# Patient Record
Sex: MALE | Race: WHITE | NOT HISPANIC OR LATINO | Employment: OTHER | URBAN - METROPOLITAN AREA
[De-identification: names, ages, dates, MRNs, and addresses within clinical notes are randomized per-mention and may not be internally consistent; named-entity substitution may affect disease eponyms.]

---

## 2017-10-19 ENCOUNTER — PATIENT MESSAGE (OUTPATIENT)
Dept: HEALTH INFORMATION MANAGEMENT | Facility: OTHER | Age: 72
End: 2017-10-19

## 2018-02-20 PROBLEM — Z91.148 CONTROLLED SUBSTANCE AGREEMENT BROKEN: Status: ACTIVE | Noted: 2018-02-20

## 2018-04-03 PROBLEM — Z91.148 CONTROLLED SUBSTANCE AGREEMENT BROKEN: Status: RESOLVED | Noted: 2018-02-20 | Resolved: 2018-04-03

## 2020-02-19 PROBLEM — G93.40 ENCEPHALOPATHY ACUTE: Status: ACTIVE | Noted: 2020-02-19

## 2020-02-19 PROBLEM — R29.6 RECURRENT FALLS: Status: ACTIVE | Noted: 2020-02-19

## 2020-02-19 PROBLEM — R79.1 SUBTHERAPEUTIC INTERNATIONAL NORMALIZED RATIO (INR): Status: ACTIVE | Noted: 2020-02-19

## 2020-02-19 PROBLEM — B35.6 TINEA CRURIS: Status: ACTIVE | Noted: 2020-02-19

## 2020-05-05 PROBLEM — R35.0 BENIGN PROSTATIC HYPERPLASIA WITH URINARY FREQUENCY: Status: ACTIVE | Noted: 2020-05-05

## 2020-05-05 PROBLEM — R31.29 MICROSCOPIC HEMATURIA: Status: ACTIVE | Noted: 2020-05-05

## 2020-05-05 PROBLEM — N28.1 ACQUIRED RENAL CYST: Status: ACTIVE | Noted: 2020-05-05

## 2020-05-05 PROBLEM — N40.1 BENIGN PROSTATIC HYPERPLASIA WITH URINARY FREQUENCY: Status: ACTIVE | Noted: 2020-05-05

## 2020-05-05 PROBLEM — R35.0 FREQUENCY OF MICTURITION: Status: ACTIVE | Noted: 2020-05-05

## 2020-05-05 PROBLEM — R35.1 NOCTURIA: Status: ACTIVE | Noted: 2020-05-05

## 2022-01-01 ENCOUNTER — APPOINTMENT (OUTPATIENT)
Dept: RADIOLOGY | Facility: MEDICAL CENTER | Age: 77
DRG: 870 | End: 2022-01-01
Attending: STUDENT IN AN ORGANIZED HEALTH CARE EDUCATION/TRAINING PROGRAM
Payer: COMMERCIAL

## 2022-01-01 ENCOUNTER — APPOINTMENT (OUTPATIENT)
Dept: RADIOLOGY | Facility: MEDICAL CENTER | Age: 77
DRG: 870 | End: 2022-01-01
Payer: COMMERCIAL

## 2022-01-01 ENCOUNTER — APPOINTMENT (OUTPATIENT)
Dept: RADIOLOGY | Facility: MEDICAL CENTER | Age: 77
DRG: 870 | End: 2022-01-01
Attending: INTERNAL MEDICINE
Payer: COMMERCIAL

## 2022-01-01 ENCOUNTER — APPOINTMENT (OUTPATIENT)
Dept: RADIOLOGY | Facility: MEDICAL CENTER | Age: 77
DRG: 870 | End: 2022-01-01
Attending: EMERGENCY MEDICINE
Payer: COMMERCIAL

## 2022-01-01 ENCOUNTER — HOSPITAL ENCOUNTER (OUTPATIENT)
Dept: RADIOLOGY | Facility: MEDICAL CENTER | Age: 77
End: 2022-11-04

## 2022-01-01 ENCOUNTER — HOSPITAL ENCOUNTER (INPATIENT)
Facility: MEDICAL CENTER | Age: 77
LOS: 36 days | DRG: 870 | End: 2022-12-10
Attending: EMERGENCY MEDICINE | Admitting: EMERGENCY MEDICINE
Payer: COMMERCIAL

## 2022-01-01 ENCOUNTER — APPOINTMENT (OUTPATIENT)
Dept: CARDIOLOGY | Facility: MEDICAL CENTER | Age: 77
DRG: 870 | End: 2022-01-01
Attending: EMERGENCY MEDICINE
Payer: COMMERCIAL

## 2022-01-01 ENCOUNTER — HOSPITAL ENCOUNTER (INPATIENT)
Facility: REHABILITATION | Age: 77
End: 2022-01-01
Attending: PHYSICAL MEDICINE & REHABILITATION | Admitting: PHYSICAL MEDICINE & REHABILITATION
Payer: COMMERCIAL

## 2022-01-01 ENCOUNTER — HOSPITAL ENCOUNTER (OUTPATIENT)
Dept: RADIOLOGY | Facility: MEDICAL CENTER | Age: 77
End: 2022-11-06
Attending: EMERGENCY MEDICINE
Payer: COMMERCIAL

## 2022-01-01 ENCOUNTER — PATIENT OUTREACH (OUTPATIENT)
Dept: SCHEDULING | Facility: IMAGING CENTER | Age: 77
End: 2022-01-01
Payer: COMMERCIAL

## 2022-01-01 VITALS
OXYGEN SATURATION: 87 % | DIASTOLIC BLOOD PRESSURE: 76 MMHG | RESPIRATION RATE: 25 BRPM | BODY MASS INDEX: 32.25 KG/M2 | SYSTOLIC BLOOD PRESSURE: 136 MMHG | WEIGHT: 251.32 LBS | TEMPERATURE: 99.1 F | HEART RATE: 90 BPM | HEIGHT: 74 IN

## 2022-01-01 DIAGNOSIS — R29.6 RECURRENT FALLS: ICD-10-CM

## 2022-01-01 DIAGNOSIS — A41.01: ICD-10-CM

## 2022-01-01 DIAGNOSIS — R65.21 SEPTIC SHOCK (HCC): ICD-10-CM

## 2022-01-01 DIAGNOSIS — A41.9 SEPTIC SHOCK (HCC): ICD-10-CM

## 2022-01-01 DIAGNOSIS — Z71.89 GOALS OF CARE, COUNSELING/DISCUSSION: ICD-10-CM

## 2022-01-01 DIAGNOSIS — N17.9 AKI (ACUTE KIDNEY INJURY) (HCC): ICD-10-CM

## 2022-01-01 DIAGNOSIS — K68.3 NONTRAUMATIC RETROPERITONEAL HEMATOMA: ICD-10-CM

## 2022-01-01 DIAGNOSIS — R65.21: ICD-10-CM

## 2022-01-01 LAB
ABO GROUP BLD: NORMAL
ABO GROUP BLD: NORMAL
ALBUMIN SERPL BCP-MCNC: 2.1 G/DL (ref 3.2–4.9)
ALBUMIN SERPL BCP-MCNC: 2.2 G/DL (ref 3.2–4.9)
ALBUMIN SERPL BCP-MCNC: 2.2 G/DL (ref 3.2–4.9)
ALBUMIN SERPL BCP-MCNC: 2.4 G/DL (ref 3.2–4.9)
ALBUMIN SERPL BCP-MCNC: 2.5 G/DL (ref 3.2–4.9)
ALBUMIN SERPL BCP-MCNC: 2.6 G/DL (ref 3.2–4.9)
ALBUMIN SERPL BCP-MCNC: 2.6 G/DL (ref 3.2–4.9)
ALBUMIN SERPL BCP-MCNC: 2.7 G/DL (ref 3.2–4.9)
ALBUMIN SERPL BCP-MCNC: 2.7 G/DL (ref 3.2–4.9)
ALBUMIN SERPL BCP-MCNC: 2.9 G/DL (ref 3.2–4.9)
ALBUMIN SERPL BCP-MCNC: 3 G/DL (ref 3.2–4.9)
ALBUMIN SERPL BCP-MCNC: 3.3 G/DL (ref 3.2–4.9)
ALBUMIN SERPL BCP-MCNC: 3.3 G/DL (ref 3.2–4.9)
ALBUMIN SERPL BCP-MCNC: 3.4 G/DL (ref 3.2–4.9)
ALBUMIN/GLOB SERPL: 0.5 G/DL
ALBUMIN/GLOB SERPL: 0.6 G/DL
ALBUMIN/GLOB SERPL: 1 G/DL
ALBUMIN/GLOB SERPL: 1.1 G/DL
ALBUMIN/GLOB SERPL: 1.1 G/DL
ALP SERPL-CCNC: 117 U/L (ref 30–99)
ALP SERPL-CCNC: 125 U/L (ref 30–99)
ALP SERPL-CCNC: 136 U/L (ref 30–99)
ALP SERPL-CCNC: 149 U/L (ref 30–99)
ALP SERPL-CCNC: 153 U/L (ref 30–99)
ALP SERPL-CCNC: 158 U/L (ref 30–99)
ALP SERPL-CCNC: 166 U/L (ref 30–99)
ALP SERPL-CCNC: 221 U/L (ref 30–99)
ALP SERPL-CCNC: 240 U/L (ref 30–99)
ALP SERPL-CCNC: 303 U/L (ref 30–99)
ALP SERPL-CCNC: 307 U/L (ref 30–99)
ALP SERPL-CCNC: 63 U/L (ref 30–99)
ALP SERPL-CCNC: 64 U/L (ref 30–99)
ALP SERPL-CCNC: 70 U/L (ref 30–99)
ALT SERPL-CCNC: 11 U/L (ref 2–50)
ALT SERPL-CCNC: 11 U/L (ref 2–50)
ALT SERPL-CCNC: 12 U/L (ref 2–50)
ALT SERPL-CCNC: 13 U/L (ref 2–50)
ALT SERPL-CCNC: 13 U/L (ref 2–50)
ALT SERPL-CCNC: 17 U/L (ref 2–50)
ALT SERPL-CCNC: 19 U/L (ref 2–50)
ALT SERPL-CCNC: 21 U/L (ref 2–50)
ALT SERPL-CCNC: 22 U/L (ref 2–50)
ALT SERPL-CCNC: 23 U/L (ref 2–50)
ALT SERPL-CCNC: 26 U/L (ref 2–50)
ALT SERPL-CCNC: 7 U/L (ref 2–50)
ALT SERPL-CCNC: 9 U/L (ref 2–50)
ALT SERPL-CCNC: 9 U/L (ref 2–50)
AMMONIA PLAS-SCNC: 23 UMOL/L (ref 11–45)
AMORPH CRY #/AREA URNS HPF: PRESENT /HPF
ANION GAP SERPL CALC-SCNC: 10 MMOL/L (ref 7–16)
ANION GAP SERPL CALC-SCNC: 11 MMOL/L (ref 7–16)
ANION GAP SERPL CALC-SCNC: 12 MMOL/L (ref 7–16)
ANION GAP SERPL CALC-SCNC: 12 MMOL/L (ref 7–16)
ANION GAP SERPL CALC-SCNC: 13 MMOL/L (ref 7–16)
ANION GAP SERPL CALC-SCNC: 14 MMOL/L (ref 7–16)
ANION GAP SERPL CALC-SCNC: 15 MMOL/L (ref 7–16)
ANION GAP SERPL CALC-SCNC: 16 MMOL/L (ref 7–16)
ANION GAP SERPL CALC-SCNC: 20 MMOL/L (ref 7–16)
ANION GAP SERPL CALC-SCNC: 22 MMOL/L (ref 7–16)
ANISOCYTOSIS BLD QL SMEAR: ABNORMAL
APPEARANCE UR: ABNORMAL
APTT PPP: 25.6 SEC (ref 24.7–36)
APTT PPP: 39.2 SEC (ref 24.7–36)
AST SERPL-CCNC: 18 U/L (ref 12–45)
AST SERPL-CCNC: 20 U/L (ref 12–45)
AST SERPL-CCNC: 22 U/L (ref 12–45)
AST SERPL-CCNC: 22 U/L (ref 12–45)
AST SERPL-CCNC: 23 U/L (ref 12–45)
AST SERPL-CCNC: 24 U/L (ref 12–45)
AST SERPL-CCNC: 25 U/L (ref 12–45)
AST SERPL-CCNC: 26 U/L (ref 12–45)
AST SERPL-CCNC: 30 U/L (ref 12–45)
AST SERPL-CCNC: 34 U/L (ref 12–45)
AST SERPL-CCNC: 35 U/L (ref 12–45)
AST SERPL-CCNC: 43 U/L (ref 12–45)
AST SERPL-CCNC: 65 U/L (ref 12–45)
AST SERPL-CCNC: 68 U/L (ref 12–45)
BACTERIA #/AREA URNS HPF: ABNORMAL /HPF
BACTERIA #/AREA URNS HPF: ABNORMAL /HPF
BACTERIA #/AREA URNS HPF: NEGATIVE /HPF
BACTERIA #/AREA URNS HPF: NEGATIVE /HPF
BACTERIA BLD CULT: NORMAL
BACTERIA SPEC RESP CULT: ABNORMAL
BACTERIA SPEC RESP CULT: ABNORMAL
BACTERIA UR CULT: NORMAL
BARCODED ABORH UBTYP: 2800
BARCODED ABORH UBTYP: 5100
BARCODED ABORH UBTYP: 6200
BARCODED PRD CODE UBPRD: NORMAL
BARCODED UNIT NUM UBUNT: NORMAL
BASE EXCESS BLDA CALC-SCNC: -1 MMOL/L (ref -4–3)
BASE EXCESS BLDA CALC-SCNC: -16 MMOL/L (ref -4–3)
BASE EXCESS BLDA CALC-SCNC: -17 MMOL/L (ref -4–3)
BASE EXCESS BLDA CALC-SCNC: -18 MMOL/L (ref -4–3)
BASE EXCESS BLDA CALC-SCNC: -2 MMOL/L (ref -4–3)
BASOPHILS # BLD AUTO: 0 % (ref 0–1.8)
BASOPHILS # BLD AUTO: 0.1 % (ref 0–1.8)
BASOPHILS # BLD AUTO: 0.2 % (ref 0–1.8)
BASOPHILS # BLD AUTO: 0.3 % (ref 0–1.8)
BASOPHILS # BLD AUTO: 0.3 % (ref 0–1.8)
BASOPHILS # BLD AUTO: 0.4 % (ref 0–1.8)
BASOPHILS # BLD AUTO: 0.5 % (ref 0–1.8)
BASOPHILS # BLD AUTO: 0.5 % (ref 0–1.8)
BASOPHILS # BLD AUTO: 0.6 % (ref 0–1.8)
BASOPHILS # BLD: 0 K/UL (ref 0–0.12)
BASOPHILS # BLD: 0.01 K/UL (ref 0–0.12)
BASOPHILS # BLD: 0.02 K/UL (ref 0–0.12)
BASOPHILS # BLD: 0.03 K/UL (ref 0–0.12)
BASOPHILS # BLD: 0.03 K/UL (ref 0–0.12)
BASOPHILS # BLD: 0.04 K/UL (ref 0–0.12)
BASOPHILS # BLD: 0.05 K/UL (ref 0–0.12)
BILIRUB SERPL-MCNC: 0.4 MG/DL (ref 0.1–1.5)
BILIRUB SERPL-MCNC: 0.7 MG/DL (ref 0.1–1.5)
BILIRUB SERPL-MCNC: 0.9 MG/DL (ref 0.1–1.5)
BILIRUB SERPL-MCNC: 1 MG/DL (ref 0.1–1.5)
BILIRUB SERPL-MCNC: 1 MG/DL (ref 0.1–1.5)
BILIRUB SERPL-MCNC: 1.1 MG/DL (ref 0.1–1.5)
BILIRUB SERPL-MCNC: 1.1 MG/DL (ref 0.1–1.5)
BILIRUB SERPL-MCNC: 1.2 MG/DL (ref 0.1–1.5)
BILIRUB SERPL-MCNC: 1.2 MG/DL (ref 0.1–1.5)
BILIRUB SERPL-MCNC: 1.3 MG/DL (ref 0.1–1.5)
BILIRUB SERPL-MCNC: 1.5 MG/DL (ref 0.1–1.5)
BILIRUB SERPL-MCNC: 1.6 MG/DL (ref 0.1–1.5)
BILIRUB UR QL STRIP.AUTO: ABNORMAL
BILIRUB UR QL STRIP.AUTO: ABNORMAL
BILIRUB UR QL STRIP.AUTO: NEGATIVE
BILIRUB UR QL STRIP.AUTO: NEGATIVE
BLD GP AB SCN SERPL QL: NORMAL
BLD GP AB SCN SERPL QL: NORMAL
BODY TEMPERATURE: ABNORMAL DEGREES
BREATHS SETTING VENT: 16
BREATHS SETTING VENT: 22
BREATHS SETTING VENT: 26
BREATHS SETTING VENT: 26
BUN SERPL-MCNC: 107 MG/DL (ref 8–22)
BUN SERPL-MCNC: 37 MG/DL (ref 8–22)
BUN SERPL-MCNC: 41 MG/DL (ref 8–22)
BUN SERPL-MCNC: 42 MG/DL (ref 8–22)
BUN SERPL-MCNC: 44 MG/DL (ref 8–22)
BUN SERPL-MCNC: 47 MG/DL (ref 8–22)
BUN SERPL-MCNC: 49 MG/DL (ref 8–22)
BUN SERPL-MCNC: 53 MG/DL (ref 8–22)
BUN SERPL-MCNC: 54 MG/DL (ref 8–22)
BUN SERPL-MCNC: 55 MG/DL (ref 8–22)
BUN SERPL-MCNC: 56 MG/DL (ref 8–22)
BUN SERPL-MCNC: 58 MG/DL (ref 8–22)
BUN SERPL-MCNC: 59 MG/DL (ref 8–22)
BUN SERPL-MCNC: 60 MG/DL (ref 8–22)
BUN SERPL-MCNC: 63 MG/DL (ref 8–22)
BUN SERPL-MCNC: 67 MG/DL (ref 8–22)
BUN SERPL-MCNC: 68 MG/DL (ref 8–22)
BUN SERPL-MCNC: 71 MG/DL (ref 8–22)
BUN SERPL-MCNC: 74 MG/DL (ref 8–22)
BUN SERPL-MCNC: 75 MG/DL (ref 8–22)
BUN SERPL-MCNC: 77 MG/DL (ref 8–22)
BUN SERPL-MCNC: 77 MG/DL (ref 8–22)
BUN SERPL-MCNC: 78 MG/DL (ref 8–22)
BUN SERPL-MCNC: 80 MG/DL (ref 8–22)
BUN SERPL-MCNC: 84 MG/DL (ref 8–22)
BUN SERPL-MCNC: 84 MG/DL (ref 8–22)
BUN SERPL-MCNC: 86 MG/DL (ref 8–22)
BUN SERPL-MCNC: 86 MG/DL (ref 8–22)
BUN SERPL-MCNC: 89 MG/DL (ref 8–22)
BUN SERPL-MCNC: 90 MG/DL (ref 8–22)
BUN SERPL-MCNC: 93 MG/DL (ref 8–22)
CA-I BLD ISE-SCNC: 0.97 MMOL/L (ref 1.1–1.3)
CA-I SERPL-SCNC: 1 MMOL/L (ref 1.1–1.3)
CALCIUM SERPL-MCNC: 6.7 MG/DL (ref 8.5–10.5)
CALCIUM SERPL-MCNC: 7.4 MG/DL (ref 8.5–10.5)
CALCIUM SERPL-MCNC: 7.5 MG/DL (ref 8.5–10.5)
CALCIUM SERPL-MCNC: 7.5 MG/DL (ref 8.5–10.5)
CALCIUM SERPL-MCNC: 7.7 MG/DL (ref 8.5–10.5)
CALCIUM SERPL-MCNC: 7.9 MG/DL (ref 8.5–10.5)
CALCIUM SERPL-MCNC: 8 MG/DL (ref 8.5–10.5)
CALCIUM SERPL-MCNC: 8 MG/DL (ref 8.5–10.5)
CALCIUM SERPL-MCNC: 8.1 MG/DL (ref 8.5–10.5)
CALCIUM SERPL-MCNC: 8.1 MG/DL (ref 8.5–10.5)
CALCIUM SERPL-MCNC: 8.2 MG/DL (ref 8.5–10.5)
CALCIUM SERPL-MCNC: 8.2 MG/DL (ref 8.5–10.5)
CALCIUM SERPL-MCNC: 8.3 MG/DL (ref 8.5–10.5)
CALCIUM SERPL-MCNC: 8.3 MG/DL (ref 8.5–10.5)
CALCIUM SERPL-MCNC: 8.4 MG/DL (ref 8.5–10.5)
CALCIUM SERPL-MCNC: 8.5 MG/DL (ref 8.5–10.5)
CALCIUM SERPL-MCNC: 8.6 MG/DL (ref 8.5–10.5)
CALCIUM SERPL-MCNC: 8.6 MG/DL (ref 8.5–10.5)
CALCIUM SERPL-MCNC: 8.7 MG/DL (ref 8.5–10.5)
CALCIUM SERPL-MCNC: 8.7 MG/DL (ref 8.5–10.5)
CALCIUM SERPL-MCNC: 8.8 MG/DL (ref 8.5–10.5)
CALCIUM SERPL-MCNC: 8.8 MG/DL (ref 8.5–10.5)
CALCIUM SERPL-MCNC: 8.9 MG/DL (ref 8.5–10.5)
CFT BLD TEG: 8.7 MIN (ref 4.6–9.1)
CFT BLD TEG: >17 MIN (ref 4.6–9.1)
CFT P HPASE BLD TEG: 8.2 MIN (ref 4.3–8.3)
CFT P HPASE BLD TEG: >17 MIN (ref 4.3–8.3)
CHLORIDE SERPL-SCNC: 100 MMOL/L (ref 96–112)
CHLORIDE SERPL-SCNC: 101 MMOL/L (ref 96–112)
CHLORIDE SERPL-SCNC: 101 MMOL/L (ref 96–112)
CHLORIDE SERPL-SCNC: 102 MMOL/L (ref 96–112)
CHLORIDE SERPL-SCNC: 102 MMOL/L (ref 96–112)
CHLORIDE SERPL-SCNC: 103 MMOL/L (ref 96–112)
CHLORIDE SERPL-SCNC: 103 MMOL/L (ref 96–112)
CHLORIDE SERPL-SCNC: 104 MMOL/L (ref 96–112)
CHLORIDE SERPL-SCNC: 105 MMOL/L (ref 96–112)
CHLORIDE SERPL-SCNC: 105 MMOL/L (ref 96–112)
CHLORIDE SERPL-SCNC: 106 MMOL/L (ref 96–112)
CHLORIDE SERPL-SCNC: 107 MMOL/L (ref 96–112)
CHLORIDE SERPL-SCNC: 107 MMOL/L (ref 96–112)
CHLORIDE SERPL-SCNC: 108 MMOL/L (ref 96–112)
CHLORIDE SERPL-SCNC: 108 MMOL/L (ref 96–112)
CHLORIDE SERPL-SCNC: 110 MMOL/L (ref 96–112)
CHLORIDE SERPL-SCNC: 110 MMOL/L (ref 96–112)
CHLORIDE SERPL-SCNC: 111 MMOL/L (ref 96–112)
CHLORIDE SERPL-SCNC: 112 MMOL/L (ref 96–112)
CHLORIDE SERPL-SCNC: 113 MMOL/L (ref 96–112)
CHLORIDE SERPL-SCNC: 115 MMOL/L (ref 96–112)
CHLORIDE SERPL-SCNC: 98 MMOL/L (ref 96–112)
CHLORIDE SERPL-SCNC: 98 MMOL/L (ref 96–112)
CHLORIDE SERPL-SCNC: 99 MMOL/L (ref 96–112)
CHLORIDE SERPL-SCNC: 99 MMOL/L (ref 96–112)
CHLORIDE UR-SCNC: <20 MMOL/L
CLOT ANGLE BLD TEG: 69.3 DEGREES (ref 63–78)
CLOT ANGLE BLD TEG: <39 DEGREES (ref 63–78)
CLOT LYSIS 30M P MA LENFR BLD TEG: 0 % (ref 0–2.6)
CLOT LYSIS 30M P MA LENFR BLD TEG: ABNORMAL % (ref 0–2.6)
CO2 BLDA-SCNC: 14 MMOL/L (ref 20–33)
CO2 BLDA-SCNC: 17 MMOL/L (ref 20–33)
CO2 BLDA-SCNC: 17 MMOL/L (ref 20–33)
CO2 BLDA-SCNC: 24 MMOL/L (ref 20–33)
CO2 BLDA-SCNC: 25 MMOL/L (ref 20–33)
CO2 SERPL-SCNC: 13 MMOL/L (ref 20–33)
CO2 SERPL-SCNC: 17 MMOL/L (ref 20–33)
CO2 SERPL-SCNC: 18 MMOL/L (ref 20–33)
CO2 SERPL-SCNC: 19 MMOL/L (ref 20–33)
CO2 SERPL-SCNC: 20 MMOL/L (ref 20–33)
CO2 SERPL-SCNC: 21 MMOL/L (ref 20–33)
CO2 SERPL-SCNC: 22 MMOL/L (ref 20–33)
CO2 SERPL-SCNC: 23 MMOL/L (ref 20–33)
CO2 SERPL-SCNC: 24 MMOL/L (ref 20–33)
CO2 SERPL-SCNC: 24 MMOL/L (ref 20–33)
CO2 SERPL-SCNC: 25 MMOL/L (ref 20–33)
COLOR UR: ABNORMAL
COMMENT 1642: NORMAL
COMPONENT CT 8504CT: NORMAL
COMPONENT CT 8504CT: NORMAL
COMPONENT F 8504F: NORMAL
COMPONENT R 8504R: NORMAL
CREAT SERPL-MCNC: 1.21 MG/DL (ref 0.5–1.4)
CREAT SERPL-MCNC: 1.29 MG/DL (ref 0.5–1.4)
CREAT SERPL-MCNC: 1.39 MG/DL (ref 0.5–1.4)
CREAT SERPL-MCNC: 1.43 MG/DL (ref 0.5–1.4)
CREAT SERPL-MCNC: 1.55 MG/DL (ref 0.5–1.4)
CREAT SERPL-MCNC: 1.97 MG/DL (ref 0.5–1.4)
CREAT SERPL-MCNC: 2.02 MG/DL (ref 0.5–1.4)
CREAT SERPL-MCNC: 2.27 MG/DL (ref 0.5–1.4)
CREAT SERPL-MCNC: 2.38 MG/DL (ref 0.5–1.4)
CREAT SERPL-MCNC: 2.47 MG/DL (ref 0.5–1.4)
CREAT SERPL-MCNC: 2.62 MG/DL (ref 0.5–1.4)
CREAT SERPL-MCNC: 2.62 MG/DL (ref 0.5–1.4)
CREAT SERPL-MCNC: 2.88 MG/DL (ref 0.5–1.4)
CREAT SERPL-MCNC: 2.96 MG/DL (ref 0.5–1.4)
CREAT SERPL-MCNC: 2.97 MG/DL (ref 0.5–1.4)
CREAT SERPL-MCNC: 2.98 MG/DL (ref 0.5–1.4)
CREAT SERPL-MCNC: 2.99 MG/DL (ref 0.5–1.4)
CREAT SERPL-MCNC: 3.02 MG/DL (ref 0.5–1.4)
CREAT SERPL-MCNC: 3.05 MG/DL (ref 0.5–1.4)
CREAT SERPL-MCNC: 3.12 MG/DL (ref 0.5–1.4)
CREAT SERPL-MCNC: 3.18 MG/DL (ref 0.5–1.4)
CREAT SERPL-MCNC: 3.74 MG/DL (ref 0.5–1.4)
CREAT SERPL-MCNC: 3.75 MG/DL (ref 0.5–1.4)
CREAT SERPL-MCNC: 3.87 MG/DL (ref 0.5–1.4)
CREAT SERPL-MCNC: 4.15 MG/DL (ref 0.5–1.4)
CREAT SERPL-MCNC: 4.28 MG/DL (ref 0.5–1.4)
CREAT SERPL-MCNC: 4.3 MG/DL (ref 0.5–1.4)
CREAT SERPL-MCNC: 4.41 MG/DL (ref 0.5–1.4)
CREAT SERPL-MCNC: 4.44 MG/DL (ref 0.5–1.4)
CREAT SERPL-MCNC: 4.69 MG/DL (ref 0.5–1.4)
CREAT SERPL-MCNC: 4.89 MG/DL (ref 0.5–1.4)
CREAT SERPL-MCNC: 4.98 MG/DL (ref 0.5–1.4)
CREAT SERPL-MCNC: 5.08 MG/DL (ref 0.5–1.4)
CREAT SERPL-MCNC: 7.66 MG/DL (ref 0.5–1.4)
CREAT UR-MCNC: 79.38 MG/DL
CRP SERPL HS-MCNC: 1.62 MG/DL (ref 0–0.75)
CRP SERPL HS-MCNC: 16.27 MG/DL (ref 0–0.75)
CRP SERPL HS-MCNC: 17.61 MG/DL (ref 0–0.75)
CRP SERPL HS-MCNC: 2.59 MG/DL (ref 0–0.75)
CRP SERPL HS-MCNC: 5.65 MG/DL (ref 0–0.75)
CRP SERPL HS-MCNC: 9.47 MG/DL (ref 0–0.75)
CT.EXTRINSIC BLD ROTEM: 2 MIN (ref 0.8–2.1)
CT.EXTRINSIC BLD ROTEM: ABNORMAL MIN (ref 0.8–2.1)
DELSYS IDSYS: ABNORMAL
EKG IMPRESSION: NORMAL
END TIDAL CARBON DIOXIDE IECO2: 27 MMHG
END TIDAL CARBON DIOXIDE IECO2: 27 MMHG
END TIDAL CARBON DIOXIDE IECO2: 31 MMHG
END TIDAL CARBON DIOXIDE IECO2: 39 MMHG
EOSINOPHIL # BLD AUTO: 0 K/UL (ref 0–0.51)
EOSINOPHIL # BLD AUTO: 0.01 K/UL (ref 0–0.51)
EOSINOPHIL # BLD AUTO: 0.12 K/UL (ref 0–0.51)
EOSINOPHIL # BLD AUTO: 0.2 K/UL (ref 0–0.51)
EOSINOPHIL # BLD AUTO: 0.23 K/UL (ref 0–0.51)
EOSINOPHIL # BLD AUTO: 0.24 K/UL (ref 0–0.51)
EOSINOPHIL # BLD AUTO: 0.24 K/UL (ref 0–0.51)
EOSINOPHIL # BLD AUTO: 0.28 K/UL (ref 0–0.51)
EOSINOPHIL # BLD AUTO: 0.3 K/UL (ref 0–0.51)
EOSINOPHIL # BLD AUTO: 0.33 K/UL (ref 0–0.51)
EOSINOPHIL # BLD AUTO: 0.37 K/UL (ref 0–0.51)
EOSINOPHIL # BLD AUTO: 0.41 K/UL (ref 0–0.51)
EOSINOPHIL # BLD AUTO: 0.43 K/UL (ref 0–0.51)
EOSINOPHIL NFR BLD: 0 % (ref 0–6.9)
EOSINOPHIL NFR BLD: 0.1 % (ref 0–6.9)
EOSINOPHIL NFR BLD: 1.2 % (ref 0–6.9)
EOSINOPHIL NFR BLD: 2.1 % (ref 0–6.9)
EOSINOPHIL NFR BLD: 2.5 % (ref 0–6.9)
EOSINOPHIL NFR BLD: 2.6 % (ref 0–6.9)
EOSINOPHIL NFR BLD: 2.9 % (ref 0–6.9)
EOSINOPHIL NFR BLD: 3.1 % (ref 0–6.9)
EOSINOPHIL NFR BLD: 3.2 % (ref 0–6.9)
EOSINOPHIL NFR BLD: 3.6 % (ref 0–6.9)
EOSINOPHIL NFR BLD: 4.4 % (ref 0–6.9)
EOSINOPHIL NFR BLD: 4.5 % (ref 0–6.9)
EOSINOPHIL NFR BLD: 4.9 % (ref 0–6.9)
EPI CELLS #/AREA URNS HPF: ABNORMAL /HPF
EPI CELLS #/AREA URNS HPF: NEGATIVE /HPF
EPI CELLS #/AREA URNS HPF: NEGATIVE /HPF
ERYTHROCYTE [DISTWIDTH] IN BLOOD BY AUTOMATED COUNT: 45 FL (ref 35.9–50)
ERYTHROCYTE [DISTWIDTH] IN BLOOD BY AUTOMATED COUNT: 46 FL (ref 35.9–50)
ERYTHROCYTE [DISTWIDTH] IN BLOOD BY AUTOMATED COUNT: 47.6 FL (ref 35.9–50)
ERYTHROCYTE [DISTWIDTH] IN BLOOD BY AUTOMATED COUNT: 48.1 FL (ref 35.9–50)
ERYTHROCYTE [DISTWIDTH] IN BLOOD BY AUTOMATED COUNT: 48.3 FL (ref 35.9–50)
ERYTHROCYTE [DISTWIDTH] IN BLOOD BY AUTOMATED COUNT: 48.4 FL (ref 35.9–50)
ERYTHROCYTE [DISTWIDTH] IN BLOOD BY AUTOMATED COUNT: 48.5 FL (ref 35.9–50)
ERYTHROCYTE [DISTWIDTH] IN BLOOD BY AUTOMATED COUNT: 48.7 FL (ref 35.9–50)
ERYTHROCYTE [DISTWIDTH] IN BLOOD BY AUTOMATED COUNT: 48.8 FL (ref 35.9–50)
ERYTHROCYTE [DISTWIDTH] IN BLOOD BY AUTOMATED COUNT: 49 FL (ref 35.9–50)
ERYTHROCYTE [DISTWIDTH] IN BLOOD BY AUTOMATED COUNT: 49.4 FL (ref 35.9–50)
ERYTHROCYTE [DISTWIDTH] IN BLOOD BY AUTOMATED COUNT: 49.5 FL (ref 35.9–50)
ERYTHROCYTE [DISTWIDTH] IN BLOOD BY AUTOMATED COUNT: 49.6 FL (ref 35.9–50)
ERYTHROCYTE [DISTWIDTH] IN BLOOD BY AUTOMATED COUNT: 49.7 FL (ref 35.9–50)
ERYTHROCYTE [DISTWIDTH] IN BLOOD BY AUTOMATED COUNT: 50.1 FL (ref 35.9–50)
ERYTHROCYTE [DISTWIDTH] IN BLOOD BY AUTOMATED COUNT: 50.1 FL (ref 35.9–50)
ERYTHROCYTE [DISTWIDTH] IN BLOOD BY AUTOMATED COUNT: 50.2 FL (ref 35.9–50)
ERYTHROCYTE [DISTWIDTH] IN BLOOD BY AUTOMATED COUNT: 50.3 FL (ref 35.9–50)
ERYTHROCYTE [DISTWIDTH] IN BLOOD BY AUTOMATED COUNT: 50.6 FL (ref 35.9–50)
ERYTHROCYTE [DISTWIDTH] IN BLOOD BY AUTOMATED COUNT: 50.6 FL (ref 35.9–50)
ERYTHROCYTE [DISTWIDTH] IN BLOOD BY AUTOMATED COUNT: 51.1 FL (ref 35.9–50)
ERYTHROCYTE [DISTWIDTH] IN BLOOD BY AUTOMATED COUNT: 51.4 FL (ref 35.9–50)
ERYTHROCYTE [DISTWIDTH] IN BLOOD BY AUTOMATED COUNT: 51.5 FL (ref 35.9–50)
ERYTHROCYTE [DISTWIDTH] IN BLOOD BY AUTOMATED COUNT: 51.5 FL (ref 35.9–50)
ERYTHROCYTE [DISTWIDTH] IN BLOOD BY AUTOMATED COUNT: 52 FL (ref 35.9–50)
ERYTHROCYTE [DISTWIDTH] IN BLOOD BY AUTOMATED COUNT: 52.5 FL (ref 35.9–50)
FERRITIN SERPL-MCNC: 1216 NG/ML (ref 22–322)
FERRITIN SERPL-MCNC: 807 NG/ML (ref 22–322)
FIBRINOGEN PPP-MCNC: 438 MG/DL (ref 215–460)
GAMMA INTERFERON BACKGROUND BLD IA-ACNC: 0.02 IU/ML
GFR SERPLBLD CREATININE-BSD FMLA CKD-EPI: 11 ML/MIN/1.73 M 2
GFR SERPLBLD CREATININE-BSD FMLA CKD-EPI: 11 ML/MIN/1.73 M 2
GFR SERPLBLD CREATININE-BSD FMLA CKD-EPI: 12 ML/MIN/1.73 M 2
GFR SERPLBLD CREATININE-BSD FMLA CKD-EPI: 12 ML/MIN/1.73 M 2
GFR SERPLBLD CREATININE-BSD FMLA CKD-EPI: 13 ML/MIN/1.73 M 2
GFR SERPLBLD CREATININE-BSD FMLA CKD-EPI: 14 ML/MIN/1.73 M 2
GFR SERPLBLD CREATININE-BSD FMLA CKD-EPI: 14 ML/MIN/1.73 M 2
GFR SERPLBLD CREATININE-BSD FMLA CKD-EPI: 15 ML/MIN/1.73 M 2
GFR SERPLBLD CREATININE-BSD FMLA CKD-EPI: 16 ML/MIN/1.73 M 2
GFR SERPLBLD CREATININE-BSD FMLA CKD-EPI: 16 ML/MIN/1.73 M 2
GFR SERPLBLD CREATININE-BSD FMLA CKD-EPI: 19 ML/MIN/1.73 M 2
GFR SERPLBLD CREATININE-BSD FMLA CKD-EPI: 20 ML/MIN/1.73 M 2
GFR SERPLBLD CREATININE-BSD FMLA CKD-EPI: 20 ML/MIN/1.73 M 2
GFR SERPLBLD CREATININE-BSD FMLA CKD-EPI: 21 ML/MIN/1.73 M 2
GFR SERPLBLD CREATININE-BSD FMLA CKD-EPI: 22 ML/MIN/1.73 M 2
GFR SERPLBLD CREATININE-BSD FMLA CKD-EPI: 24 ML/MIN/1.73 M 2
GFR SERPLBLD CREATININE-BSD FMLA CKD-EPI: 24 ML/MIN/1.73 M 2
GFR SERPLBLD CREATININE-BSD FMLA CKD-EPI: 26 ML/MIN/1.73 M 2
GFR SERPLBLD CREATININE-BSD FMLA CKD-EPI: 27 ML/MIN/1.73 M 2
GFR SERPLBLD CREATININE-BSD FMLA CKD-EPI: 29 ML/MIN/1.73 M 2
GFR SERPLBLD CREATININE-BSD FMLA CKD-EPI: 33 ML/MIN/1.73 M 2
GFR SERPLBLD CREATININE-BSD FMLA CKD-EPI: 34 ML/MIN/1.73 M 2
GFR SERPLBLD CREATININE-BSD FMLA CKD-EPI: 46 ML/MIN/1.73 M 2
GFR SERPLBLD CREATININE-BSD FMLA CKD-EPI: 51 ML/MIN/1.73 M 2
GFR SERPLBLD CREATININE-BSD FMLA CKD-EPI: 52 ML/MIN/1.73 M 2
GFR SERPLBLD CREATININE-BSD FMLA CKD-EPI: 57 ML/MIN/1.73 M 2
GFR SERPLBLD CREATININE-BSD FMLA CKD-EPI: 62 ML/MIN/1.73 M 2
GFR SERPLBLD CREATININE-BSD FMLA CKD-EPI: 7 ML/MIN/1.73 M 2
GIANT PLATELETS BLD QL SMEAR: NORMAL
GLOBULIN SER CALC-MCNC: 3 G/DL (ref 1.9–3.5)
GLOBULIN SER CALC-MCNC: 3.1 G/DL (ref 1.9–3.5)
GLOBULIN SER CALC-MCNC: 3.4 G/DL (ref 1.9–3.5)
GLOBULIN SER CALC-MCNC: 4 G/DL (ref 1.9–3.5)
GLOBULIN SER CALC-MCNC: 4.2 G/DL (ref 1.9–3.5)
GLOBULIN SER CALC-MCNC: 4.5 G/DL (ref 1.9–3.5)
GLOBULIN SER CALC-MCNC: 4.6 G/DL (ref 1.9–3.5)
GLOBULIN SER CALC-MCNC: 4.8 G/DL (ref 1.9–3.5)
GLOBULIN SER CALC-MCNC: 5 G/DL (ref 1.9–3.5)
GLUCOSE BLD STRIP.AUTO-MCNC: 100 MG/DL (ref 65–99)
GLUCOSE BLD STRIP.AUTO-MCNC: 104 MG/DL (ref 65–99)
GLUCOSE BLD STRIP.AUTO-MCNC: 105 MG/DL (ref 65–99)
GLUCOSE BLD STRIP.AUTO-MCNC: 106 MG/DL (ref 65–99)
GLUCOSE BLD STRIP.AUTO-MCNC: 108 MG/DL (ref 65–99)
GLUCOSE BLD STRIP.AUTO-MCNC: 110 MG/DL (ref 65–99)
GLUCOSE BLD STRIP.AUTO-MCNC: 110 MG/DL (ref 65–99)
GLUCOSE BLD STRIP.AUTO-MCNC: 113 MG/DL (ref 65–99)
GLUCOSE BLD STRIP.AUTO-MCNC: 115 MG/DL (ref 65–99)
GLUCOSE BLD STRIP.AUTO-MCNC: 116 MG/DL (ref 65–99)
GLUCOSE BLD STRIP.AUTO-MCNC: 116 MG/DL (ref 65–99)
GLUCOSE BLD STRIP.AUTO-MCNC: 119 MG/DL (ref 65–99)
GLUCOSE BLD STRIP.AUTO-MCNC: 119 MG/DL (ref 65–99)
GLUCOSE BLD STRIP.AUTO-MCNC: 120 MG/DL (ref 65–99)
GLUCOSE BLD STRIP.AUTO-MCNC: 121 MG/DL (ref 65–99)
GLUCOSE BLD STRIP.AUTO-MCNC: 122 MG/DL (ref 65–99)
GLUCOSE BLD STRIP.AUTO-MCNC: 122 MG/DL (ref 65–99)
GLUCOSE BLD STRIP.AUTO-MCNC: 123 MG/DL (ref 65–99)
GLUCOSE BLD STRIP.AUTO-MCNC: 123 MG/DL (ref 65–99)
GLUCOSE BLD STRIP.AUTO-MCNC: 124 MG/DL (ref 65–99)
GLUCOSE BLD STRIP.AUTO-MCNC: 125 MG/DL (ref 65–99)
GLUCOSE BLD STRIP.AUTO-MCNC: 126 MG/DL (ref 65–99)
GLUCOSE BLD STRIP.AUTO-MCNC: 126 MG/DL (ref 65–99)
GLUCOSE BLD STRIP.AUTO-MCNC: 128 MG/DL (ref 65–99)
GLUCOSE BLD STRIP.AUTO-MCNC: 129 MG/DL (ref 65–99)
GLUCOSE BLD STRIP.AUTO-MCNC: 130 MG/DL (ref 65–99)
GLUCOSE BLD STRIP.AUTO-MCNC: 131 MG/DL (ref 65–99)
GLUCOSE BLD STRIP.AUTO-MCNC: 132 MG/DL (ref 65–99)
GLUCOSE BLD STRIP.AUTO-MCNC: 133 MG/DL (ref 65–99)
GLUCOSE BLD STRIP.AUTO-MCNC: 134 MG/DL (ref 65–99)
GLUCOSE BLD STRIP.AUTO-MCNC: 134 MG/DL (ref 65–99)
GLUCOSE BLD STRIP.AUTO-MCNC: 135 MG/DL (ref 65–99)
GLUCOSE BLD STRIP.AUTO-MCNC: 136 MG/DL (ref 65–99)
GLUCOSE BLD STRIP.AUTO-MCNC: 136 MG/DL (ref 65–99)
GLUCOSE BLD STRIP.AUTO-MCNC: 137 MG/DL (ref 65–99)
GLUCOSE BLD STRIP.AUTO-MCNC: 138 MG/DL (ref 65–99)
GLUCOSE BLD STRIP.AUTO-MCNC: 139 MG/DL (ref 65–99)
GLUCOSE BLD STRIP.AUTO-MCNC: 139 MG/DL (ref 65–99)
GLUCOSE BLD STRIP.AUTO-MCNC: 140 MG/DL (ref 65–99)
GLUCOSE BLD STRIP.AUTO-MCNC: 140 MG/DL (ref 65–99)
GLUCOSE BLD STRIP.AUTO-MCNC: 142 MG/DL (ref 65–99)
GLUCOSE BLD STRIP.AUTO-MCNC: 145 MG/DL (ref 65–99)
GLUCOSE BLD STRIP.AUTO-MCNC: 145 MG/DL (ref 65–99)
GLUCOSE BLD STRIP.AUTO-MCNC: 146 MG/DL (ref 65–99)
GLUCOSE BLD STRIP.AUTO-MCNC: 150 MG/DL (ref 65–99)
GLUCOSE BLD STRIP.AUTO-MCNC: 152 MG/DL (ref 65–99)
GLUCOSE BLD STRIP.AUTO-MCNC: 153 MG/DL (ref 65–99)
GLUCOSE BLD STRIP.AUTO-MCNC: 154 MG/DL (ref 65–99)
GLUCOSE BLD STRIP.AUTO-MCNC: 155 MG/DL (ref 65–99)
GLUCOSE BLD STRIP.AUTO-MCNC: 155 MG/DL (ref 65–99)
GLUCOSE BLD STRIP.AUTO-MCNC: 158 MG/DL (ref 65–99)
GLUCOSE BLD STRIP.AUTO-MCNC: 159 MG/DL (ref 65–99)
GLUCOSE BLD STRIP.AUTO-MCNC: 159 MG/DL (ref 65–99)
GLUCOSE BLD STRIP.AUTO-MCNC: 160 MG/DL (ref 65–99)
GLUCOSE BLD STRIP.AUTO-MCNC: 160 MG/DL (ref 65–99)
GLUCOSE BLD STRIP.AUTO-MCNC: 162 MG/DL (ref 65–99)
GLUCOSE BLD STRIP.AUTO-MCNC: 162 MG/DL (ref 65–99)
GLUCOSE BLD STRIP.AUTO-MCNC: 163 MG/DL (ref 65–99)
GLUCOSE BLD STRIP.AUTO-MCNC: 165 MG/DL (ref 65–99)
GLUCOSE BLD STRIP.AUTO-MCNC: 166 MG/DL (ref 65–99)
GLUCOSE BLD STRIP.AUTO-MCNC: 167 MG/DL (ref 65–99)
GLUCOSE BLD STRIP.AUTO-MCNC: 168 MG/DL (ref 65–99)
GLUCOSE BLD STRIP.AUTO-MCNC: 169 MG/DL (ref 65–99)
GLUCOSE BLD STRIP.AUTO-MCNC: 169 MG/DL (ref 65–99)
GLUCOSE BLD STRIP.AUTO-MCNC: 170 MG/DL (ref 65–99)
GLUCOSE BLD STRIP.AUTO-MCNC: 170 MG/DL (ref 65–99)
GLUCOSE BLD STRIP.AUTO-MCNC: 171 MG/DL (ref 65–99)
GLUCOSE BLD STRIP.AUTO-MCNC: 172 MG/DL (ref 65–99)
GLUCOSE BLD STRIP.AUTO-MCNC: 174 MG/DL (ref 65–99)
GLUCOSE BLD STRIP.AUTO-MCNC: 174 MG/DL (ref 65–99)
GLUCOSE BLD STRIP.AUTO-MCNC: 175 MG/DL (ref 65–99)
GLUCOSE BLD STRIP.AUTO-MCNC: 180 MG/DL (ref 65–99)
GLUCOSE BLD STRIP.AUTO-MCNC: 180 MG/DL (ref 65–99)
GLUCOSE BLD STRIP.AUTO-MCNC: 182 MG/DL (ref 65–99)
GLUCOSE BLD STRIP.AUTO-MCNC: 182 MG/DL (ref 65–99)
GLUCOSE BLD STRIP.AUTO-MCNC: 183 MG/DL (ref 65–99)
GLUCOSE BLD STRIP.AUTO-MCNC: 184 MG/DL (ref 65–99)
GLUCOSE BLD STRIP.AUTO-MCNC: 185 MG/DL (ref 65–99)
GLUCOSE BLD STRIP.AUTO-MCNC: 185 MG/DL (ref 65–99)
GLUCOSE BLD STRIP.AUTO-MCNC: 186 MG/DL (ref 65–99)
GLUCOSE BLD STRIP.AUTO-MCNC: 186 MG/DL (ref 65–99)
GLUCOSE BLD STRIP.AUTO-MCNC: 187 MG/DL (ref 65–99)
GLUCOSE BLD STRIP.AUTO-MCNC: 189 MG/DL (ref 65–99)
GLUCOSE BLD STRIP.AUTO-MCNC: 194 MG/DL (ref 65–99)
GLUCOSE BLD STRIP.AUTO-MCNC: 197 MG/DL (ref 65–99)
GLUCOSE BLD STRIP.AUTO-MCNC: 198 MG/DL (ref 65–99)
GLUCOSE BLD STRIP.AUTO-MCNC: 199 MG/DL (ref 65–99)
GLUCOSE BLD STRIP.AUTO-MCNC: 202 MG/DL (ref 65–99)
GLUCOSE BLD STRIP.AUTO-MCNC: 203 MG/DL (ref 65–99)
GLUCOSE BLD STRIP.AUTO-MCNC: 204 MG/DL (ref 65–99)
GLUCOSE BLD STRIP.AUTO-MCNC: 207 MG/DL (ref 65–99)
GLUCOSE BLD STRIP.AUTO-MCNC: 208 MG/DL (ref 65–99)
GLUCOSE BLD STRIP.AUTO-MCNC: 214 MG/DL (ref 65–99)
GLUCOSE BLD STRIP.AUTO-MCNC: 217 MG/DL (ref 65–99)
GLUCOSE BLD STRIP.AUTO-MCNC: 220 MG/DL (ref 65–99)
GLUCOSE BLD STRIP.AUTO-MCNC: 222 MG/DL (ref 65–99)
GLUCOSE BLD STRIP.AUTO-MCNC: 224 MG/DL (ref 65–99)
GLUCOSE BLD STRIP.AUTO-MCNC: 225 MG/DL (ref 65–99)
GLUCOSE BLD STRIP.AUTO-MCNC: 227 MG/DL (ref 65–99)
GLUCOSE BLD STRIP.AUTO-MCNC: 227 MG/DL (ref 65–99)
GLUCOSE BLD STRIP.AUTO-MCNC: 228 MG/DL (ref 65–99)
GLUCOSE BLD STRIP.AUTO-MCNC: 242 MG/DL (ref 65–99)
GLUCOSE BLD STRIP.AUTO-MCNC: 244 MG/DL (ref 65–99)
GLUCOSE BLD STRIP.AUTO-MCNC: 252 MG/DL (ref 65–99)
GLUCOSE BLD STRIP.AUTO-MCNC: 266 MG/DL (ref 65–99)
GLUCOSE BLD STRIP.AUTO-MCNC: 266 MG/DL (ref 65–99)
GLUCOSE BLD STRIP.AUTO-MCNC: 318 MG/DL (ref 65–99)
GLUCOSE BLD STRIP.AUTO-MCNC: 325 MG/DL (ref 65–99)
GLUCOSE BLD STRIP.AUTO-MCNC: 339 MG/DL (ref 65–99)
GLUCOSE BLD STRIP.AUTO-MCNC: 340 MG/DL (ref 65–99)
GLUCOSE BLD STRIP.AUTO-MCNC: 346 MG/DL (ref 65–99)
GLUCOSE BLD STRIP.AUTO-MCNC: 353 MG/DL (ref 65–99)
GLUCOSE BLD STRIP.AUTO-MCNC: 70 MG/DL (ref 65–99)
GLUCOSE BLD STRIP.AUTO-MCNC: 85 MG/DL (ref 65–99)
GLUCOSE BLD STRIP.AUTO-MCNC: 91 MG/DL (ref 65–99)
GLUCOSE BLD STRIP.AUTO-MCNC: 95 MG/DL (ref 65–99)
GLUCOSE BLD STRIP.AUTO-MCNC: 95 MG/DL (ref 65–99)
GLUCOSE BLD STRIP.AUTO-MCNC: 97 MG/DL (ref 65–99)
GLUCOSE BLD STRIP.AUTO-MCNC: 98 MG/DL (ref 65–99)
GLUCOSE SERPL-MCNC: 110 MG/DL (ref 65–99)
GLUCOSE SERPL-MCNC: 111 MG/DL (ref 65–99)
GLUCOSE SERPL-MCNC: 117 MG/DL (ref 65–99)
GLUCOSE SERPL-MCNC: 121 MG/DL (ref 65–99)
GLUCOSE SERPL-MCNC: 123 MG/DL (ref 65–99)
GLUCOSE SERPL-MCNC: 126 MG/DL (ref 65–99)
GLUCOSE SERPL-MCNC: 130 MG/DL (ref 65–99)
GLUCOSE SERPL-MCNC: 135 MG/DL (ref 65–99)
GLUCOSE SERPL-MCNC: 136 MG/DL (ref 65–99)
GLUCOSE SERPL-MCNC: 137 MG/DL (ref 65–99)
GLUCOSE SERPL-MCNC: 139 MG/DL (ref 65–99)
GLUCOSE SERPL-MCNC: 146 MG/DL (ref 65–99)
GLUCOSE SERPL-MCNC: 151 MG/DL (ref 65–99)
GLUCOSE SERPL-MCNC: 153 MG/DL (ref 65–99)
GLUCOSE SERPL-MCNC: 157 MG/DL (ref 65–99)
GLUCOSE SERPL-MCNC: 159 MG/DL (ref 65–99)
GLUCOSE SERPL-MCNC: 162 MG/DL (ref 65–99)
GLUCOSE SERPL-MCNC: 163 MG/DL (ref 65–99)
GLUCOSE SERPL-MCNC: 168 MG/DL (ref 65–99)
GLUCOSE SERPL-MCNC: 175 MG/DL (ref 65–99)
GLUCOSE SERPL-MCNC: 182 MG/DL (ref 65–99)
GLUCOSE SERPL-MCNC: 183 MG/DL (ref 65–99)
GLUCOSE SERPL-MCNC: 184 MG/DL (ref 65–99)
GLUCOSE SERPL-MCNC: 188 MG/DL (ref 65–99)
GLUCOSE SERPL-MCNC: 195 MG/DL (ref 65–99)
GLUCOSE SERPL-MCNC: 197 MG/DL (ref 65–99)
GLUCOSE SERPL-MCNC: 211 MG/DL (ref 65–99)
GLUCOSE SERPL-MCNC: 241 MG/DL (ref 65–99)
GLUCOSE SERPL-MCNC: 260 MG/DL (ref 65–99)
GLUCOSE SERPL-MCNC: 271 MG/DL (ref 65–99)
GLUCOSE SERPL-MCNC: 292 MG/DL (ref 65–99)
GLUCOSE SERPL-MCNC: 345 MG/DL (ref 65–99)
GLUCOSE SERPL-MCNC: 347 MG/DL (ref 65–99)
GLUCOSE SERPL-MCNC: 80 MG/DL (ref 65–99)
GLUCOSE UR STRIP.AUTO-MCNC: NEGATIVE MG/DL
GRAM STN SPEC: ABNORMAL
GRAM STN SPEC: NORMAL
GRAN CASTS #/AREA URNS LPF: ABNORMAL /LPF
GRAN CASTS #/AREA URNS LPF: ABNORMAL /LPF
HAV IGM SERPL QL IA: NORMAL
HBV CORE IGM SER QL: NORMAL
HBV SURFACE AB SERPL IA-ACNC: <3.5 MIU/ML (ref 0–10)
HBV SURFACE AB SERPL IA-ACNC: <3.5 MIU/ML (ref 0–10)
HBV SURFACE AG SER QL: NORMAL
HCO3 BLDA-SCNC: 12.6 MMOL/L (ref 17–25)
HCO3 BLDA-SCNC: 15.1 MMOL/L (ref 17–25)
HCO3 BLDA-SCNC: 15.5 MMOL/L (ref 17–25)
HCO3 BLDA-SCNC: 22.6 MMOL/L (ref 17–25)
HCO3 BLDA-SCNC: 23.5 MMOL/L (ref 17–25)
HCT VFR BLD AUTO: 21.7 % (ref 42–52)
HCT VFR BLD AUTO: 23 % (ref 42–52)
HCT VFR BLD AUTO: 23.2 % (ref 42–52)
HCT VFR BLD AUTO: 23.2 % (ref 42–52)
HCT VFR BLD AUTO: 23.5 % (ref 42–52)
HCT VFR BLD AUTO: 23.6 % (ref 42–52)
HCT VFR BLD AUTO: 23.7 % (ref 42–52)
HCT VFR BLD AUTO: 24.1 % (ref 42–52)
HCT VFR BLD AUTO: 24.1 % (ref 42–52)
HCT VFR BLD AUTO: 24.3 % (ref 42–52)
HCT VFR BLD AUTO: 24.4 % (ref 42–52)
HCT VFR BLD AUTO: 24.5 % (ref 42–52)
HCT VFR BLD AUTO: 24.9 % (ref 42–52)
HCT VFR BLD AUTO: 25 % (ref 42–52)
HCT VFR BLD AUTO: 25.1 % (ref 42–52)
HCT VFR BLD AUTO: 25.1 % (ref 42–52)
HCT VFR BLD AUTO: 25.3 % (ref 42–52)
HCT VFR BLD AUTO: 25.5 % (ref 42–52)
HCT VFR BLD AUTO: 25.5 % (ref 42–52)
HCT VFR BLD AUTO: 25.7 % (ref 42–52)
HCT VFR BLD AUTO: 25.8 % (ref 42–52)
HCT VFR BLD AUTO: 25.9 % (ref 42–52)
HCT VFR BLD AUTO: 26 % (ref 42–52)
HCT VFR BLD AUTO: 26 % (ref 42–52)
HCT VFR BLD AUTO: 26.1 % (ref 42–52)
HCT VFR BLD AUTO: 26.1 % (ref 42–52)
HCT VFR BLD AUTO: 26.2 % (ref 42–52)
HCT VFR BLD AUTO: 26.3 % (ref 42–52)
HCT VFR BLD AUTO: 26.5 % (ref 42–52)
HCT VFR BLD AUTO: 26.6 % (ref 42–52)
HCT VFR BLD AUTO: 26.8 % (ref 42–52)
HCT VFR BLD AUTO: 26.9 % (ref 42–52)
HCT VFR BLD AUTO: 27 % (ref 42–52)
HCT VFR BLD AUTO: 27.1 % (ref 42–52)
HCT VFR BLD AUTO: 27.7 % (ref 42–52)
HCT VFR BLD AUTO: 29.1 % (ref 42–52)
HCT VFR BLD AUTO: 29.5 % (ref 42–52)
HCT VFR BLD AUTO: 32.5 % (ref 42–52)
HCT VFR BLD AUTO: 38.6 % (ref 42–52)
HCV AB SER QL: NORMAL
HGB BLD-MCNC: 10.8 G/DL (ref 14–18)
HGB BLD-MCNC: 12.1 G/DL (ref 14–18)
HGB BLD-MCNC: 6.7 G/DL (ref 14–18)
HGB BLD-MCNC: 7 G/DL (ref 14–18)
HGB BLD-MCNC: 7.1 G/DL (ref 14–18)
HGB BLD-MCNC: 7.1 G/DL (ref 14–18)
HGB BLD-MCNC: 7.2 G/DL (ref 14–18)
HGB BLD-MCNC: 7.3 G/DL (ref 14–18)
HGB BLD-MCNC: 7.3 G/DL (ref 14–18)
HGB BLD-MCNC: 7.4 G/DL (ref 14–18)
HGB BLD-MCNC: 7.5 G/DL (ref 14–18)
HGB BLD-MCNC: 7.6 G/DL (ref 14–18)
HGB BLD-MCNC: 7.6 G/DL (ref 14–18)
HGB BLD-MCNC: 7.7 G/DL (ref 14–18)
HGB BLD-MCNC: 7.7 G/DL (ref 14–18)
HGB BLD-MCNC: 7.8 G/DL (ref 14–18)
HGB BLD-MCNC: 7.9 G/DL (ref 14–18)
HGB BLD-MCNC: 8 G/DL (ref 14–18)
HGB BLD-MCNC: 8.1 G/DL (ref 14–18)
HGB BLD-MCNC: 8.2 G/DL (ref 14–18)
HGB BLD-MCNC: 8.2 G/DL (ref 14–18)
HGB BLD-MCNC: 8.3 G/DL (ref 14–18)
HGB BLD-MCNC: 8.4 G/DL (ref 14–18)
HGB BLD-MCNC: 8.5 G/DL (ref 14–18)
HGB BLD-MCNC: 8.6 G/DL (ref 14–18)
HGB BLD-MCNC: 8.8 G/DL (ref 14–18)
HGB BLD-MCNC: 8.9 G/DL (ref 14–18)
HGB BLD-MCNC: 8.9 G/DL (ref 14–18)
HOROWITZ INDEX BLDA+IHG-RTO: 118 MM[HG]
HOROWITZ INDEX BLDA+IHG-RTO: 120 MM[HG]
HOROWITZ INDEX BLDA+IHG-RTO: 131 MM[HG]
HOROWITZ INDEX BLDA+IHG-RTO: 166 MM[HG]
HOROWITZ INDEX BLDA+IHG-RTO: 192 MM[HG]
HYALINE CASTS #/AREA URNS LPF: ABNORMAL /LPF
HYALINE CASTS #/AREA URNS LPF: ABNORMAL /LPF
HYPOCHROMIA BLD QL SMEAR: ABNORMAL
IMM GRANULOCYTES # BLD AUTO: 0.03 K/UL (ref 0–0.11)
IMM GRANULOCYTES # BLD AUTO: 0.04 K/UL (ref 0–0.11)
IMM GRANULOCYTES # BLD AUTO: 0.05 K/UL (ref 0–0.11)
IMM GRANULOCYTES # BLD AUTO: 0.06 K/UL (ref 0–0.11)
IMM GRANULOCYTES # BLD AUTO: 0.16 K/UL (ref 0–0.11)
IMM GRANULOCYTES # BLD AUTO: 0.17 K/UL (ref 0–0.11)
IMM GRANULOCYTES # BLD AUTO: 0.2 K/UL (ref 0–0.11)
IMM GRANULOCYTES # BLD AUTO: 0.24 K/UL (ref 0–0.11)
IMM GRANULOCYTES # BLD AUTO: 0.25 K/UL (ref 0–0.11)
IMM GRANULOCYTES NFR BLD AUTO: 0.3 % (ref 0–0.9)
IMM GRANULOCYTES NFR BLD AUTO: 0.4 % (ref 0–0.9)
IMM GRANULOCYTES NFR BLD AUTO: 0.5 % (ref 0–0.9)
IMM GRANULOCYTES NFR BLD AUTO: 0.7 % (ref 0–0.9)
IMM GRANULOCYTES NFR BLD AUTO: 0.8 % (ref 0–0.9)
IMM GRANULOCYTES NFR BLD AUTO: 1.5 % (ref 0–0.9)
IMM GRANULOCYTES NFR BLD AUTO: 1.6 % (ref 0–0.9)
IMM GRANULOCYTES NFR BLD AUTO: 1.9 % (ref 0–0.9)
IMM GRANULOCYTES NFR BLD AUTO: 1.9 % (ref 0–0.9)
IMM GRANULOCYTES NFR BLD AUTO: 2.4 % (ref 0–0.9)
INR PPP: 1.43 (ref 0.87–1.13)
INR PPP: 1.43 (ref 0.87–1.13)
INR PPP: 1.45 (ref 0.87–1.13)
INR PPP: 1.49 (ref 0.87–1.13)
INR PPP: 1.51 (ref 0.87–1.13)
INR PPP: 1.51 (ref 0.87–1.13)
INR PPP: 1.56 (ref 0.87–1.13)
INR PPP: 1.56 (ref 0.87–1.13)
INR PPP: 1.64 (ref 0.87–1.13)
INR PPP: 1.67 (ref 0.87–1.13)
INR PPP: 1.75 (ref 0.87–1.13)
INR PPP: 1.98 (ref 0.87–1.13)
INR PPP: 2.26 (ref 0.87–1.13)
INR PPP: 2.28 (ref 0.87–1.13)
INR PPP: 2.45 (ref 0.87–1.13)
INR PPP: 2.78 (ref 0.87–1.13)
INR PPP: 3.03 (ref 0.87–1.13)
INR PPP: 3.4 (ref 0.87–1.13)
INR PPP: 4.97 (ref 0.87–1.13)
IRON SATN MFR SERPL: 16 % (ref 15–55)
IRON SATN MFR SERPL: 22 % (ref 15–55)
IRON SERPL-MCNC: 28 UG/DL (ref 50–180)
IRON SERPL-MCNC: 33 UG/DL (ref 50–180)
KETONES UR STRIP.AUTO-MCNC: NEGATIVE MG/DL
LACTATE SERPL-SCNC: 1 MMOL/L (ref 0.5–2)
LACTATE SERPL-SCNC: 1.6 MMOL/L (ref 0.5–2)
LACTATE SERPL-SCNC: 1.6 MMOL/L (ref 0.5–2)
LEUKOCYTE ESTERASE UR QL STRIP.AUTO: ABNORMAL
LEUKOCYTE ESTERASE UR QL STRIP.AUTO: NEGATIVE
LV EJECT FRACT  99904: 55
LYMPHOCYTES # BLD AUTO: 0.18 K/UL (ref 1–4.8)
LYMPHOCYTES # BLD AUTO: 0.63 K/UL (ref 1–4.8)
LYMPHOCYTES # BLD AUTO: 0.88 K/UL (ref 1–4.8)
LYMPHOCYTES # BLD AUTO: 1 K/UL (ref 1–4.8)
LYMPHOCYTES # BLD AUTO: 1.06 K/UL (ref 1–4.8)
LYMPHOCYTES # BLD AUTO: 1.19 K/UL (ref 1–4.8)
LYMPHOCYTES # BLD AUTO: 1.25 K/UL (ref 1–4.8)
LYMPHOCYTES # BLD AUTO: 1.26 K/UL (ref 1–4.8)
LYMPHOCYTES # BLD AUTO: 1.35 K/UL (ref 1–4.8)
LYMPHOCYTES # BLD AUTO: 1.36 K/UL (ref 1–4.8)
LYMPHOCYTES # BLD AUTO: 1.47 K/UL (ref 1–4.8)
LYMPHOCYTES # BLD AUTO: 1.48 K/UL (ref 1–4.8)
LYMPHOCYTES # BLD AUTO: 1.53 K/UL (ref 1–4.8)
LYMPHOCYTES # BLD AUTO: 1.56 K/UL (ref 1–4.8)
LYMPHOCYTES # BLD AUTO: 1.56 K/UL (ref 1–4.8)
LYMPHOCYTES # BLD AUTO: 1.65 K/UL (ref 1–4.8)
LYMPHOCYTES # BLD AUTO: 1.79 K/UL (ref 1–4.8)
LYMPHOCYTES NFR BLD: 1.7 % (ref 22–41)
LYMPHOCYTES NFR BLD: 10.4 % (ref 22–41)
LYMPHOCYTES NFR BLD: 11.7 % (ref 22–41)
LYMPHOCYTES NFR BLD: 12.7 % (ref 22–41)
LYMPHOCYTES NFR BLD: 13.9 % (ref 22–41)
LYMPHOCYTES NFR BLD: 14 % (ref 22–41)
LYMPHOCYTES NFR BLD: 14.5 % (ref 22–41)
LYMPHOCYTES NFR BLD: 14.7 % (ref 22–41)
LYMPHOCYTES NFR BLD: 15.1 % (ref 22–41)
LYMPHOCYTES NFR BLD: 15.7 % (ref 22–41)
LYMPHOCYTES NFR BLD: 16.2 % (ref 22–41)
LYMPHOCYTES NFR BLD: 16.8 % (ref 22–41)
LYMPHOCYTES NFR BLD: 16.8 % (ref 22–41)
LYMPHOCYTES NFR BLD: 16.9 % (ref 22–41)
LYMPHOCYTES NFR BLD: 17.7 % (ref 22–41)
LYMPHOCYTES NFR BLD: 9.5 % (ref 22–41)
LYMPHOCYTES NFR BLD: 9.8 % (ref 22–41)
M TB IFN-G BLD-IMP: ABNORMAL
M TB IFN-G CD4+ BCKGRND COR BLD-ACNC: 0 IU/ML
MACROCYTES BLD QL SMEAR: ABNORMAL
MAGNESIUM SERPL-MCNC: 1.8 MG/DL (ref 1.5–2.5)
MAGNESIUM SERPL-MCNC: 1.9 MG/DL (ref 1.5–2.5)
MAGNESIUM SERPL-MCNC: 2 MG/DL (ref 1.5–2.5)
MAGNESIUM SERPL-MCNC: 2.1 MG/DL (ref 1.5–2.5)
MAGNESIUM SERPL-MCNC: 2.1 MG/DL (ref 1.5–2.5)
MAGNESIUM SERPL-MCNC: 2.2 MG/DL (ref 1.5–2.5)
MAGNESIUM SERPL-MCNC: 2.3 MG/DL (ref 1.5–2.5)
MAGNESIUM SERPL-MCNC: 2.3 MG/DL (ref 1.5–2.5)
MANUAL DIFF BLD: NORMAL
MCF BLD TEG: 53.9 MM (ref 52–69)
MCF BLD TEG: <40 MM (ref 52–69)
MCF.PLATELET INHIB BLD ROTEM: 21.4 MM (ref 15–32)
MCF.PLATELET INHIB BLD ROTEM: 28.6 MM (ref 15–32)
MCH RBC QN AUTO: 28.7 PG (ref 27–33)
MCH RBC QN AUTO: 28.7 PG (ref 27–33)
MCH RBC QN AUTO: 29 PG (ref 27–33)
MCH RBC QN AUTO: 29.1 PG (ref 27–33)
MCH RBC QN AUTO: 29.2 PG (ref 27–33)
MCH RBC QN AUTO: 29.2 PG (ref 27–33)
MCH RBC QN AUTO: 29.5 PG (ref 27–33)
MCH RBC QN AUTO: 29.7 PG (ref 27–33)
MCH RBC QN AUTO: 29.8 PG (ref 27–33)
MCH RBC QN AUTO: 29.9 PG (ref 27–33)
MCH RBC QN AUTO: 30 PG (ref 27–33)
MCH RBC QN AUTO: 30.1 PG (ref 27–33)
MCH RBC QN AUTO: 30.2 PG (ref 27–33)
MCH RBC QN AUTO: 30.3 PG (ref 27–33)
MCH RBC QN AUTO: 30.3 PG (ref 27–33)
MCH RBC QN AUTO: 30.5 PG (ref 27–33)
MCH RBC QN AUTO: 30.6 PG (ref 27–33)
MCH RBC QN AUTO: 30.7 PG (ref 27–33)
MCHC RBC AUTO-ENTMCNC: 29.7 G/DL (ref 33.7–35.3)
MCHC RBC AUTO-ENTMCNC: 29.8 G/DL (ref 33.7–35.3)
MCHC RBC AUTO-ENTMCNC: 30.2 G/DL (ref 33.7–35.3)
MCHC RBC AUTO-ENTMCNC: 30.4 G/DL (ref 33.7–35.3)
MCHC RBC AUTO-ENTMCNC: 30.5 G/DL (ref 33.7–35.3)
MCHC RBC AUTO-ENTMCNC: 30.5 G/DL (ref 33.7–35.3)
MCHC RBC AUTO-ENTMCNC: 30.6 G/DL (ref 33.7–35.3)
MCHC RBC AUTO-ENTMCNC: 30.7 G/DL (ref 33.7–35.3)
MCHC RBC AUTO-ENTMCNC: 30.7 G/DL (ref 33.7–35.3)
MCHC RBC AUTO-ENTMCNC: 30.8 G/DL (ref 33.7–35.3)
MCHC RBC AUTO-ENTMCNC: 30.9 G/DL (ref 33.7–35.3)
MCHC RBC AUTO-ENTMCNC: 30.9 G/DL (ref 33.7–35.3)
MCHC RBC AUTO-ENTMCNC: 31 G/DL (ref 33.7–35.3)
MCHC RBC AUTO-ENTMCNC: 31.1 G/DL (ref 33.7–35.3)
MCHC RBC AUTO-ENTMCNC: 31.3 G/DL (ref 33.7–35.3)
MCHC RBC AUTO-ENTMCNC: 31.4 G/DL (ref 33.7–35.3)
MCHC RBC AUTO-ENTMCNC: 31.4 G/DL (ref 33.7–35.3)
MCHC RBC AUTO-ENTMCNC: 31.5 G/DL (ref 33.7–35.3)
MCHC RBC AUTO-ENTMCNC: 31.5 G/DL (ref 33.7–35.3)
MCHC RBC AUTO-ENTMCNC: 31.6 G/DL (ref 33.7–35.3)
MCHC RBC AUTO-ENTMCNC: 31.7 G/DL (ref 33.7–35.3)
MCHC RBC AUTO-ENTMCNC: 31.9 G/DL (ref 33.7–35.3)
MCHC RBC AUTO-ENTMCNC: 33.2 G/DL (ref 33.7–35.3)
MCHC RBC AUTO-ENTMCNC: 33.3 G/DL (ref 33.7–35.3)
MCHC RBC AUTO-ENTMCNC: 33.5 G/DL (ref 33.7–35.3)
MCV RBC AUTO: 90.7 FL (ref 81.4–97.8)
MCV RBC AUTO: 91.3 FL (ref 81.4–97.8)
MCV RBC AUTO: 92.3 FL (ref 81.4–97.8)
MCV RBC AUTO: 93.4 FL (ref 81.4–97.8)
MCV RBC AUTO: 94.2 FL (ref 81.4–97.8)
MCV RBC AUTO: 94.2 FL (ref 81.4–97.8)
MCV RBC AUTO: 94.3 FL (ref 81.4–97.8)
MCV RBC AUTO: 94.6 FL (ref 81.4–97.8)
MCV RBC AUTO: 94.7 FL (ref 81.4–97.8)
MCV RBC AUTO: 94.8 FL (ref 81.4–97.8)
MCV RBC AUTO: 95.1 FL (ref 81.4–97.8)
MCV RBC AUTO: 95.1 FL (ref 81.4–97.8)
MCV RBC AUTO: 95.4 FL (ref 81.4–97.8)
MCV RBC AUTO: 95.6 FL (ref 81.4–97.8)
MCV RBC AUTO: 95.7 FL (ref 81.4–97.8)
MCV RBC AUTO: 95.9 FL (ref 81.4–97.8)
MCV RBC AUTO: 96.3 FL (ref 81.4–97.8)
MCV RBC AUTO: 96.4 FL (ref 81.4–97.8)
MCV RBC AUTO: 96.4 FL (ref 81.4–97.8)
MCV RBC AUTO: 96.5 FL (ref 81.4–97.8)
MCV RBC AUTO: 96.6 FL (ref 81.4–97.8)
MCV RBC AUTO: 96.8 FL (ref 81.4–97.8)
MCV RBC AUTO: 96.9 FL (ref 81.4–97.8)
MCV RBC AUTO: 97 FL (ref 81.4–97.8)
MCV RBC AUTO: 97.1 FL (ref 81.4–97.8)
MCV RBC AUTO: 97.4 FL (ref 81.4–97.8)
MCV RBC AUTO: 97.7 FL (ref 81.4–97.8)
MCV RBC AUTO: 97.9 FL (ref 81.4–97.8)
MCV RBC AUTO: 98.3 FL (ref 81.4–97.8)
MCV RBC AUTO: 98.4 FL (ref 81.4–97.8)
MCV RBC AUTO: 99.6 FL (ref 81.4–97.8)
METAMYELOCYTES NFR BLD MANUAL: 0.8 %
METAMYELOCYTES NFR BLD MANUAL: 0.8 %
MICRO URNS: ABNORMAL
MITOGEN IGNF BCKGRD COR BLD-ACNC: 0.22 IU/ML
MODE IMODE: ABNORMAL
MONOCYTES # BLD AUTO: 0.46 K/UL (ref 0–0.85)
MONOCYTES # BLD AUTO: 0.53 K/UL (ref 0–0.85)
MONOCYTES # BLD AUTO: 0.64 K/UL (ref 0–0.85)
MONOCYTES # BLD AUTO: 0.95 K/UL (ref 0–0.85)
MONOCYTES # BLD AUTO: 1.07 K/UL (ref 0–0.85)
MONOCYTES # BLD AUTO: 1.12 K/UL (ref 0–0.85)
MONOCYTES # BLD AUTO: 1.14 K/UL (ref 0–0.85)
MONOCYTES # BLD AUTO: 1.14 K/UL (ref 0–0.85)
MONOCYTES # BLD AUTO: 1.22 K/UL (ref 0–0.85)
MONOCYTES # BLD AUTO: 1.23 K/UL (ref 0–0.85)
MONOCYTES # BLD AUTO: 1.23 K/UL (ref 0–0.85)
MONOCYTES # BLD AUTO: 1.33 K/UL (ref 0–0.85)
MONOCYTES # BLD AUTO: 1.58 K/UL (ref 0–0.85)
MONOCYTES # BLD AUTO: 1.68 K/UL (ref 0–0.85)
MONOCYTES # BLD AUTO: 1.71 K/UL (ref 0–0.85)
MONOCYTES # BLD AUTO: 1.93 K/UL (ref 0–0.85)
MONOCYTES # BLD AUTO: 1.95 K/UL (ref 0–0.85)
MONOCYTES NFR BLD AUTO: 10.1 % (ref 0–13.4)
MONOCYTES NFR BLD AUTO: 11 % (ref 0–13.4)
MONOCYTES NFR BLD AUTO: 11.4 % (ref 0–13.4)
MONOCYTES NFR BLD AUTO: 11.7 % (ref 0–13.4)
MONOCYTES NFR BLD AUTO: 12.2 % (ref 0–13.4)
MONOCYTES NFR BLD AUTO: 12.8 % (ref 0–13.4)
MONOCYTES NFR BLD AUTO: 13 % (ref 0–13.4)
MONOCYTES NFR BLD AUTO: 13.1 % (ref 0–13.4)
MONOCYTES NFR BLD AUTO: 13.5 % (ref 0–13.4)
MONOCYTES NFR BLD AUTO: 13.8 % (ref 0–13.4)
MONOCYTES NFR BLD AUTO: 14.9 % (ref 0–13.4)
MONOCYTES NFR BLD AUTO: 16.1 % (ref 0–13.4)
MONOCYTES NFR BLD AUTO: 17.2 % (ref 0–13.4)
MONOCYTES NFR BLD AUTO: 18.1 % (ref 0–13.4)
MONOCYTES NFR BLD AUTO: 19.2 % (ref 0–13.4)
MONOCYTES NFR BLD AUTO: 5.1 % (ref 0–13.4)
MONOCYTES NFR BLD AUTO: 6.9 % (ref 0–13.4)
MORPHOLOGY BLD-IMP: NORMAL
NEUTROPHILS # BLD AUTO: 4.52 K/UL (ref 1.82–7.42)
NEUTROPHILS # BLD AUTO: 4.94 K/UL (ref 1.82–7.42)
NEUTROPHILS # BLD AUTO: 5.23 K/UL (ref 1.82–7.42)
NEUTROPHILS # BLD AUTO: 5.52 K/UL (ref 1.82–7.42)
NEUTROPHILS # BLD AUTO: 5.58 K/UL (ref 1.82–7.42)
NEUTROPHILS # BLD AUTO: 5.75 K/UL (ref 1.82–7.42)
NEUTROPHILS # BLD AUTO: 5.82 K/UL (ref 1.82–7.42)
NEUTROPHILS # BLD AUTO: 6.47 K/UL (ref 1.82–7.42)
NEUTROPHILS # BLD AUTO: 6.74 K/UL (ref 1.82–7.42)
NEUTROPHILS # BLD AUTO: 6.75 K/UL (ref 1.82–7.42)
NEUTROPHILS # BLD AUTO: 6.89 K/UL (ref 1.82–7.42)
NEUTROPHILS # BLD AUTO: 6.93 K/UL (ref 1.82–7.42)
NEUTROPHILS # BLD AUTO: 7.03 K/UL (ref 1.82–7.42)
NEUTROPHILS # BLD AUTO: 7.05 K/UL (ref 1.82–7.42)
NEUTROPHILS # BLD AUTO: 7.95 K/UL (ref 1.82–7.42)
NEUTROPHILS # BLD AUTO: 9.46 K/UL (ref 1.82–7.42)
NEUTROPHILS # BLD AUTO: 9.52 K/UL (ref 1.82–7.42)
NEUTROPHILS NFR BLD: 60 % (ref 44–72)
NEUTROPHILS NFR BLD: 63 % (ref 44–72)
NEUTROPHILS NFR BLD: 63.6 % (ref 44–72)
NEUTROPHILS NFR BLD: 64.8 % (ref 44–72)
NEUTROPHILS NFR BLD: 65.3 % (ref 44–72)
NEUTROPHILS NFR BLD: 66.4 % (ref 44–72)
NEUTROPHILS NFR BLD: 67.8 % (ref 44–72)
NEUTROPHILS NFR BLD: 69 % (ref 44–72)
NEUTROPHILS NFR BLD: 69.2 % (ref 44–72)
NEUTROPHILS NFR BLD: 70.6 % (ref 44–72)
NEUTROPHILS NFR BLD: 71.3 % (ref 44–72)
NEUTROPHILS NFR BLD: 71.4 % (ref 44–72)
NEUTROPHILS NFR BLD: 71.8 % (ref 44–72)
NEUTROPHILS NFR BLD: 73 % (ref 44–72)
NEUTROPHILS NFR BLD: 73.2 % (ref 44–72)
NEUTROPHILS NFR BLD: 79.3 % (ref 44–72)
NEUTROPHILS NFR BLD: 88.2 % (ref 44–72)
NEUTS BAND NFR BLD MANUAL: 4.2 % (ref 0–10)
NEUTS BAND NFR BLD MANUAL: 4.3 % (ref 0–10)
NITRITE UR QL STRIP.AUTO: NEGATIVE
NITRITE UR QL STRIP.AUTO: POSITIVE
NRBC # BLD AUTO: 0 K/UL
NRBC # BLD AUTO: 0.06 K/UL
NRBC # BLD AUTO: 0.09 K/UL
NRBC # BLD AUTO: 0.13 K/UL
NRBC # BLD AUTO: 0.15 K/UL
NRBC # BLD AUTO: 0.26 K/UL
NRBC BLD-RTO: 0 /100 WBC
NRBC BLD-RTO: 0.5 /100 WBC
NRBC BLD-RTO: 0.9 /100 WBC
NRBC BLD-RTO: 1.2 /100 WBC
NRBC BLD-RTO: 1.2 /100 WBC
NRBC BLD-RTO: 2.6 /100 WBC
NT-PROBNP SERPL IA-MCNC: 7008 PG/ML (ref 0–125)
NT-PROBNP SERPL IA-MCNC: ABNORMAL PG/ML (ref 0–125)
O2/TOTAL GAS SETTING VFR VENT: 50 %
O2/TOTAL GAS SETTING VFR VENT: 50 %
O2/TOTAL GAS SETTING VFR VENT: 80 %
PA AA BLD-ACNC: 0 % (ref 0–11)
PA AA BLD-ACNC: 9 % (ref 0–11)
PA ADP BLD-ACNC: 17.4 % (ref 0–17)
PA ADP BLD-ACNC: 7.6 % (ref 0–17)
PCO2 BLDA: 34.9 MMHG (ref 26–37)
PCO2 BLDA: 36.6 MMHG (ref 26–37)
PCO2 BLDA: 46.4 MMHG (ref 26–37)
PCO2 BLDA: 61.2 MMHG (ref 26–37)
PCO2 BLDA: 61.8 MMHG (ref 26–37)
PCO2 TEMP ADJ BLDA: 35.8 MMHG (ref 26–37)
PCO2 TEMP ADJ BLDA: 37 MMHG (ref 26–37)
PCO2 TEMP ADJ BLDA: 48.1 MMHG (ref 26–37)
PCO2 TEMP ADJ BLDA: 62.9 MMHG (ref 26–37)
PEEP END EXPIRATORY PRESSURE IPEEP: 8 CMH20
PH BLDA: 7 [PH] (ref 7.4–7.5)
PH BLDA: 7.01 [PH] (ref 7.4–7.5)
PH BLDA: 7.04 [PH] (ref 7.4–7.5)
PH BLDA: 7.4 [PH] (ref 7.4–7.5)
PH BLDA: 7.44 [PH] (ref 7.4–7.5)
PH TEMP ADJ BLDA: 6.99 [PH] (ref 7.4–7.5)
PH TEMP ADJ BLDA: 7.03 [PH] (ref 7.4–7.5)
PH TEMP ADJ BLDA: 7.4 [PH] (ref 7.4–7.5)
PH TEMP ADJ BLDA: 7.43 [PH] (ref 7.4–7.5)
PH UR STRIP.AUTO: 5 [PH] (ref 5–8)
PH UR STRIP.AUTO: 5.5 [PH] (ref 5–8)
PH UR STRIP.AUTO: 7 [PH] (ref 5–8)
PH UR STRIP.AUTO: 7 [PH] (ref 5–8)
PHOSPHATE SERPL-MCNC: 1.8 MG/DL (ref 2.5–4.5)
PHOSPHATE SERPL-MCNC: 10.3 MG/DL (ref 2.5–4.5)
PHOSPHATE SERPL-MCNC: 2.4 MG/DL (ref 2.5–4.5)
PHOSPHATE SERPL-MCNC: 2.5 MG/DL (ref 2.5–4.5)
PHOSPHATE SERPL-MCNC: 2.8 MG/DL (ref 2.5–4.5)
PHOSPHATE SERPL-MCNC: 2.9 MG/DL (ref 2.5–4.5)
PHOSPHATE SERPL-MCNC: 3 MG/DL (ref 2.5–4.5)
PHOSPHATE SERPL-MCNC: 3 MG/DL (ref 2.5–4.5)
PHOSPHATE SERPL-MCNC: 3.6 MG/DL (ref 2.5–4.5)
PHOSPHATE SERPL-MCNC: 4.6 MG/DL (ref 2.5–4.5)
PHOSPHATE SERPL-MCNC: 5.3 MG/DL (ref 2.5–4.5)
PHOSPHATE SERPL-MCNC: 5.6 MG/DL (ref 2.5–4.5)
PHOSPHATE SERPL-MCNC: 6.1 MG/DL (ref 2.5–4.5)
PLATELET # BLD AUTO: 103 K/UL (ref 164–446)
PLATELET # BLD AUTO: 122 K/UL (ref 164–446)
PLATELET # BLD AUTO: 125 K/UL (ref 164–446)
PLATELET # BLD AUTO: 128 K/UL (ref 164–446)
PLATELET # BLD AUTO: 158 K/UL (ref 164–446)
PLATELET # BLD AUTO: 181 K/UL (ref 164–446)
PLATELET # BLD AUTO: 196 K/UL (ref 164–446)
PLATELET # BLD AUTO: 203 K/UL (ref 164–446)
PLATELET # BLD AUTO: 207 K/UL (ref 164–446)
PLATELET # BLD AUTO: 213 K/UL (ref 164–446)
PLATELET # BLD AUTO: 216 K/UL (ref 164–446)
PLATELET # BLD AUTO: 219 K/UL (ref 164–446)
PLATELET # BLD AUTO: 221 K/UL (ref 164–446)
PLATELET # BLD AUTO: 232 K/UL (ref 164–446)
PLATELET # BLD AUTO: 232 K/UL (ref 164–446)
PLATELET # BLD AUTO: 233 K/UL (ref 164–446)
PLATELET # BLD AUTO: 235 K/UL (ref 164–446)
PLATELET # BLD AUTO: 237 K/UL (ref 164–446)
PLATELET # BLD AUTO: 244 K/UL (ref 164–446)
PLATELET # BLD AUTO: 244 K/UL (ref 164–446)
PLATELET # BLD AUTO: 272 K/UL (ref 164–446)
PLATELET # BLD AUTO: 280 K/UL (ref 164–446)
PLATELET # BLD AUTO: 325 K/UL (ref 164–446)
PLATELET # BLD AUTO: 343 K/UL (ref 164–446)
PLATELET # BLD AUTO: 349 K/UL (ref 164–446)
PLATELET # BLD AUTO: 353 K/UL (ref 164–446)
PLATELET # BLD AUTO: 360 K/UL (ref 164–446)
PLATELET # BLD AUTO: 376 K/UL (ref 164–446)
PLATELET # BLD AUTO: 379 K/UL (ref 164–446)
PLATELET # BLD AUTO: 380 K/UL (ref 164–446)
PLATELET # BLD AUTO: 436 K/UL (ref 164–446)
PLATELET # BLD AUTO: 445 K/UL (ref 164–446)
PLATELET # BLD AUTO: 449 K/UL (ref 164–446)
PLATELET BLD QL SMEAR: NORMAL
PMV BLD AUTO: 10.1 FL (ref 9–12.9)
PMV BLD AUTO: 10.2 FL (ref 9–12.9)
PMV BLD AUTO: 10.2 FL (ref 9–12.9)
PMV BLD AUTO: 10.3 FL (ref 9–12.9)
PMV BLD AUTO: 10.5 FL (ref 9–12.9)
PMV BLD AUTO: 10.5 FL (ref 9–12.9)
PMV BLD AUTO: 10.7 FL (ref 9–12.9)
PMV BLD AUTO: 10.7 FL (ref 9–12.9)
PMV BLD AUTO: 10.8 FL (ref 9–12.9)
PMV BLD AUTO: 10.8 FL (ref 9–12.9)
PMV BLD AUTO: 10.9 FL (ref 9–12.9)
PMV BLD AUTO: 11 FL (ref 9–12.9)
PMV BLD AUTO: 11.1 FL (ref 9–12.9)
PMV BLD AUTO: 11.2 FL (ref 9–12.9)
PMV BLD AUTO: 11.2 FL (ref 9–12.9)
PMV BLD AUTO: 11.3 FL (ref 9–12.9)
PMV BLD AUTO: 11.4 FL (ref 9–12.9)
PMV BLD AUTO: 11.4 FL (ref 9–12.9)
PMV BLD AUTO: 11.6 FL (ref 9–12.9)
PMV BLD AUTO: 11.6 FL (ref 9–12.9)
PMV BLD AUTO: 12.3 FL (ref 9–12.9)
PMV BLD AUTO: 9.7 FL (ref 9–12.9)
PMV BLD AUTO: 9.9 FL (ref 9–12.9)
PO2 BLDA: 105 MMHG (ref 64–87)
PO2 BLDA: 83 MMHG (ref 64–87)
PO2 BLDA: 94 MMHG (ref 64–87)
PO2 BLDA: 96 MMHG (ref 64–87)
PO2 BLDA: 96 MMHG (ref 64–87)
PO2 TEMP ADJ BLDA: 100 MMHG (ref 64–87)
PO2 TEMP ADJ BLDA: 110 MMHG (ref 64–87)
PO2 TEMP ADJ BLDA: 84 MMHG (ref 64–87)
PO2 TEMP ADJ BLDA: 98 MMHG (ref 64–87)
POIKILOCYTOSIS BLD QL SMEAR: NORMAL
POLYCHROMASIA BLD QL SMEAR: NORMAL
POTASSIUM BLD-SCNC: 7.3 MMOL/L (ref 3.6–5.5)
POTASSIUM SERPL-SCNC: 4.2 MMOL/L (ref 3.6–5.5)
POTASSIUM SERPL-SCNC: 4.2 MMOL/L (ref 3.6–5.5)
POTASSIUM SERPL-SCNC: 4.3 MMOL/L (ref 3.6–5.5)
POTASSIUM SERPL-SCNC: 4.3 MMOL/L (ref 3.6–5.5)
POTASSIUM SERPL-SCNC: 4.4 MMOL/L (ref 3.6–5.5)
POTASSIUM SERPL-SCNC: 4.5 MMOL/L (ref 3.6–5.5)
POTASSIUM SERPL-SCNC: 4.5 MMOL/L (ref 3.6–5.5)
POTASSIUM SERPL-SCNC: 4.6 MMOL/L (ref 3.6–5.5)
POTASSIUM SERPL-SCNC: 4.6 MMOL/L (ref 3.6–5.5)
POTASSIUM SERPL-SCNC: 4.7 MMOL/L (ref 3.6–5.5)
POTASSIUM SERPL-SCNC: 4.7 MMOL/L (ref 3.6–5.5)
POTASSIUM SERPL-SCNC: 4.8 MMOL/L (ref 3.6–5.5)
POTASSIUM SERPL-SCNC: 4.9 MMOL/L (ref 3.6–5.5)
POTASSIUM SERPL-SCNC: 5 MMOL/L (ref 3.6–5.5)
POTASSIUM SERPL-SCNC: 5.1 MMOL/L (ref 3.6–5.5)
POTASSIUM SERPL-SCNC: 5.2 MMOL/L (ref 3.6–5.5)
POTASSIUM SERPL-SCNC: 5.3 MMOL/L (ref 3.6–5.5)
POTASSIUM SERPL-SCNC: 5.3 MMOL/L (ref 3.6–5.5)
POTASSIUM SERPL-SCNC: 5.4 MMOL/L (ref 3.6–5.5)
POTASSIUM SERPL-SCNC: 5.5 MMOL/L (ref 3.6–5.5)
POTASSIUM SERPL-SCNC: 5.9 MMOL/L (ref 3.6–5.5)
POTASSIUM SERPL-SCNC: 5.9 MMOL/L (ref 3.6–5.5)
POTASSIUM SERPL-SCNC: 6 MMOL/L (ref 3.6–5.5)
POTASSIUM SERPL-SCNC: 7.6 MMOL/L (ref 3.6–5.5)
POTASSIUM UR-SCNC: 41 MMOL/L
PREALB SERPL-MCNC: 12.1 MG/DL (ref 18–38)
PREALB SERPL-MCNC: 12.1 MG/DL (ref 18–38)
PREALB SERPL-MCNC: 14.2 MG/DL (ref 18–38)
PREALB SERPL-MCNC: 6.7 MG/DL (ref 18–38)
PROCALCITONIN SERPL-MCNC: 0.29 NG/ML
PROCALCITONIN SERPL-MCNC: 0.5 NG/ML
PROCALCITONIN SERPL-MCNC: 0.54 NG/ML
PROCALCITONIN SERPL-MCNC: 1.05 NG/ML
PRODUCT TYPE UPROD: NORMAL
PROT SERPL-MCNC: 6.4 G/DL (ref 6–8.2)
PROT SERPL-MCNC: 6.4 G/DL (ref 6–8.2)
PROT SERPL-MCNC: 6.5 G/DL (ref 6–8.2)
PROT SERPL-MCNC: 6.6 G/DL (ref 6–8.2)
PROT SERPL-MCNC: 6.7 G/DL (ref 6–8.2)
PROT SERPL-MCNC: 6.8 G/DL (ref 6–8.2)
PROT SERPL-MCNC: 6.8 G/DL (ref 6–8.2)
PROT SERPL-MCNC: 6.9 G/DL (ref 6–8.2)
PROT SERPL-MCNC: 7.1 G/DL (ref 6–8.2)
PROT SERPL-MCNC: 7.2 G/DL (ref 6–8.2)
PROT SERPL-MCNC: 7.2 G/DL (ref 6–8.2)
PROT SERPL-MCNC: 7.4 G/DL (ref 6–8.2)
PROT SERPL-MCNC: 7.4 G/DL (ref 6–8.2)
PROT SERPL-MCNC: 7.5 G/DL (ref 6–8.2)
PROT UR QL STRIP: 100 MG/DL
PROT UR-MCNC: 115 MG/DL (ref 0–15)
PROTHROMBIN TIME: 17.1 SEC (ref 12–14.6)
PROTHROMBIN TIME: 17.2 SEC (ref 12–14.6)
PROTHROMBIN TIME: 17.3 SEC (ref 12–14.6)
PROTHROMBIN TIME: 17.8 SEC (ref 12–14.6)
PROTHROMBIN TIME: 17.9 SEC (ref 12–14.6)
PROTHROMBIN TIME: 17.9 SEC (ref 12–14.6)
PROTHROMBIN TIME: 18.3 SEC (ref 12–14.6)
PROTHROMBIN TIME: 18.4 SEC (ref 12–14.6)
PROTHROMBIN TIME: 19 SEC (ref 12–14.6)
PROTHROMBIN TIME: 19.3 SEC (ref 12–14.6)
PROTHROMBIN TIME: 20 SEC (ref 12–14.6)
PROTHROMBIN TIME: 21.9 SEC (ref 12–14.6)
PROTHROMBIN TIME: 24.3 SEC (ref 12–14.6)
PROTHROMBIN TIME: 24.5 SEC (ref 12–14.6)
PROTHROMBIN TIME: 25.8 SEC (ref 12–14.6)
PROTHROMBIN TIME: 28.4 SEC (ref 12–14.6)
PROTHROMBIN TIME: 30.3 SEC (ref 12–14.6)
PROTHROMBIN TIME: 33.1 SEC (ref 12–14.6)
PROTHROMBIN TIME: 44.2 SEC (ref 12–14.6)
PTH-INTACT SERPL-MCNC: 99.3 PG/ML (ref 14–72)
QFT TB2 - NIL TBQ2: 0 IU/ML
RBC # BLD AUTO: 2.22 M/UL (ref 4.7–6.1)
RBC # BLD AUTO: 2.36 M/UL (ref 4.7–6.1)
RBC # BLD AUTO: 2.41 M/UL (ref 4.7–6.1)
RBC # BLD AUTO: 2.46 M/UL (ref 4.7–6.1)
RBC # BLD AUTO: 2.49 M/UL (ref 4.7–6.1)
RBC # BLD AUTO: 2.52 M/UL (ref 4.7–6.1)
RBC # BLD AUTO: 2.54 M/UL (ref 4.7–6.1)
RBC # BLD AUTO: 2.58 M/UL (ref 4.7–6.1)
RBC # BLD AUTO: 2.58 M/UL (ref 4.7–6.1)
RBC # BLD AUTO: 2.62 M/UL (ref 4.7–6.1)
RBC # BLD AUTO: 2.63 M/UL (ref 4.7–6.1)
RBC # BLD AUTO: 2.64 M/UL (ref 4.7–6.1)
RBC # BLD AUTO: 2.66 M/UL (ref 4.7–6.1)
RBC # BLD AUTO: 2.69 M/UL (ref 4.7–6.1)
RBC # BLD AUTO: 2.76 M/UL (ref 4.7–6.1)
RBC # BLD AUTO: 2.76 M/UL (ref 4.7–6.1)
RBC # BLD AUTO: 2.77 M/UL (ref 4.7–6.1)
RBC # BLD AUTO: 2.79 M/UL (ref 4.7–6.1)
RBC # BLD AUTO: 2.81 M/UL (ref 4.7–6.1)
RBC # BLD AUTO: 2.81 M/UL (ref 4.7–6.1)
RBC # BLD AUTO: 2.82 M/UL (ref 4.7–6.1)
RBC # BLD AUTO: 2.84 M/UL (ref 4.7–6.1)
RBC # BLD AUTO: 2.86 M/UL (ref 4.7–6.1)
RBC # BLD AUTO: 2.86 M/UL (ref 4.7–6.1)
RBC # BLD AUTO: 2.88 M/UL (ref 4.7–6.1)
RBC # BLD AUTO: 2.9 M/UL (ref 4.7–6.1)
RBC # BLD AUTO: 3.02 M/UL (ref 4.7–6.1)
RBC # BLD AUTO: 3.05 M/UL (ref 4.7–6.1)
RBC # BLD AUTO: 3.52 M/UL (ref 4.7–6.1)
RBC # BLD AUTO: 4 M/UL (ref 4.7–6.1)
RBC # URNS HPF: >150 /HPF
RBC # URNS HPF: >150 /HPF
RBC # URNS HPF: ABNORMAL /HPF
RBC # URNS HPF: ABNORMAL /HPF
RBC BLD AUTO: NORMAL
RBC BLD AUTO: PRESENT
RBC BLD AUTO: PRESENT
RBC UR QL AUTO: ABNORMAL
RH BLD: NORMAL
RH BLD: NORMAL
SAO2 % BLDA: 91 % (ref 93–99)
SAO2 % BLDA: 92 % (ref 93–99)
SAO2 % BLDA: 95 % (ref 93–99)
SAO2 % BLDA: 96 % (ref 93–99)
SAO2 % BLDA: 98 % (ref 93–99)
SCCMEC + MECA PNL NOSE NAA+PROBE: NEGATIVE
SIGNIFICANT IND 70042: ABNORMAL
SIGNIFICANT IND 70042: NORMAL
SITE SITE: ABNORMAL
SITE SITE: NORMAL
SODIUM BLD-SCNC: 135 MMOL/L (ref 135–145)
SODIUM SERPL-SCNC: 132 MMOL/L (ref 135–145)
SODIUM SERPL-SCNC: 135 MMOL/L (ref 135–145)
SODIUM SERPL-SCNC: 136 MMOL/L (ref 135–145)
SODIUM SERPL-SCNC: 137 MMOL/L (ref 135–145)
SODIUM SERPL-SCNC: 138 MMOL/L (ref 135–145)
SODIUM SERPL-SCNC: 139 MMOL/L (ref 135–145)
SODIUM SERPL-SCNC: 140 MMOL/L (ref 135–145)
SODIUM SERPL-SCNC: 140 MMOL/L (ref 135–145)
SODIUM SERPL-SCNC: 141 MMOL/L (ref 135–145)
SODIUM SERPL-SCNC: 142 MMOL/L (ref 135–145)
SODIUM SERPL-SCNC: 144 MMOL/L (ref 135–145)
SODIUM SERPL-SCNC: 144 MMOL/L (ref 135–145)
SODIUM SERPL-SCNC: 145 MMOL/L (ref 135–145)
SODIUM SERPL-SCNC: 146 MMOL/L (ref 135–145)
SODIUM SERPL-SCNC: 147 MMOL/L (ref 135–145)
SODIUM SERPL-SCNC: 148 MMOL/L (ref 135–145)
SODIUM SERPL-SCNC: 148 MMOL/L (ref 135–145)
SODIUM SERPL-SCNC: 149 MMOL/L (ref 135–145)
SODIUM SERPL-SCNC: 151 MMOL/L (ref 135–145)
SODIUM SERPL-SCNC: 159 MMOL/L (ref 135–145)
SODIUM UR-SCNC: 32 MMOL/L
SOURCE SOURCE: ABNORMAL
SOURCE SOURCE: NORMAL
SP GR UR STRIP.AUTO: 1.01
SP GR UR STRIP.AUTO: 1.01
SP GR UR STRIP.AUTO: 1.02
SP GR UR STRIP.AUTO: 1.02
SPECIMEN DRAWN FROM PATIENT: ABNORMAL
SPHEROCYTES BLD QL SMEAR: NORMAL
TEG ALGORITHM TGALG: ABNORMAL
TEG ALGORITHM TGALG: ABNORMAL
TIBC SERPL-MCNC: 149 UG/DL (ref 250–450)
TIBC SERPL-MCNC: 174 UG/DL (ref 250–450)
TIDAL VOLUME IVT: 470 ML
UFH PPP CHRO-ACNC: 0.21 IU/ML
UFH PPP CHRO-ACNC: 0.33 IU/ML
UFH PPP CHRO-ACNC: 0.46 IU/ML
UFH PPP CHRO-ACNC: 0.49 IU/ML
UFH PPP CHRO-ACNC: 0.51 IU/ML
UFH PPP CHRO-ACNC: 0.59 IU/ML
UFH PPP CHRO-ACNC: 0.64 IU/ML
UFH PPP CHRO-ACNC: 0.69 IU/ML
UFH PPP CHRO-ACNC: 0.75 IU/ML
UFH PPP CHRO-ACNC: 0.82 IU/ML
UFH PPP CHRO-ACNC: 1.04 IU/ML
UFH PPP CHRO-ACNC: <0.1 IU/ML
UFH PPP CHRO-ACNC: >1.1 IU/ML
UIBC SERPL-MCNC: 116 UG/DL (ref 110–370)
UIBC SERPL-MCNC: 146 UG/DL (ref 110–370)
UNIT STATUS USTAT: NORMAL
URATE SERPL-MCNC: 8.8 MG/DL (ref 2.5–8.3)
UROBILINOGEN UR STRIP.AUTO-MCNC: 1 MG/DL
UROBILINOGEN UR STRIP.AUTO-MCNC: 1 MG/DL
UROBILINOGEN UR STRIP.AUTO-MCNC: 4 MG/DL
UROBILINOGEN UR STRIP.AUTO-MCNC: 4 MG/DL
WBC # BLD AUTO: 10.2 K/UL (ref 4.8–10.8)
WBC # BLD AUTO: 10.2 K/UL (ref 4.8–10.8)
WBC # BLD AUTO: 10.3 K/UL (ref 4.8–10.8)
WBC # BLD AUTO: 10.6 K/UL (ref 4.8–10.8)
WBC # BLD AUTO: 11.1 K/UL (ref 4.8–10.8)
WBC # BLD AUTO: 12.8 K/UL (ref 4.8–10.8)
WBC # BLD AUTO: 12.9 K/UL (ref 4.8–10.8)
WBC # BLD AUTO: 12.9 K/UL (ref 4.8–10.8)
WBC # BLD AUTO: 13.2 K/UL (ref 4.8–10.8)
WBC # BLD AUTO: 13.9 K/UL (ref 4.8–10.8)
WBC # BLD AUTO: 16.1 K/UL (ref 4.8–10.8)
WBC # BLD AUTO: 5.6 K/UL (ref 4.8–10.8)
WBC # BLD AUTO: 5.7 K/UL (ref 4.8–10.8)
WBC # BLD AUTO: 6.3 K/UL (ref 4.8–10.8)
WBC # BLD AUTO: 6.6 K/UL (ref 4.8–10.8)
WBC # BLD AUTO: 6.6 K/UL (ref 4.8–10.8)
WBC # BLD AUTO: 6.8 K/UL (ref 4.8–10.8)
WBC # BLD AUTO: 7 K/UL (ref 4.8–10.8)
WBC # BLD AUTO: 7 K/UL (ref 4.8–10.8)
WBC # BLD AUTO: 7.2 K/UL (ref 4.8–10.8)
WBC # BLD AUTO: 8.3 K/UL (ref 4.8–10.8)
WBC # BLD AUTO: 8.8 K/UL (ref 4.8–10.8)
WBC # BLD AUTO: 8.8 K/UL (ref 4.8–10.8)
WBC # BLD AUTO: 9.1 K/UL (ref 4.8–10.8)
WBC # BLD AUTO: 9.2 K/UL (ref 4.8–10.8)
WBC # BLD AUTO: 9.3 K/UL (ref 4.8–10.8)
WBC # BLD AUTO: 9.4 K/UL (ref 4.8–10.8)
WBC # BLD AUTO: 9.6 K/UL (ref 4.8–10.8)
WBC # BLD AUTO: 9.7 K/UL (ref 4.8–10.8)
WBC # BLD AUTO: 9.8 K/UL (ref 4.8–10.8)
WBC # BLD AUTO: 9.8 K/UL (ref 4.8–10.8)
WBC #/AREA URNS HPF: ABNORMAL /HPF

## 2022-01-01 PROCEDURE — 36415 COLL VENOUS BLD VENIPUNCTURE: CPT

## 2022-01-01 PROCEDURE — 700105 HCHG RX REV CODE 258: Performed by: INTERNAL MEDICINE

## 2022-01-01 PROCEDURE — A9270 NON-COVERED ITEM OR SERVICE: HCPCS

## 2022-01-01 PROCEDURE — 99232 SBSQ HOSP IP/OBS MODERATE 35: CPT | Mod: GC | Performed by: HOSPITALIST

## 2022-01-01 PROCEDURE — 82962 GLUCOSE BLOOD TEST: CPT | Mod: 91

## 2022-01-01 PROCEDURE — A9270 NON-COVERED ITEM OR SERVICE: HCPCS | Performed by: HOSPITALIST

## 2022-01-01 PROCEDURE — 93005 ELECTROCARDIOGRAM TRACING: CPT

## 2022-01-01 PROCEDURE — 86850 RBC ANTIBODY SCREEN: CPT

## 2022-01-01 PROCEDURE — 700105 HCHG RX REV CODE 258: Performed by: EMERGENCY MEDICINE

## 2022-01-01 PROCEDURE — 700102 HCHG RX REV CODE 250 W/ 637 OVERRIDE(OP)

## 2022-01-01 PROCEDURE — 83735 ASSAY OF MAGNESIUM: CPT

## 2022-01-01 PROCEDURE — A9270 NON-COVERED ITEM OR SERVICE: HCPCS | Performed by: INTERNAL MEDICINE

## 2022-01-01 PROCEDURE — 82803 BLOOD GASES ANY COMBINATION: CPT

## 2022-01-01 PROCEDURE — 93010 ELECTROCARDIOGRAM REPORT: CPT | Performed by: INTERNAL MEDICINE

## 2022-01-01 PROCEDURE — 76775 US EXAM ABDO BACK WALL LIM: CPT

## 2022-01-01 PROCEDURE — 37799 UNLISTED PX VASCULAR SURGERY: CPT

## 2022-01-01 PROCEDURE — A9270 NON-COVERED ITEM OR SERVICE: HCPCS | Performed by: STUDENT IN AN ORGANIZED HEALTH CARE EDUCATION/TRAINING PROGRAM

## 2022-01-01 PROCEDURE — 94003 VENT MGMT INPAT SUBQ DAY: CPT

## 2022-01-01 PROCEDURE — 770020 HCHG ROOM/CARE - TELE (206)

## 2022-01-01 PROCEDURE — 97530 THERAPEUTIC ACTIVITIES: CPT

## 2022-01-01 PROCEDURE — 80048 BASIC METABOLIC PNL TOTAL CA: CPT

## 2022-01-01 PROCEDURE — 700102 HCHG RX REV CODE 250 W/ 637 OVERRIDE(OP): Performed by: INTERNAL MEDICINE

## 2022-01-01 PROCEDURE — 700102 HCHG RX REV CODE 250 W/ 637 OVERRIDE(OP): Performed by: HOSPITALIST

## 2022-01-01 PROCEDURE — 82962 GLUCOSE BLOOD TEST: CPT

## 2022-01-01 PROCEDURE — A9270 NON-COVERED ITEM OR SERVICE: HCPCS | Performed by: NURSE PRACTITIONER

## 2022-01-01 PROCEDURE — 93306 TTE W/DOPPLER COMPLETE: CPT

## 2022-01-01 PROCEDURE — 85014 HEMATOCRIT: CPT

## 2022-01-01 PROCEDURE — 86706 HEP B SURFACE ANTIBODY: CPT | Mod: 91

## 2022-01-01 PROCEDURE — 770001 HCHG ROOM/CARE - MED/SURG/GYN PRIV*

## 2022-01-01 PROCEDURE — 84145 PROCALCITONIN (PCT): CPT

## 2022-01-01 PROCEDURE — 92526 ORAL FUNCTION THERAPY: CPT

## 2022-01-01 PROCEDURE — A9270 NON-COVERED ITEM OR SERVICE: HCPCS | Performed by: PHYSICAL MEDICINE & REHABILITATION

## 2022-01-01 PROCEDURE — 99233 SBSQ HOSP IP/OBS HIGH 50: CPT | Performed by: INTERNAL MEDICINE

## 2022-01-01 PROCEDURE — 84100 ASSAY OF PHOSPHORUS: CPT

## 2022-01-01 PROCEDURE — 700111 HCHG RX REV CODE 636 W/ 250 OVERRIDE (IP): Performed by: EMERGENCY MEDICINE

## 2022-01-01 PROCEDURE — 87641 MR-STAPH DNA AMP PROBE: CPT

## 2022-01-01 PROCEDURE — 74176 CT ABD & PELVIS W/O CONTRAST: CPT

## 2022-01-01 PROCEDURE — 85027 COMPLETE CBC AUTOMATED: CPT

## 2022-01-01 PROCEDURE — 80053 COMPREHEN METABOLIC PANEL: CPT

## 2022-01-01 PROCEDURE — 84300 ASSAY OF URINE SODIUM: CPT

## 2022-01-01 PROCEDURE — 700102 HCHG RX REV CODE 250 W/ 637 OVERRIDE(OP): Performed by: STUDENT IN AN ORGANIZED HEALTH CARE EDUCATION/TRAINING PROGRAM

## 2022-01-01 PROCEDURE — 700105 HCHG RX REV CODE 258

## 2022-01-01 PROCEDURE — 700111 HCHG RX REV CODE 636 W/ 250 OVERRIDE (IP): Performed by: INTERNAL MEDICINE

## 2022-01-01 PROCEDURE — 30233N1 TRANSFUSION OF NONAUTOLOGOUS RED BLOOD CELLS INTO PERIPHERAL VEIN, PERCUTANEOUS APPROACH: ICD-10-PCS | Performed by: STUDENT IN AN ORGANIZED HEALTH CARE EDUCATION/TRAINING PROGRAM

## 2022-01-01 PROCEDURE — 700101 HCHG RX REV CODE 250

## 2022-01-01 PROCEDURE — 700111 HCHG RX REV CODE 636 W/ 250 OVERRIDE (IP)

## 2022-01-01 PROCEDURE — 85610 PROTHROMBIN TIME: CPT

## 2022-01-01 PROCEDURE — 97602 WOUND(S) CARE NON-SELECTIVE: CPT

## 2022-01-01 PROCEDURE — 85576 BLOOD PLATELET AGGREGATION: CPT | Mod: 91

## 2022-01-01 PROCEDURE — 94640 AIRWAY INHALATION TREATMENT: CPT

## 2022-01-01 PROCEDURE — 85025 COMPLETE CBC W/AUTO DIFF WBC: CPT

## 2022-01-01 PROCEDURE — 84550 ASSAY OF BLOOD/URIC ACID: CPT

## 2022-01-01 PROCEDURE — 700101 HCHG RX REV CODE 250: Performed by: EMERGENCY MEDICINE

## 2022-01-01 PROCEDURE — 84295 ASSAY OF SERUM SODIUM: CPT

## 2022-01-01 PROCEDURE — 700102 HCHG RX REV CODE 250 W/ 637 OVERRIDE(OP): Performed by: EMERGENCY MEDICINE

## 2022-01-01 PROCEDURE — 94150 VITAL CAPACITY TEST: CPT

## 2022-01-01 PROCEDURE — 06HY33Z INSERTION OF INFUSION DEVICE INTO LOWER VEIN, PERCUTANEOUS APPROACH: ICD-10-PCS | Performed by: STUDENT IN AN ORGANIZED HEALTH CARE EDUCATION/TRAINING PROGRAM

## 2022-01-01 PROCEDURE — 85018 HEMOGLOBIN: CPT | Mod: 91

## 2022-01-01 PROCEDURE — 71045 X-RAY EXAM CHEST 1 VIEW: CPT

## 2022-01-01 PROCEDURE — 36620 INSERTION CATHETER ARTERY: CPT

## 2022-01-01 PROCEDURE — 80069 RENAL FUNCTION PANEL: CPT

## 2022-01-01 PROCEDURE — 93005 ELECTROCARDIOGRAM TRACING: CPT | Performed by: STUDENT IN AN ORGANIZED HEALTH CARE EDUCATION/TRAINING PROGRAM

## 2022-01-01 PROCEDURE — 99232 SBSQ HOSP IP/OBS MODERATE 35: CPT | Mod: GC | Performed by: INTERNAL MEDICINE

## 2022-01-01 PROCEDURE — 99231 SBSQ HOSP IP/OBS SF/LOW 25: CPT | Performed by: INTERNAL MEDICINE

## 2022-01-01 PROCEDURE — C1752 CATH,HEMODIALYSIS,SHORT-TERM: HCPCS

## 2022-01-01 PROCEDURE — 97167 OT EVAL HIGH COMPLEX 60 MIN: CPT

## 2022-01-01 PROCEDURE — 770022 HCHG ROOM/CARE - ICU (200)

## 2022-01-01 PROCEDURE — 700102 HCHG RX REV CODE 250 W/ 637 OVERRIDE(OP): Performed by: PHYSICAL MEDICINE & REHABILITATION

## 2022-01-01 PROCEDURE — 85730 THROMBOPLASTIN TIME PARTIAL: CPT

## 2022-01-01 PROCEDURE — 93005 ELECTROCARDIOGRAM TRACING: CPT | Performed by: EMERGENCY MEDICINE

## 2022-01-01 PROCEDURE — 99291 CRITICAL CARE FIRST HOUR: CPT | Performed by: STUDENT IN AN ORGANIZED HEALTH CARE EDUCATION/TRAINING PROGRAM

## 2022-01-01 PROCEDURE — 86900 BLOOD TYPING SEROLOGIC ABO: CPT

## 2022-01-01 PROCEDURE — 700105 HCHG RX REV CODE 258: Performed by: STUDENT IN AN ORGANIZED HEALTH CARE EDUCATION/TRAINING PROGRAM

## 2022-01-01 PROCEDURE — 85018 HEMOGLOBIN: CPT

## 2022-01-01 PROCEDURE — 82330 ASSAY OF CALCIUM: CPT

## 2022-01-01 PROCEDURE — 99232 SBSQ HOSP IP/OBS MODERATE 35: CPT | Performed by: INTERNAL MEDICINE

## 2022-01-01 PROCEDURE — 99292 CRITICAL CARE ADDL 30 MIN: CPT | Performed by: INTERNAL MEDICINE

## 2022-01-01 PROCEDURE — 85520 HEPARIN ASSAY: CPT

## 2022-01-01 PROCEDURE — 30233M1 TRANSFUSION OF NONAUTOLOGOUS PLASMA CRYOPRECIPITATE INTO PERIPHERAL VEIN, PERCUTANEOUS APPROACH: ICD-10-PCS | Performed by: STUDENT IN AN ORGANIZED HEALTH CARE EDUCATION/TRAINING PROGRAM

## 2022-01-01 PROCEDURE — 700102 HCHG RX REV CODE 250 W/ 637 OVERRIDE(OP): Performed by: NURSE PRACTITIONER

## 2022-01-01 PROCEDURE — 99232 SBSQ HOSP IP/OBS MODERATE 35: CPT | Performed by: STUDENT IN AN ORGANIZED HEALTH CARE EDUCATION/TRAINING PROGRAM

## 2022-01-01 PROCEDURE — 92612 ENDOSCOPY SWALLOW (FEES) VID: CPT

## 2022-01-01 PROCEDURE — 84134 ASSAY OF PREALBUMIN: CPT

## 2022-01-01 PROCEDURE — 36556 INSERT NON-TUNNEL CV CATH: CPT | Mod: RT | Performed by: EMERGENCY MEDICINE

## 2022-01-01 PROCEDURE — 700101 HCHG RX REV CODE 250: Performed by: STUDENT IN AN ORGANIZED HEALTH CARE EDUCATION/TRAINING PROGRAM

## 2022-01-01 PROCEDURE — A9270 NON-COVERED ITEM OR SERVICE: HCPCS | Performed by: EMERGENCY MEDICINE

## 2022-01-01 PROCEDURE — 87040 BLOOD CULTURE FOR BACTERIA: CPT | Mod: 91

## 2022-01-01 PROCEDURE — 94760 N-INVAS EAR/PLS OXIMETRY 1: CPT

## 2022-01-01 PROCEDURE — P9016 RBC LEUKOCYTES REDUCED: HCPCS | Mod: 91

## 2022-01-01 PROCEDURE — 87086 URINE CULTURE/COLONY COUNT: CPT

## 2022-01-01 PROCEDURE — 73100 X-RAY EXAM OF WRIST: CPT | Mod: RT

## 2022-01-01 PROCEDURE — 97163 PT EVAL HIGH COMPLEX 45 MIN: CPT

## 2022-01-01 PROCEDURE — 85007 BL SMEAR W/DIFF WBC COUNT: CPT

## 2022-01-01 PROCEDURE — 99291 CRITICAL CARE FIRST HOUR: CPT | Performed by: INTERNAL MEDICINE

## 2022-01-01 PROCEDURE — 99292 CRITICAL CARE ADDL 30 MIN: CPT | Mod: 25 | Performed by: STUDENT IN AN ORGANIZED HEALTH CARE EDUCATION/TRAINING PROGRAM

## 2022-01-01 PROCEDURE — 83540 ASSAY OF IRON: CPT

## 2022-01-01 PROCEDURE — 99152 MOD SED SAME PHYS/QHP 5/>YRS: CPT

## 2022-01-01 PROCEDURE — 36600 WITHDRAWAL OF ARTERIAL BLOOD: CPT

## 2022-01-01 PROCEDURE — P9016 RBC LEUKOCYTES REDUCED: HCPCS

## 2022-01-01 PROCEDURE — 86140 C-REACTIVE PROTEIN: CPT

## 2022-01-01 PROCEDURE — 85027 COMPLETE CBC AUTOMATED: CPT | Mod: 91

## 2022-01-01 PROCEDURE — 700111 HCHG RX REV CODE 636 W/ 250 OVERRIDE (IP): Performed by: STUDENT IN AN ORGANIZED HEALTH CARE EDUCATION/TRAINING PROGRAM

## 2022-01-01 PROCEDURE — 94799 UNLISTED PULMONARY SVC/PX: CPT

## 2022-01-01 PROCEDURE — 51798 US URINE CAPACITY MEASURE: CPT

## 2022-01-01 PROCEDURE — 82803 BLOOD GASES ANY COMBINATION: CPT | Mod: 91

## 2022-01-01 PROCEDURE — 86901 BLOOD TYPING SEROLOGIC RH(D): CPT

## 2022-01-01 PROCEDURE — 36620 INSERTION CATHETER ARTERY: CPT | Performed by: EMERGENCY MEDICINE

## 2022-01-01 PROCEDURE — 83970 ASSAY OF PARATHORMONE: CPT

## 2022-01-01 PROCEDURE — 36430 TRANSFUSION BLD/BLD COMPNT: CPT

## 2022-01-01 PROCEDURE — 82728 ASSAY OF FERRITIN: CPT

## 2022-01-01 PROCEDURE — 99231 SBSQ HOSP IP/OBS SF/LOW 25: CPT | Mod: GC | Performed by: INTERNAL MEDICINE

## 2022-01-01 PROCEDURE — 8E0ZXY6 ISOLATION: ICD-10-PCS | Performed by: EMERGENCY MEDICINE

## 2022-01-01 PROCEDURE — 302240 PAPR UNIT: Performed by: EMERGENCY MEDICINE

## 2022-01-01 PROCEDURE — 84295 ASSAY OF SERUM SODIUM: CPT | Mod: 91

## 2022-01-01 PROCEDURE — 93970 EXTREMITY STUDY: CPT

## 2022-01-01 PROCEDURE — 84133 ASSAY OF URINE POTASSIUM: CPT

## 2022-01-01 PROCEDURE — 85347 COAGULATION TIME ACTIVATED: CPT | Mod: 91

## 2022-01-01 PROCEDURE — 83550 IRON BINDING TEST: CPT

## 2022-01-01 PROCEDURE — 93970 EXTREMITY STUDY: CPT | Mod: 26 | Performed by: INTERNAL MEDICINE

## 2022-01-01 PROCEDURE — 83880 ASSAY OF NATRIURETIC PEPTIDE: CPT

## 2022-01-01 PROCEDURE — 87040 BLOOD CULTURE FOR BACTERIA: CPT

## 2022-01-01 PROCEDURE — 99233 SBSQ HOSP IP/OBS HIGH 50: CPT | Mod: GC | Performed by: INTERNAL MEDICINE

## 2022-01-01 PROCEDURE — 99291 CRITICAL CARE FIRST HOUR: CPT | Mod: 25 | Performed by: STUDENT IN AN ORGANIZED HEALTH CARE EDUCATION/TRAINING PROGRAM

## 2022-01-01 PROCEDURE — 99291 CRITICAL CARE FIRST HOUR: CPT | Mod: 25 | Performed by: EMERGENCY MEDICINE

## 2022-01-01 PROCEDURE — 84156 ASSAY OF PROTEIN URINE: CPT

## 2022-01-01 PROCEDURE — 81001 URINALYSIS AUTO W/SCOPE: CPT

## 2022-01-01 PROCEDURE — 87077 CULTURE AEROBIC IDENTIFY: CPT

## 2022-01-01 PROCEDURE — 36556 INSERT NON-TUNNEL CV CATH: CPT

## 2022-01-01 PROCEDURE — 5A1D70Z PERFORMANCE OF URINARY FILTRATION, INTERMITTENT, LESS THAN 6 HOURS PER DAY: ICD-10-PCS | Performed by: INTERNAL MEDICINE

## 2022-01-01 PROCEDURE — 99223 1ST HOSP IP/OBS HIGH 75: CPT | Performed by: PHYSICAL MEDICINE & REHABILITATION

## 2022-01-01 PROCEDURE — 87186 SC STD MICRODIL/AGAR DIL: CPT

## 2022-01-01 PROCEDURE — 93306 TTE W/DOPPLER COMPLETE: CPT | Mod: 26 | Performed by: INTERNAL MEDICINE

## 2022-01-01 PROCEDURE — 5A1955Z RESPIRATORY VENTILATION, GREATER THAN 96 CONSECUTIVE HOURS: ICD-10-PCS | Performed by: EMERGENCY MEDICINE

## 2022-01-01 PROCEDURE — 82140 ASSAY OF AMMONIA: CPT

## 2022-01-01 PROCEDURE — P9017 PLASMA 1 DONOR FRZ W/IN 8 HR: HCPCS | Mod: 91

## 2022-01-01 PROCEDURE — 86923 COMPATIBILITY TEST ELECTRIC: CPT

## 2022-01-01 PROCEDURE — 87205 SMEAR GRAM STAIN: CPT

## 2022-01-01 PROCEDURE — C1751 CATH, INF, PER/CENT/MIDLINE: HCPCS

## 2022-01-01 PROCEDURE — 02HV33Z INSERTION OF INFUSION DEVICE INTO SUPERIOR VENA CAVA, PERCUTANEOUS APPROACH: ICD-10-PCS | Performed by: STUDENT IN AN ORGANIZED HEALTH CARE EDUCATION/TRAINING PROGRAM

## 2022-01-01 PROCEDURE — 86480 TB TEST CELL IMMUN MEASURE: CPT

## 2022-01-01 PROCEDURE — 87147 CULTURE TYPE IMMUNOLOGIC: CPT

## 2022-01-01 PROCEDURE — 97535 SELF CARE MNGMENT TRAINING: CPT

## 2022-01-01 PROCEDURE — 87070 CULTURE OTHR SPECIMN AEROBIC: CPT

## 2022-01-01 PROCEDURE — 85384 FIBRINOGEN ACTIVITY: CPT

## 2022-01-01 PROCEDURE — 85014 HEMATOCRIT: CPT | Mod: 91

## 2022-01-01 PROCEDURE — 99292 CRITICAL CARE ADDL 30 MIN: CPT | Mod: 25 | Performed by: EMERGENCY MEDICINE

## 2022-01-01 PROCEDURE — 86923 COMPATIBILITY TEST ELECTRIC: CPT | Mod: 91

## 2022-01-01 PROCEDURE — 30233K1 TRANSFUSION OF NONAUTOLOGOUS FROZEN PLASMA INTO PERIPHERAL VEIN, PERCUTANEOUS APPROACH: ICD-10-PCS | Performed by: STUDENT IN AN ORGANIZED HEALTH CARE EDUCATION/TRAINING PROGRAM

## 2022-01-01 PROCEDURE — 82570 ASSAY OF URINE CREATININE: CPT

## 2022-01-01 PROCEDURE — 93971 EXTREMITY STUDY: CPT | Mod: 26,RT | Performed by: INTERNAL MEDICINE

## 2022-01-01 PROCEDURE — 36556 INSERT NON-TUNNEL CV CATH: CPT | Mod: LT | Performed by: STUDENT IN AN ORGANIZED HEALTH CARE EDUCATION/TRAINING PROGRAM

## 2022-01-01 PROCEDURE — 80048 BASIC METABOLIC PNL TOTAL CA: CPT | Mod: 91

## 2022-01-01 PROCEDURE — 82436 ASSAY OF URINE CHLORIDE: CPT

## 2022-01-01 PROCEDURE — 302136 NUTRITION PUMP: Performed by: EMERGENCY MEDICINE

## 2022-01-01 PROCEDURE — 83605 ASSAY OF LACTIC ACID: CPT

## 2022-01-01 PROCEDURE — 74018 RADEX ABDOMEN 1 VIEW: CPT

## 2022-01-01 PROCEDURE — P9017 PLASMA 1 DONOR FRZ W/IN 8 HR: HCPCS

## 2022-01-01 PROCEDURE — 04HY32Z INSERTION OF MONITORING DEVICE INTO LOWER ARTERY, PERCUTANEOUS APPROACH: ICD-10-PCS | Performed by: EMERGENCY MEDICINE

## 2022-01-01 PROCEDURE — 99153 MOD SED SAME PHYS/QHP EA: CPT

## 2022-01-01 PROCEDURE — 93971 EXTREMITY STUDY: CPT | Mod: RT

## 2022-01-01 PROCEDURE — 94002 VENT MGMT INPAT INIT DAY: CPT

## 2022-01-01 PROCEDURE — 84132 ASSAY OF SERUM POTASSIUM: CPT

## 2022-01-01 PROCEDURE — 70450 CT HEAD/BRAIN W/O DYE: CPT

## 2022-01-01 PROCEDURE — 99223 1ST HOSP IP/OBS HIGH 75: CPT | Performed by: HOSPITALIST

## 2022-01-01 PROCEDURE — 92610 EVALUATE SWALLOWING FUNCTION: CPT

## 2022-01-01 PROCEDURE — 90935 HEMODIALYSIS ONE EVALUATION: CPT

## 2022-01-01 PROCEDURE — 302240 PAPR UNIT: Performed by: STUDENT IN AN ORGANIZED HEALTH CARE EDUCATION/TRAINING PROGRAM

## 2022-01-01 PROCEDURE — P9012 CRYOPRECIPITATE EACH UNIT: HCPCS | Mod: 91

## 2022-01-01 PROCEDURE — 80074 ACUTE HEPATITIS PANEL: CPT

## 2022-01-01 PROCEDURE — 80053 COMPREHEN METABOLIC PANEL: CPT | Mod: 91

## 2022-01-01 PROCEDURE — 93005 ELECTROCARDIOGRAM TRACING: CPT | Performed by: INTERNAL MEDICINE

## 2022-01-01 RX ORDER — CARBOXYMETHYLCELLULOSE SODIUM 5 MG/ML
1 SOLUTION/ DROPS OPHTHALMIC PRN
Status: DISCONTINUED | OUTPATIENT
Start: 2022-01-01 | End: 2022-01-01 | Stop reason: HOSPADM

## 2022-01-01 RX ORDER — FAMOTIDINE 20 MG/1
20 TABLET, FILM COATED ORAL EVERY 24 HOURS
Status: DISCONTINUED | OUTPATIENT
Start: 2022-01-01 | End: 2022-01-01

## 2022-01-01 RX ORDER — FUROSEMIDE 10 MG/ML
40 INJECTION INTRAMUSCULAR; INTRAVENOUS
Status: DISCONTINUED | OUTPATIENT
Start: 2022-01-01 | End: 2022-01-01

## 2022-01-01 RX ORDER — LORAZEPAM 2 MG/ML
1 INJECTION INTRAMUSCULAR
Status: DISCONTINUED | OUTPATIENT
Start: 2022-01-01 | End: 2022-01-01

## 2022-01-01 RX ORDER — HEPARIN SODIUM 5000 [USP'U]/ML
5000 INJECTION, SOLUTION INTRAVENOUS; SUBCUTANEOUS EVERY 8 HOURS
Status: DISCONTINUED | OUTPATIENT
Start: 2022-01-01 | End: 2022-01-01

## 2022-01-01 RX ORDER — HYDROMORPHONE HYDROCHLORIDE 1 MG/ML
1 INJECTION, SOLUTION INTRAMUSCULAR; INTRAVENOUS; SUBCUTANEOUS ONCE
Status: COMPLETED | OUTPATIENT
Start: 2022-01-01 | End: 2022-01-01

## 2022-01-01 RX ORDER — SODIUM CHLORIDE, SODIUM LACTATE, POTASSIUM CHLORIDE, CALCIUM CHLORIDE 600; 310; 30; 20 MG/100ML; MG/100ML; MG/100ML; MG/100ML
INJECTION, SOLUTION INTRAVENOUS CONTINUOUS
Status: DISCONTINUED | OUTPATIENT
Start: 2022-01-01 | End: 2022-01-01

## 2022-01-01 RX ORDER — MORPHINE SULFATE 10 MG/5ML
10 SOLUTION ORAL
Status: DISCONTINUED | OUTPATIENT
Start: 2022-01-01 | End: 2022-01-01

## 2022-01-01 RX ORDER — MIDAZOLAM HYDROCHLORIDE 1 MG/ML
1 INJECTION INTRAMUSCULAR; INTRAVENOUS
Status: COMPLETED | OUTPATIENT
Start: 2022-01-01 | End: 2022-01-01

## 2022-01-01 RX ORDER — HYDROCODONE BITARTRATE AND ACETAMINOPHEN 10; 325 MG/1; MG/1
1 TABLET ORAL EVERY 6 HOURS PRN
Status: DISCONTINUED | OUTPATIENT
Start: 2022-01-01 | End: 2022-01-01

## 2022-01-01 RX ORDER — QUETIAPINE FUMARATE 25 MG/1
25 TABLET, FILM COATED ORAL ONCE
Status: DISCONTINUED | OUTPATIENT
Start: 2022-01-01 | End: 2022-01-01

## 2022-01-01 RX ORDER — HEPARIN SODIUM 1000 [USP'U]/ML
40 INJECTION, SOLUTION INTRAVENOUS; SUBCUTANEOUS PRN
Status: DISCONTINUED | OUTPATIENT
Start: 2022-01-01 | End: 2022-01-01

## 2022-01-01 RX ORDER — SODIUM CHLORIDE, SODIUM LACTATE, POTASSIUM CHLORIDE, CALCIUM CHLORIDE 600; 310; 30; 20 MG/100ML; MG/100ML; MG/100ML; MG/100ML
INJECTION, SOLUTION INTRAVENOUS CONTINUOUS
Status: ACTIVE | OUTPATIENT
Start: 2022-01-01 | End: 2022-01-01

## 2022-01-01 RX ORDER — TAMSULOSIN HYDROCHLORIDE 0.4 MG/1
0.4 CAPSULE ORAL
Status: DISCONTINUED | OUTPATIENT
Start: 2022-01-01 | End: 2022-01-01

## 2022-01-01 RX ORDER — MIDAZOLAM HYDROCHLORIDE 1 MG/ML
4 INJECTION INTRAMUSCULAR; INTRAVENOUS ONCE
Status: DISCONTINUED | OUTPATIENT
Start: 2022-01-01 | End: 2022-01-01

## 2022-01-01 RX ORDER — SODIUM CHLORIDE 9 MG/ML
INJECTION, SOLUTION INTRAVENOUS CONTINUOUS
Status: ACTIVE | OUTPATIENT
Start: 2022-01-01 | End: 2022-01-01

## 2022-01-01 RX ORDER — SODIUM POLYSTYRENE SULFONATE 15 G/60ML
15 SUSPENSION ORAL; RECTAL ONCE
Status: COMPLETED | OUTPATIENT
Start: 2022-01-01 | End: 2022-01-01

## 2022-01-01 RX ORDER — BISACODYL 10 MG
10 SUPPOSITORY, RECTAL RECTAL
Status: DISCONTINUED | OUTPATIENT
Start: 2022-01-01 | End: 2022-01-01

## 2022-01-01 RX ORDER — SEVELAMER CARBONATE 800 MG/1
800 TABLET, FILM COATED ORAL
Status: DISCONTINUED | OUTPATIENT
Start: 2022-01-01 | End: 2022-01-01

## 2022-01-01 RX ORDER — ENEMA 19; 7 G/133ML; G/133ML
1 ENEMA RECTAL ONCE
Status: COMPLETED | OUTPATIENT
Start: 2022-01-01 | End: 2022-01-01

## 2022-01-01 RX ORDER — QUETIAPINE FUMARATE 25 MG/1
25 TABLET, FILM COATED ORAL
Status: DISCONTINUED | OUTPATIENT
Start: 2022-01-01 | End: 2022-01-01

## 2022-01-01 RX ORDER — AMOXICILLIN 250 MG
2 CAPSULE ORAL 2 TIMES DAILY
Status: DISCONTINUED | OUTPATIENT
Start: 2022-01-01 | End: 2022-01-01

## 2022-01-01 RX ORDER — LINEZOLID 2 MG/ML
600 INJECTION, SOLUTION INTRAVENOUS ONCE
Status: COMPLETED | OUTPATIENT
Start: 2022-01-01 | End: 2022-01-01

## 2022-01-01 RX ORDER — NOREPINEPHRINE BITARTRATE 0.03 MG/ML
0-30 INJECTION, SOLUTION INTRAVENOUS CONTINUOUS
Status: DISCONTINUED | OUTPATIENT
Start: 2022-01-01 | End: 2022-01-01

## 2022-01-01 RX ORDER — DEXAMETHASONE SODIUM PHOSPHATE 4 MG/ML
6 INJECTION, SOLUTION INTRA-ARTICULAR; INTRALESIONAL; INTRAMUSCULAR; INTRAVENOUS; SOFT TISSUE DAILY
Status: COMPLETED | OUTPATIENT
Start: 2022-01-01 | End: 2022-01-01

## 2022-01-01 RX ORDER — CARVEDILOL 12.5 MG/1
12.5 TABLET ORAL 2 TIMES DAILY WITH MEALS
Status: DISCONTINUED | OUTPATIENT
Start: 2022-01-01 | End: 2022-01-01

## 2022-01-01 RX ORDER — OXYCODONE HYDROCHLORIDE 5 MG/1
5-10 TABLET ORAL EVERY 4 HOURS PRN
Status: DISCONTINUED | OUTPATIENT
Start: 2022-01-01 | End: 2022-01-01

## 2022-01-01 RX ORDER — WARFARIN SODIUM 5 MG/1
5 TABLET ORAL DAILY
Status: DISCONTINUED | OUTPATIENT
Start: 2022-01-01 | End: 2022-01-01

## 2022-01-01 RX ORDER — CARBOXYMETHYLCELLULOSE SODIUM 5 MG/ML
1 SOLUTION/ DROPS OPHTHALMIC PRN
Status: DISCONTINUED | OUTPATIENT
Start: 2022-01-01 | End: 2022-01-01

## 2022-01-01 RX ORDER — OXYCODONE HYDROCHLORIDE 5 MG/1
5 TABLET ORAL EVERY 8 HOURS PRN
Status: DISCONTINUED | OUTPATIENT
Start: 2022-01-01 | End: 2022-01-01

## 2022-01-01 RX ORDER — ROPINIROLE 0.5 MG/1
1 TABLET, FILM COATED ORAL
Status: DISCONTINUED | OUTPATIENT
Start: 2022-01-01 | End: 2022-01-01

## 2022-01-01 RX ORDER — CEFAZOLIN SODIUM 1 G/50ML
1 INJECTION, SOLUTION INTRAVENOUS EVERY 12 HOURS
Status: DISCONTINUED | OUTPATIENT
Start: 2022-01-01 | End: 2022-01-01

## 2022-01-01 RX ORDER — ACETAMINOPHEN 325 MG/1
650 TABLET ORAL EVERY 6 HOURS PRN
Status: DISCONTINUED | OUTPATIENT
Start: 2022-01-01 | End: 2022-01-01

## 2022-01-01 RX ORDER — HALOPERIDOL 5 MG/ML
1 INJECTION INTRAMUSCULAR ONCE
Status: COMPLETED | OUTPATIENT
Start: 2022-01-01 | End: 2022-01-01

## 2022-01-01 RX ORDER — ALBUTEROL SULFATE 90 UG/1
2 AEROSOL, METERED RESPIRATORY (INHALATION)
Status: DISCONTINUED | OUTPATIENT
Start: 2022-01-01 | End: 2022-01-01

## 2022-01-01 RX ORDER — NYSTATIN 100000 [USP'U]/G
POWDER TOPICAL 3 TIMES DAILY
Status: DISCONTINUED | OUTPATIENT
Start: 2022-01-01 | End: 2022-01-01

## 2022-01-01 RX ORDER — OMEPRAZOLE 20 MG/1
20 CAPSULE, DELAYED RELEASE ORAL
Status: DISCONTINUED | OUTPATIENT
Start: 2022-01-01 | End: 2022-01-01

## 2022-01-01 RX ORDER — CALCIUM GLUCONATE 20 MG/ML
2 INJECTION, SOLUTION INTRAVENOUS ONCE
Status: COMPLETED | OUTPATIENT
Start: 2022-01-01 | End: 2022-01-01

## 2022-01-01 RX ORDER — DOCUSATE SODIUM 100 MG/1
100 CAPSULE, LIQUID FILLED ORAL EVERY 12 HOURS
Status: DISCONTINUED | OUTPATIENT
Start: 2022-01-01 | End: 2022-01-01 | Stop reason: HOSPADM

## 2022-01-01 RX ORDER — HEPARIN SODIUM 1000 [USP'U]/ML
2800 INJECTION, SOLUTION INTRAVENOUS; SUBCUTANEOUS
Status: DISCONTINUED | OUTPATIENT
Start: 2022-01-01 | End: 2022-01-01

## 2022-01-01 RX ORDER — POLYETHYLENE GLYCOL 3350 17 G/17G
1 POWDER, FOR SOLUTION ORAL
Status: DISCONTINUED | OUTPATIENT
Start: 2022-01-01 | End: 2022-01-01

## 2022-01-01 RX ORDER — IPRATROPIUM BROMIDE AND ALBUTEROL SULFATE 2.5; .5 MG/3ML; MG/3ML
3 SOLUTION RESPIRATORY (INHALATION)
Status: DISCONTINUED | OUTPATIENT
Start: 2022-01-01 | End: 2022-01-01

## 2022-01-01 RX ORDER — ACETAMINOPHEN 325 MG/1
650 TABLET ORAL EVERY 4 HOURS PRN
Status: DISCONTINUED | OUTPATIENT
Start: 2022-01-01 | End: 2022-01-01

## 2022-01-01 RX ORDER — CHOLECALCIFEROL (VITAMIN D3) 125 MCG
5 CAPSULE ORAL NIGHTLY
Status: DISCONTINUED | OUTPATIENT
Start: 2022-01-01 | End: 2022-01-01

## 2022-01-01 RX ORDER — DIPHENHYDRAMINE HCL 25 MG
25 TABLET ORAL ONCE
Status: COMPLETED | OUTPATIENT
Start: 2022-01-01 | End: 2022-01-01

## 2022-01-01 RX ORDER — ENEMA 19; 7 G/133ML; G/133ML
1 ENEMA RECTAL ONCE
Status: ACTIVE | OUTPATIENT
Start: 2022-01-01 | End: 2022-01-01

## 2022-01-01 RX ORDER — ONDANSETRON 4 MG/1
8 TABLET, ORALLY DISINTEGRATING ORAL EVERY 8 HOURS PRN
Status: DISCONTINUED | OUTPATIENT
Start: 2022-01-01 | End: 2022-01-01

## 2022-01-01 RX ORDER — POLYETHYLENE GLYCOL 3350 17 G/17G
1 POWDER, FOR SOLUTION ORAL 2 TIMES DAILY
Status: DISCONTINUED | OUTPATIENT
Start: 2022-01-01 | End: 2022-01-01

## 2022-01-01 RX ORDER — DEXMEDETOMIDINE HYDROCHLORIDE 4 UG/ML
0-1.5 INJECTION, SOLUTION INTRAVENOUS CONTINUOUS
Status: DISCONTINUED | OUTPATIENT
Start: 2022-01-01 | End: 2022-01-01

## 2022-01-01 RX ORDER — WARFARIN SODIUM 6 MG/1
6 TABLET ORAL DAILY
Status: DISCONTINUED | OUTPATIENT
Start: 2022-01-01 | End: 2022-01-01

## 2022-01-01 RX ORDER — ENOXAPARIN SODIUM 100 MG/ML
40 INJECTION SUBCUTANEOUS DAILY
Status: DISCONTINUED | OUTPATIENT
Start: 2022-01-01 | End: 2022-01-01

## 2022-01-01 RX ORDER — MORPHINE SULFATE 10 MG/5ML
10 SOLUTION ORAL
Status: DISCONTINUED | OUTPATIENT
Start: 2022-01-01 | End: 2022-01-01 | Stop reason: HOSPADM

## 2022-01-01 RX ORDER — HEPARIN SODIUM 5000 [USP'U]/100ML
0-30 INJECTION, SOLUTION INTRAVENOUS CONTINUOUS
Status: DISCONTINUED | OUTPATIENT
Start: 2022-01-01 | End: 2022-01-01

## 2022-01-01 RX ORDER — MAGNESIUM SULFATE 1 G/100ML
1 INJECTION INTRAVENOUS ONCE
Status: COMPLETED | OUTPATIENT
Start: 2022-01-01 | End: 2022-01-01

## 2022-01-01 RX ORDER — FUROSEMIDE 10 MG/ML
20 INJECTION INTRAMUSCULAR; INTRAVENOUS
Status: DISCONTINUED | OUTPATIENT
Start: 2022-01-01 | End: 2022-01-01

## 2022-01-01 RX ORDER — WARFARIN SODIUM 2 MG/1
2 TABLET ORAL
Status: DISCONTINUED | OUTPATIENT
Start: 2022-01-01 | End: 2022-01-01

## 2022-01-01 RX ORDER — MIDAZOLAM HYDROCHLORIDE 1 MG/ML
4 INJECTION INTRAMUSCULAR; INTRAVENOUS ONCE
Status: COMPLETED | OUTPATIENT
Start: 2022-01-01 | End: 2022-01-01

## 2022-01-01 RX ORDER — ROPINIROLE 0.5 MG/1
0.5 TABLET, FILM COATED ORAL DAILY
Status: DISCONTINUED | OUTPATIENT
Start: 2022-01-01 | End: 2022-01-01

## 2022-01-01 RX ORDER — SODIUM CHLORIDE 9 MG/ML
INJECTION, SOLUTION INTRAVENOUS CONTINUOUS
Status: DISCONTINUED | OUTPATIENT
Start: 2022-01-01 | End: 2022-01-01

## 2022-01-01 RX ORDER — ATORVASTATIN CALCIUM 40 MG/1
40 TABLET, FILM COATED ORAL
Status: DISCONTINUED | OUTPATIENT
Start: 2022-01-01 | End: 2022-01-01

## 2022-01-01 RX ORDER — SODIUM POLYSTYRENE SULFONATE 15 G/60ML
15 SUSPENSION ORAL; RECTAL 3 TIMES DAILY
Status: COMPLETED | OUTPATIENT
Start: 2022-01-01 | End: 2022-01-01

## 2022-01-01 RX ORDER — NYSTATIN 100000 [USP'U]/G
POWDER TOPICAL 2 TIMES DAILY
Status: COMPLETED | OUTPATIENT
Start: 2022-01-01 | End: 2022-01-01

## 2022-01-01 RX ORDER — ONDANSETRON 2 MG/ML
8 INJECTION INTRAMUSCULAR; INTRAVENOUS EVERY 8 HOURS PRN
Status: DISCONTINUED | OUTPATIENT
Start: 2022-01-01 | End: 2022-01-01

## 2022-01-01 RX ORDER — CEFAZOLIN SODIUM 1 G/50ML
1 INJECTION, SOLUTION INTRAVENOUS EVERY 24 HOURS
Status: DISCONTINUED | OUTPATIENT
Start: 2022-01-01 | End: 2022-01-01

## 2022-01-01 RX ORDER — DOCUSATE SODIUM 100 MG/1
100 CAPSULE, LIQUID FILLED ORAL EVERY 12 HOURS
Status: DISCONTINUED | OUTPATIENT
Start: 2022-01-01 | End: 2022-01-01

## 2022-01-01 RX ORDER — LINEZOLID 600 MG/1
600 TABLET, FILM COATED ORAL EVERY 12 HOURS
Status: DISCONTINUED | OUTPATIENT
Start: 2022-01-01 | End: 2022-01-01

## 2022-01-01 RX ORDER — HYDROMORPHONE HYDROCHLORIDE 1 MG/ML
0.5 INJECTION, SOLUTION INTRAMUSCULAR; INTRAVENOUS; SUBCUTANEOUS
Status: DISCONTINUED | OUTPATIENT
Start: 2022-01-01 | End: 2022-01-01

## 2022-01-01 RX ORDER — POLYETHYLENE GLYCOL 3350 17 G/17G
1 POWDER, FOR SOLUTION ORAL DAILY
Status: DISCONTINUED | OUTPATIENT
Start: 2022-01-01 | End: 2022-01-01

## 2022-01-01 RX ORDER — HYDROMORPHONE HYDROCHLORIDE 1 MG/ML
.5-1 INJECTION, SOLUTION INTRAMUSCULAR; INTRAVENOUS; SUBCUTANEOUS
Status: DISCONTINUED | OUTPATIENT
Start: 2022-01-01 | End: 2022-01-01

## 2022-01-01 RX ORDER — IBUPROFEN 600 MG/1
600 TABLET ORAL EVERY 6 HOURS PRN
Status: DISCONTINUED | OUTPATIENT
Start: 2022-01-01 | End: 2022-01-01 | Stop reason: HOSPADM

## 2022-01-01 RX ORDER — HYDROMORPHONE HYDROCHLORIDE 1 MG/ML
1 INJECTION, SOLUTION INTRAMUSCULAR; INTRAVENOUS; SUBCUTANEOUS
Status: DISCONTINUED | OUTPATIENT
Start: 2022-01-01 | End: 2022-01-01

## 2022-01-01 RX ORDER — WARFARIN SODIUM 7.5 MG/1
7.5 TABLET ORAL DAILY
Status: DISCONTINUED | OUTPATIENT
Start: 2022-01-01 | End: 2022-01-01

## 2022-01-01 RX ORDER — ATROPINE SULFATE 10 MG/ML
2 SOLUTION/ DROPS OPHTHALMIC EVERY 4 HOURS PRN
Status: DISCONTINUED | OUTPATIENT
Start: 2022-01-01 | End: 2022-01-01 | Stop reason: HOSPADM

## 2022-01-01 RX ORDER — LORAZEPAM 2 MG/ML
1 CONCENTRATE ORAL
Status: DISCONTINUED | OUTPATIENT
Start: 2022-01-01 | End: 2022-01-01

## 2022-01-01 RX ORDER — LORAZEPAM 2 MG/ML
2 CONCENTRATE ORAL
Status: DISCONTINUED | OUTPATIENT
Start: 2022-01-01 | End: 2022-01-01 | Stop reason: HOSPADM

## 2022-01-01 RX ORDER — SCOLOPAMINE TRANSDERMAL SYSTEM 1 MG/1
1 PATCH, EXTENDED RELEASE TRANSDERMAL
Status: DISCONTINUED | OUTPATIENT
Start: 2022-01-01 | End: 2022-01-01 | Stop reason: HOSPADM

## 2022-01-01 RX ORDER — ALBUTEROL SULFATE 90 UG/1
2 AEROSOL, METERED RESPIRATORY (INHALATION) EVERY 4 HOURS PRN
Status: DISCONTINUED | OUTPATIENT
Start: 2022-01-01 | End: 2022-01-01

## 2022-01-01 RX ORDER — MAGNESIUM SULFATE HEPTAHYDRATE 40 MG/ML
2 INJECTION, SOLUTION INTRAVENOUS ONCE
Status: COMPLETED | OUTPATIENT
Start: 2022-01-01 | End: 2022-01-01

## 2022-01-01 RX ORDER — OXYCODONE HYDROCHLORIDE 5 MG/1
5 TABLET ORAL EVERY 4 HOURS PRN
Status: DISCONTINUED | OUTPATIENT
Start: 2022-01-01 | End: 2022-01-01

## 2022-01-01 RX ORDER — OXYCODONE HYDROCHLORIDE 5 MG/1
2.5 TABLET ORAL ONCE
Status: COMPLETED | OUTPATIENT
Start: 2022-01-01 | End: 2022-01-01

## 2022-01-01 RX ORDER — DEXTROSE MONOHYDRATE 50 MG/ML
INJECTION, SOLUTION INTRAVENOUS CONTINUOUS
Status: DISCONTINUED | OUTPATIENT
Start: 2022-01-01 | End: 2022-01-01

## 2022-01-01 RX ORDER — SODIUM CHLORIDE, SODIUM LACTATE, POTASSIUM CHLORIDE, CALCIUM CHLORIDE 600; 310; 30; 20 MG/100ML; MG/100ML; MG/100ML; MG/100ML
INJECTION, SOLUTION INTRAVENOUS CONTINUOUS
Status: CANCELLED | OUTPATIENT
Start: 2022-01-01

## 2022-01-01 RX ORDER — ACETAMINOPHEN 650 MG/1
650 SUPPOSITORY RECTAL EVERY 4 HOURS PRN
Status: DISCONTINUED | OUTPATIENT
Start: 2022-01-01 | End: 2022-01-01

## 2022-01-01 RX ORDER — DOXYCYCLINE 100 MG/1
100 TABLET ORAL EVERY 12 HOURS
Status: DISCONTINUED | OUTPATIENT
Start: 2022-01-01 | End: 2022-01-01

## 2022-01-01 RX ORDER — OXYCODONE HYDROCHLORIDE 5 MG/1
5-10 TABLET ORAL EVERY 6 HOURS PRN
Status: DISCONTINUED | OUTPATIENT
Start: 2022-01-01 | End: 2022-01-01

## 2022-01-01 RX ORDER — OMEPRAZOLE 20 MG/1
20 CAPSULE, DELAYED RELEASE ORAL DAILY
Status: DISCONTINUED | OUTPATIENT
Start: 2022-01-01 | End: 2022-01-01

## 2022-01-01 RX ORDER — SODIUM CHLORIDE 9 MG/ML
1500 INJECTION, SOLUTION INTRAVENOUS CONTINUOUS
Status: DISCONTINUED | OUTPATIENT
Start: 2022-01-01 | End: 2022-01-01

## 2022-01-01 RX ORDER — LIDOCAINE 50 MG/G
2 PATCH TOPICAL EVERY 24 HOURS
Status: DISCONTINUED | OUTPATIENT
Start: 2022-01-01 | End: 2022-01-01

## 2022-01-01 RX ORDER — LORAZEPAM 2 MG/ML
1 INJECTION INTRAMUSCULAR
Status: DISCONTINUED | OUTPATIENT
Start: 2022-01-01 | End: 2022-01-01 | Stop reason: HOSPADM

## 2022-01-01 RX ORDER — LABETALOL HYDROCHLORIDE 5 MG/ML
10 INJECTION, SOLUTION INTRAVENOUS EVERY 4 HOURS PRN
Status: ACTIVE | OUTPATIENT
Start: 2022-01-01 | End: 2022-01-01

## 2022-01-01 RX ORDER — FAMOTIDINE 20 MG/1
10 TABLET, FILM COATED ORAL EVERY 24 HOURS
Status: DISCONTINUED | OUTPATIENT
Start: 2022-01-01 | End: 2022-01-01

## 2022-01-01 RX ORDER — ROPINIROLE 0.5 MG/1
0.5 TABLET, FILM COATED ORAL ONCE
Status: COMPLETED | OUTPATIENT
Start: 2022-01-01 | End: 2022-01-01

## 2022-01-01 RX ORDER — ROPINIROLE 1 MG/1
1 TABLET, FILM COATED ORAL
Status: DISCONTINUED | OUTPATIENT
Start: 2022-01-01 | End: 2022-01-01

## 2022-01-01 RX ORDER — NOREPINEPHRINE BITARTRATE 1 MG/ML
INJECTION, SOLUTION INTRAVENOUS
Status: ACTIVE
Start: 2022-01-01 | End: 2022-01-01

## 2022-01-01 RX ORDER — IPRATROPIUM BROMIDE AND ALBUTEROL SULFATE 2.5; .5 MG/3ML; MG/3ML
3 SOLUTION RESPIRATORY (INHALATION) 4 TIMES DAILY
Status: DISCONTINUED | OUTPATIENT
Start: 2022-01-01 | End: 2022-01-01

## 2022-01-01 RX ORDER — ACETAMINOPHEN 325 MG/1
650 TABLET ORAL 4 TIMES DAILY
Status: DISCONTINUED | OUTPATIENT
Start: 2022-01-01 | End: 2022-01-01

## 2022-01-01 RX ORDER — ONDANSETRON 2 MG/ML
4 INJECTION INTRAMUSCULAR; INTRAVENOUS EVERY 4 HOURS PRN
Status: DISCONTINUED | OUTPATIENT
Start: 2022-01-01 | End: 2022-01-01

## 2022-01-01 RX ADMIN — FAMOTIDINE 10 MG: 10 INJECTION, SOLUTION INTRAVENOUS at 05:14

## 2022-01-01 RX ADMIN — ATORVASTATIN CALCIUM 40 MG: 40 TABLET, FILM COATED ORAL at 16:22

## 2022-01-01 RX ADMIN — APIXABAN 5 MG: 5 TABLET, FILM COATED ORAL at 18:03

## 2022-01-01 RX ADMIN — LORAZEPAM 2 MG: 2 SOLUTION, CONCENTRATE ORAL at 06:02

## 2022-01-01 RX ADMIN — NYSTATIN: 100000 POWDER TOPICAL at 04:41

## 2022-01-01 RX ADMIN — Medication 5 MG: at 20:21

## 2022-01-01 RX ADMIN — OMEPRAZOLE 20 MG: 20 CAPSULE, DELAYED RELEASE ORAL at 05:01

## 2022-01-01 RX ADMIN — IPRATROPIUM BROMIDE AND ALBUTEROL SULFATE 3 ML: .5; 3 SOLUTION RESPIRATORY (INHALATION) at 21:54

## 2022-01-01 RX ADMIN — DOCUSATE SODIUM 50 MG AND SENNOSIDES 8.6 MG 2 TABLET: 8.6; 5 TABLET, FILM COATED ORAL at 17:21

## 2022-01-01 RX ADMIN — POLYETHYLENE GLYCOL 3350 1 PACKET: 17 POWDER, FOR SOLUTION ORAL at 05:22

## 2022-01-01 RX ADMIN — DEXAMETHASONE SODIUM PHOSPHATE 6 MG: 4 INJECTION, SOLUTION INTRA-ARTICULAR; INTRALESIONAL; INTRAMUSCULAR; INTRAVENOUS; SOFT TISSUE at 05:25

## 2022-01-01 RX ADMIN — ATORVASTATIN CALCIUM 40 MG: 40 TABLET, FILM COATED ORAL at 18:18

## 2022-01-01 RX ADMIN — IPRATROPIUM BROMIDE AND ALBUTEROL SULFATE 3 ML: .5; 3 SOLUTION RESPIRATORY (INHALATION) at 03:11

## 2022-01-01 RX ADMIN — ACETAMINOPHEN 650 MG: 325 TABLET, FILM COATED ORAL at 11:57

## 2022-01-01 RX ADMIN — DOCUSATE SODIUM 50 MG AND SENNOSIDES 8.6 MG 2 TABLET: 8.6; 5 TABLET, FILM COATED ORAL at 17:50

## 2022-01-01 RX ADMIN — DEXAMETHASONE SODIUM PHOSPHATE 6 MG: 4 INJECTION, SOLUTION INTRA-ARTICULAR; INTRALESIONAL; INTRAMUSCULAR; INTRAVENOUS; SOFT TISSUE at 05:20

## 2022-01-01 RX ADMIN — POLYETHYLENE GLYCOL 3350 1 PACKET: 17 POWDER, FOR SOLUTION ORAL at 17:26

## 2022-01-01 RX ADMIN — ROPINIROLE HYDROCHLORIDE 1 MG: 0.5 TABLET, FILM COATED ORAL at 17:25

## 2022-01-01 RX ADMIN — MORPHINE SULFATE 10 MG: 10 SOLUTION ORAL at 06:46

## 2022-01-01 RX ADMIN — DOCUSATE SODIUM 50 MG AND SENNOSIDES 8.6 MG 2 TABLET: 8.6; 5 TABLET, FILM COATED ORAL at 18:02

## 2022-01-01 RX ADMIN — INSULIN HUMAN 3 UNITS: 100 INJECTION, SOLUTION PARENTERAL at 12:47

## 2022-01-01 RX ADMIN — TAMSULOSIN HYDROCHLORIDE 0.4 MG: 0.4 CAPSULE ORAL at 08:54

## 2022-01-01 RX ADMIN — IPRATROPIUM BROMIDE AND ALBUTEROL SULFATE 3 ML: .5; 3 SOLUTION RESPIRATORY (INHALATION) at 20:09

## 2022-01-01 RX ADMIN — INSULIN HUMAN 3 UNITS: 100 INJECTION, SOLUTION PARENTERAL at 12:46

## 2022-01-01 RX ADMIN — DEXAMETHASONE SODIUM PHOSPHATE 6 MG: 4 INJECTION, SOLUTION INTRA-ARTICULAR; INTRALESIONAL; INTRAMUSCULAR; INTRAVENOUS; SOFT TISSUE at 06:26

## 2022-01-01 RX ADMIN — LORAZEPAM 1 MG: 2 SOLUTION, CONCENTRATE ORAL at 04:19

## 2022-01-01 RX ADMIN — ATORVASTATIN CALCIUM 40 MG: 40 TABLET, FILM COATED ORAL at 18:03

## 2022-01-01 RX ADMIN — Medication 1 APPLICATOR: at 05:42

## 2022-01-01 RX ADMIN — OMEPRAZOLE 20 MG: KIT at 08:29

## 2022-01-01 RX ADMIN — INSULIN HUMAN 4 UNITS: 100 INJECTION, SOLUTION PARENTERAL at 13:17

## 2022-01-01 RX ADMIN — WARFARIN SODIUM 5 MG: 5 TABLET ORAL at 18:07

## 2022-01-01 RX ADMIN — DICLOFENAC SODIUM 2 G: 10 GEL TOPICAL at 02:37

## 2022-01-01 RX ADMIN — ENOXAPARIN SODIUM 40 MG: 40 INJECTION SUBCUTANEOUS at 17:40

## 2022-01-01 RX ADMIN — PIPERACILLIN AND TAZOBACTAM 3.38 G: 3; .375 INJECTION, POWDER, LYOPHILIZED, FOR SOLUTION INTRAVENOUS; PARENTERAL at 01:42

## 2022-01-01 RX ADMIN — INSULIN GLARGINE-YFGN 12 UNITS: 100 INJECTION, SOLUTION SUBCUTANEOUS at 04:33

## 2022-01-01 RX ADMIN — IPRATROPIUM BROMIDE AND ALBUTEROL SULFATE 3 ML: .5; 3 SOLUTION RESPIRATORY (INHALATION) at 06:58

## 2022-01-01 RX ADMIN — SENNOSIDES AND DOCUSATE SODIUM 2 TABLET: 50; 8.6 TABLET ORAL at 04:32

## 2022-01-01 RX ADMIN — HYDROMORPHONE HYDROCHLORIDE 1 MG: 1 INJECTION, SOLUTION INTRAMUSCULAR; INTRAVENOUS; SUBCUTANEOUS at 19:26

## 2022-01-01 RX ADMIN — OMEPRAZOLE 20 MG: 20 CAPSULE, DELAYED RELEASE ORAL at 06:00

## 2022-01-01 RX ADMIN — Medication 2500 MCG: at 02:14

## 2022-01-01 RX ADMIN — APIXABAN 5 MG: 5 TABLET, FILM COATED ORAL at 11:25

## 2022-01-01 RX ADMIN — INSULIN GLARGINE-YFGN 12 UNITS: 100 INJECTION, SOLUTION SUBCUTANEOUS at 05:04

## 2022-01-01 RX ADMIN — INSULIN HUMAN 3 UNITS: 100 INJECTION, SOLUTION PARENTERAL at 13:11

## 2022-01-01 RX ADMIN — SODIUM POLYSTYRENE SULFONATE 15 G: 15 SUSPENSION ORAL; RECTAL at 12:01

## 2022-01-01 RX ADMIN — CARVEDILOL 12.5 MG: 12.5 TABLET, FILM COATED ORAL at 16:29

## 2022-01-01 RX ADMIN — INSULIN HUMAN 3 UNITS: 100 INJECTION, SOLUTION PARENTERAL at 21:43

## 2022-01-01 RX ADMIN — ROPINIROLE HYDROCHLORIDE 1 MG: 0.5 TABLET, FILM COATED ORAL at 16:29

## 2022-01-01 RX ADMIN — INSULIN GLARGINE-YFGN 12 UNITS: 100 INJECTION, SOLUTION SUBCUTANEOUS at 04:59

## 2022-01-01 RX ADMIN — INSULIN GLARGINE-YFGN 12 UNITS: 100 INJECTION, SOLUTION SUBCUTANEOUS at 05:00

## 2022-01-01 RX ADMIN — INSULIN HUMAN 3 UNITS: 100 INJECTION, SOLUTION PARENTERAL at 12:02

## 2022-01-01 RX ADMIN — OXYCODONE 5 MG: 5 TABLET ORAL at 06:14

## 2022-01-01 RX ADMIN — ROPINIROLE HYDROCHLORIDE 1 MG: 0.5 TABLET, FILM COATED ORAL at 18:07

## 2022-01-01 RX ADMIN — IPRATROPIUM BROMIDE AND ALBUTEROL SULFATE 3 ML: .5; 2.5 SOLUTION RESPIRATORY (INHALATION) at 05:15

## 2022-01-01 RX ADMIN — DEXMEDETOMIDINE 0.4 MCG/KG/HR: 200 INJECTION, SOLUTION INTRAVENOUS at 15:06

## 2022-01-01 RX ADMIN — OXYCODONE 5 MG: 5 TABLET ORAL at 22:27

## 2022-01-01 RX ADMIN — OXYCODONE 5 MG: 5 TABLET ORAL at 18:02

## 2022-01-01 RX ADMIN — DOCUSATE SODIUM 50 MG AND SENNOSIDES 8.6 MG 2 TABLET: 8.6; 5 TABLET, FILM COATED ORAL at 05:32

## 2022-01-01 RX ADMIN — INSULIN HUMAN 3 UNITS: 100 INJECTION, SOLUTION PARENTERAL at 06:04

## 2022-01-01 RX ADMIN — PHYTONADIONE 10 MG: 10 INJECTION, EMULSION INTRAMUSCULAR; INTRAVENOUS; SUBCUTANEOUS at 09:59

## 2022-01-01 RX ADMIN — ATORVASTATIN CALCIUM 40 MG: 40 TABLET, FILM COATED ORAL at 18:37

## 2022-01-01 RX ADMIN — SENNOSIDES AND DOCUSATE SODIUM 2 TABLET: 50; 8.6 TABLET ORAL at 06:15

## 2022-01-01 RX ADMIN — ACETAMINOPHEN 650 MG: 325 TABLET, FILM COATED ORAL at 20:16

## 2022-01-01 RX ADMIN — IPRATROPIUM BROMIDE AND ALBUTEROL SULFATE 3 ML: .5; 3 SOLUTION RESPIRATORY (INHALATION) at 03:48

## 2022-01-01 RX ADMIN — OMEPRAZOLE 20 MG: 20 CAPSULE, DELAYED RELEASE ORAL at 04:16

## 2022-01-01 RX ADMIN — OXYCODONE 10 MG: 5 TABLET ORAL at 01:54

## 2022-01-01 RX ADMIN — CEFAZOLIN SODIUM 1 G: 1 INJECTION, SOLUTION INTRAVENOUS at 17:43

## 2022-01-01 RX ADMIN — ALBUTEROL SULFATE 2 PUFF: 90 AEROSOL, METERED RESPIRATORY (INHALATION) at 07:11

## 2022-01-01 RX ADMIN — ATORVASTATIN CALCIUM 40 MG: 40 TABLET, FILM COATED ORAL at 17:20

## 2022-01-01 RX ADMIN — Medication 5 MG: at 20:46

## 2022-01-01 RX ADMIN — ACETAMINOPHEN 650 MG: 325 TABLET, FILM COATED ORAL at 15:28

## 2022-01-01 RX ADMIN — OXYCODONE 10 MG: 5 TABLET ORAL at 06:16

## 2022-01-01 RX ADMIN — METOPROLOL TARTRATE 12.5 MG: 25 TABLET, FILM COATED ORAL at 17:53

## 2022-01-01 RX ADMIN — OXYCODONE 5 MG: 5 TABLET ORAL at 23:59

## 2022-01-01 RX ADMIN — ALBUTEROL SULFATE 2 PUFF: 90 AEROSOL, METERED RESPIRATORY (INHALATION) at 00:03

## 2022-01-01 RX ADMIN — OXYCODONE 5 MG: 5 TABLET ORAL at 21:18

## 2022-01-01 RX ADMIN — IPRATROPIUM BROMIDE AND ALBUTEROL SULFATE 3 ML: .5; 3 SOLUTION RESPIRATORY (INHALATION) at 14:08

## 2022-01-01 RX ADMIN — NOREPINEPHRINE BITARTRATE 6 MCG/MIN: 1 INJECTION, SOLUTION, CONCENTRATE INTRAVENOUS at 15:41

## 2022-01-01 RX ADMIN — Medication 5 MG: at 20:16

## 2022-01-01 RX ADMIN — DICLOFENAC SODIUM 2 G: 10 GEL TOPICAL at 04:59

## 2022-01-01 RX ADMIN — INSULIN GLARGINE-YFGN 12 UNITS: 100 INJECTION, SOLUTION SUBCUTANEOUS at 04:40

## 2022-01-01 RX ADMIN — CARVEDILOL 12.5 MG: 12.5 TABLET, FILM COATED ORAL at 08:39

## 2022-01-01 RX ADMIN — CARVEDILOL 12.5 MG: 12.5 TABLET, FILM COATED ORAL at 07:57

## 2022-01-01 RX ADMIN — METOPROLOL TARTRATE 12.5 MG: 25 TABLET, FILM COATED ORAL at 05:00

## 2022-01-01 RX ADMIN — LORAZEPAM 2 MG: 2 SOLUTION, CONCENTRATE ORAL at 17:29

## 2022-01-01 RX ADMIN — CEFTRIAXONE SODIUM 2000 MG: 10 INJECTION, POWDER, FOR SOLUTION INTRAVENOUS at 09:09

## 2022-01-01 RX ADMIN — Medication 5 MG: at 21:43

## 2022-01-01 RX ADMIN — INSULIN GLARGINE-YFGN 12 UNITS: 100 INJECTION, SOLUTION SUBCUTANEOUS at 05:07

## 2022-01-01 RX ADMIN — PIPERACILLIN SODIUM AND TAZOBACTAM SODIUM 3.38 G: 3; .375 INJECTION, POWDER, FOR SOLUTION INTRAVENOUS at 13:20

## 2022-01-01 RX ADMIN — INSULIN HUMAN 3 UNITS: 100 INJECTION, SOLUTION PARENTERAL at 18:28

## 2022-01-01 RX ADMIN — WARFARIN SODIUM 5 MG: 6 TABLET ORAL at 18:22

## 2022-01-01 RX ADMIN — DEXAMETHASONE SODIUM PHOSPHATE 6 MG: 4 INJECTION, SOLUTION INTRA-ARTICULAR; INTRALESIONAL; INTRAMUSCULAR; INTRAVENOUS; SOFT TISSUE at 05:14

## 2022-01-01 RX ADMIN — SODIUM CHLORIDE 1500 ML: 9 INJECTION, SOLUTION INTRAVENOUS at 18:18

## 2022-01-01 RX ADMIN — Medication 5 MG: at 20:35

## 2022-01-01 RX ADMIN — LORAZEPAM 1 MG: 2 SOLUTION, CONCENTRATE ORAL at 21:50

## 2022-01-01 RX ADMIN — CARVEDILOL 12.5 MG: 12.5 TABLET, FILM COATED ORAL at 17:32

## 2022-01-01 RX ADMIN — OXYCODONE 5 MG: 5 TABLET ORAL at 20:30

## 2022-01-01 RX ADMIN — POLYETHYLENE GLYCOL 3350 1 PACKET: 17 POWDER, FOR SOLUTION ORAL at 17:55

## 2022-01-01 RX ADMIN — INSULIN HUMAN 3 UNITS: 100 INJECTION, SOLUTION PARENTERAL at 21:33

## 2022-01-01 RX ADMIN — SENNOSIDES AND DOCUSATE SODIUM 2 TABLET: 50; 8.6 TABLET ORAL at 05:47

## 2022-01-01 RX ADMIN — ENOXAPARIN SODIUM 40 MG: 40 INJECTION SUBCUTANEOUS at 18:14

## 2022-01-01 RX ADMIN — FAMOTIDINE 10 MG: 20 TABLET, FILM COATED ORAL at 05:47

## 2022-01-01 RX ADMIN — APIXABAN 5 MG: 5 TABLET, FILM COATED ORAL at 17:16

## 2022-01-01 RX ADMIN — ACETAMINOPHEN 650 MG: 325 TABLET, FILM COATED ORAL at 00:08

## 2022-01-01 RX ADMIN — CEFAZOLIN 2 G: 2 INJECTION, POWDER, FOR SOLUTION INTRAMUSCULAR; INTRAVENOUS at 05:48

## 2022-01-01 RX ADMIN — INSULIN HUMAN 3 UNITS: 100 INJECTION, SOLUTION PARENTERAL at 11:57

## 2022-01-01 RX ADMIN — Medication 5 MG: at 21:27

## 2022-01-01 RX ADMIN — DOCUSATE SODIUM 50 MG AND SENNOSIDES 8.6 MG 2 TABLET: 8.6; 5 TABLET, FILM COATED ORAL at 17:09

## 2022-01-01 RX ADMIN — HEPARIN SODIUM 18 UNITS/KG/HR: 5000 INJECTION, SOLUTION INTRAVENOUS at 18:04

## 2022-01-01 RX ADMIN — DEXMEDETOMIDINE 0.5 MCG/KG/HR: 200 INJECTION, SOLUTION INTRAVENOUS at 03:55

## 2022-01-01 RX ADMIN — OXYCODONE 10 MG: 5 TABLET ORAL at 09:39

## 2022-01-01 RX ADMIN — MORPHINE SULFATE 10 MG: 10 SOLUTION ORAL at 15:17

## 2022-01-01 RX ADMIN — OXYCODONE 10 MG: 5 TABLET ORAL at 17:50

## 2022-01-01 RX ADMIN — CEFAZOLIN SODIUM 1 G: 1 INJECTION, SOLUTION INTRAVENOUS at 15:10

## 2022-01-01 RX ADMIN — DOCUSATE SODIUM 50 MG AND SENNOSIDES 8.6 MG 2 TABLET: 8.6; 5 TABLET, FILM COATED ORAL at 05:12

## 2022-01-01 RX ADMIN — TAMSULOSIN HYDROCHLORIDE 0.4 MG: 0.4 CAPSULE ORAL at 08:27

## 2022-01-01 RX ADMIN — PIPERACILLIN SODIUM AND TAZOBACTAM SODIUM 3.38 G: 3; .375 INJECTION, POWDER, FOR SOLUTION INTRAVENOUS at 13:56

## 2022-01-01 RX ADMIN — PIPERACILLIN SODIUM AND TAZOBACTAM SODIUM 3.38 G: 3; .375 INJECTION, POWDER, FOR SOLUTION INTRAVENOUS at 20:34

## 2022-01-01 RX ADMIN — OXYCODONE 10 MG: 5 TABLET ORAL at 22:04

## 2022-01-01 RX ADMIN — METOPROLOL TARTRATE 12.5 MG: 25 TABLET, FILM COATED ORAL at 06:06

## 2022-01-01 RX ADMIN — METOPROLOL TARTRATE 12.5 MG: 25 TABLET, FILM COATED ORAL at 17:35

## 2022-01-01 RX ADMIN — INSULIN GLARGINE-YFGN 20 UNITS: 100 INJECTION, SOLUTION SUBCUTANEOUS at 06:32

## 2022-01-01 RX ADMIN — OXYCODONE 5 MG: 5 TABLET ORAL at 01:57

## 2022-01-01 RX ADMIN — ATORVASTATIN CALCIUM 40 MG: 40 TABLET, FILM COATED ORAL at 16:29

## 2022-01-01 RX ADMIN — METOPROLOL TARTRATE 12.5 MG: 25 TABLET, FILM COATED ORAL at 05:07

## 2022-01-01 RX ADMIN — MAGNESIUM SULFATE HEPTAHYDRATE 2 G: 40 INJECTION, SOLUTION INTRAVENOUS at 09:53

## 2022-01-01 RX ADMIN — NYSTATIN: 100000 POWDER TOPICAL at 12:12

## 2022-01-01 RX ADMIN — Medication 5 MG: at 21:52

## 2022-01-01 RX ADMIN — SENNOSIDES AND DOCUSATE SODIUM 2 TABLET: 50; 8.6 TABLET ORAL at 06:00

## 2022-01-01 RX ADMIN — SODIUM POLYSTYRENE SULFONATE 15 G: 15 SUSPENSION ORAL; RECTAL at 17:14

## 2022-01-01 RX ADMIN — PIPERACILLIN SODIUM AND TAZOBACTAM SODIUM 3.38 G: 3; .375 INJECTION, POWDER, FOR SOLUTION INTRAVENOUS at 04:52

## 2022-01-01 RX ADMIN — NOREPINEPHRINE BITARTRATE 7 MCG/MIN: 1 INJECTION, SOLUTION, CONCENTRATE INTRAVENOUS at 21:18

## 2022-01-01 RX ADMIN — INSULIN HUMAN 3 UNITS: 100 INJECTION, SOLUTION PARENTERAL at 16:30

## 2022-01-01 RX ADMIN — APIXABAN 5 MG: 5 TABLET, FILM COATED ORAL at 05:01

## 2022-01-01 RX ADMIN — CEFAZOLIN SODIUM 1 G: 1 INJECTION, SOLUTION INTRAVENOUS at 17:57

## 2022-01-01 RX ADMIN — ACETAMINOPHEN 650 MG: 325 TABLET, FILM COATED ORAL at 23:44

## 2022-01-01 RX ADMIN — IPRATROPIUM BROMIDE AND ALBUTEROL SULFATE 3 ML: .5; 3 SOLUTION RESPIRATORY (INHALATION) at 10:27

## 2022-01-01 RX ADMIN — DEXAMETHASONE SODIUM PHOSPHATE 6 MG: 4 INJECTION, SOLUTION INTRA-ARTICULAR; INTRALESIONAL; INTRAMUSCULAR; INTRAVENOUS; SOFT TISSUE at 05:46

## 2022-01-01 RX ADMIN — VASOPRESSIN 0.03 UNITS/MIN: 20 INJECTION, SOLUTION INTRAMUSCULAR; SUBCUTANEOUS at 12:11

## 2022-01-01 RX ADMIN — NYSTATIN: 100000 POWDER TOPICAL at 06:00

## 2022-01-01 RX ADMIN — HYDROMORPHONE HYDROCHLORIDE 1 MG: 1 INJECTION, SOLUTION INTRAMUSCULAR; INTRAVENOUS; SUBCUTANEOUS at 04:33

## 2022-01-01 RX ADMIN — SENNOSIDES AND DOCUSATE SODIUM 2 TABLET: 50; 8.6 TABLET ORAL at 06:27

## 2022-01-01 RX ADMIN — WARFARIN SODIUM 5 MG: 5 TABLET ORAL at 17:26

## 2022-01-01 RX ADMIN — ATORVASTATIN CALCIUM 40 MG: 40 TABLET, FILM COATED ORAL at 17:26

## 2022-01-01 RX ADMIN — SENNOSIDES AND DOCUSATE SODIUM 2 TABLET: 50; 8.6 TABLET ORAL at 06:26

## 2022-01-01 RX ADMIN — SODIUM CHLORIDE, POTASSIUM CHLORIDE, SODIUM LACTATE AND CALCIUM CHLORIDE: 600; 310; 30; 20 INJECTION, SOLUTION INTRAVENOUS at 13:09

## 2022-01-01 RX ADMIN — CEFAZOLIN 2 G: 2 INJECTION, POWDER, FOR SOLUTION INTRAMUSCULAR; INTRAVENOUS at 21:35

## 2022-01-01 RX ADMIN — ROPINIROLE HYDROCHLORIDE 1 MG: 0.5 TABLET, FILM COATED ORAL at 17:09

## 2022-01-01 RX ADMIN — METOPROLOL TARTRATE 12.5 MG: 25 TABLET, FILM COATED ORAL at 17:21

## 2022-01-01 RX ADMIN — Medication 100 MCG/HR: at 19:08

## 2022-01-01 RX ADMIN — TAMSULOSIN HYDROCHLORIDE 0.4 MG: 0.4 CAPSULE ORAL at 07:23

## 2022-01-01 RX ADMIN — OXYCODONE 10 MG: 5 TABLET ORAL at 00:25

## 2022-01-01 RX ADMIN — CARVEDILOL 12.5 MG: 12.5 TABLET, FILM COATED ORAL at 10:21

## 2022-01-01 RX ADMIN — FUROSEMIDE 40 MG: 10 INJECTION, SOLUTION INTRAMUSCULAR; INTRAVENOUS at 04:41

## 2022-01-01 RX ADMIN — OXYCODONE 5 MG: 5 TABLET ORAL at 05:32

## 2022-01-01 RX ADMIN — SENNOSIDES AND DOCUSATE SODIUM 2 TABLET: 50; 8.6 TABLET ORAL at 16:12

## 2022-01-01 RX ADMIN — LORAZEPAM 2 MG: 2 SOLUTION, CONCENTRATE ORAL at 09:55

## 2022-01-01 RX ADMIN — MIDAZOLAM HYDROCHLORIDE 4 MG: 1 INJECTION, SOLUTION INTRAMUSCULAR; INTRAVENOUS at 11:01

## 2022-01-01 RX ADMIN — INSULIN HUMAN 3 UNITS: 100 INJECTION, SOLUTION PARENTERAL at 12:51

## 2022-01-01 RX ADMIN — OXYCODONE 10 MG: 5 TABLET ORAL at 00:39

## 2022-01-01 RX ADMIN — PIPERACILLIN SODIUM AND TAZOBACTAM SODIUM 3.38 G: 3; .375 INJECTION, POWDER, FOR SOLUTION INTRAVENOUS at 21:50

## 2022-01-01 RX ADMIN — OXYCODONE 10 MG: 5 TABLET ORAL at 15:18

## 2022-01-01 RX ADMIN — INSULIN HUMAN 3 UNITS: 100 INJECTION, SOLUTION PARENTERAL at 17:46

## 2022-01-01 RX ADMIN — SENNOSIDES AND DOCUSATE SODIUM 2 TABLET: 50; 8.6 TABLET ORAL at 06:24

## 2022-01-01 RX ADMIN — SEVELAMER CARBONATE 800 MG: 800 TABLET, FILM COATED ORAL at 12:12

## 2022-01-01 RX ADMIN — LORAZEPAM 2 MG: 2 SOLUTION, CONCENTRATE ORAL at 06:28

## 2022-01-01 RX ADMIN — DIBASIC SODIUM PHOSPHATE, MONOBASIC POTASSIUM PHOSPHATE AND MONOBASIC SODIUM PHOSPHATE 500 MG: 852; 155; 130 TABLET ORAL at 17:40

## 2022-01-01 RX ADMIN — POLYETHYLENE GLYCOL 3350 1 PACKET: 17 POWDER, FOR SOLUTION ORAL at 04:59

## 2022-01-01 RX ADMIN — DOCUSATE SODIUM 50 MG AND SENNOSIDES 8.6 MG 2 TABLET: 8.6; 5 TABLET, FILM COATED ORAL at 04:16

## 2022-01-01 RX ADMIN — SENNOSIDES AND DOCUSATE SODIUM 2 TABLET: 50; 8.6 TABLET ORAL at 06:02

## 2022-01-01 RX ADMIN — HYDROMORPHONE HYDROCHLORIDE 1 MG: 1 INJECTION, SOLUTION INTRAMUSCULAR; INTRAVENOUS; SUBCUTANEOUS at 21:14

## 2022-01-01 RX ADMIN — PIPERACILLIN SODIUM AND TAZOBACTAM SODIUM 3.38 G: 3; .375 INJECTION, POWDER, FOR SOLUTION INTRAVENOUS at 11:49

## 2022-01-01 RX ADMIN — DICLOFENAC SODIUM 2 G: 10 GEL TOPICAL at 02:15

## 2022-01-01 RX ADMIN — INSULIN HUMAN 3 UNITS: 100 INJECTION, SOLUTION PARENTERAL at 20:30

## 2022-01-01 RX ADMIN — INSULIN HUMAN 3 UNITS: 100 INJECTION, SOLUTION PARENTERAL at 21:16

## 2022-01-01 RX ADMIN — INSULIN GLARGINE-YFGN 20 UNITS: 100 INJECTION, SOLUTION SUBCUTANEOUS at 05:23

## 2022-01-01 RX ADMIN — METOPROLOL TARTRATE 12.5 MG: 25 TABLET, FILM COATED ORAL at 17:51

## 2022-01-01 RX ADMIN — INSULIN HUMAN 3 UNITS: 100 INJECTION, SOLUTION PARENTERAL at 21:14

## 2022-01-01 RX ADMIN — OMEPRAZOLE 20 MG: 20 CAPSULE, DELAYED RELEASE ORAL at 05:23

## 2022-01-01 RX ADMIN — CARVEDILOL 12.5 MG: 12.5 TABLET, FILM COATED ORAL at 18:36

## 2022-01-01 RX ADMIN — TAMSULOSIN HYDROCHLORIDE 0.4 MG: 0.4 CAPSULE ORAL at 07:47

## 2022-01-01 RX ADMIN — NYSTATIN: 100000 POWDER TOPICAL at 11:57

## 2022-01-01 RX ADMIN — OXYCODONE 10 MG: 5 TABLET ORAL at 17:17

## 2022-01-01 RX ADMIN — DEXTROSE MONOHYDRATE: 50 INJECTION, SOLUTION INTRAVENOUS at 07:33

## 2022-01-01 RX ADMIN — ACETAMINOPHEN 650 MG: 325 TABLET, FILM COATED ORAL at 05:02

## 2022-01-01 RX ADMIN — ATORVASTATIN CALCIUM 40 MG: 40 TABLET, FILM COATED ORAL at 18:07

## 2022-01-01 RX ADMIN — DEXTROSE MONOHYDRATE: 50 INJECTION, SOLUTION INTRAVENOUS at 16:21

## 2022-01-01 RX ADMIN — SENNOSIDES AND DOCUSATE SODIUM 2 TABLET: 50; 8.6 TABLET ORAL at 17:09

## 2022-01-01 RX ADMIN — OMEPRAZOLE 20 MG: 20 CAPSULE, DELAYED RELEASE ORAL at 04:52

## 2022-01-01 RX ADMIN — INSULIN HUMAN 3 UNITS: 100 INJECTION, SOLUTION PARENTERAL at 11:31

## 2022-01-01 RX ADMIN — ROPINIROLE HYDROCHLORIDE 1 MG: 0.5 TABLET, FILM COATED ORAL at 17:31

## 2022-01-01 RX ADMIN — DOCUSATE SODIUM 50 MG AND SENNOSIDES 8.6 MG 2 TABLET: 8.6; 5 TABLET, FILM COATED ORAL at 17:17

## 2022-01-01 RX ADMIN — NYSTATIN: 100000 POWDER TOPICAL at 05:45

## 2022-01-01 RX ADMIN — INSULIN GLARGINE-YFGN 20 UNITS: 100 INJECTION, SOLUTION SUBCUTANEOUS at 10:15

## 2022-01-01 RX ADMIN — INSULIN HUMAN 4 UNITS: 100 INJECTION, SOLUTION PARENTERAL at 08:37

## 2022-01-01 RX ADMIN — FAMOTIDINE 10 MG: 10 INJECTION, SOLUTION INTRAVENOUS at 05:46

## 2022-01-01 RX ADMIN — INSULIN HUMAN 4 UNITS: 100 INJECTION, SOLUTION PARENTERAL at 18:06

## 2022-01-01 RX ADMIN — INSULIN HUMAN 4 UNITS: 100 INJECTION, SOLUTION PARENTERAL at 17:36

## 2022-01-01 RX ADMIN — DEXTROSE MONOHYDRATE: 50 INJECTION, SOLUTION INTRAVENOUS at 22:12

## 2022-01-01 RX ADMIN — OXYCODONE 5 MG: 5 TABLET ORAL at 09:12

## 2022-01-01 RX ADMIN — DOCUSATE SODIUM 50 MG AND SENNOSIDES 8.6 MG 2 TABLET: 8.6; 5 TABLET, FILM COATED ORAL at 17:05

## 2022-01-01 RX ADMIN — CEFAZOLIN SODIUM 1 G: 1 INJECTION, SOLUTION INTRAVENOUS at 03:23

## 2022-01-01 RX ADMIN — HEPARIN SODIUM 18 UNITS/KG/HR: 5000 INJECTION, SOLUTION INTRAVENOUS at 08:58

## 2022-01-01 RX ADMIN — NOREPINEPHRINE BITARTRATE 30 MCG/MIN: 1 INJECTION, SOLUTION, CONCENTRATE INTRAVENOUS at 14:57

## 2022-01-01 RX ADMIN — ONDANSETRON 4 MG: 2 INJECTION INTRAMUSCULAR; INTRAVENOUS at 11:48

## 2022-01-01 RX ADMIN — FAMOTIDINE 10 MG: 20 TABLET, FILM COATED ORAL at 04:32

## 2022-01-01 RX ADMIN — OMEPRAZOLE 20 MG: 20 CAPSULE, DELAYED RELEASE ORAL at 06:32

## 2022-01-01 RX ADMIN — DEXMEDETOMIDINE 0.2 MCG/KG/HR: 200 INJECTION, SOLUTION INTRAVENOUS at 02:15

## 2022-01-01 RX ADMIN — ROPINIROLE HYDROCHLORIDE 1 MG: 0.5 TABLET, FILM COATED ORAL at 16:22

## 2022-01-01 RX ADMIN — INSULIN HUMAN 3 UNITS: 100 INJECTION, SOLUTION PARENTERAL at 13:31

## 2022-01-01 RX ADMIN — NYSTATIN: 100000 POWDER TOPICAL at 18:05

## 2022-01-01 RX ADMIN — MORPHINE SULFATE 10 MG: 10 SOLUTION ORAL at 13:10

## 2022-01-01 RX ADMIN — INSULIN HUMAN 3 UNITS: 100 INJECTION, SOLUTION PARENTERAL at 04:48

## 2022-01-01 RX ADMIN — OXYCODONE 10 MG: 5 TABLET ORAL at 19:32

## 2022-01-01 RX ADMIN — ACETAMINOPHEN 650 MG: 325 TABLET, FILM COATED ORAL at 00:17

## 2022-01-01 RX ADMIN — ACETAMINOPHEN 650 MG: 325 TABLET, FILM COATED ORAL at 04:52

## 2022-01-01 RX ADMIN — POLYETHYLENE GLYCOL 3350 1 PACKET: 17 POWDER, FOR SOLUTION ORAL at 05:06

## 2022-01-01 RX ADMIN — DOCUSATE SODIUM 50 MG AND SENNOSIDES 8.6 MG 2 TABLET: 8.6; 5 TABLET, FILM COATED ORAL at 07:22

## 2022-01-01 RX ADMIN — ROPINIROLE HYDROCHLORIDE 1 MG: 0.5 TABLET, FILM COATED ORAL at 16:12

## 2022-01-01 RX ADMIN — INSULIN HUMAN 3 UNITS: 100 INJECTION, SOLUTION PARENTERAL at 20:17

## 2022-01-01 RX ADMIN — Medication 1 APPLICATOR: at 18:00

## 2022-01-01 RX ADMIN — DOCUSATE SODIUM 50 MG AND SENNOSIDES 8.6 MG 2 TABLET: 8.6; 5 TABLET, FILM COATED ORAL at 17:26

## 2022-01-01 RX ADMIN — Medication 5 MG: at 21:20

## 2022-01-01 RX ADMIN — IPRATROPIUM BROMIDE AND ALBUTEROL SULFATE 3 ML: .5; 3 SOLUTION RESPIRATORY (INHALATION) at 09:48

## 2022-01-01 RX ADMIN — HEPARIN SODIUM 3500 UNITS: 1000 INJECTION, SOLUTION INTRAVENOUS; SUBCUTANEOUS at 09:31

## 2022-01-01 RX ADMIN — SODIUM CHLORIDE, POTASSIUM CHLORIDE, SODIUM LACTATE AND CALCIUM CHLORIDE: 600; 310; 30; 20 INJECTION, SOLUTION INTRAVENOUS at 13:41

## 2022-01-01 RX ADMIN — DOCUSATE SODIUM 50 MG AND SENNOSIDES 8.6 MG 2 TABLET: 8.6; 5 TABLET, FILM COATED ORAL at 17:34

## 2022-01-01 RX ADMIN — NOREPINEPHRINE BITARTRATE 10 MCG/MIN: 1 INJECTION, SOLUTION, CONCENTRATE INTRAVENOUS at 22:49

## 2022-01-01 RX ADMIN — OXYCODONE 5 MG: 5 TABLET ORAL at 14:36

## 2022-01-01 RX ADMIN — OMEPRAZOLE 20 MG: 20 CAPSULE, DELAYED RELEASE ORAL at 04:41

## 2022-01-01 RX ADMIN — OXYCODONE 5 MG: 5 TABLET ORAL at 05:25

## 2022-01-01 RX ADMIN — CARVEDILOL 12.5 MG: 12.5 TABLET, FILM COATED ORAL at 18:07

## 2022-01-01 RX ADMIN — WARFARIN SODIUM 6 MG: 6 TABLET ORAL at 18:38

## 2022-01-01 RX ADMIN — INSULIN GLARGINE-YFGN 20 UNITS: 100 INJECTION, SOLUTION SUBCUTANEOUS at 06:19

## 2022-01-01 RX ADMIN — ALBUTEROL SULFATE 2 PUFF: 90 AEROSOL, METERED RESPIRATORY (INHALATION) at 21:50

## 2022-01-01 RX ADMIN — INSULIN HUMAN 3 UNITS: 100 INJECTION, SOLUTION PARENTERAL at 18:17

## 2022-01-01 RX ADMIN — CARVEDILOL 12.5 MG: 12.5 TABLET, FILM COATED ORAL at 09:05

## 2022-01-01 RX ADMIN — TAMSULOSIN HYDROCHLORIDE 0.4 MG: 0.4 CAPSULE ORAL at 15:28

## 2022-01-01 RX ADMIN — Medication 1 APPLICATOR: at 18:14

## 2022-01-01 RX ADMIN — IPRATROPIUM BROMIDE AND ALBUTEROL SULFATE 3 ML: .5; 3 SOLUTION RESPIRATORY (INHALATION) at 10:53

## 2022-01-01 RX ADMIN — INSULIN HUMAN 3 UNITS: 100 INJECTION, SOLUTION PARENTERAL at 09:39

## 2022-01-01 RX ADMIN — DEXTROSE MONOHYDRATE 25 G: 100 INJECTION, SOLUTION INTRAVENOUS at 05:27

## 2022-01-01 RX ADMIN — WARFARIN SODIUM 7.5 MG: 7.5 TABLET ORAL at 17:08

## 2022-01-01 RX ADMIN — WARFARIN SODIUM 7.5 MG: 7.5 TABLET ORAL at 18:00

## 2022-01-01 RX ADMIN — OXYCODONE 5 MG: 5 TABLET ORAL at 08:12

## 2022-01-01 RX ADMIN — DEXMEDETOMIDINE 0.5 MCG/KG/HR: 200 INJECTION, SOLUTION INTRAVENOUS at 21:45

## 2022-01-01 RX ADMIN — DEXAMETHASONE SODIUM PHOSPHATE 6 MG: 4 INJECTION, SOLUTION INTRA-ARTICULAR; INTRALESIONAL; INTRAMUSCULAR; INTRAVENOUS; SOFT TISSUE at 06:15

## 2022-01-01 RX ADMIN — DEXAMETHASONE SODIUM PHOSPHATE 6 MG: 4 INJECTION, SOLUTION INTRA-ARTICULAR; INTRALESIONAL; INTRAMUSCULAR; INTRAVENOUS; SOFT TISSUE at 05:40

## 2022-01-01 RX ADMIN — OXYCODONE 10 MG: 5 TABLET ORAL at 02:14

## 2022-01-01 RX ADMIN — DEXMEDETOMIDINE 0.5 MCG/KG/HR: 200 INJECTION, SOLUTION INTRAVENOUS at 22:50

## 2022-01-01 RX ADMIN — METOPROLOL TARTRATE 12.5 MG: 25 TABLET, FILM COATED ORAL at 18:03

## 2022-01-01 RX ADMIN — OMEPRAZOLE 20 MG: 20 CAPSULE, DELAYED RELEASE ORAL at 05:33

## 2022-01-01 RX ADMIN — METOPROLOL TARTRATE 12.5 MG: 25 TABLET, FILM COATED ORAL at 04:58

## 2022-01-01 RX ADMIN — Medication 5 MG: at 21:09

## 2022-01-01 RX ADMIN — LORAZEPAM 2 MG: 2 SOLUTION, CONCENTRATE ORAL at 11:48

## 2022-01-01 RX ADMIN — SODIUM CHLORIDE, POTASSIUM CHLORIDE, SODIUM LACTATE AND CALCIUM CHLORIDE: 600; 310; 30; 20 INJECTION, SOLUTION INTRAVENOUS at 00:09

## 2022-01-01 RX ADMIN — OMEPRAZOLE 20 MG: 20 CAPSULE, DELAYED RELEASE ORAL at 06:06

## 2022-01-01 RX ADMIN — METOPROLOL TARTRATE 12.5 MG: 25 TABLET, FILM COATED ORAL at 18:05

## 2022-01-01 RX ADMIN — NOREPINEPHRINE BITARTRATE 15 MCG/MIN: 1 INJECTION, SOLUTION, CONCENTRATE INTRAVENOUS at 02:17

## 2022-01-01 RX ADMIN — CEFAZOLIN 2 G: 2 INJECTION, POWDER, FOR SOLUTION INTRAMUSCULAR; INTRAVENOUS at 21:32

## 2022-01-01 RX ADMIN — CEFTRIAXONE SODIUM 2000 MG: 10 INJECTION, POWDER, FOR SOLUTION INTRAVENOUS at 05:47

## 2022-01-01 RX ADMIN — OXYCODONE 5 MG: 5 TABLET ORAL at 13:14

## 2022-01-01 RX ADMIN — OMEPRAZOLE 20 MG: 20 CAPSULE, DELAYED RELEASE ORAL at 05:03

## 2022-01-01 RX ADMIN — ATORVASTATIN CALCIUM 40 MG: 40 TABLET, FILM COATED ORAL at 17:34

## 2022-01-01 RX ADMIN — OXYCODONE 5 MG: 5 TABLET ORAL at 22:58

## 2022-01-01 RX ADMIN — OXYCODONE 5 MG: 5 TABLET ORAL at 18:11

## 2022-01-01 RX ADMIN — IPRATROPIUM BROMIDE AND ALBUTEROL SULFATE 3 ML: .5; 3 SOLUTION RESPIRATORY (INHALATION) at 14:50

## 2022-01-01 RX ADMIN — ACETAMINOPHEN 650 MG: 325 TABLET, FILM COATED ORAL at 06:06

## 2022-01-01 RX ADMIN — OMEPRAZOLE 20 MG: 20 CAPSULE, DELAYED RELEASE ORAL at 05:12

## 2022-01-01 RX ADMIN — OXYCODONE 5 MG: 5 TABLET ORAL at 16:29

## 2022-01-01 RX ADMIN — SENNOSIDES AND DOCUSATE SODIUM 2 TABLET: 50; 8.6 TABLET ORAL at 05:13

## 2022-01-01 RX ADMIN — INSULIN HUMAN 3 UNITS: 100 INJECTION, SOLUTION PARENTERAL at 11:44

## 2022-01-01 RX ADMIN — POLYETHYLENE GLYCOL 3350 1 PACKET: 17 POWDER, FOR SOLUTION ORAL at 05:26

## 2022-01-01 RX ADMIN — INSULIN GLARGINE-YFGN 20 UNITS: 100 INJECTION, SOLUTION SUBCUTANEOUS at 09:54

## 2022-01-01 RX ADMIN — Medication 1 APPLICATOR: at 06:00

## 2022-01-01 RX ADMIN — OXYCODONE 10 MG: 5 TABLET ORAL at 16:46

## 2022-01-01 RX ADMIN — OMEPRAZOLE 20 MG: KIT at 05:24

## 2022-01-01 RX ADMIN — SCOPOLAMINE 1 PATCH: 1.5 PATCH, EXTENDED RELEASE TRANSDERMAL at 11:36

## 2022-01-01 RX ADMIN — OXYCODONE 10 MG: 5 TABLET ORAL at 16:12

## 2022-01-01 RX ADMIN — FENTANYL CITRATE 100 MCG: 50 INJECTION, SOLUTION INTRAMUSCULAR; INTRAVENOUS at 13:56

## 2022-01-01 RX ADMIN — INSULIN HUMAN 3 UNITS: 100 INJECTION, SOLUTION PARENTERAL at 12:09

## 2022-01-01 RX ADMIN — ATORVASTATIN CALCIUM 40 MG: 40 TABLET, FILM COATED ORAL at 17:50

## 2022-01-01 RX ADMIN — INSULIN HUMAN 3 UNITS: 100 INJECTION, SOLUTION PARENTERAL at 18:48

## 2022-01-01 RX ADMIN — INSULIN HUMAN 3 UNITS: 100 INJECTION, SOLUTION PARENTERAL at 18:03

## 2022-01-01 RX ADMIN — METOPROLOL TARTRATE 12.5 MG: 25 TABLET, FILM COATED ORAL at 04:17

## 2022-01-01 RX ADMIN — OXYCODONE 5 MG: 5 TABLET ORAL at 18:00

## 2022-01-01 RX ADMIN — ATORVASTATIN CALCIUM 40 MG: 40 TABLET, FILM COATED ORAL at 16:12

## 2022-01-01 RX ADMIN — PIPERACILLIN SODIUM AND TAZOBACTAM SODIUM 3.38 G: 3; .375 INJECTION, POWDER, FOR SOLUTION INTRAVENOUS at 20:25

## 2022-01-01 RX ADMIN — ALBUTEROL SULFATE 2 PUFF: 90 AEROSOL, METERED RESPIRATORY (INHALATION) at 01:44

## 2022-01-01 RX ADMIN — OXYCODONE 10 MG: 5 TABLET ORAL at 04:57

## 2022-01-01 RX ADMIN — ATORVASTATIN CALCIUM 40 MG: 40 TABLET, FILM COATED ORAL at 17:33

## 2022-01-01 RX ADMIN — SODIUM CHLORIDE: 9 INJECTION, SOLUTION INTRAVENOUS at 04:35

## 2022-01-01 RX ADMIN — OMEPRAZOLE 20 MG: 20 CAPSULE, DELAYED RELEASE ORAL at 05:04

## 2022-01-01 RX ADMIN — OXYCODONE 5 MG: 5 TABLET ORAL at 17:16

## 2022-01-01 RX ADMIN — SODIUM POLYSTYRENE SULFONATE 15 G: 15 SUSPENSION ORAL; RECTAL at 04:59

## 2022-01-01 RX ADMIN — LORAZEPAM 1 MG: 2 SOLUTION, CONCENTRATE ORAL at 16:06

## 2022-01-01 RX ADMIN — IPRATROPIUM BROMIDE AND ALBUTEROL SULFATE 3 ML: .5; 3 SOLUTION RESPIRATORY (INHALATION) at 06:42

## 2022-01-01 RX ADMIN — INSULIN HUMAN 3 UNITS: 100 INJECTION, SOLUTION PARENTERAL at 18:20

## 2022-01-01 RX ADMIN — OXYCODONE 5 MG: 5 TABLET ORAL at 11:24

## 2022-01-01 RX ADMIN — Medication 5 MG: at 21:14

## 2022-01-01 RX ADMIN — CEFAZOLIN 2 G: 2 INJECTION, POWDER, FOR SOLUTION INTRAMUSCULAR; INTRAVENOUS at 14:20

## 2022-01-01 RX ADMIN — DOCUSATE SODIUM 50 MG AND SENNOSIDES 8.6 MG 2 TABLET: 8.6; 5 TABLET, FILM COATED ORAL at 17:46

## 2022-01-01 RX ADMIN — NYSTATIN: 100000 POWDER TOPICAL at 05:20

## 2022-01-01 RX ADMIN — CEFAZOLIN 2 G: 2 INJECTION, POWDER, FOR SOLUTION INTRAMUSCULAR; INTRAVENOUS at 05:23

## 2022-01-01 RX ADMIN — OMEPRAZOLE 20 MG: 20 CAPSULE, DELAYED RELEASE ORAL at 06:27

## 2022-01-01 RX ADMIN — Medication 1 APPLICATOR: at 17:40

## 2022-01-01 RX ADMIN — SENNOSIDES AND DOCUSATE SODIUM 2 TABLET: 50; 8.6 TABLET ORAL at 04:41

## 2022-01-01 RX ADMIN — METOPROLOL TARTRATE 12.5 MG: 25 TABLET, FILM COATED ORAL at 17:26

## 2022-01-01 RX ADMIN — OXYCODONE 10 MG: 5 TABLET ORAL at 09:32

## 2022-01-01 RX ADMIN — DOCUSATE SODIUM 50 MG AND SENNOSIDES 8.6 MG 2 TABLET: 8.6; 5 TABLET, FILM COATED ORAL at 04:58

## 2022-01-01 RX ADMIN — DEXAMETHASONE SODIUM PHOSPHATE 6 MG: 4 INJECTION, SOLUTION INTRA-ARTICULAR; INTRALESIONAL; INTRAMUSCULAR; INTRAVENOUS; SOFT TISSUE at 05:24

## 2022-01-01 RX ADMIN — NYSTATIN: 100000 POWDER TOPICAL at 18:16

## 2022-01-01 RX ADMIN — Medication 5 MG: at 20:40

## 2022-01-01 RX ADMIN — APIXABAN 5 MG: 5 TABLET, FILM COATED ORAL at 04:58

## 2022-01-01 RX ADMIN — OXYCODONE 10 MG: 5 TABLET ORAL at 20:35

## 2022-01-01 RX ADMIN — CARVEDILOL 12.5 MG: 12.5 TABLET, FILM COATED ORAL at 07:30

## 2022-01-01 RX ADMIN — INSULIN GLARGINE-YFGN 12 UNITS: 100 INJECTION, SOLUTION SUBCUTANEOUS at 06:01

## 2022-01-01 RX ADMIN — WARFARIN SODIUM 5 MG: 5 TABLET ORAL at 18:24

## 2022-01-01 RX ADMIN — ROPINIROLE HYDROCHLORIDE 1 MG: 0.5 TABLET, FILM COATED ORAL at 18:37

## 2022-01-01 RX ADMIN — IPRATROPIUM BROMIDE AND ALBUTEROL SULFATE 3 ML: .5; 3 SOLUTION RESPIRATORY (INHALATION) at 06:32

## 2022-01-01 RX ADMIN — INSULIN HUMAN 5.5 UNITS: 100 INJECTION, SOLUTION PARENTERAL at 07:35

## 2022-01-01 RX ADMIN — LORAZEPAM 2 MG: 2 SOLUTION, CONCENTRATE ORAL at 04:10

## 2022-01-01 RX ADMIN — MORPHINE SULFATE 10 MG: 10 SOLUTION ORAL at 05:43

## 2022-01-01 RX ADMIN — FENTANYL CITRATE 100 MCG: 50 INJECTION, SOLUTION INTRAMUSCULAR; INTRAVENOUS at 22:26

## 2022-01-01 RX ADMIN — MORPHINE SULFATE 10 MG: 10 SOLUTION ORAL at 10:25

## 2022-01-01 RX ADMIN — MORPHINE SULFATE 10 MG: 10 SOLUTION ORAL at 16:53

## 2022-01-01 RX ADMIN — DIBASIC SODIUM PHOSPHATE, MONOBASIC POTASSIUM PHOSPHATE AND MONOBASIC SODIUM PHOSPHATE 500 MG: 852; 155; 130 TABLET ORAL at 10:13

## 2022-01-01 RX ADMIN — OXYCODONE 10 MG: 5 TABLET ORAL at 10:42

## 2022-01-01 RX ADMIN — INSULIN HUMAN 3 UNITS: 100 INJECTION, SOLUTION PARENTERAL at 20:43

## 2022-01-01 RX ADMIN — INSULIN HUMAN 10.5 UNITS: 100 INJECTION, SOLUTION PARENTERAL at 05:29

## 2022-01-01 RX ADMIN — OMEPRAZOLE 20 MG: 20 CAPSULE, DELAYED RELEASE ORAL at 09:05

## 2022-01-01 RX ADMIN — ROPINIROLE HYDROCHLORIDE 1 MG: 0.5 TABLET, FILM COATED ORAL at 18:18

## 2022-01-01 RX ADMIN — LORAZEPAM 2 MG: 2 SOLUTION, CONCENTRATE ORAL at 00:07

## 2022-01-01 RX ADMIN — FAMOTIDINE 10 MG: 10 INJECTION, SOLUTION INTRAVENOUS at 05:37

## 2022-01-01 RX ADMIN — DOCUSATE SODIUM 50 MG AND SENNOSIDES 8.6 MG 2 TABLET: 8.6; 5 TABLET, FILM COATED ORAL at 05:00

## 2022-01-01 RX ADMIN — PIPERACILLIN SODIUM AND TAZOBACTAM SODIUM 3.38 G: 3; .375 INJECTION, POWDER, FOR SOLUTION INTRAVENOUS at 14:04

## 2022-01-01 RX ADMIN — NYSTATIN: 100000 POWDER TOPICAL at 05:23

## 2022-01-01 RX ADMIN — OXYCODONE 5 MG: 5 TABLET ORAL at 23:14

## 2022-01-01 RX ADMIN — INSULIN GLARGINE-YFGN 20 UNITS: 100 INJECTION, SOLUTION SUBCUTANEOUS at 06:06

## 2022-01-01 RX ADMIN — OMEPRAZOLE 20 MG: 20 CAPSULE, DELAYED RELEASE ORAL at 06:24

## 2022-01-01 RX ADMIN — OXYCODONE 5 MG: 5 TABLET ORAL at 05:13

## 2022-01-01 RX ADMIN — CEFAZOLIN 2 G: 2 INJECTION, POWDER, FOR SOLUTION INTRAMUSCULAR; INTRAVENOUS at 13:35

## 2022-01-01 RX ADMIN — POLYETHYLENE GLYCOL 3350 1 PACKET: 17 POWDER, FOR SOLUTION ORAL at 04:17

## 2022-01-01 RX ADMIN — DOCUSATE SODIUM 50 MG AND SENNOSIDES 8.6 MG 2 TABLET: 8.6; 5 TABLET, FILM COATED ORAL at 05:04

## 2022-01-01 RX ADMIN — INSULIN HUMAN 3 UNITS: 100 INJECTION, SOLUTION PARENTERAL at 21:20

## 2022-01-01 RX ADMIN — ATORVASTATIN CALCIUM 40 MG: 40 TABLET, FILM COATED ORAL at 18:05

## 2022-01-01 RX ADMIN — NOREPINEPHRINE BITARTRATE 10 MCG/MIN: 1 INJECTION, SOLUTION, CONCENTRATE INTRAVENOUS at 09:07

## 2022-01-01 RX ADMIN — SENNOSIDES AND DOCUSATE SODIUM 2 TABLET: 50; 8.6 TABLET ORAL at 17:44

## 2022-01-01 RX ADMIN — CARVEDILOL 12.5 MG: 12.5 TABLET, FILM COATED ORAL at 18:18

## 2022-01-01 RX ADMIN — NYSTATIN: 100000 POWDER TOPICAL at 06:14

## 2022-01-01 RX ADMIN — OXYCODONE 10 MG: 5 TABLET ORAL at 08:43

## 2022-01-01 RX ADMIN — Medication 1 APPLICATOR: at 05:47

## 2022-01-01 RX ADMIN — HEPARIN SODIUM 18 UNITS/KG/HR: 5000 INJECTION, SOLUTION INTRAVENOUS at 01:07

## 2022-01-01 RX ADMIN — ACETAMINOPHEN 650 MG: 325 TABLET, FILM COATED ORAL at 23:59

## 2022-01-01 RX ADMIN — NYSTATIN: 100000 POWDER TOPICAL at 18:00

## 2022-01-01 RX ADMIN — OMEPRAZOLE 20 MG: 20 CAPSULE, DELAYED RELEASE ORAL at 05:54

## 2022-01-01 RX ADMIN — VASOPRESSIN 0.03 UNITS/MIN: 20 INJECTION, SOLUTION INTRAMUSCULAR; SUBCUTANEOUS at 07:59

## 2022-01-01 RX ADMIN — DEXMEDETOMIDINE 0.7 MCG/KG/HR: 200 INJECTION, SOLUTION INTRAVENOUS at 04:15

## 2022-01-01 RX ADMIN — OXYCODONE 10 MG: 5 TABLET ORAL at 23:45

## 2022-01-01 RX ADMIN — INSULIN HUMAN 4 UNITS: 100 INJECTION, SOLUTION PARENTERAL at 21:05

## 2022-01-01 RX ADMIN — HEPARIN SODIUM 16 UNITS/KG/HR: 5000 INJECTION, SOLUTION INTRAVENOUS at 18:13

## 2022-01-01 RX ADMIN — OXYCODONE 5 MG: 5 TABLET ORAL at 18:41

## 2022-01-01 RX ADMIN — IPRATROPIUM BROMIDE AND ALBUTEROL SULFATE 3 ML: .5; 3 SOLUTION RESPIRATORY (INHALATION) at 21:31

## 2022-01-01 RX ADMIN — OXYCODONE 10 MG: 5 TABLET ORAL at 04:16

## 2022-01-01 RX ADMIN — INSULIN HUMAN 3 UNITS: 100 INJECTION, SOLUTION PARENTERAL at 20:29

## 2022-01-01 RX ADMIN — DEXTROSE MONOHYDRATE 25 G: 100 INJECTION, SOLUTION INTRAVENOUS at 07:12

## 2022-01-01 RX ADMIN — INSULIN HUMAN 4 UNITS: 100 INJECTION, SOLUTION PARENTERAL at 21:17

## 2022-01-01 RX ADMIN — ACETAMINOPHEN 650 MG: 325 TABLET, FILM COATED ORAL at 05:03

## 2022-01-01 RX ADMIN — ATORVASTATIN CALCIUM 40 MG: 40 TABLET, FILM COATED ORAL at 17:09

## 2022-01-01 RX ADMIN — MORPHINE SULFATE 10 MG: 10 SOLUTION ORAL at 07:54

## 2022-01-01 RX ADMIN — OXYCODONE 10 MG: 5 TABLET ORAL at 14:38

## 2022-01-01 RX ADMIN — ALBUTEROL SULFATE 2 PUFF: 90 AEROSOL, METERED RESPIRATORY (INHALATION) at 14:11

## 2022-01-01 RX ADMIN — INSULIN HUMAN 4 UNITS: 100 INJECTION, SOLUTION PARENTERAL at 12:18

## 2022-01-01 RX ADMIN — LORAZEPAM 2 MG: 2 SOLUTION, CONCENTRATE ORAL at 13:55

## 2022-01-01 RX ADMIN — MIDAZOLAM HYDROCHLORIDE 1 MG: 1 INJECTION, SOLUTION INTRAMUSCULAR; INTRAVENOUS at 16:28

## 2022-01-01 RX ADMIN — IPRATROPIUM BROMIDE AND ALBUTEROL SULFATE 3 ML: .5; 3 SOLUTION RESPIRATORY (INHALATION) at 18:34

## 2022-01-01 RX ADMIN — LORAZEPAM 1 MG: 2 SOLUTION, CONCENTRATE ORAL at 09:46

## 2022-01-01 RX ADMIN — INSULIN GLARGINE-YFGN 20 UNITS: 100 INJECTION, SOLUTION SUBCUTANEOUS at 06:30

## 2022-01-01 RX ADMIN — Medication 5 MG: at 20:56

## 2022-01-01 RX ADMIN — Medication 5 MG: at 21:18

## 2022-01-01 RX ADMIN — HEPARIN SODIUM 18 UNITS/KG/HR: 5000 INJECTION, SOLUTION INTRAVENOUS at 23:42

## 2022-01-01 RX ADMIN — SENNOSIDES AND DOCUSATE SODIUM 2 TABLET: 50; 8.6 TABLET ORAL at 18:00

## 2022-01-01 RX ADMIN — IPRATROPIUM BROMIDE AND ALBUTEROL SULFATE 3 ML: .5; 3 SOLUTION RESPIRATORY (INHALATION) at 07:04

## 2022-01-01 RX ADMIN — HYDROMORPHONE HYDROCHLORIDE 0.5 MG: 1 INJECTION, SOLUTION INTRAMUSCULAR; INTRAVENOUS; SUBCUTANEOUS at 13:13

## 2022-01-01 RX ADMIN — FUROSEMIDE 20 MG: 10 INJECTION, SOLUTION INTRAMUSCULAR; INTRAVENOUS at 14:02

## 2022-01-01 RX ADMIN — INSULIN GLARGINE-YFGN 15 UNITS: 100 INJECTION, SOLUTION SUBCUTANEOUS at 06:31

## 2022-01-01 RX ADMIN — ROPINIROLE HYDROCHLORIDE 1 MG: 0.5 TABLET, FILM COATED ORAL at 16:54

## 2022-01-01 RX ADMIN — CEFAZOLIN 2 G: 2 INJECTION, POWDER, FOR SOLUTION INTRAMUSCULAR; INTRAVENOUS at 05:16

## 2022-01-01 RX ADMIN — NYSTATIN: 100000 POWDER TOPICAL at 12:16

## 2022-01-01 RX ADMIN — NOREPINEPHRINE BITARTRATE 18 MCG/MIN: 1 INJECTION, SOLUTION, CONCENTRATE INTRAVENOUS at 09:48

## 2022-01-01 RX ADMIN — VASOPRESSIN 0.03 UNITS/MIN: 20 INJECTION, SOLUTION INTRAMUSCULAR; SUBCUTANEOUS at 21:32

## 2022-01-01 RX ADMIN — SODIUM BICARBONATE 50 MEQ: 84 INJECTION, SOLUTION INTRAVENOUS at 05:25

## 2022-01-01 RX ADMIN — OXYCODONE 5 MG: 5 TABLET ORAL at 07:24

## 2022-01-01 RX ADMIN — ALBUTEROL SULFATE 2 PUFF: 90 AEROSOL, METERED RESPIRATORY (INHALATION) at 18:45

## 2022-01-01 RX ADMIN — INSULIN HUMAN 3 UNITS: 100 INJECTION, SOLUTION PARENTERAL at 13:05

## 2022-01-01 RX ADMIN — ACETAMINOPHEN 650 MG: 325 TABLET, FILM COATED ORAL at 23:07

## 2022-01-01 RX ADMIN — CARVEDILOL 12.5 MG: 12.5 TABLET, FILM COATED ORAL at 10:07

## 2022-01-01 RX ADMIN — CARVEDILOL 12.5 MG: 12.5 TABLET, FILM COATED ORAL at 17:45

## 2022-01-01 RX ADMIN — PIPERACILLIN SODIUM AND TAZOBACTAM SODIUM 3.38 G: 3; .375 INJECTION, POWDER, FOR SOLUTION INTRAVENOUS at 21:10

## 2022-01-01 RX ADMIN — INSULIN HUMAN 3 UNITS: 100 INJECTION, SOLUTION PARENTERAL at 00:04

## 2022-01-01 RX ADMIN — INSULIN HUMAN 4 UNITS: 100 INJECTION, SOLUTION PARENTERAL at 22:07

## 2022-01-01 RX ADMIN — INSULIN HUMAN 4 UNITS: 100 INJECTION, SOLUTION PARENTERAL at 17:59

## 2022-01-01 RX ADMIN — DEXAMETHASONE SODIUM PHOSPHATE 6 MG: 4 INJECTION, SOLUTION INTRA-ARTICULAR; INTRALESIONAL; INTRAMUSCULAR; INTRAVENOUS; SOFT TISSUE at 04:32

## 2022-01-01 RX ADMIN — DOCUSATE SODIUM 50 MG AND SENNOSIDES 8.6 MG 2 TABLET: 8.6; 5 TABLET, FILM COATED ORAL at 06:06

## 2022-01-01 RX ADMIN — APIXABAN 5 MG: 5 TABLET, FILM COATED ORAL at 17:09

## 2022-01-01 RX ADMIN — LINEZOLID 600 MG: 600 INJECTION, SOLUTION INTRAVENOUS at 02:22

## 2022-01-01 RX ADMIN — INSULIN HUMAN 3 UNITS: 100 INJECTION, SOLUTION PARENTERAL at 13:22

## 2022-01-01 RX ADMIN — OXYCODONE 5 MG: 5 TABLET ORAL at 17:50

## 2022-01-01 RX ADMIN — ALBUTEROL SULFATE 2 PUFF: 90 AEROSOL, METERED RESPIRATORY (INHALATION) at 10:57

## 2022-01-01 RX ADMIN — ACETAMINOPHEN 650 MG: 325 TABLET, FILM COATED ORAL at 12:12

## 2022-01-01 RX ADMIN — INSULIN GLARGINE-YFGN 20 UNITS: 100 INJECTION, SOLUTION SUBCUTANEOUS at 08:40

## 2022-01-01 RX ADMIN — OMEPRAZOLE 20 MG: KIT at 05:19

## 2022-01-01 RX ADMIN — OXYCODONE 10 MG: 5 TABLET ORAL at 03:07

## 2022-01-01 RX ADMIN — ACETAMINOPHEN 650 MG: 325 TABLET, FILM COATED ORAL at 17:55

## 2022-01-01 RX ADMIN — NYSTATIN: 100000 POWDER TOPICAL at 04:51

## 2022-01-01 RX ADMIN — INSULIN GLARGINE-YFGN 12 UNITS: 100 INJECTION, SOLUTION SUBCUTANEOUS at 05:56

## 2022-01-01 RX ADMIN — DICLOFENAC SODIUM 2 G: 10 GEL TOPICAL at 06:03

## 2022-01-01 RX ADMIN — METOPROLOL TARTRATE 12.5 MG: 25 TABLET, FILM COATED ORAL at 05:03

## 2022-01-01 RX ADMIN — DOCUSATE SODIUM 50 MG AND SENNOSIDES 8.6 MG 2 TABLET: 8.6; 5 TABLET, FILM COATED ORAL at 16:46

## 2022-01-01 RX ADMIN — METOPROLOL TARTRATE 12.5 MG: 25 TABLET, FILM COATED ORAL at 04:53

## 2022-01-01 RX ADMIN — INSULIN HUMAN 3 UNITS: 100 INJECTION, SOLUTION PARENTERAL at 17:52

## 2022-01-01 RX ADMIN — DOCUSATE SODIUM 50 MG AND SENNOSIDES 8.6 MG 2 TABLET: 8.6; 5 TABLET, FILM COATED ORAL at 04:52

## 2022-01-01 RX ADMIN — OMEPRAZOLE 20 MG: 20 CAPSULE, DELAYED RELEASE ORAL at 04:59

## 2022-01-01 RX ADMIN — DOCUSATE SODIUM 50 MG AND SENNOSIDES 8.6 MG 2 TABLET: 8.6; 5 TABLET, FILM COATED ORAL at 17:22

## 2022-01-01 RX ADMIN — ALBUTEROL SULFATE 2 PUFF: 90 AEROSOL, METERED RESPIRATORY (INHALATION) at 15:08

## 2022-01-01 RX ADMIN — NYSTATIN: 100000 POWDER TOPICAL at 05:40

## 2022-01-01 RX ADMIN — METOPROLOL TARTRATE 12.5 MG: 25 TABLET, FILM COATED ORAL at 17:05

## 2022-01-01 RX ADMIN — CARVEDILOL 12.5 MG: 12.5 TABLET, FILM COATED ORAL at 16:39

## 2022-01-01 RX ADMIN — Medication 5 MG: at 20:25

## 2022-01-01 RX ADMIN — METOPROLOL TARTRATE 12.5 MG: 25 TABLET, FILM COATED ORAL at 05:22

## 2022-01-01 RX ADMIN — MIDAZOLAM HYDROCHLORIDE 1 MG: 1 INJECTION, SOLUTION INTRAMUSCULAR; INTRAVENOUS at 17:02

## 2022-01-01 RX ADMIN — ACETAMINOPHEN 650 MG: 325 TABLET, FILM COATED ORAL at 17:34

## 2022-01-01 RX ADMIN — SODIUM CHLORIDE, POTASSIUM CHLORIDE, SODIUM LACTATE AND CALCIUM CHLORIDE: 600; 310; 30; 20 INJECTION, SOLUTION INTRAVENOUS at 08:51

## 2022-01-01 RX ADMIN — OXYCODONE 10 MG: 5 TABLET ORAL at 10:54

## 2022-01-01 RX ADMIN — HYDROMORPHONE HYDROCHLORIDE 1 MG: 1 INJECTION, SOLUTION INTRAMUSCULAR; INTRAVENOUS; SUBCUTANEOUS at 11:01

## 2022-01-01 RX ADMIN — Medication 1 APPLICATOR: at 04:32

## 2022-01-01 RX ADMIN — HALOPERIDOL LACTATE 1 MG: 5 INJECTION, SOLUTION INTRAMUSCULAR at 02:31

## 2022-01-01 RX ADMIN — IPRATROPIUM BROMIDE AND ALBUTEROL SULFATE 3 ML: .5; 3 SOLUTION RESPIRATORY (INHALATION) at 15:02

## 2022-01-01 RX ADMIN — METOPROLOL TARTRATE 12.5 MG: 25 TABLET, FILM COATED ORAL at 17:09

## 2022-01-01 RX ADMIN — DEXTROSE MONOHYDRATE: 50 INJECTION, SOLUTION INTRAVENOUS at 04:11

## 2022-01-01 RX ADMIN — OXYCODONE 5 MG: 5 TABLET ORAL at 23:54

## 2022-01-01 RX ADMIN — INSULIN HUMAN 3 UNITS: 100 INJECTION, SOLUTION PARENTERAL at 22:26

## 2022-01-01 RX ADMIN — PIPERACILLIN SODIUM AND TAZOBACTAM SODIUM 3.38 G: 3; .375 INJECTION, POWDER, FOR SOLUTION INTRAVENOUS at 03:30

## 2022-01-01 RX ADMIN — HYDROMORPHONE HYDROCHLORIDE 1 MG: 1 INJECTION, SOLUTION INTRAMUSCULAR; INTRAVENOUS; SUBCUTANEOUS at 03:14

## 2022-01-01 RX ADMIN — ROPINIROLE HYDROCHLORIDE 0.5 MG: 0.5 TABLET, FILM COATED ORAL at 05:30

## 2022-01-01 RX ADMIN — OXYCODONE 10 MG: 5 TABLET ORAL at 12:01

## 2022-01-01 RX ADMIN — POLYETHYLENE GLYCOL 3350 1 PACKET: 17 POWDER, FOR SOLUTION ORAL at 05:04

## 2022-01-01 RX ADMIN — METOPROLOL TARTRATE 12.5 MG: 25 TABLET, FILM COATED ORAL at 09:03

## 2022-01-01 RX ADMIN — FENTANYL CITRATE 100 MCG: 50 INJECTION, SOLUTION INTRAMUSCULAR; INTRAVENOUS at 13:38

## 2022-01-01 RX ADMIN — FAMOTIDINE 20 MG: 10 INJECTION, SOLUTION INTRAVENOUS at 05:25

## 2022-01-01 RX ADMIN — APIXABAN 5 MG: 5 TABLET, FILM COATED ORAL at 05:04

## 2022-01-01 RX ADMIN — ACETAMINOPHEN 650 MG: 325 TABLET, FILM COATED ORAL at 21:51

## 2022-01-01 RX ADMIN — ALBUTEROL SULFATE 2 PUFF: 90 AEROSOL, METERED RESPIRATORY (INHALATION) at 06:10

## 2022-01-01 RX ADMIN — FUROSEMIDE 20 MG: 10 INJECTION, SOLUTION INTRAMUSCULAR; INTRAVENOUS at 06:24

## 2022-01-01 RX ADMIN — ATORVASTATIN CALCIUM 40 MG: 40 TABLET, FILM COATED ORAL at 17:53

## 2022-01-01 RX ADMIN — LORAZEPAM 1 MG: 2 SOLUTION, CONCENTRATE ORAL at 14:31

## 2022-01-01 RX ADMIN — HYDROMORPHONE HYDROCHLORIDE 0.5 MG: 1 INJECTION, SOLUTION INTRAMUSCULAR; INTRAVENOUS; SUBCUTANEOUS at 16:29

## 2022-01-01 RX ADMIN — OXYCODONE 5 MG: 5 TABLET ORAL at 17:08

## 2022-01-01 RX ADMIN — PIPERACILLIN SODIUM AND TAZOBACTAM SODIUM 3.38 G: 3; .375 INJECTION, POWDER, FOR SOLUTION INTRAVENOUS at 06:31

## 2022-01-01 RX ADMIN — NYSTATIN: 100000 POWDER TOPICAL at 17:54

## 2022-01-01 RX ADMIN — INSULIN HUMAN 3 UNITS: 100 INJECTION, SOLUTION PARENTERAL at 14:30

## 2022-01-01 RX ADMIN — SEVELAMER CARBONATE 800 MG: 800 TABLET, FILM COATED ORAL at 08:27

## 2022-01-01 RX ADMIN — DIPHENHYDRAMINE HYDROCHLORIDE 25 MG: 25 TABLET ORAL at 22:53

## 2022-01-01 RX ADMIN — OMEPRAZOLE 20 MG: 20 CAPSULE, DELAYED RELEASE ORAL at 05:26

## 2022-01-01 RX ADMIN — TAMSULOSIN HYDROCHLORIDE 0.4 MG: 0.4 CAPSULE ORAL at 09:24

## 2022-01-01 RX ADMIN — POLYETHYLENE GLYCOL 3350 1 PACKET: 17 POWDER, FOR SOLUTION ORAL at 17:49

## 2022-01-01 RX ADMIN — ACETAMINOPHEN 650 MG: 325 TABLET, FILM COATED ORAL at 19:42

## 2022-01-01 RX ADMIN — INSULIN HUMAN 3 UNITS: 100 INJECTION, SOLUTION PARENTERAL at 12:10

## 2022-01-01 RX ADMIN — DOCUSATE SODIUM 50 MG AND SENNOSIDES 8.6 MG 2 TABLET: 8.6; 5 TABLET, FILM COATED ORAL at 04:35

## 2022-01-01 RX ADMIN — PIPERACILLIN SODIUM AND TAZOBACTAM SODIUM 3.38 G: 3; .375 INJECTION, POWDER, FOR SOLUTION INTRAVENOUS at 21:22

## 2022-01-01 RX ADMIN — SODIUM POLYSTYRENE SULFONATE 15 G: 15 SUSPENSION ORAL; RECTAL at 17:53

## 2022-01-01 RX ADMIN — SODIUM POLYSTYRENE SULFONATE 15 G: 15 SUSPENSION ORAL; RECTAL at 07:35

## 2022-01-01 RX ADMIN — POLYETHYLENE GLYCOL 3350 1 PACKET: 17 POWDER, FOR SOLUTION ORAL at 05:33

## 2022-01-01 RX ADMIN — TAMSULOSIN HYDROCHLORIDE 0.4 MG: 0.4 CAPSULE ORAL at 08:15

## 2022-01-01 RX ADMIN — ATORVASTATIN CALCIUM 40 MG: 40 TABLET, FILM COATED ORAL at 17:06

## 2022-01-01 RX ADMIN — METOPROLOL TARTRATE 12.5 MG: 25 TABLET, FILM COATED ORAL at 05:54

## 2022-01-01 RX ADMIN — CEFAZOLIN 2 G: 2 INJECTION, POWDER, FOR SOLUTION INTRAMUSCULAR; INTRAVENOUS at 21:07

## 2022-01-01 RX ADMIN — LORAZEPAM 1 MG: 2 SOLUTION, CONCENTRATE ORAL at 19:44

## 2022-01-01 RX ADMIN — FAMOTIDINE 10 MG: 10 INJECTION, SOLUTION INTRAVENOUS at 06:27

## 2022-01-01 RX ADMIN — OMEPRAZOLE 20 MG: KIT at 05:40

## 2022-01-01 RX ADMIN — ROPINIROLE HYDROCHLORIDE 0.5 MG: 0.5 TABLET, FILM COATED ORAL at 05:03

## 2022-01-01 RX ADMIN — OXYCODONE 10 MG: 5 TABLET ORAL at 16:41

## 2022-01-01 RX ADMIN — Medication 5 MG: at 20:24

## 2022-01-01 RX ADMIN — INSULIN GLARGINE-YFGN 12 UNITS: 100 INJECTION, SOLUTION SUBCUTANEOUS at 05:17

## 2022-01-01 RX ADMIN — PIPERACILLIN SODIUM AND TAZOBACTAM SODIUM 3.38 G: 3; .375 INJECTION, POWDER, FOR SOLUTION INTRAVENOUS at 13:45

## 2022-01-01 RX ADMIN — APIXABAN 5 MG: 5 TABLET, FILM COATED ORAL at 17:53

## 2022-01-01 RX ADMIN — LORAZEPAM 2 MG: 2 SOLUTION, CONCENTRATE ORAL at 21:14

## 2022-01-01 RX ADMIN — PIPERACILLIN SODIUM AND TAZOBACTAM SODIUM 3.38 G: 3; .375 INJECTION, POWDER, FOR SOLUTION INTRAVENOUS at 06:24

## 2022-01-01 RX ADMIN — OXYCODONE 10 MG: 5 TABLET ORAL at 17:09

## 2022-01-01 RX ADMIN — POLYETHYLENE GLYCOL 3350 1 PACKET: 17 POWDER, FOR SOLUTION ORAL at 08:52

## 2022-01-01 RX ADMIN — LIDOCAINE 2 PATCH: 50 PATCH TOPICAL at 12:53

## 2022-01-01 RX ADMIN — SEVELAMER CARBONATE 800 MG: 800 TABLET, FILM COATED ORAL at 17:51

## 2022-01-01 RX ADMIN — Medication 5 MG: at 20:09

## 2022-01-01 RX ADMIN — ALBUTEROL SULFATE 10 MG/HR: 2.5 SOLUTION RESPIRATORY (INHALATION) at 06:58

## 2022-01-01 RX ADMIN — POLYETHYLENE GLYCOL 3350 1 PACKET: 17 POWDER, FOR SOLUTION ORAL at 17:35

## 2022-01-01 RX ADMIN — OMEPRAZOLE 20 MG: 20 CAPSULE, DELAYED RELEASE ORAL at 04:42

## 2022-01-01 RX ADMIN — HYDROMORPHONE HYDROCHLORIDE 1 MG: 1 INJECTION, SOLUTION INTRAMUSCULAR; INTRAVENOUS; SUBCUTANEOUS at 21:31

## 2022-01-01 RX ADMIN — ROPINIROLE HYDROCHLORIDE 1 MG: 0.5 TABLET, FILM COATED ORAL at 17:08

## 2022-01-01 RX ADMIN — ACETAMINOPHEN 650 MG: 325 TABLET, FILM COATED ORAL at 11:51

## 2022-01-01 RX ADMIN — APIXABAN 5 MG: 5 TABLET, FILM COATED ORAL at 05:07

## 2022-01-01 RX ADMIN — SENNOSIDES AND DOCUSATE SODIUM 2 TABLET: 50; 8.6 TABLET ORAL at 17:40

## 2022-01-01 RX ADMIN — OXYCODONE 5 MG: 5 TABLET ORAL at 01:39

## 2022-01-01 RX ADMIN — DEXTROSE MONOHYDRATE: 50 INJECTION, SOLUTION INTRAVENOUS at 13:04

## 2022-01-01 RX ADMIN — NYSTATIN: 100000 POWDER TOPICAL at 17:51

## 2022-01-01 RX ADMIN — WARFARIN SODIUM 6 MG: 6 TABLET ORAL at 17:45

## 2022-01-01 RX ADMIN — DOCUSATE SODIUM 50 MG AND SENNOSIDES 8.6 MG 2 TABLET: 8.6; 5 TABLET, FILM COATED ORAL at 17:51

## 2022-01-01 RX ADMIN — OXYCODONE 2.5 MG: 5 TABLET ORAL at 05:22

## 2022-01-01 RX ADMIN — MAGNESIUM HYDROXIDE 30 ML: 400 SUSPENSION ORAL at 18:11

## 2022-01-01 RX ADMIN — OXYCODONE 10 MG: 5 TABLET ORAL at 07:47

## 2022-01-01 RX ADMIN — QUETIAPINE FUMARATE 25 MG: 25 TABLET ORAL at 20:09

## 2022-01-01 RX ADMIN — INSULIN HUMAN 3 UNITS: 100 INJECTION, SOLUTION PARENTERAL at 21:42

## 2022-01-01 RX ADMIN — LORAZEPAM 1 MG: 2 SOLUTION, CONCENTRATE ORAL at 07:14

## 2022-01-01 RX ADMIN — POLYETHYLENE GLYCOL 3350 1 PACKET: 17 POWDER, FOR SOLUTION ORAL at 05:00

## 2022-01-01 RX ADMIN — HYDROCODONE BITARTRATE AND ACETAMINOPHEN 1 TABLET: 10; 325 TABLET ORAL at 20:08

## 2022-01-01 RX ADMIN — APIXABAN 5 MG: 5 TABLET, FILM COATED ORAL at 05:12

## 2022-01-01 RX ADMIN — FENTANYL CITRATE 100 MCG: 50 INJECTION, SOLUTION INTRAMUSCULAR; INTRAVENOUS at 15:17

## 2022-01-01 RX ADMIN — HEPARIN SODIUM 2800 UNITS: 1000 INJECTION, SOLUTION INTRAVENOUS; SUBCUTANEOUS at 10:00

## 2022-01-01 RX ADMIN — ACETAMINOPHEN 650 MG: 325 TABLET, FILM COATED ORAL at 12:10

## 2022-01-01 RX ADMIN — IPRATROPIUM BROMIDE AND ALBUTEROL SULFATE 3 ML: .5; 3 SOLUTION RESPIRATORY (INHALATION) at 01:41

## 2022-01-01 RX ADMIN — NOREPINEPHRINE BITARTRATE 5 MCG/MIN: 1 INJECTION, SOLUTION, CONCENTRATE INTRAVENOUS at 01:50

## 2022-01-01 RX ADMIN — POLYETHYLENE GLYCOL 3350 1 PACKET: 17 POWDER, FOR SOLUTION ORAL at 07:22

## 2022-01-01 RX ADMIN — SENNOSIDES AND DOCUSATE SODIUM 2 TABLET: 50; 8.6 TABLET ORAL at 16:54

## 2022-01-01 RX ADMIN — HEPARIN SODIUM 13 UNITS/KG/HR: 5000 INJECTION, SOLUTION INTRAVENOUS at 01:54

## 2022-01-01 RX ADMIN — Medication 50 MCG/HR: at 16:27

## 2022-01-01 RX ADMIN — HYDROMORPHONE HYDROCHLORIDE 1 MG: 1 INJECTION, SOLUTION INTRAMUSCULAR; INTRAVENOUS; SUBCUTANEOUS at 22:30

## 2022-01-01 RX ADMIN — OMEPRAZOLE 20 MG: KIT at 10:21

## 2022-01-01 RX ADMIN — LORAZEPAM 2 MG: 2 SOLUTION, CONCENTRATE ORAL at 09:37

## 2022-01-01 RX ADMIN — Medication 5 MG: at 20:23

## 2022-01-01 RX ADMIN — ACETAMINOPHEN 650 MG: 325 TABLET, FILM COATED ORAL at 13:31

## 2022-01-01 RX ADMIN — CARVEDILOL 12.5 MG: 12.5 TABLET, FILM COATED ORAL at 09:48

## 2022-01-01 RX ADMIN — CEFAZOLIN 2 G: 2 INJECTION, POWDER, FOR SOLUTION INTRAMUSCULAR; INTRAVENOUS at 13:40

## 2022-01-01 RX ADMIN — INSULIN HUMAN 7 UNITS: 100 INJECTION, SOLUTION PARENTERAL at 13:13

## 2022-01-01 RX ADMIN — CALCIUM GLUCONATE 2 G: 20 INJECTION, SOLUTION INTRAVENOUS at 05:27

## 2022-01-01 RX ADMIN — CEFAZOLIN SODIUM 1 G: 1 INJECTION, SOLUTION INTRAVENOUS at 16:10

## 2022-01-01 RX ADMIN — Medication 5 MG: at 22:01

## 2022-01-01 RX ADMIN — ACETAMINOPHEN 650 MG: 325 TABLET, FILM COATED ORAL at 18:05

## 2022-01-01 RX ADMIN — CARVEDILOL 12.5 MG: 12.5 TABLET, FILM COATED ORAL at 17:08

## 2022-01-01 RX ADMIN — ACETAMINOPHEN 650 MG: 325 TABLET, FILM COATED ORAL at 17:50

## 2022-01-01 RX ADMIN — SODIUM CHLORIDE, POTASSIUM CHLORIDE, SODIUM LACTATE AND CALCIUM CHLORIDE: 600; 310; 30; 20 INJECTION, SOLUTION INTRAVENOUS at 09:48

## 2022-01-01 RX ADMIN — CEFAZOLIN 2 G: 2 INJECTION, POWDER, FOR SOLUTION INTRAMUSCULAR; INTRAVENOUS at 22:11

## 2022-01-01 RX ADMIN — SODIUM CHLORIDE 1500 ML: 9 INJECTION, SOLUTION INTRAVENOUS at 01:25

## 2022-01-01 RX ADMIN — IPRATROPIUM BROMIDE AND ALBUTEROL SULFATE 3 ML: .5; 3 SOLUTION RESPIRATORY (INHALATION) at 14:31

## 2022-01-01 RX ADMIN — INSULIN GLARGINE-YFGN 15 UNITS: 100 INJECTION, SOLUTION SUBCUTANEOUS at 04:48

## 2022-01-01 RX ADMIN — SENNOSIDES AND DOCUSATE SODIUM 2 TABLET: 50; 8.6 TABLET ORAL at 18:14

## 2022-01-01 RX ADMIN — INSULIN GLARGINE-YFGN 15 UNITS: 100 INJECTION, SOLUTION SUBCUTANEOUS at 13:18

## 2022-01-01 RX ADMIN — OXYCODONE 10 MG: 5 TABLET ORAL at 16:59

## 2022-01-01 RX ADMIN — HEPARIN SODIUM 18 UNITS/KG/HR: 5000 INJECTION, SOLUTION INTRAVENOUS at 07:39

## 2022-01-01 RX ADMIN — MAGNESIUM SULFATE IN DEXTROSE 1 G: 10 INJECTION, SOLUTION INTRAVENOUS at 09:56

## 2022-01-01 RX ADMIN — DEXTROSE MONOHYDRATE: 50 INJECTION, SOLUTION INTRAVENOUS at 00:22

## 2022-01-01 RX ADMIN — POLYETHYLENE GLYCOL 3350 1 PACKET: 17 POWDER, FOR SOLUTION ORAL at 04:35

## 2022-01-01 RX ADMIN — OXYCODONE 10 MG: 5 TABLET ORAL at 11:25

## 2022-01-01 RX ADMIN — INSULIN GLARGINE-YFGN 20 UNITS: 100 INJECTION, SOLUTION SUBCUTANEOUS at 04:57

## 2022-01-01 RX ADMIN — DEXMEDETOMIDINE 0.4 MCG/KG/HR: 200 INJECTION, SOLUTION INTRAVENOUS at 13:16

## 2022-01-01 RX ADMIN — HYDROMORPHONE HYDROCHLORIDE 1 MG: 1 INJECTION, SOLUTION INTRAMUSCULAR; INTRAVENOUS; SUBCUTANEOUS at 19:34

## 2022-01-01 RX ADMIN — NYSTATIN: 100000 POWDER TOPICAL at 17:22

## 2022-01-01 RX ADMIN — INSULIN GLARGINE-YFGN 20 UNITS: 100 INJECTION, SOLUTION SUBCUTANEOUS at 08:04

## 2022-01-01 RX ADMIN — OMEPRAZOLE 20 MG: 20 CAPSULE, DELAYED RELEASE ORAL at 04:35

## 2022-01-01 RX ADMIN — DEXAMETHASONE SODIUM PHOSPHATE 6 MG: 4 INJECTION, SOLUTION INTRA-ARTICULAR; INTRALESIONAL; INTRAMUSCULAR; INTRAVENOUS; SOFT TISSUE at 05:48

## 2022-01-01 RX ADMIN — INSULIN HUMAN 3 UNITS: 100 INJECTION, SOLUTION PARENTERAL at 17:19

## 2022-01-01 RX ADMIN — HYDROCODONE BITARTRATE AND ACETAMINOPHEN 1 TABLET: 10; 325 TABLET ORAL at 08:36

## 2022-01-01 RX ADMIN — DICLOFENAC SODIUM 2 G: 10 GEL TOPICAL at 05:32

## 2022-01-01 RX ADMIN — IPRATROPIUM BROMIDE AND ALBUTEROL SULFATE 3 ML: .5; 3 SOLUTION RESPIRATORY (INHALATION) at 09:00

## 2022-01-01 RX ADMIN — SENNOSIDES AND DOCUSATE SODIUM 2 TABLET: 50; 8.6 TABLET ORAL at 17:21

## 2022-01-01 RX ADMIN — DICLOFENAC SODIUM 2 G: 10 GEL TOPICAL at 20:48

## 2022-01-01 RX ADMIN — ATORVASTATIN CALCIUM 40 MG: 40 TABLET, FILM COATED ORAL at 17:51

## 2022-01-01 RX ADMIN — DOCUSATE SODIUM 100 MG: 100 CAPSULE, LIQUID FILLED ORAL at 09:46

## 2022-01-01 RX ADMIN — Medication 1 APPLICATOR: at 05:14

## 2022-01-01 RX ADMIN — DOCUSATE SODIUM 50 MG AND SENNOSIDES 8.6 MG 2 TABLET: 8.6; 5 TABLET, FILM COATED ORAL at 05:23

## 2022-01-01 RX ADMIN — SODIUM PHOSPHATE 133 ML: 7; 19 ENEMA RECTAL at 15:45

## 2022-01-01 RX ADMIN — HEPARIN SODIUM 16 UNITS/KG/HR: 5000 INJECTION, SOLUTION INTRAVENOUS at 12:20

## 2022-01-01 RX ADMIN — Medication 5 MG: at 20:31

## 2022-01-01 RX ADMIN — HYDROMORPHONE HYDROCHLORIDE 1 MG: 1 INJECTION, SOLUTION INTRAMUSCULAR; INTRAVENOUS; SUBCUTANEOUS at 12:40

## 2022-01-01 RX ADMIN — LORAZEPAM 2 MG: 2 SOLUTION, CONCENTRATE ORAL at 22:42

## 2022-01-01 RX ADMIN — OXYCODONE 10 MG: 5 TABLET ORAL at 12:39

## 2022-01-01 RX ADMIN — Medication 5 MG: at 20:20

## 2022-01-01 RX ADMIN — INSULIN HUMAN 4 UNITS: 100 INJECTION, SOLUTION PARENTERAL at 12:48

## 2022-01-01 RX ADMIN — DEXMEDETOMIDINE 0.5 MCG/KG/HR: 200 INJECTION, SOLUTION INTRAVENOUS at 00:14

## 2022-01-01 RX ADMIN — INSULIN GLARGINE-YFGN 20 UNITS: 100 INJECTION, SOLUTION SUBCUTANEOUS at 10:11

## 2022-01-01 RX ADMIN — OXYCODONE 10 MG: 5 TABLET ORAL at 19:01

## 2022-01-01 RX ADMIN — INSULIN GLARGINE-YFGN 20 UNITS: 100 INJECTION, SOLUTION SUBCUTANEOUS at 06:33

## 2022-01-01 RX ADMIN — INSULIN GLARGINE-YFGN 20 UNITS: 100 INJECTION, SOLUTION SUBCUTANEOUS at 06:03

## 2022-01-01 RX ADMIN — DOCUSATE SODIUM 50 MG AND SENNOSIDES 8.6 MG 2 TABLET: 8.6; 5 TABLET, FILM COATED ORAL at 05:26

## 2022-01-01 RX ADMIN — APIXABAN 5 MG: 5 TABLET, FILM COATED ORAL at 17:46

## 2022-01-01 RX ADMIN — POLYETHYLENE GLYCOL 3350 1 PACKET: 17 POWDER, FOR SOLUTION ORAL at 18:15

## 2022-01-01 RX ADMIN — SODIUM CHLORIDE: 9 INJECTION, SOLUTION INTRAVENOUS at 17:49

## 2022-01-01 RX ADMIN — Medication 1 APPLICATOR: at 21:56

## 2022-01-01 RX ADMIN — ROPINIROLE HYDROCHLORIDE 0.5 MG: 0.5 TABLET, FILM COATED ORAL at 04:38

## 2022-01-01 RX ADMIN — ALBUTEROL SULFATE 2 PUFF: 90 AEROSOL, METERED RESPIRATORY (INHALATION) at 11:21

## 2022-01-01 RX ADMIN — LORAZEPAM 2 MG: 2 SOLUTION, CONCENTRATE ORAL at 18:00

## 2022-01-01 RX ADMIN — Medication 1 APPLICATOR: at 17:44

## 2022-01-01 RX ADMIN — OMEPRAZOLE 20 MG: 20 CAPSULE, DELAYED RELEASE ORAL at 05:07

## 2022-01-01 RX ADMIN — OXYCODONE 10 MG: 5 TABLET ORAL at 13:48

## 2022-01-01 RX ADMIN — SENNOSIDES AND DOCUSATE SODIUM 2 TABLET: 50; 8.6 TABLET ORAL at 17:27

## 2022-01-01 RX ADMIN — ALBUTEROL SULFATE 2 PUFF: 90 AEROSOL, METERED RESPIRATORY (INHALATION) at 13:04

## 2022-01-01 RX ADMIN — METOPROLOL TARTRATE 12.5 MG: 25 TABLET, FILM COATED ORAL at 04:35

## 2022-01-01 RX ADMIN — ACETAMINOPHEN 650 MG: 325 TABLET, FILM COATED ORAL at 22:50

## 2022-01-01 RX ADMIN — METOPROLOL TARTRATE 12.5 MG: 25 TABLET, FILM COATED ORAL at 04:42

## 2022-01-01 ASSESSMENT — COGNITIVE AND FUNCTIONAL STATUS - GENERAL
CLIMB 3 TO 5 STEPS WITH RAILING: TOTAL
MOVING TO AND FROM BED TO CHAIR: UNABLE
HELP NEEDED FOR BATHING: TOTAL
SUGGESTED CMS G CODE MODIFIER DAILY ACTIVITY: CL
DAILY ACTIVITIY SCORE: 11
STANDING UP FROM CHAIR USING ARMS: A LOT
WALKING IN HOSPITAL ROOM: TOTAL
HELP NEEDED FOR BATHING: A LOT
PERSONAL GROOMING: TOTAL
DRESSING REGULAR UPPER BODY CLOTHING: TOTAL
MOVING TO AND FROM BED TO CHAIR: UNABLE
SUGGESTED CMS G CODE MODIFIER DAILY ACTIVITY: CL
DRESSING REGULAR LOWER BODY CLOTHING: A LOT
PERSONAL GROOMING: A LOT
HELP NEEDED FOR BATHING: TOTAL
WALKING IN HOSPITAL ROOM: A LOT
MOVING TO AND FROM BED TO CHAIR: A LOT
STANDING UP FROM CHAIR USING ARMS: TOTAL
SUGGESTED CMS G CODE MODIFIER DAILY ACTIVITY: CM
CLIMB 3 TO 5 STEPS WITH RAILING: TOTAL
SUGGESTED CMS G CODE MODIFIER MOBILITY: CN
DAILY ACTIVITIY SCORE: 7
MOVING TO AND FROM BED TO CHAIR: UNABLE
CLIMB 3 TO 5 STEPS WITH RAILING: TOTAL
STANDING UP FROM CHAIR USING ARMS: A LOT
SUGGESTED CMS G CODE MODIFIER DAILY ACTIVITY: CL
SUGGESTED CMS G CODE MODIFIER MOBILITY: CM
MOVING FROM LYING ON BACK TO SITTING ON SIDE OF FLAT BED: UNABLE
WALKING IN HOSPITAL ROOM: TOTAL
DRESSING REGULAR UPPER BODY CLOTHING: A LOT
DRESSING REGULAR LOWER BODY CLOTHING: TOTAL
EATING MEALS: A LOT
MOBILITY SCORE: 6
MOBILITY SCORE: 7
CLIMB 3 TO 5 STEPS WITH RAILING: TOTAL
DAILY ACTIVITIY SCORE: 7
WALKING IN HOSPITAL ROOM: TOTAL
HELP NEEDED FOR BATHING: TOTAL
DRESSING REGULAR LOWER BODY CLOTHING: TOTAL
TOILETING: TOTAL
TURNING FROM BACK TO SIDE WHILE IN FLAT BAD: A LOT
MOVING FROM LYING ON BACK TO SITTING ON SIDE OF FLAT BED: UNABLE
TURNING FROM BACK TO SIDE WHILE IN FLAT BAD: A LOT
MOVING FROM LYING ON BACK TO SITTING ON SIDE OF FLAT BED: UNABLE
EATING MEALS: A LOT
SUGGESTED CMS G CODE MODIFIER DAILY ACTIVITY: CN
DAILY ACTIVITIY SCORE: 9
STANDING UP FROM CHAIR USING ARMS: TOTAL
WALKING IN HOSPITAL ROOM: TOTAL
TOILETING: A LOT
DRESSING REGULAR UPPER BODY CLOTHING: A LOT
MOBILITY SCORE: 8
DRESSING REGULAR UPPER BODY CLOTHING: A LOT
MOVING TO AND FROM BED TO CHAIR: A LOT
TURNING FROM BACK TO SIDE WHILE IN FLAT BAD: A LOT
CLIMB 3 TO 5 STEPS WITH RAILING: TOTAL
TURNING FROM BACK TO SIDE WHILE IN FLAT BAD: A LOT
EATING MEALS: A LOT
MOVING TO AND FROM BED TO CHAIR: A LOT
TURNING FROM BACK TO SIDE WHILE IN FLAT BAD: UNABLE
PERSONAL GROOMING: A LOT
TOILETING: A LOT
HELP NEEDED FOR BATHING: A LOT
SUGGESTED CMS G CODE MODIFIER MOBILITY: CN
TURNING FROM BACK TO SIDE WHILE IN FLAT BAD: UNABLE
WALKING IN HOSPITAL ROOM: TOTAL
MOBILITY SCORE: 9
SUGGESTED CMS G CODE MODIFIER MOBILITY: CM
EATING MEALS: A LOT
SUGGESTED CMS G CODE MODIFIER MOBILITY: CL
DAILY ACTIVITIY SCORE: 12
PERSONAL GROOMING: A LOT
CLIMB 3 TO 5 STEPS WITH RAILING: TOTAL
DRESSING REGULAR UPPER BODY CLOTHING: A LOT
TURNING FROM BACK TO SIDE WHILE IN FLAT BAD: A LOT
TURNING FROM BACK TO SIDE WHILE IN FLAT BAD: UNABLE
TOILETING: A LOT
TOILETING: TOTAL
STANDING UP FROM CHAIR USING ARMS: TOTAL
SUGGESTED CMS G CODE MODIFIER DAILY ACTIVITY: CL
STANDING UP FROM CHAIR USING ARMS: TOTAL
PERSONAL GROOMING: A LOT
HELP NEEDED FOR BATHING: A LOT
CLIMB 3 TO 5 STEPS WITH RAILING: TOTAL
DRESSING REGULAR LOWER BODY CLOTHING: TOTAL
DAILY ACTIVITIY SCORE: 6
MOVING FROM LYING ON BACK TO SITTING ON SIDE OF FLAT BED: UNABLE
TURNING FROM BACK TO SIDE WHILE IN FLAT BAD: A LOT
STANDING UP FROM CHAIR USING ARMS: TOTAL
DAILY ACTIVITIY SCORE: 12
MOBILITY SCORE: 8
DRESSING REGULAR LOWER BODY CLOTHING: TOTAL
MOVING TO AND FROM BED TO CHAIR: UNABLE
MOVING FROM LYING ON BACK TO SITTING ON SIDE OF FLAT BED: UNABLE
WALKING IN HOSPITAL ROOM: TOTAL
WALKING IN HOSPITAL ROOM: TOTAL
MOVING FROM LYING ON BACK TO SITTING ON SIDE OF FLAT BED: UNABLE
MOBILITY SCORE: 8
SUGGESTED CMS G CODE MODIFIER DAILY ACTIVITY: CM
WALKING IN HOSPITAL ROOM: TOTAL
STANDING UP FROM CHAIR USING ARMS: TOTAL
SUGGESTED CMS G CODE MODIFIER DAILY ACTIVITY: CL
CLIMB 3 TO 5 STEPS WITH RAILING: TOTAL
MOVING FROM LYING ON BACK TO SITTING ON SIDE OF FLAT BED: A LOT
HELP NEEDED FOR BATHING: TOTAL
CLIMB 3 TO 5 STEPS WITH RAILING: TOTAL
MOVING TO AND FROM BED TO CHAIR: UNABLE
TOILETING: TOTAL
DRESSING REGULAR LOWER BODY CLOTHING: TOTAL
PERSONAL GROOMING: A LOT
MOVING FROM LYING ON BACK TO SITTING ON SIDE OF FLAT BED: UNABLE
PERSONAL GROOMING: TOTAL
MOBILITY SCORE: 6
TOILETING: TOTAL
EATING MEALS: TOTAL
EATING MEALS: A LOT
SUGGESTED CMS G CODE MODIFIER MOBILITY: CM
WALKING IN HOSPITAL ROOM: TOTAL
CLIMB 3 TO 5 STEPS WITH RAILING: TOTAL
SUGGESTED CMS G CODE MODIFIER MOBILITY: CM
DRESSING REGULAR LOWER BODY CLOTHING: TOTAL
EATING MEALS: TOTAL
MOVING FROM LYING ON BACK TO SITTING ON SIDE OF FLAT BED: UNABLE
MOVING TO AND FROM BED TO CHAIR: UNABLE
DRESSING REGULAR LOWER BODY CLOTHING: A LOT
DRESSING REGULAR UPPER BODY CLOTHING: TOTAL
SUGGESTED CMS G CODE MODIFIER MOBILITY: CM
WALKING IN HOSPITAL ROOM: TOTAL
SUGGESTED CMS G CODE MODIFIER MOBILITY: CN
CLIMB 3 TO 5 STEPS WITH RAILING: TOTAL
MOBILITY SCORE: 10
STANDING UP FROM CHAIR USING ARMS: A LOT
HELP NEEDED FOR BATHING: A LOT
MOBILITY SCORE: 6
STANDING UP FROM CHAIR USING ARMS: TOTAL
TOILETING: TOTAL
STANDING UP FROM CHAIR USING ARMS: A LOT
DAILY ACTIVITIY SCORE: 10
DRESSING REGULAR UPPER BODY CLOTHING: A LOT
TURNING FROM BACK TO SIDE WHILE IN FLAT BAD: UNABLE
EATING MEALS: A LOT
PERSONAL GROOMING: A LOT
MOVING FROM LYING ON BACK TO SITTING ON SIDE OF FLAT BED: UNABLE
TURNING FROM BACK TO SIDE WHILE IN FLAT BAD: UNABLE
DRESSING REGULAR UPPER BODY CLOTHING: TOTAL
MOVING TO AND FROM BED TO CHAIR: A LOT

## 2022-01-01 ASSESSMENT — ENCOUNTER SYMPTOMS
PSYCHIATRIC NEGATIVE: 1
WEAKNESS: 1
WEAKNESS: 1
MUSCULOSKELETAL NEGATIVE: 1
CARDIOVASCULAR NEGATIVE: 1
CARDIOVASCULAR NEGATIVE: 1
PSYCHIATRIC NEGATIVE: 1
WEAKNESS: 1
FOCAL WEAKNESS: 1
WEAKNESS: 1
WEAKNESS: 1
GASTROINTESTINAL NEGATIVE: 1
VOMITING: 0
SPUTUM PRODUCTION: 0
CHILLS: 0
DIZZINESS: 0
FOCAL WEAKNESS: 1
SPUTUM PRODUCTION: 0
MUSCULOSKELETAL NEGATIVE: 1
EYES NEGATIVE: 1
ABDOMINAL PAIN: 0
GASTROINTESTINAL NEGATIVE: 1
SPUTUM PRODUCTION: 0
GASTROINTESTINAL NEGATIVE: 1
ABDOMINAL PAIN: 0
FOCAL WEAKNESS: 1
CARDIOVASCULAR NEGATIVE: 1
PSYCHIATRIC NEGATIVE: 1
EYES NEGATIVE: 1
CARDIOVASCULAR NEGATIVE: 1
SPUTUM PRODUCTION: 0
GASTROINTESTINAL NEGATIVE: 1
FOCAL WEAKNESS: 1
SPUTUM PRODUCTION: 1
PSYCHIATRIC NEGATIVE: 1
EYES NEGATIVE: 1
HEADACHES: 0
SPUTUM PRODUCTION: 0
SHORTNESS OF BREATH: 0
CARDIOVASCULAR NEGATIVE: 1
EYES NEGATIVE: 1
MYALGIAS: 0
CONSTIPATION: 0
FEVER: 0
FOCAL WEAKNESS: 1
FOCAL WEAKNESS: 1
FEVER: 0
NAUSEA: 0
COUGH: 0
WEAKNESS: 1
COUGH: 0
EYES NEGATIVE: 1
WEAKNESS: 1
EYES NEGATIVE: 1
GASTROINTESTINAL NEGATIVE: 1
MUSCULOSKELETAL NEGATIVE: 1
FEVER: 0
EYES NEGATIVE: 1
GASTROINTESTINAL NEGATIVE: 1
PSYCHIATRIC NEGATIVE: 1
NAUSEA: 0
SPUTUM PRODUCTION: 0
SPUTUM PRODUCTION: 0
ABDOMINAL PAIN: 0
EYES NEGATIVE: 1
SPUTUM PRODUCTION: 0
MUSCULOSKELETAL NEGATIVE: 1
SPUTUM PRODUCTION: 0
DEPRESSION: 1
CONSTIPATION: 0
FLANK PAIN: 0
FOCAL WEAKNESS: 1
PSYCHIATRIC NEGATIVE: 1
FOCAL WEAKNESS: 1
FOCAL WEAKNESS: 1
EYES NEGATIVE: 1
EYES NEGATIVE: 1
GASTROINTESTINAL NEGATIVE: 1
FOCAL WEAKNESS: 1
GASTROINTESTINAL NEGATIVE: 1
EYES NEGATIVE: 1
EYES NEGATIVE: 1
SPUTUM PRODUCTION: 0
SPUTUM PRODUCTION: 0
EYES NEGATIVE: 1
WEAKNESS: 1
CHILLS: 0
PSYCHIATRIC NEGATIVE: 1
MUSCULOSKELETAL NEGATIVE: 1
WEAKNESS: 1
MUSCULOSKELETAL NEGATIVE: 1
CARDIOVASCULAR NEGATIVE: 1
NERVOUS/ANXIOUS: 1
PSYCHIATRIC NEGATIVE: 1
GASTROINTESTINAL NEGATIVE: 1
MUSCULOSKELETAL NEGATIVE: 1
WEAKNESS: 1
FOCAL WEAKNESS: 1
PSYCHIATRIC NEGATIVE: 1
MUSCULOSKELETAL NEGATIVE: 1
VOMITING: 0
SHORTNESS OF BREATH: 0
CONSTIPATION: 0
MUSCULOSKELETAL NEGATIVE: 1
DEPRESSION: 1
CARDIOVASCULAR NEGATIVE: 1
COUGH: 0
FOCAL WEAKNESS: 1
GASTROINTESTINAL NEGATIVE: 1
SHORTNESS OF BREATH: 0
SPUTUM PRODUCTION: 1
SPUTUM PRODUCTION: 0
MYALGIAS: 0
WEAKNESS: 1
WEAKNESS: 1
HEADACHES: 0
CARDIOVASCULAR NEGATIVE: 1
PSYCHIATRIC NEGATIVE: 1
CARDIOVASCULAR NEGATIVE: 1
NERVOUS/ANXIOUS: 1
EYES NEGATIVE: 1
DIZZINESS: 0
WEAKNESS: 1
GASTROINTESTINAL NEGATIVE: 1
ABDOMINAL PAIN: 1
PSYCHIATRIC NEGATIVE: 1
CARDIOVASCULAR NEGATIVE: 1
MUSCULOSKELETAL NEGATIVE: 1
FOCAL WEAKNESS: 1
PSYCHIATRIC NEGATIVE: 1
CHILLS: 0
CARDIOVASCULAR NEGATIVE: 1
GASTROINTESTINAL NEGATIVE: 1
DIZZINESS: 0
MUSCULOSKELETAL NEGATIVE: 1
HEADACHES: 0
VOMITING: 0
CARDIOVASCULAR NEGATIVE: 1
MUSCULOSKELETAL NEGATIVE: 1
HEADACHES: 0
NAUSEA: 0
GASTROINTESTINAL NEGATIVE: 1
COUGH: 0
FOCAL WEAKNESS: 1
MYALGIAS: 0
FEVER: 0
WEAKNESS: 1
MUSCULOSKELETAL NEGATIVE: 1
MUSCULOSKELETAL NEGATIVE: 1
CARDIOVASCULAR NEGATIVE: 1
GASTROINTESTINAL NEGATIVE: 1
CARDIOVASCULAR NEGATIVE: 1
CHILLS: 0

## 2022-01-01 ASSESSMENT — CHA2DS2 SCORE
AGE 65 TO 74: NO
HYPERTENSION: YES
CHF OR LEFT VENTRICULAR DYSFUNCTION: YES
AGE 65 TO 74: NO
PRIOR STROKE OR TIA OR THROMBOEMBOLISM: NO
DIABETES: YES
HYPERTENSION: YES
DIABETES: YES
SEX: MALE
CHF OR LEFT VENTRICULAR DYSFUNCTION: YES
CHF OR LEFT VENTRICULAR DYSFUNCTION: NO
HYPERTENSION: YES
AGE 65 TO 74: NO
SEX: MALE
DIABETES: YES
CHA2DS2 VASC SCORE: 5
CHF OR LEFT VENTRICULAR DYSFUNCTION: YES
VASCULAR DISEASE: NO
SEX: MALE
PRIOR STROKE OR TIA OR THROMBOEMBOLISM: NO
VASCULAR DISEASE: NO
VASCULAR DISEASE: NO
PRIOR STROKE OR TIA OR THROMBOEMBOLISM: NO
CHA2DS2 VASC SCORE: 5
PRIOR STROKE OR TIA OR THROMBOEMBOLISM: NO
AGE 75 OR GREATER: YES
AGE 75 OR GREATER: YES
CHA2DS2 VASC SCORE: 5
AGE 75 OR GREATER: YES
DIABETES: YES
VASCULAR DISEASE: NO
CHA2DS2 VASC SCORE: 4
AGE 75 OR GREATER: YES
HYPERTENSION: YES
AGE 65 TO 74: NO
SEX: MALE

## 2022-01-01 ASSESSMENT — GAIT ASSESSMENTS
GAIT LEVEL OF ASSIST: UNABLE TO PARTICIPATE
GAIT LEVEL OF ASSIST: UNABLE TO PARTICIPATE
GAIT LEVEL OF ASSIST: REFUSED
GAIT LEVEL OF ASSIST: UNABLE TO PARTICIPATE

## 2022-01-01 ASSESSMENT — LIFESTYLE VARIABLES
CONSUMPTION TOTAL: NEGATIVE
TOTAL SCORE: 0
ON A TYPICAL DAY WHEN YOU DRINK ALCOHOL HOW MANY DRINKS DO YOU HAVE: 0
HAVE YOU EVER FELT YOU SHOULD CUT DOWN ON YOUR DRINKING: NO
TOTAL SCORE: 0
EVER HAD A DRINK FIRST THING IN THE MORNING TO STEADY YOUR NERVES TO GET RID OF A HANGOVER: NO
ALCOHOL_USE: NO
TOTAL SCORE: 0
HAVE PEOPLE ANNOYED YOU BY CRITICIZING YOUR DRINKING: NO
HOW MANY TIMES IN THE PAST YEAR HAVE YOU HAD 5 OR MORE DRINKS IN A DAY: 0
EVER FELT BAD OR GUILTY ABOUT YOUR DRINKING: NO
AVERAGE NUMBER OF DAYS PER WEEK YOU HAVE A DRINK CONTAINING ALCOHOL: 0

## 2022-01-01 ASSESSMENT — PATIENT HEALTH QUESTIONNAIRE - PHQ9
1. LITTLE INTEREST OR PLEASURE IN DOING THINGS: NOT AT ALL
2. FEELING DOWN, DEPRESSED, IRRITABLE, OR HOPELESS: NOT AT ALL
2. FEELING DOWN, DEPRESSED, IRRITABLE, OR HOPELESS: NOT AT ALL
SUM OF ALL RESPONSES TO PHQ9 QUESTIONS 1 AND 2: 0
2. FEELING DOWN, DEPRESSED, IRRITABLE, OR HOPELESS: NOT AT ALL
1. LITTLE INTEREST OR PLEASURE IN DOING THINGS: NOT AT ALL
1. LITTLE INTEREST OR PLEASURE IN DOING THINGS: NOT AT ALL
SUM OF ALL RESPONSES TO PHQ9 QUESTIONS 1 AND 2: 0
SUM OF ALL RESPONSES TO PHQ9 QUESTIONS 1 AND 2: 0
2. FEELING DOWN, DEPRESSED, IRRITABLE, OR HOPELESS: NOT AT ALL
1. LITTLE INTEREST OR PLEASURE IN DOING THINGS: NOT AT ALL
1. LITTLE INTEREST OR PLEASURE IN DOING THINGS: NOT AT ALL
SUM OF ALL RESPONSES TO PHQ9 QUESTIONS 1 AND 2: 0
1. LITTLE INTEREST OR PLEASURE IN DOING THINGS: NOT AT ALL
SUM OF ALL RESPONSES TO PHQ9 QUESTIONS 1 AND 2: 0
2. FEELING DOWN, DEPRESSED, IRRITABLE, OR HOPELESS: NOT AT ALL
1. LITTLE INTEREST OR PLEASURE IN DOING THINGS: NOT AT ALL
SUM OF ALL RESPONSES TO PHQ9 QUESTIONS 1 AND 2: 0
2. FEELING DOWN, DEPRESSED, IRRITABLE, OR HOPELESS: NOT AT ALL
1. LITTLE INTEREST OR PLEASURE IN DOING THINGS: NOT AT ALL
SUM OF ALL RESPONSES TO PHQ9 QUESTIONS 1 AND 2: 0
2. FEELING DOWN, DEPRESSED, IRRITABLE, OR HOPELESS: NOT AT ALL
SUM OF ALL RESPONSES TO PHQ9 QUESTIONS 1 AND 2: 0
2. FEELING DOWN, DEPRESSED, IRRITABLE, OR HOPELESS: NOT AT ALL

## 2022-01-01 ASSESSMENT — PULMONARY FUNCTION TESTS
FVC: 1
FVC: 1.1
FVC: 1

## 2022-01-01 ASSESSMENT — FIBROSIS 4 INDEX
FIB4 SCORE: 10.27
FIB4 SCORE: 1.86
FIB4 SCORE: 2.05
FIB4 SCORE: 2.14
FIB4 SCORE: 2.29
FIB4 SCORE: 8.67
FIB4 SCORE: 2.7
FIB4 SCORE: 8.27
FIB4 SCORE: 2.71
FIB4 SCORE: 8.47

## 2022-01-01 ASSESSMENT — PAIN SCALES - WONG BAKER
WONGBAKER_NUMERICALRESPONSE: HURTS A LITTLE MORE
WONGBAKER_NUMERICALRESPONSE: HURTS A LITTLE MORE
WONGBAKER_NUMERICALRESPONSE: DOESN'T HURT AT ALL
WONGBAKER_NUMERICALRESPONSE: DOESN'T HURT AT ALL
WONGBAKER_NUMERICALRESPONSE: HURTS A LITTLE MORE
WONGBAKER_NUMERICALRESPONSE: DOESN'T HURT AT ALL
WONGBAKER_NUMERICALRESPONSE: DOESN'T HURT AT ALL
WONGBAKER_NUMERICALRESPONSE: HURTS A WHOLE LOT
WONGBAKER_NUMERICALRESPONSE: DOESN'T HURT AT ALL

## 2022-01-01 ASSESSMENT — PAIN DESCRIPTION - PAIN TYPE
TYPE: ACUTE PAIN;SURGICAL PAIN
TYPE: ACUTE PAIN;SURGICAL PAIN

## 2022-11-03 PROBLEM — U07.1 ACUTE HYPOXEMIC RESPIRATORY FAILURE DUE TO COVID-19 (HCC): Status: ACTIVE | Noted: 2022-01-01

## 2022-11-03 PROBLEM — J96.01 ACUTE HYPOXEMIC RESPIRATORY FAILURE DUE TO COVID-19 (HCC): Status: ACTIVE | Noted: 2022-01-01

## 2022-11-04 PROBLEM — R65.21 SEPTIC SHOCK (HCC): Status: ACTIVE | Noted: 2022-01-01

## 2022-11-04 PROBLEM — I50.23 ACUTE ON CHRONIC SYSTOLIC CONGESTIVE HEART FAILURE (HCC): Status: ACTIVE | Noted: 2022-01-01

## 2022-11-04 PROBLEM — A41.9 SEPTIC SHOCK (HCC): Status: ACTIVE | Noted: 2022-01-01

## 2022-11-04 PROBLEM — N17.9 AKI (ACUTE KIDNEY INJURY) (HCC): Status: ACTIVE | Noted: 2022-01-01

## 2022-11-04 PROBLEM — E87.5 HYPERKALEMIA: Status: ACTIVE | Noted: 2022-01-01

## 2022-11-04 NOTE — PROCEDURES
"Arterial Line Insertion    Date/Time: 11/4/2022 4:15 AM  Performed by: Earl Scott M.D.  Authorized by: Earl Scott M.D.   Consent: The procedure was performed in an emergent situation.  Required items: required blood products, implants, devices, and special equipment available  Patient identity confirmed: anonymous protocol, patient vented/unresponsive  Time out: Immediately prior to procedure a \"time out\" was called to verify the correct patient, procedure, equipment, support staff and site/side marked as required.  Preparation: Patient was prepped and draped in the usual sterile fashion.  Indications: respiratory failure and hemodynamic monitoring  Location: right femoral  Anesthesia: local infiltration    Anesthesia:  Local Anesthetic: lidocaine 1% without epinephrine  Anesthetic total: 2 mL  Needle gauge: 20  Seldinger technique: Seldinger technique used  Number of attempts: 1  Post-procedure: line sutured and dressing applied  Post-procedure CMS: unchanged  Patient tolerance: patient tolerated the procedure well with no immediate complications  Comments: Wire removal: After insertion of the catheter via Seldinger technique the guidewire was removed intact and confirmed by the assistant in the room.             "

## 2022-11-04 NOTE — PLAN OF CARE (IOPOC)
[facility]CAR INTENSIVIST DIRECT ADMISSION REPORT    Transferring facility: [place]  Transferring physician: Dr Burrows  Transferring facility/physician contact number: [facility]  Chief complaint: COVID respiratory failure + MYLA  Pertinent history & patient course: 77 M DM, afib, CHF, COPD w/ COVID-19 PNA and MYLA now on BiPap.  Pertinent imaging & lab results: creatinine 8.0, K 6.6, BNP 8230, COVID-19 +  Code Status: FULL CODE per transferring provider, I personally verified with the transferring provider patient's code status and the transferring provider has confirmed this with the patient.  Further work up or recommendations prior to transfer:   Consultants called prior to transfer and pertinent input from consultants:   Patient accepted for transfer: YES  Consultants to be called upon arrival:   Floor requested: ICU  ADT order placed for accepted patient: YES    Please inform the Intensivist upon assignment of an ICU bed and then again on arrival of the patient.

## 2022-11-04 NOTE — ASSESSMENT & PLAN NOTE
Likely due to dehydration and COVID-19  Improving overall and making good UOP    Plan:   Avoid nephrotoxins  Monitor electrolytes and replete as needed  Renally dose adjust medications  Strict I/O monitoring

## 2022-11-04 NOTE — PROGRESS NOTES
"Critical Care Progress Note    Date of admission  11/4/2022    Chief Complaint  \"77 y.o. male w/ h/o CHF, COPD, afib, DM who presented 11/4/2022 in transfer from [facility] where he presented today with worsening shortness of breath and fatigue.  At [place] he was found to be COVID-positive, was initially started on BiPAP and then intubated, in acute kidney injury with a creatinine of 8.0 and potassium of 6.6.  He was reportedly treated with calcium gluconate and insulin/glucose and then transferred to [facility] for further evaluation and treatment.     On arrival the patient was intubated and sedated and I was unable to gain any additional history other than by review of the outside hospital records.\" - Dr Scott    Hospital Course  11/3 - Taken to [facility]  11/4 - Transfer to [facility], intubation / lines placed    Interval Problem Update  Reviewed last 24 hour events:  Tmax: 100F  Diet: TF  Vasopressors: Norepinephrine    Antibx: None      Intake / Output    Intake/Output Summary (Last 24 hours) at 11/4/2022 1016  Last data filed at 11/4/2022 0640  Gross per 24 hour   Intake 163.81 ml   Output 150 ml   Net 13.81 ml     Net IO Since Admission: 3.41 mL [11/04/22 0615]     Review of Systems  Review of Systems   Unable to perform ROS: Intubated      Vital Signs for last 24 hours   Temp:  [37.8 °C (100 °F)] 37.8 °C (100 °F)  Pulse:  [62-97] 70  Resp:  [14-41] 24  BP: ()/(39-65) 126/65  SpO2:  [93 %-97 %] 97 %    Hemodynamic parameters for last 24 hours       Respiratory Information for the last 24 hours  Vent Mode: APVCMV  Rate (breaths/min): 26  Vt Target (mL): 470  PEEP/CPAP: 8  MAP: 13  Control VTE (exp VT): 458    Physical Exam   Physical Exam  Vitals and nursing note reviewed. Exam conducted with a chaperone present.   Constitutional:       Appearance: He is obese. He is ill-appearing.      Interventions: He is sedated and intubated.   HENT:      Head: Normocephalic.      " Mouth/Throat:      Mouth: Mucous membranes are moist.   Eyes:      Extraocular Movements: Extraocular movements intact.   Cardiovascular:      Rate and Rhythm: Normal rate. Rhythm irregular.      Pulses: Normal pulses.   Pulmonary:      Effort: He is intubated.      Breath sounds: Wheezing and rhonchi present.   Abdominal:      General: There is no distension.      Palpations: Abdomen is soft.      Tenderness: There is no abdominal tenderness. There is no guarding or rebound.   Musculoskeletal:      Cervical back: Normal range of motion and neck supple.      Right lower leg: Edema present.      Left lower leg: Edema present.   Skin:     General: Skin is warm and dry.      Capillary Refill: Capillary refill takes less than 2 seconds.   Neurological:      Comments: RASS -4       Medications  Current Facility-Administered Medications   Medication Dose Route Frequency Provider Last Rate Last Admin    NOREPINEPHRINE BITARTRATE 1 MG/ML IV SOLN             Respiratory Therapy Consult   Nebulization Continuous RT Earl Scott M.D.        senna-docusate (PERICOLACE or SENOKOT S) 8.6-50 MG per tablet 2 Tablet  2 Tablet Enteral Tube BID Earl Scott M.D.        And    polyethylene glycol/lytes (MIRALAX) PACKET 1 Packet  1 Packet Enteral Tube QDAY PRN Earl Scott M.D.        And    bisacodyl (DULCOLAX) suppository 10 mg  10 mg Rectal QDAY PRN Earl Scott M.D.        lidocaine (XYLOCAINE) 1 % injection 2 mL  2 mL Tracheal Tube Q30 MIN PRN Earl Scott M.D.        Pharmacy Consult: Enteral tube insertion - review meds/change route/product selection  1 Each Other PHARMACY TO DOSE Earl Scott M.D.        fentaNYL (SUBLIMAZE) injection 100 mcg  100 mcg Intravenous Q15 MIN PRN Earl Scott M.D.        And    fentaNYL (SUBLIMAZE) injection 200 mcg  200 mcg Intravenous Q15 MIN PRN Earl Scott M.D.        And    fentaNYL (SUBLIMAZE) 50 mcg/mL in 50mL (Continuous Infusion)   Intravenous  Continuous Earl Scott M.D. 2 mL/hr at 11/04/22 0700 100 mcg/hr at 11/04/22 0700    And    dexmedetomidine (PRECEDEX) 400 mcg/100mL NS premix infusion  0-1.5 mcg/kg/hr Intravenous Continuous Earl Scott M.D. 8 mL/hr at 11/04/22 0930 0.3 mcg/kg/hr at 11/04/22 0930    ipratropium-albuterol (DUONEB) nebulizer solution  3 mL Nebulization Q2HRS PRN (RT) Earl Scott M.D.   3 mL at 11/04/22 0515    ipratropium-albuterol (DUONEB) nebulizer solution  3 mL Nebulization Q4HRS (RT) Earl Scott M.D.   3 mL at 11/04/22 0658    insulin regular (HumuLIN R,NovoLIN R) injection  1-6 Units Subcutaneous Q6HRS Earl Scott M.D.   1 Units at 11/04/22 0844    And    dextrose 10 % BOLUS 25 g  25 g Intravenous Q15 MIN PRN Earl Scott M.D.        ondansetron (ZOFRAN) syringe/vial injection 4 mg  4 mg Intravenous Q4HRS PRN Earl Scott M.D.        norepinephrine (Levophed) 8 mg in 250 mL NS infusion (premix)  0-30 mcg/min Intravenous Continuous Earl Scott M.D. 15 mL/hr at 11/04/22 0930 8 mcg/min at 11/04/22 0930    dexamethasone (DECADRON) injection 6 mg  6 mg Intravenous DAILY Earl Scott M.D.   6 mg at 11/04/22 0525    heparin infusion 25,000 units in 500 mL 0.45% NACL  0-30 Units/kg/hr (Adjusted) Intravenous Continuous Earl Scott M.D. 32.2 mL/hr at 11/04/22 0739 18 Units/kg/hr at 11/04/22 0739    heparin injection 3,600 Units  40 Units/kg (Adjusted) Intravenous PRN Earl Scott M.D.        cefTRIAXone (Rocephin) syringe 2,000 mg  2,000 mg Intravenous Q24HRS Harshil Manuel M.D.   2,000 mg at 11/04/22 0909    vasopressin (Vasostrict) 20 Units in  mL Infusion  0.03 Units/min Intravenous Continuous Harshil Manuel M.D.   Held at 11/04/22 0645    heparin injection 2,800 Units  2,800 Units Intracatheter ACUTE DIALYSIS PRN Bre Partida D.O.        [START ON 11/5/2022] famotidine (PEPCID) tablet 10 mg  10 mg Enteral Tube Q24HRS Harshil Manuel M.D.        Or     [START ON 11/5/2022] famotidine (PEPCID) injection 10 mg  10 mg Intravenous Q24HRS Harshil Manuel M.D.           Fluids    Intake/Output Summary (Last 24 hours) at 11/4/2022 1016  Last data filed at 11/4/2022 0640  Gross per 24 hour   Intake 163.81 ml   Output 150 ml   Net 13.81 ml       Laboratory  Recent Labs     11/03/22 2120 11/04/22 0224 11/04/22  0514   SDZIM21I 7.16*  --   --    FUVOVF942Y 31.7  --   --    CQLOW516F 100.7*  --   --    RBSO3IUS 95.6*  --   --    ARTHCO3 11*  --   --    ARTBE -17*  --   --    ISTATAPH  --  7.001* 7.041*   ISTATAPCO2  --  61.2* 46.4*   ISTATAPO2  --  94* 105*   ISTATATCO2  --  17* 14*   [id number]  --  91* 95   ISTATARTHCO3  --  15.1* 12.6*   ISTATARTBE  --  -17* -18*   ISTATTEMP  --  99.7 F 100.0 F   ISTATFIO2  --  80 80   ISTATSPEC  --  Arterial Arterial   ISTATAPHTC  --  6.993* 7.031*   XQTSBZXZ3VB  --  98* 110*     Recent Labs     11/03/22 1955   TROPONINI 0.12*   BNPBTYPENAT 8230*     Recent Labs     11/03/22 2255 11/03/22 2335 11/04/22 0215   SODIUM 139 141 135   POTASSIUM 7.3* 6.0* 7.6*   CHLORIDE 105 106 100   CO2 14* 12* 13*   * 102* 107*   CREATININE 7.7* 7.7* 7.66*   MAGNESIUM  --   --  2.2   PHOSPHORUS  --   --  10.3*   CALCIUM 6.5* 6.7* 6.7*     Recent Labs     11/03/22 1955 11/03/22 2255 11/03/22 2335 11/04/22 0215   ALTSGPT 26  --   --   --    ASTSGOT 71*  --   --   --    ALKPHOSPHAT 104  --   --   --    TBILIRUBIN 0.7  --   --   --    GLUCOSE 131* 142* 126* 135*     Recent Labs     11/03/22 1920 11/03/22 1955 11/04/22 0822   WBC 9.8  --  10.3   NEUTSPOLYS  --   --  88.20*   LYMPHOCYTES  --   --  1.70*   MONOCYTES  --   --  5.10   EOSINOPHILS  --   --  0.00   BASOPHILS  --   --  0.00   ASTSGOT  --  71*  --    ALTSGPT  --  26  --    ALKPHOSPHAT  --  104  --    TBILIRUBIN  --  0.7  --      Recent Labs     11/03/22 1920 11/03/22 1955 11/04/22 0822   RBC 4.34*  --  4.00*   HEMOGLOBIN 13.1*  --  12.1*   HEMATOCRIT 40.6*  --  38.6*   PLATELETCT  148  --  125*   PROTHROMBTM  --  28.1*  --    INR  --  2.84  --        Imaging  X-Ray:  I have personally reviewed the images and compared with prior images. and My impression is: Interstitial opacifications, left> right    Assessment/Plan  * Acute hypoxemic respiratory failure due to COVID-19 (HCC)- (present on admission)  Assessment & Plan  COVID-19 pneumonia  Dexamethasone  Fluid balance - keep euvolemic    Tocilizumab - evaluate for qualification  - currently unsure if concomitant bacterial infection    Ventilator dependent respiratory failure  Modify ventilator to optimize oxygenation and ventilation  HOB > 30  Chlorhexidine  Perform spontaneous breathing trial when able  Early mobility  Significant acidosis   - dialysis    Septic shock (HCC)- (present on admission)  Assessment & Plan  This is Septic shock Present on admission  SIRS criteria identified on my evaluation include: Fever, with temperature greater than 101 deg F, Tachycardia, with heart rate greater than 90 BPM and Tachypnea, with respirations greater than 20 per minute  Indicators of septic shock include: Severe sepsis present and persistent hypotension after 30 ml/kg completed.   Sources is: COVID-19 pneumonia  Sepsis protocol initiated  Crystalloid Fluid Administration: Patient has COVID-19 pneumonia, CHF w/ EF of 35-40%, and acute kidney injury w/ creatinine of 8.0 and CrCl of 10.3 mL/min, for these reasons it is unsafe for this patient to receive 30 mL/kg of fluid  Partial Fluid Dose Given: 15 mL/kg per current body weight given at sending hospital, patient to be reassessed shortly after completion of partial fluid bolus to ensure adequacy of resuscitation  There are no antibiotics appropriate for this source of sepsis  Reassessment: I have reassessed the patient's hemodynamic status    Titrate norepinephrine to maintain MAP > 65  Fluid resuscitation - received 15 mL/kg  Trend lactates    Hyperkalemia- (present on admission)  Assessment &  Plan  K 6.6  Now getting emergent dialysis through femoral line placed overnight    Will try to place IJ line    Acute on chronic systolic congestive heart failure (HCC)- (present on admission)  Assessment & Plan  BNP 8230  Troponin 0.12    TTE is pending    MYLA (acute kidney injury) (HCC)- (present on admission)  Assessment & Plan  Likely due to dehydration and COVID-19  Avoid nephrotoxins  Monitor electrolytes and replete as needed  Renally dose adjust medications  Strict I/O monitoring    S/p emergent dialysis    Paroxysmal atrial fibrillation (HCC)- (present on admission)  Assessment & Plan  Chronic paroxysmal afib  Maximize electrolytes (keep K > 4.0 and Mag > 2.0)  Anticoagulation - therapeutic w/ heparin infusion       VTE:  Heparin gtt  Ulcer: H2 Antagonist  Lines: Central Line  Ongoing indication addressed, Arterial Line  Ongoing indication addressed, and Ball Catheter  Ongoing indication addressed    I have performed a physical exam and reviewed and updated ROS and Plan today (11/4/2022). In review of yesterday's note (11/3/2022), there are no changes except as documented above.     Discussed patient condition and risk of morbidity and/or mortality with RN, RT, Pharmacy, , and nephrology    The patient remains critically ill.  Critical care time = 50 minutes in directly providing and coordinating critical care and extensive data review.  No time overlap and excludes procedures.

## 2022-11-04 NOTE — ASSESSMENT & PLAN NOTE
COVID-19 pneumonia & MSSA  On full vent support, minimal setting  11/7 off sedation   11/8 Did well on SAT/SBT, extubated.     Plan:   Dexamethasone 6mg daily for 10 days (end date 11/13)  Continue cefazolin x7 days (end date 11/13)  Early mobility. PT/OT

## 2022-11-04 NOTE — CARE PLAN
The patient is Watcher - Medium risk of patient condition declining or worsening    Shift Goals  Clinical Goals: hemodynamic stability    Progress made toward(s) clinical / shift goals:    Problem: Safety - Medical Restraint  Goal: Remains free of injury from restraints (Restraint for Interference with Medical Device)  11/4/2022 1411 by Heidi Bustos R.N.  Outcome: Progressing  11/4/2022 1410 by YEMI BrownN.  Outcome: Progressing   Monitoring q2 hours skin, neccessity, elimination, and patient safety  Problem: Skin Integrity  Goal: Skin integrity is maintained or improved  11/4/2022 1411 by YEMI BrownN.  Outcome: Progressing  11/4/2022 1410 by Heidi Bustos R.N.  Outcome: Progressing   Skin will be assessed frequently for breakdown and pt will remain clean, dry, and intact. All listed wounds will be assessed. Pressure ulcer prevention will be utilized when appropriate & based on Pt stability      Patient is not progressing towards the following goals:

## 2022-11-04 NOTE — HOSPITAL COURSE
11/3 - Taken to [facility]  11/4 - Transfer to [facility], intubation / lines placed  11/5 - Coagulopathic, hematoma forming in right thigh. Heparin gtt was topped  11/7 - tolerating SBT, off sedation (both precedex and fentanyl). Remains encephalopathic  11/8 - extubated

## 2022-11-04 NOTE — PROCEDURES
Central Line Insertion    Date/Time: 11/4/2022 4:30 AM  Performed by: Earl Scott M.D.  Authorized by: Earl Scott M.D.     Consent:     Consent obtained:  Emergent situation  Universal protocol:     Immediately prior to procedure, a time out was called: yes      Patient identity confirmed:  Anonymous protocol, patient vented/unresponsive  Pre-procedure details:     Hand hygiene: Hand hygiene performed prior to insertion      Sterile barrier technique: All elements of maximal sterile technique followed      Skin preparation:  2% chlorhexidine    Skin preparation agent: Skin preparation agent completely dried prior to procedure    Anesthesia:     Anesthesia method:  Local infiltration    Local anesthetic:  Lidocaine 1% w/o epi  Procedure details:     Location:  R femoral    Site selection rationale:  Coagulopathy    Patient position:  Flat    Procedural supplies: Trialysis catheter - 20 cm.    Catheter size: 13 Fr.    Landmarks identified: yes      Ultrasound guidance: yes      Sterile ultrasound techniques: Sterile gel and sterile probe covers were used      Number of attempts:  1    Successful placement: yes    Post-procedure details:     Post-procedure:  Dressing applied and line sutured    Guidewire: guidewire removal confirmed      Assessment:  Blood return through all ports and free fluid flow    Patient tolerance of procedure:  Tolerated well, no immediate complications

## 2022-11-04 NOTE — CONSULTS
[facility] -  CONSULTATION NOTE               Author: Jules Rios M.D. Date & Time: 11/4/2022  10:25 AM       REASON FOR CONSULTATION:   MYLA and hyperkalemia    CHIEF COMPLAINT:   Unable since patient is on vent support and sedated    HISTORY OF PRESENT ILLNESS:    This is a 77 y.o. male w/ h/o CHF, COPD, afib, DM who presented 11/4/2022 in transfer from [facility] where he presented with worsening shortness of breath and fatigue.  At [place] he was found to be COVID-positive, was initially started on BiPAP and then intubated, in acute kidney injury with a creatinine of 8.0 and potassium of 6.6.  He was transferred to [facility] for further evaluation and treatment. Here his Scr was 7.6 with a K+ of 7.6. Nephrology was asked to consult for MYLA and critical hyperkalemia.      This history was obtained from the medical records since patient is not able to communicate.    REVIEW OF SYSTEMS:    Unable to assess    PAST MEDICAL HISTORY:   Past Medical History:   Diagnosis Date    Acquired renal cyst 5/5/2020    Anticoagulation monitoring, INR range 2.5-3.5     Arthritis 2011    ASTHMA     Atrial fibrillation (HCC)     Back pain     Backpain     Blood transfusion, without reported diagnosis 08/07/2012    Bursitis of right hip 2/8/2013    Cardiomyopathy, nonischemic (HCC)     EF 45%    Chronic anticoagulation     Chronic systolic congestive heart failure (HCC) 12/29/2015    Coronary artery disease involving native coronary artery of native heart without angina pectoris     Diabetes (HCC)     Dyslipidemia     Eosinophilia 9/4/2014    Essential hypertension     GERD (gastroesophageal reflux disease)     GERD (gastroesophageal reflux disease) 11/12/2014    H/O pneumococcal pneumonia     Headache(784.0)     Hematuria 9/4/2014    History of complete AV block     Hyperglycemia 9/4/2014    fasting serum glucose 125 (9/5/2014)    Hypertension     Insomnia     Iron  deficiency anemia 2/8/2013    Nocturnal hypoxemia     Paroxysmal atrial fibrillation (HCC)     Restless leg syndrome     Right hip pain 2/8/2013    S/P cardiac pacemaker procedure     S/P mitral valve replacement with bioprosthetic valve     Seasonal allergies     Sleep apnea     Tinea cruris 2/8/2013    Type 2 diabetes mellitus (HCC) 9/5/2014    Type 2 diabetes mellitus without complication (HCC) 10/13/2016    Ventral hernia 7/2014       PAST SURGICAL HISTORY:   Past Surgical History:   Procedure Laterality Date    COLONOSCOPY - ENDO  1/2/2017    Procedure: COLONOSCOPY - ENDO;  Surgeon: Joel Barney M.D.;  Location: SURGERY [facility];  Service:     STENT PLACEMENT  11/2015    coronary artery    MAZE PROCEDURE  8/7/2012    Performed by JEREMIE BRADLEY at SURGERY [facility]    MITRAL VALVE REPLACE  8/7/2012    Performed by JEREMIE BRADLEY at SURGERY [facility]    PACEMAKER INSERTION  August 2012    [employer] Altrua 60 S 606 implanted by Dr. Dennis Gray.    MITRAL VALVE REPLACEMENT  2012    bioprosthetic    INGUINAL HERNIA REPAIR  2007    Left side    OTHER ORTHOPEDIC SURGERY  2005    Right knee replacement    OTHER ORTHOPEDIC SURGERY  1965    Back surgery    APPENDECTOMY CHILD  1957    TONSILLECTOMY  1949    APPENDECTOMY      HERNIA REPAIR      KNEE ARTHROPLASTY TOTAL Right     MAZE PROCEDURE      PACEMAKER INSERTION      IL LAP UMBILICAL HERNIA REPAIR      IL REMOVAL OF TONSILS,<11 Y/O      TONSILLECTOMY         FAMILY HISTORY:   Family History   Problem Relation Age of Onset    Heart Disease Mother         Valvular heart problems./valvular heart disease    Diabetes Father     Cancer Sister     Cancer Brother         lung cancer    Diabetes Brother     Other Brother         heart bypass       SOCIAL HISTORY:   Social History     Tobacco Use   Smoking Status Never   Smokeless Tobacco Never     Social History     Substance and Sexual Activity   Alcohol Use No     Social History      Substance and Sexual Activity   Drug Use No    Frequency: 7.0 times per week    Types: Marijuana, Inhaled    Comment: marijuana last smoked 8/4/12       HOME MEDICATIONS:   Reviewed and documented in chart    LABORATORY STUDIES:   Recent Labs     11/03/22  2255 11/03/22  2335 11/04/22  0215   SODIUM 139 141 135   POTASSIUM 7.3* 6.0* 7.6*   CHLORIDE 105 106 100   CO2 14* 12* 13*   GLUCOSE 142* 126* 135*   * 102* 107*   CREATININE 7.7* 7.7* 7.66*   CALCIUM 6.5* 6.7* 6.7*       ALLERGIES:  Patient has no known allergies.    VS:  /65   Pulse 70   Temp 37.8 °C (100 °F)   Resp (!) 24   Ht 1.829 m (6')   SpO2 97%   BMI 31.93 kg/m²     Physical Exam  Vitals and nursing note reviewed.   Constitutional:       Appearance: He is ill-appearing.   HENT:      Head: Normocephalic and atraumatic.      Nose: Nose normal.   Eyes:      Conjunctiva/sclera: Conjunctivae normal.   Cardiovascular:      Rate and Rhythm: Normal rate.      Pulses: Normal pulses.      Heart sounds: Normal heart sounds.   Pulmonary:      Effort: No respiratory distress.      Breath sounds: Rales present.   Abdominal:      General: Abdomen is flat. Bowel sounds are normal.      Palpations: Abdomen is soft.   Musculoskeletal:      Cervical back: Normal range of motion.      Right lower leg: Edema present.      Left lower leg: Edema present.   Skin:     General: Skin is warm.      Capillary Refill: Capillary refill takes less than 2 seconds.   Neurological:      Comments: Unable to assess   Psychiatric:      Comments: Unable to assess       FLUID BALANCE:  In: 163.8 [I.V.:63.8]  Out: 150     IMAGING:  All imaging reviewed from admission to present day    IMPRESSION:  # MYLA - etiology is not clear at this time but suspect sec to Septic ATN  # Septic shock  # Acute hypoxemic respiratory failure due to COVID-19  # Acidosis  # Critical hyperkalemia  # PAfib  # ? CHF  # Anemia  # Hyperphosphatemia  # Hypocalcemia    PLAN:  - HD today ongoing 3.5  hrs  - Daily evaluation for RRT needs  - Dose all meds per eGFR < 15  - Avoid nephrotoxins  - Monitor UOP  - Keep MAP >/= to 65  - ECHO        Total CCM > 65 min  Thank you for the consultation!

## 2022-11-04 NOTE — ASSESSMENT & PLAN NOTE
Chronic paroxysmal afib  Maximize electrolytes (keep K > 4.0 and Mag > 2.0)  Heparin gtt was stopped due to hematoma. Hematoma stable  CHADVasc score is at least 5.   Continue heparin gtt and resume coumadin per pharmacy protocol

## 2022-11-04 NOTE — CARE PLAN
Vent Day 1    CMV 16, vt 470 cc, peep 8, Fio2 80%      Problem: Ventilation  Goal: Ability to achieve and maintain unassisted ventilation or tolerate decreased levels of ventilator support  Description: Target End Date:  4 days     Document on Vent flowsheet    1.  Support and monitor invasive and noninvasive mechanical ventilation  2.  Monitor ventilator weaning response  3.  Perform ventilator associated pneumonia prevention interventions  4.  Manage ventilation therapy by monitoring diagnostic test results  Outcome: Not Met

## 2022-11-04 NOTE — PROGRESS NOTES
Stat HD treatment x 3.5 H for hyperkalemia ordered by Dr Partida.Treatment tolerated well with  no UF removal using newly placed femoral CVC.VSS throughout.CVC locked with heparin.Report given to primary Rn.

## 2022-11-04 NOTE — CONSULTS
Critical Care Consultation - Admission H&P    Date of consult: 11/4/2022    Referring Physician  JUSTIN Morales M.D.    Reason for Consultation  Acute respiratory failure w/ hypoxia due to COVID-19    History of Presenting Illness  77 y.o. male w/ h/o CHF, COPD, afib, DM who presented 11/4/2022 in transfer from [facility] where he presented today with worsening shortness of breath and fatigue.  At [place] he was found to be COVID-positive, was initially started on BiPAP and then intubated, in acute kidney injury with a creatinine of 8.0 and potassium of 6.6.  He was reportedly treated with calcium gluconate and insulin/glucose and then transferred to [facility] for further evaluation and treatment.    On arrival the patient was intubated and sedated and I was unable to gain any additional history other than by review of the outside hospital records.    Code Status  Full Code    Review of Systems  Review of Systems   Unable to perform ROS: Intubated     Past Medical History   has a past medical history of Acquired renal cyst (5/5/2020), Anticoagulation monitoring, INR range 2.5-3.5, Arthritis (2011), ASTHMA, Atrial fibrillation (HCC), Back pain, Backpain, Blood transfusion, without reported diagnosis (08/07/2012), Bursitis of right hip (2/8/2013), Cardiomyopathy, nonischemic (HCC), Chronic anticoagulation, Chronic systolic congestive heart failure (HCC) (12/29/2015), Coronary artery disease involving native coronary artery of native heart without angina pectoris, Diabetes ([facility]), Dyslipidemia, Eosinophilia (9/4/2014), Essential hypertension, GERD (gastroesophageal reflux disease), GERD (gastroesophageal reflux disease) (11/12/2014), H/O pneumococcal pneumonia, Headache(784.0), Hematuria (9/4/2014), History of complete AV block, Hyperglycemia (9/4/2014), Hypertension, Insomnia, Iron deficiency anemia (2/8/2013), Nocturnal hypoxemia, Paroxysmal atrial fibrillation (HCC), Restless leg  syndrome, Right hip pain (2/8/2013), S/P cardiac pacemaker procedure, S/P mitral valve replacement with bioprosthetic valve, Seasonal allergies, Sleep apnea, Tinea cruris (2/8/2013), Type 2 diabetes mellitus (HCC) (9/5/2014), Type 2 diabetes mellitus without complication (HCC) (10/13/2016), and Ventral hernia (7/2014).    He has no past medical history of Addisons disease (HCC), Adrenal disorder, Anxiety, Asymptomatic human immunodeficiency virus (HIV) infection status (HCC), Cancer (HCC), Chronic airway obstruction, not elsewhere classified, Clotting disorder, Cushings syndrome, Depression, Diabetic neuropathy (HCC), Emphysema, Glaucoma, Goiter, Headache, classical migraine, Heart attack (HCC), IBD (inflammatory bowel disease), Meningitis, Osteoporosis, unspecified, Parathyroid disorder (HCC), Pituitary disease (HCC), Seizure (HCC), Sickle cell disease (HCC), Stroke (HCC), Thyroid disease, Tuberculosis, Ulcer, Unspecified cataract, or Urinary tract infection, site not specified.    Surgical History   has a past surgical history that includes mitral valve replacement (2012); knee arthroplasty total (Right); maze procedure; appendectomy; hernia repair; tonsillectomy; pacemaker insertion; other orthopedic surgery (2005); inguinal hernia repair (2007); pr lap umbilical hernia repair; pr removal of tonsils,<11 y/o; maze procedure (8/7/2012); mitral valve replace (8/7/2012); other orthopedic surgery (1965); tonsillectomy (1949); appendectomy child (1957); pacemaker insertion (August 2012); stent placement (11/2015); and colonoscopy - endo (1/2/2017).    Family History  family history includes Cancer in his brother and sister; Diabetes in his brother and father; Heart Disease in his mother; Other in his brother.    Social History   reports that he has never smoked. He has never used smokeless tobacco. He reports that he does not drink alcohol and does not use drugs.    Medications  Home Medications    **Home medications  have not yet been reviewed for this encounter**       Current Facility-Administered Medications   Medication Dose Route Frequency Provider Last Rate Last Admin    NOREPINEPHRINE BITARTRATE 1 MG/ML IV SOLN             Respiratory Therapy Consult   Nebulization Continuous RT Earl Scott M.D.        famotidine (PEPCID) tablet 20 mg  20 mg Enteral Tube Q24HRS Earl Scott M.D.        Or    famotidine (PEPCID) injection 20 mg  20 mg Intravenous Q24HRS Earl Scott M.D.   20 mg at 11/04/22 0525    senna-docusate (PERICOLACE or SENOKOT S) 8.6-50 MG per tablet 2 Tablet  2 Tablet Enteral Tube BID Earl Scott M.D.        And    polyethylene glycol/lytes (MIRALAX) PACKET 1 Packet  1 Packet Enteral Tube QDAY PRN Earl Scott M.D.        And    bisacodyl (DULCOLAX) suppository 10 mg  10 mg Rectal QDAY PRN Earl Scott M.D. MD Alert...ICU Electrolyte Replacement per Pharmacy   Other PHARMACY TO DOSE Earl Scott M.D.        lidocaine (XYLOCAINE) 1 % injection 2 mL  2 mL Tracheal Tube Q30 MIN PRN Earl Scott M.D.        Pharmacy Consult: Enteral tube insertion - review meds/change route/product selection  1 Each Other PHARMACY TO DOSE Earl Scott M.D.        fentaNYL (SUBLIMAZE) injection 100 mcg  100 mcg Intravenous Q15 MIN PRN Earl Scott M.D.        And    fentaNYL (SUBLIMAZE) injection 200 mcg  200 mcg Intravenous Q15 MIN PRN Earl Scott M.D.        And    fentaNYL (SUBLIMAZE) 50 mcg/mL in 50mL (Continuous Infusion)   Intravenous Continuous Earl Scott M.D. 4 mL/hr at 11/04/22 0358 200 mcg/hr at 11/04/22 0358    And    dexmedetomidine (PRECEDEX) 400 mcg/100mL NS premix infusion  0-1.5 mcg/kg/hr Intravenous Continuous Earl Scott M.D. 5.4 mL/hr at 11/04/22 0215 0.2 mcg/kg/hr at 11/04/22 0215    ipratropium-albuterol (DUONEB) nebulizer solution  3 mL Nebulization Q2HRS PRN (RT) Earl Scott M.D.   3 mL at 11/04/22 0515     ipratropium-albuterol (DUONEB) nebulizer solution  3 mL Nebulization Q4HRS (RT) Earl Scott M.D.   3 mL at 11/04/22 0348    insulin regular (HumuLIN R,NovoLIN R) injection  1-6 Units Subcutaneous Q6HRS Earl Scott M.D.        And    dextrose 10 % BOLUS 25 g  25 g Intravenous Q15 MIN PRN Earl Scott M.D.        ondansetron (ZOFRAN) syringe/vial injection 4 mg  4 mg Intravenous Q4HRS PRN Earl Scott M.D.        norepinephrine (Levophed) 8 mg in 250 mL NS infusion (premix)  0-30 mcg/min Intravenous Continuous Earl Scott M.D. 56.3 mL/hr at 11/04/22 0619 30 mcg/min at 11/04/22 0619    dexamethasone (DECADRON) injection 6 mg  6 mg Intravenous DAILY Earl Scott M.D.   6 mg at 11/04/22 0525    albuterol (PROVENTIL) 100 mg in NS 60 mL for continuous nebulization  10 mg/hr Nebulization ONCE (RT) Earl Scott M.D.        calcium GLUConate 2 g in NaCl IVPB premix  2 g Intravenous Once Earl Scott M.D. 100 mL/hr at 11/04/22 0527 2 g at 11/04/22 0527    heparin infusion 25,000 units in 500 mL 0.45% NACL  0-30 Units/kg/hr (Adjusted) Intravenous Continuous Earl Scott M.D.        heparin injection 3,600 Units  40 Units/kg (Adjusted) Intravenous PRN Earl Scott M.D.        cefTRIAXone (Rocephin) syringe 2,000 mg  2,000 mg Intravenous Q24HRS Harshil Manuel M.D.        vasopressin (Vasostrict) 20 Units in  mL Infusion  0.03 Units/min Intravenous Continuous Harshil Manuel M.D.           Allergies  No Known Allergies    Vital Signs last 24 hours  Temp:  [37.8 °C (100 °F)] 37.8 °C (100 °F)  Pulse:  [62-97] 71  Resp:  [14-41] 25  BP: ()/(39-65) 98/54  SpO2:  [93 %-96 %] 96 %    Physical Exam  Physical Exam  Vitals and nursing note reviewed. Exam conducted with a chaperone present.   Constitutional:       General: He is in acute distress.      Appearance: He is obese. He is ill-appearing.      Interventions: He is sedated and intubated.   HENT:      Head:  Normocephalic and atraumatic.      Right Ear: External ear normal.      Left Ear: External ear normal.      Nose: Nose normal.      Mouth/Throat:      Mouth: Mucous membranes are moist.      Pharynx: Oropharynx is clear.   Eyes:      Extraocular Movements: Extraocular movements intact.      Pupils: Pupils are equal, round, and reactive to light.   Cardiovascular:      Rate and Rhythm: Normal rate and regular rhythm.      Pulses: Normal pulses.   Pulmonary:      Effort: He is intubated.      Comments: Coarse breath sounds bilaterally  Abdominal:      Palpations: Abdomen is soft.   Musculoskeletal:         General: Normal range of motion.      Cervical back: Normal range of motion and neck supple.   Skin:     General: Skin is warm and dry.      Capillary Refill: Capillary refill takes less than 2 seconds.      Findings: Lesion (escoriations to bilateral inguinal crease (L > R)) present.   Neurological:      General: No focal deficit present.       Fluids    Intake/Output Summary (Last 24 hours) at 11/4/2022 0626  Last data filed at 11/4/2022 0200  Gross per 24 hour   Intake 3.41 ml   Output --   Net 3.41 ml       Laboratory  Recent Results (from the past 48 hour(s))   MRSA SURVEILLANCE    Collection Time: 11/03/22  7:06 PM   Result Value Ref Range    Mrsa Screen NEGATIVE    SARS-CoV-2 PCR (Single Test)    Collection Time: 11/03/22  7:06 PM    Specimen: Respirate   Result Value Ref Range    SARS-CoV-2 by PCR POSITIVE (A) Negative    SARS-CoV-2 Source NP Swab    RESPIRATORY PANEL BY PCR    Collection Time: 11/03/22  7:06 PM   Result Value Ref Range    Adenovirus, PCR Not Detected Not Detected    Coronavirus 229E, PCR Not Detected Not Detected    Coronavirus HKU1, PCR Not Detected Not Detected    Coronavirus NL63, PCR Not Detected Not Detected    Coronavirus OC43, PCR Not Detected Not Detected    SARS-CoV-2 (COVID-19) RNA by KESHA Detected (A) Not Detected    Influenza virus A RNA Not Detected Not Detected    Influenza  virus A H1 RNA Not Detected Not Detected    Influenza virus A H3 RNA Not Detected Not Detected    Influenza A 2009, H1N1, PCR Not Detected Not Detected    Influenza virus B, PCR Not Detected Not Detected    Human Metapneumovirus, PCR Not Detected Not Detected    Rhinovirus / Enterovirus, PCR Not Detected Not Detected    Parainfluenza virus 1, PCR Not Detected Not Detected    Parainfluenza virus 2, PCR Not Detected Not Detected    Parainfluenza virus 3, PCR Not Detected Not Detected    Parainfluenza 4, PCR Not Detected Not Detected    RSV, PCR Not Detected Not Detected    B. pertussis, PCR Not Detected Not Detected    B. parapertussis, PCR Not Detected Not Detected    Chlamydia pneumoniae, PCR Not Detected Not Detected    Mycoplasma pneumoniae, PCR Not Detected Not Detected   CBC WITH DIFFERENTIAL    Collection Time: 11/03/22  7:20 PM   Result Value Ref Range    WBC 9.8 4.8 - 10.8 K/uL    RBC 4.34 (L) 4.70 - 6.10 M/uL    Hemoglobin 13.1 (L) 14.0 - 18.0 g/dL    Hematocrit 40.6 (L) 42.0 - 52.0 %    MCV 93.5 80.0 - 94.0 fL    MCH 30.2 28.7 - 33.1 pg    MCHC 32.3 (L) 33.0 - 37.0 g/dL    RDW 13.2 11.5 - 14.5 %    Platelet Count 148 130 - 400 K/uL    MPV 10.1 7.4 - 10.4 fL    Neutrophils Automated 77.6 (H) 39.0 - 70.0 %    Lymphocytes Automated 12.1 (L) 21.0 - 50.0 %    Monocytes Automated 8.8 1.7 - 10.0 %    Eosinophils Automated 1.3 0.0 - 5.0 %    Basophils Automated 0.2 0.0 - 3.0 %    Abs Neutrophils Automated 7.6 1.8 - 7.7 K/uL    Abs Lymph Automated 1.2 1.2 - 4.8 K/uL    Monos (Absolute) 0.9 0.2 - 0.9 K/uL   LACTIC ACID    Collection Time: 11/03/22  7:20 PM   Result Value Ref Range    Lactic Acid 2.2 (H) 0.0 - 2.0 mmol/L   COMP METABOLIC PANEL    Collection Time: 11/03/22  7:55 PM   Result Value Ref Range    Sodium 139 137 - 145 mmol/L    Potassium 6.6 (HH) 3.5 - 5.1 mmol/L    Chloride 105 98 - 107 mmol/L    Co2 8 (LL) 22 - 30 mmol/L    Anion Gap 26 (H) 4 - 12 mmol/L    Glucose 131 (H) 70 - 100 mg/dL    Bun 105 (HH) 9  - 20 mg/dL    Creatinine 8.0 (HH) 0.7 - 1.3 mg/dL    Calcium 6.9 (L) 8.7 - 10.5 mg/dL    AST(SGOT) 71 (H) 15 - 46 U/L    ALT(SGPT) 26 13 - 69 U/L    Alkaline Phosphatase 104 38 - 126 U/L    Total Bilirubin 0.7 0.2 - 1.3 mg/dL    Albumin 4.4 3.5 - 5.0 g/dL    Total Protein 8.1 6.3 - 8.2 g/dL    A-G Ratio 1.2    TROPONIN    Collection Time: 11/03/22  7:55 PM   Result Value Ref Range    Troponin I 0.12 (HH) 0.00 - 0.04 ng/mL   BTYPE NATRIURETIC PEPTIDE    Collection Time: 11/03/22  7:55 PM   Result Value Ref Range    B Natriuretic Peptide 8230 (H) 0 - 450 pg/mL   ESTIMATED GFR    Collection Time: 11/03/22  7:55 PM   Result Value Ref Range    GFR (CKD-EPI) 6 (A) >60 mL/min/1.73 m 2   PROTHROMBIN TIME (INR)    Collection Time: 11/03/22  7:55 PM   Result Value Ref Range    PT 28.1 (H) 9.3 - 12.4 sec    INR 2.84    ARTERIAL BLOOD GAS    Collection Time: 11/03/22  9:20 PM   Result Value Ref Range    Ph 7.16 (LL) 7.40 - 7.50    Pco2 31.7 25.0 - 35.0 mmHg    Po2 100.7 (H) 55.0 - 70.0 mmHg    Hco3 11 (L) 20 - 29 mmol/L    Base Excess -17 (L) -3 - 4 mmol/L    O2 Content 17.6 mL/dL    O2 Saturation 95.6 (H) 89.0 - 94.0 %    Anion Gap 25 (H) 10 - 18 mmol/L   POC GLUCOSE    Collection Time: 11/03/22 10:35 PM   Result Value Ref Range    Glucose - Accu-Ck 160 (A) 70 - 100 mg/dL   URINALYSIS (UA)    Collection Time: 11/03/22 10:50 PM    Specimen: Urine   Result Value Ref Range    Color YELLOW     Character CLEAR     Specific Gravity >=1.030 (A) <1.035    Ph 5.5 5.0 - 8.0    Glucose NEGATIVE Negative mg/dL    Ketones TRACE (A) Negative mg/dL    Protein 100 (A) Negative mg/dL    Bilirubin NEGATIVE Negative    Urobilinogen, Urine 0.2 Negative    Nitrite POSITIVE (A) Negative    Leukocyte Esterase NEGATIVE Negative    Occult Blood LARGE (A) Negative   URINE MICROSCOPIC (W/UA)    Collection Time: 11/03/22 10:50 PM   Result Value Ref Range    WBC 11-20 (A) 0 - 6 /hpf    RBC >100 0 - 3 /hpf    Bacteria >100 (A) None /hpf    Epithelial Cells  3-5 (A) /hpf    Mucous Threads 1+ /hpf    Urine Other See Below    BASIC METABOLIC PANEL    Collection Time: 22 10:55 PM   Result Value Ref Range    Sodium 139 137 - 145 mmol/L    Potassium 7.3 (HH) 3.5 - 5.1 mmol/L    Chloride 105 98 - 107 mmol/L    Co2 14 (L) 22 - 30 mmol/L    Glucose 142 (H) 70 - 100 mg/dL    Bun 103 (HH) 9 - 20 mg/dL    Creatinine 7.7 (HH) 0.7 - 1.3 mg/dL    Calcium 6.5 (L) 8.7 - 10.5 mg/dL    Anion Gap 20 (H) 4 - 12 mmol/L   ESTIMATED GFR    Collection Time: 22 10:55 PM   Result Value Ref Range    GFR (CKD-EPI) 7 (A) >60 mL/min/1.73 m 2   Basic Metabolic Panel    Collection Time: 22 11:35 PM   Result Value Ref Range    Sodium 141 137 - 145 mmol/L    Potassium 6.0 (H) 3.5 - 5.1 mmol/L    Chloride 106 98 - 107 mmol/L    Co2 12 (L) 22 - 30 mmol/L    Glucose 126 (H) 70 - 100 mg/dL    Bun 102 (HH) 9 - 20 mg/dL    Creatinine 7.7 (HH) 0.7 - 1.3 mg/dL    Calcium 6.7 (L) 8.7 - 10.5 mg/dL    Anion Gap 23 (H) 4 - 12 mmol/L   ESTIMATED GFR    Collection Time: 22 11:35 PM   Result Value Ref Range    GFR (CKD-EPI) 7 (A) >60 mL/min/1.73 m 2   POCT glucose device results    Collection Time: 22  2:04 AM   Result Value Ref Range    POC Glucose, Blood 134 (H) 65 - 99 mg/dL   EKG    Collection Time: 22  2:06 AM   Result Value Ref Range    Report       [facility]    Test Date:  2022  Pt Name:    SHARONDA NEGRO             Department: 19  MRN:        [id number]                      Room:       [facility]  Gender:     Male                         Technician: MARILEE  :        1945                   Requested By:BERNARDA PATTERSON  Order #:    [id number]                    Reading MD: Ponce Tineo MD    Measurements  Intervals                                Axis  Rate:       83                           P:  PA:                                      QRS:        48  QRSD:       146                          T:          3  QT:         413  QTc:        486    Interpretive  Statements  Intermitent paced rhythm  Atrial fibrillation  Nonspecific intraventricular conduction delay  Electronically Signed On 11-4-2022 6:21:49 PDT by Ponce Tineo MD     Basic Metabolic Panel (BMP)    Collection Time: 11/04/22  2:15 AM   Result Value Ref Range    Sodium 135 135 - 145 mmol/L    Potassium 7.6 (HH) 3.6 - 5.5 mmol/L    Chloride 100 96 - 112 mmol/L    Co2 13 (L) 20 - 33 mmol/L    Glucose 135 (H) 65 - 99 mg/dL    Bun 107 (H) 8 - 22 mg/dL    Creatinine 7.66 (HH) 0.50 - 1.40 mg/dL    Calcium 6.7 (LL) 8.5 - 10.5 mg/dL    Anion Gap 22.0 (H) 7.0 - 16.0   Magnesium    Collection Time: 11/04/22  2:15 AM   Result Value Ref Range    Magnesium 2.2 1.5 - 2.5 mg/dL   Phosphorus    Collection Time: 11/04/22  2:15 AM   Result Value Ref Range    Phosphorus 10.3 (HH) 2.5 - 4.5 mg/dL   Lactic acid    Collection Time: 11/04/22  2:15 AM   Result Value Ref Range    Lactic Acid 1.0 0.5 - 2.0 mmol/L   ESTIMATED GFR    Collection Time: 11/04/22  2:15 AM   Result Value Ref Range    GFR (CKD-EPI) 7 (A) >60 mL/min/1.73 m 2   POCT arterial blood gas device results    Collection Time: 11/04/22  2:24 AM   Result Value Ref Range    Ph 7.001 (LL) 7.400 - 7.500    Pco2 61.2 (HH) 26.0 - 37.0 mmHg    Po2 94 (H) 64 - 87 mmHg    Tco2 17 (L) 20 - 33 mmol/L    S02 91 (L) 93 - 99 %    Hco3 15.1 (L) 17.0 - 25.0 mmol/L    BE -17 (L) -4 - 3 mmol/L    Body Temp 99.7 F degrees    O2 Therapy 80 %    iPF Ratio 118     Ph Temp Bella 6.993 (LL) 7.400 - 7.500    Pco2 Temp Co 62.9 (HH) 26.0 - 37.0 mmHg    Po2 Temp Cor 98 (H) 64 - 87 mmHg    Specimen Arterial     DelSys Vent     End Tidal Carbon Dioxide 39 mmhg    Tidal Volume 470 mL    Peep End Expiratory Pressure 8 cmh20    Set Rate 16     Mode APV-CMV    POCT arterial blood gas device results    Collection Time: 11/04/22  5:14 AM   Result Value Ref Range    Ph 7.041 (LL) 7.400 - 7.500    Pco2 46.4 (H) 26.0 - 37.0 mmHg    Po2 105 (H) 64 - 87 mmHg    Tco2 14 (L) 20 - 33 mmol/L    S02 95 93 -  99 %    Hco3 12.6 (L) 17.0 - 25.0 mmol/L    BE -18 (L) -4 - 3 mmol/L    Body Temp 100.0 F degrees    O2 Therapy 80 %    iPF Ratio 131     Ph Temp Bella 7.031 (LL) 7.400 - 7.500    Pco2 Temp Co 48.1 (H) 26.0 - 37.0 mmHg    Po2 Temp Cor 110 (H) 64 - 87 mmHg    Specimen Arterial     DelSys Vent     End Tidal Carbon Dioxide 31 mmhg    Tidal Volume 470 mL    Peep End Expiratory Pressure 8 cmh20    Set Rate 22     Mode APV-CMV        Imaging  OUTSIDE IMAGES-DX CHEST   Final Result      DX-CHEST-PORTABLE (1 VIEW)   Final Result         1.  Bibasilar atelectasis or early infiltrates.   2.  Trace bilateral pleural effusions   3.  Cardiomegaly      EC-ECHOCARDIOGRAM LTD W/O CONT    (Results Pending)   DX-ABDOMEN FOR TUBE PLACEMENT    (Results Pending)       Assessment/Plan  * Acute hypoxemic respiratory failure due to COVID-19 (HCC)- (present on admission)  Assessment & Plan  COVID-19 pneumonia  Dexamethasone  Fluid balance - keep euvolemic  Tocilizumab - evaluate for qualification  - Qualifies if:     - CRP > 7.5     - ANC > 500     - Hepatitis panel - negative     - Quantiferon Gold - negative     - Platelets > 50    Ventilator dependent respiratory failure  Modify ventilator to optimize oxygenation and ventilation  HOB > 30  Chlorhexidine  Perform spontaneous breathing trial when able  Early mobility  Significant acidosis  - Increase minute ventilation    Acute on chronic systolic congestive heart failure (HCC)- (present on admission)  Assessment & Plan  BNP 8230  Troponin 0.12    TTE  Gentle volume resuscitation    Septic shock (HCC)- (present on admission)  Assessment & Plan  This is Septic shock Present on admission  SIRS criteria identified on my evaluation include: Fever, with temperature greater than 101 deg F, Tachycardia, with heart rate greater than 90 BPM and Tachypnea, with respirations greater than 20 per minute  Indicators of septic shock include: Severe sepsis present and persistent hypotension after 30 ml/kg  completed.   Sources is: COVID-19 pneumonia  Sepsis protocol initiated  Crystalloid Fluid Administration: Patient has COVID-19 pneumonia, CHF w/ EF of 35-40%, and acute kidney injury w/ creatinine of 8.0 and CrCl of 10.3 mL/min, for these reasons it is unsafe for this patient to receive 30 mL/kg of fluid  Partial Fluid Dose Given: 15 mL/kg per current body weight given at sending hospital, patient to be reassessed shortly after completion of partial fluid bolus to ensure adequacy of resuscitation  There are no antibiotics appropriate for this source of sepsis  Reassessment: I have reassessed the patient's hemodynamic status    Titrate norepinephrine to maintain MAP > 65  Fluid resuscitation - received 15 mL/kg  Trend lactates    Hyperkalemia- (present on admission)  Assessment & Plan  K 6.6  Received calcium gluconate and insulin/glucose at OSH  Recheck K - 7.6  Insulin/glucose  Albuterol  Calcium  NaHCO3  Nephrology consultation - Dr. Partida w/ [facility] - will perform emergent iHD    MYLA (acute kidney injury) (HCC)- (present on admission)  Assessment & Plan  Likely due to dehydration and COVID-19  Avoid nephrotoxins  Monitor electrolytes and replete as needed  Renally dose adjust medications  Strict I/O monitoring    Paroxysmal atrial fibrillation (HCC)- (present on admission)  Assessment & Plan  Chronic paroxysmal afib  Maximize electrolytes (keep K > 4.0 and Mag > 2.0)  Anticoagulation - therapeutic w/ heparin infusion    DVT prophylaxis: Therapeutic heparin infusion  PUD prophylaxis: Famotidine  Glycemic control: Sliding scale insulin  Nutrition: NPO --> start tube feeds  Lines: Needed  Ball: Need for strict I/O monitoring, remove when able  Mobility: Early mobility as tolerated  Goals of care: Full code  Disposition: ICU    Discussed patient condition and risk of morbidity and/or mortality with RN, RT, Pharmacy, Code status disscussed, Charge nurse / hot rounds, nephrology, and Dr. JUSTIN Morales at  [facility] .    The patient remains critically ill.  Critical care time = 120 minutes in directly providing and coordinating critical care and extensive data review.  No time overlap and excludes procedures.

## 2022-11-04 NOTE — DIETARY
Nutrition Support Assessment:  Day 0 of admit.  Sean Kincaid is a 77 y.o. male with admitting DX of Acute hypoxemic respiratory failure due to COVID-19.     Current problem list:  Acute hypoxemic respiratory failure due to COVID-19  Septic shock  Hyperkalemia  Acute on chronic systolic congestive heart failure  MYLA (acute kidney injury)  Paroxysmal atrial fibrillation     Assessment:  Estimated Nutritional Needs based on:   Height: 182.9 cm (6')  Weight to Use in Calculations: 107 kg (235 lb 14.3 oz) (weight per [facility])  [place] Body Weight: 80.7 kg (178 lb)  Body mass index is 31.93 kg/m²., BMI classification: Class 1 Obesity    Calculation/Equation: PSU (VE 11.3, Tmax 37.9 C) = 2165 kcal, REE x 1 - 1.2 = 1840 = 2208 kcal  Total Calories / day: 1850 - 2200 (Calories / k.2 - 20.5)  Total Grams Protein / day: 81 - 121 (Grams Protein / kg IBW: 1 - 1.5 for MYLA on HD)     Evaluation:   Pt intubated. Start of enteral nutrition support appropriate.  NG tube in place.  Labs / include Na 135, K+ 7.6 (H), Glu 135 (H),  (H), Creat 7.66 (H), Phos 10.3 (H), Mg 2.2  Medications include Levophed at 8 mcg/min, Fentanyl, Precedex, Rocephin, Decadron, SSI.  HD this morning.  LBM PTA  Renal tube feed formula Novasource Renal appropriate to meet needs in pt with MYLA. Novasource Renal is fiber free for pt requiring pressor support.     Malnutrition Risk: ASPEN criteria not noted.     Recommendations/Plan:  Start Novasource Renal and advance as tolerated to goal rate of 45 ml/hour to provide 2160 kcal, 98 gm protein, 1026 mg K+, 885 mg Phos, and 767 ml free water in 24 hours.  Fluids per MD.  Monitor weight. Requested measured weight when feasible.    RD following

## 2022-11-04 NOTE — PROGRESS NOTES
Pt brought in by [facility]. No belongings with pt upon transfer.    4 Eyes Skin Assessment Completed by NORAH Sullivan and NORAH Elliott.    Head WDL  Ears WDL  Nose WDL  Mouth dry, cracked  Neck WDL  Breast/Chest WDL  Shoulder Blades WDL  Spine WDL  (R) Arm/Elbow/Hand WDL  (L) Arm/Elbow/Hand Bruising  Abdomen WDL  Groin Redness and Excoriation  Scrotum/Coccyx/Buttocks Redness and Blanching  (R) Leg discoloration  (L) Leg discoloration  (R) Heel/Foot/Toe Redness, Blanching, and Boggy  (L) Heel/Foot/Toe Redness, Blanching, and Boggy          Devices In Places Ball, SCD's, and ET Tube      Interventions In Place TAP System, Pillows, Low Air Loss Mattress, Barrier Cream, and Pressure Redistribution Mattress    Possible Skin Injury Yes    Pictures Uploaded Into Epic Yes  Wound Consult Placed N/A  RN Wound Prevention Protocol Ordered Yes

## 2022-11-04 NOTE — ASSESSMENT & PLAN NOTE
This is Septic shock Present on admission  SIRS criteria identified on my evaluation include: Fever, with temperature greater than 101 deg F, Tachycardia, with heart rate greater than 90 BPM and Tachypnea, with respirations greater than 20 per minute  Indicators of septic shock include: Severe sepsis present and persistent hypotension after 30 ml/kg completed.   Sources is: COVID-19 pneumonia  Sepsis protocol initiated  Crystalloid Fluid Administration: Patient has COVID-19 pneumonia, CHF w/ EF of 35-40%, and acute kidney injury w/ creatinine of 8.0 and CrCl of 10.3 mL/min, for these reasons it is unsafe for this patient to receive 30 mL/kg of fluid  Partial Fluid Dose Given: 15 mL/kg per current body weight given at sending hospital, patient to be reassessed shortly after completion of partial fluid bolus to ensure adequacy of resuscitation  There are no antibiotics appropriate for this source of sepsis  Reassessment: I have reassessed the patient's hemodynamic status    Titrate norepinephrine to maintain MAP > 65  Fluid resuscitation - received 15 mL/kg  Lactate resolved    Awaiting speciation from sputum with GPC

## 2022-11-05 NOTE — CARE PLAN
Problem: Ventilation  Goal: Ability to achieve and maintain unassisted ventilation or tolerate decreased levels of ventilator support  Description: Target End Date:  4 days     Document on Vent flowsheet    1.  Support and monitor invasive and noninvasive mechanical ventilation  2.  Monitor ventilator weaning response  3.  Perform ventilator associated pneumonia prevention interventions  4.  Manage ventilation therapy by monitoring diagnostic test results  Outcome: Progressing      Ventilator Daily Summary    Vent Day # 2  8 @ 26    APVCMV: 26/470/+8/50%    Weaning trials: SBT x 1    Plan: Continue current ventilator settings and wean mechanical ventilation as tolerated per physician orders.

## 2022-11-05 NOTE — CARE PLAN
The patient is Watcher - Medium risk of patient condition declining or worsening    Shift Goals  Clinical Goals: hemodynamic stability    Progress made toward(s) clinical / shift goals:    Problem: Safety - Medical Restraint  Goal: Remains free of injury from restraints (Restraint for Interference with Medical Device)  Outcome: Progressing     Problem: Skin Integrity  Goal: Skin integrity is maintained or improved  Outcome: Progressing     Problem: Fall Risk  Goal: Patient will remain free from falls  Outcome: Progressing     Problem: Pain - Standard  Goal: Alleviation of pain or a reduction in pain to the patient’s comfort goal  Outcome: Progressing       Patient is not progressing towards the following goals:      Problem: Safety - Medical Restraint  Goal: Free from restraint(s) (Restraint for Interference with Medical Device)  Outcome: Not Met

## 2022-11-05 NOTE — CARE PLAN
The patient is Unstable - High likelihood or risk of patient condition declining or worsening    Shift Goals  Clinical Goals: MAP > 65, wean ventilator, comfort and pain relief  Patient Goals: Unable to assess  Family Goals: Unable to assess    Progress made toward(s) clinical / shift goals:    Problem: Knowledge Deficit - Standard  Goal: Patient and family/care givers will demonstrate understanding of plan of care, disease process/condition, diagnostic tests and medications  Outcome: Progressing     Problem: Safety - Medical Restraint  Goal: Remains free of injury from restraints (Restraint for Interference with Medical Device)  Outcome: Progressing  Goal: Free from restraint(s) (Restraint for Interference with Medical Device)  Outcome: Progressing     Problem: Skin Integrity  Goal: Skin integrity is maintained or improved  Outcome: Progressing     Problem: Fall Risk  Goal: Patient will remain free from falls  Outcome: Progressing     Problem: Pain - Standard  Goal: Alleviation of pain or a reduction in pain to the patient’s comfort goal  Outcome: Progressing       Patient is not progressing towards the following goals:

## 2022-11-05 NOTE — PROGRESS NOTES
Per Dr. Manuel    Administer blood products, draw labs that are ordered after.    Sandbag to groin site.    Monitor for any increase in hematoma. New central line to be placed tonight or tomorrow. Dialysis cath to be removed tomorrow pending dialysis need.

## 2022-11-05 NOTE — PROGRESS NOTES
Dialysis cath/art line dressing changed. Site progressively more bruised and swollen. Dr. Manuel notified.

## 2022-11-05 NOTE — CARE PLAN
Problem: Ventilation  Goal: Ability to achieve and maintain unassisted ventilation or tolerate decreased levels of ventilator support  Description: Target End Date:  4 days     Document on Vent flowsheet    1.  Support and monitor invasive and noninvasive mechanical ventilation  2.  Monitor ventilator weaning response  3.  Perform ventilator associated pneumonia prevention interventions  4.  Manage ventilation therapy by monitoring diagnostic test results  Outcome: Progressing      Ventilator Daily Summary    Vent Day #2    Ventilator settings changed this shift: na    Weaning trials: na    Respiratory Procedures: na    Plan: Continue current ventilator settings and wean mechanical ventilation as tolerated per physician orders.    Vent Mode: APVCMV  Rate (breaths/min):  [26] 26  PEEP/CPAP:  [8] 8  PIP:  [22-31] 24  MAP:  [13-16] 13

## 2022-11-05 NOTE — PROGRESS NOTES
Spoke to Dr. Manuel updated him on patient's U/O of 20 ml in the last 2 hours, hypotension with increasing Levo drip rate and starting Vaso, oozing from mouth and groin site despite Heparin being stopped this morning.   Orders received for fibrinogen and TEG.

## 2022-11-05 NOTE — PROGRESS NOTES
"[facility] -  PROGRESS NOTE               Author: Jules Rios M.D. Date & Time: 11/5/2022  10:02 AM     HPI:  This is a 77 y.o. male w/ h/o CHF, COPD, afib, DM who presented 11/4/2022 in transfer from [facility] where he presented with worsening shortness of breath and fatigue.  At [place] he was found to be COVID-positive, was initially started on BiPAP and then intubated, in acute kidney injury with a creatinine of 8.0 and potassium of 6.6.  He was transferred to [facility] for further evaluation and treatment. Here his Scr was 7.6 with a K+ of 7.6. Nephrology was asked to consult for MYLA and critical hyperkalemia.      This history was obtained from the medical records since patient is not able to communicate.    DAILY NEPHROLOGY SUMMARY:  11/5 - Scr down to 3.74. K+ is 5.0. Non-oliguric. Still on vent support.    REVIEW OF SYSTEMS:    Unable to assess given on vent    PMH/PSH/SH/FH:   Reviewed and unchanged since admission note    CURRENT MEDICATIONS:   Reviewed from admission to present day    VS:  BP (!) 96/54   Pulse 66   Temp 37.8 °C (100 °F)   Resp 17   Ht 1.829 m (6' 0.01\")   Wt 98.1 kg (216 lb 4.3 oz)   SpO2 96%   BMI 29.33 kg/m²     Physical Exam  Vitals and nursing note reviewed.   Constitutional:       Appearance: He is ill-appearing.   HENT:      Head: Normocephalic and atraumatic.      Nose: Nose normal.   Eyes:      Conjunctiva/sclera: Conjunctivae normal.   Cardiovascular:      Rate and Rhythm: Normal rate.      Pulses: Normal pulses.      Heart sounds: Normal heart sounds.   Pulmonary:      Effort: No respiratory distress.      Breath sounds: Rales present.   Abdominal:      General: Abdomen is flat. Bowel sounds are normal.      Palpations: Abdomen is soft.   Musculoskeletal:      Cervical back: Normal range of motion.      Right lower leg: Edema present.      Left lower leg: Edema present.   Skin:     General: Skin is warm.    "   Capillary Refill: Capillary refill takes less than 2 seconds.   Neurological:      Comments: Unable to assess   Psychiatric:      Comments: Unable to assess     Fluids:  In: 2387.2 [I.V.:1582.2; Other:150]  Out: 1550     LABS:  Recent Labs     11/04/22 0215 11/04/22 2023 11/05/22  0337   SODIUM 135 137 136   POTASSIUM 7.6* 5.4 5.0   CHLORIDE 100 98 100   CO2 13* 19* 20   GLUCOSE 135* 241* 292*   * 60* 67*   CREATININE 7.66* 4.15* 3.74*   CALCIUM 6.7* 7.5* 7.7*       IMAGING:   All imaging reviewed from admission to present day    IMPRESSION:  # MYLA - etiology is not clear at this time but suspect sec to Septic ATN  # Septic shock  # Acute hypoxemic respiratory failure due to COVID-19  # Acidosis  # Critical hyperkalemia  # PAfib  # ? CHF  # Anemia  # Hyperphosphatemia  # Hypocalcemia     PLAN:  - Will hold HD today and monitor.  - Daily evaluation for RRT needs  - Dose all meds per eGFR < 15  - Avoid nephrotoxins  - Monitor UOP  - Keep MAP >/= to 65  - ECHO results pending

## 2022-11-05 NOTE — PROGRESS NOTES
"Critical Care Progress Note    Date of admission  11/4/2022    Chief Complaint  \"77 y.o. male w/ h/o CHF, COPD, afib, DM who presented 11/4/2022 in transfer from [facility] where he presented today with worsening shortness of breath and fatigue.  At [place] he was found to be COVID-positive, was initially started on BiPAP and then intubated, in acute kidney injury with a creatinine of 8.0 and potassium of 6.6.  He was reportedly treated with calcium gluconate and insulin/glucose and then transferred to [facility] for further evaluation and treatment.     On arrival the patient was intubated and sedated and I was unable to gain any additional history other than by review of the outside hospital records.\" - Dr Scott    Hospital Course  11/3 - Taken to [facility]  11/4 - Transfer to [facility], intubation / lines placed    Interval Problem Update  Reviewed last 24 hour events:  Tmax: 100.2F  Diet: TF  Vasopressors: Norepinephrine    Antibx:   Ceftriaxone 11/4 - present    GPC on gram stain of tracheal aspirate  - awaiting speciation / sensitivity      Intake / Output    Intake/Output Summary (Last 24 hours) at 11/5/2022 1201  Last data filed at 11/5/2022 0600  Gross per 24 hour   Intake 1829.42 ml   Output 1300 ml   Net 529.42 ml     Net IO Since Admission: 3.41 mL [11/04/22 0615]     Review of Systems  Review of Systems   Unable to perform ROS: Intubated      Vital Signs for last 24 hours   Pulse:  [61-76] 68  Resp:  [15-43] 15  BP: ()/(51-68) 119/55  SpO2:  [93 %-97 %] 95 %    Hemodynamic parameters for last 24 hours       Respiratory Information for the last 24 hours  Vent Mode: Spont  Rate (breaths/min): 26  Vt Target (mL): 470  PEEP/CPAP: 8  P Support: 5  MAP: 11  Length of Weaning Trial (Hours): 1  Control VTE (exp VT): 591    Physical Exam   Physical Exam  Vitals and nursing note reviewed. Exam conducted with a chaperone present.   Constitutional:       Appearance: He is obese. He " is ill-appearing.      Interventions: He is sedated and intubated.   HENT:      Head: Normocephalic.      Mouth/Throat:      Mouth: Mucous membranes are moist.   Eyes:      Extraocular Movements: Extraocular movements intact.   Cardiovascular:      Rate and Rhythm: Normal rate. Rhythm irregular.      Pulses: Normal pulses.   Pulmonary:      Effort: He is intubated.      Breath sounds: Rhonchi present.   Abdominal:      General: There is no distension.      Palpations: Abdomen is soft.      Tenderness: There is no abdominal tenderness. There is no guarding or rebound.   Musculoskeletal:      Cervical back: Normal range of motion and neck supple.      Right lower leg: Edema present.      Left lower leg: Edema present.   Skin:     General: Skin is warm and dry.      Capillary Refill: Capillary refill takes less than 2 seconds.   Neurological:      Comments: RASS -4       Medications  Current Facility-Administered Medications   Medication Dose Route Frequency Provider Last Rate Last Admin    Respiratory Therapy Consult   Nebulization Continuous RT Earl Scott M.D.        senna-docusate (PERICOLACE or SENOKOT S) 8.6-50 MG per tablet 2 Tablet  2 Tablet Enteral Tube BID Earl Scott M.D.   2 Tablet at 11/05/22 0547    And    polyethylene glycol/lytes (MIRALAX) PACKET 1 Packet  1 Packet Enteral Tube QDAY PRN Earl Scott M.D.        And    bisacodyl (DULCOLAX) suppository 10 mg  10 mg Rectal QDAY PRN Earl Scott M.D.        lidocaine (XYLOCAINE) 1 % injection 2 mL  2 mL Tracheal Tube Q30 MIN PRN Earl Scott M.D.        Pharmacy Consult: Enteral tube insertion - review meds/change route/product selection  1 Each Other PHARMACY TO DOSE Earl Scott M.D.        fentaNYL (SUBLIMAZE) injection 100 mcg  100 mcg Intravenous Q15 MIN PRN Earl Scott M.D.        And    fentaNYL (SUBLIMAZE) injection 200 mcg  200 mcg Intravenous Q15 MIN PRN Earl Scott M.D.        And    fentaNYL  (SUBLIMAZE) 50 mcg/mL in 50mL (Continuous Infusion)   Intravenous Continuous Earl Scott M.D. 2 mL/hr at 11/05/22 0850 100 mcg/hr at 11/05/22 0850    And    dexmedetomidine (PRECEDEX) 400 mcg/100mL NS premix infusion  0-1.5 mcg/kg/hr Intravenous Continuous Earl Scott M.D. 16.1 mL/hr at 11/05/22 1035 0.6 mcg/kg/hr at 11/05/22 1035    ipratropium-albuterol (DUONEB) nebulizer solution  3 mL Nebulization Q2HRS PRN (RT) Earl Scott M.D.   3 mL at 11/04/22 0515    ipratropium-albuterol (DUONEB) nebulizer solution  3 mL Nebulization Q4HRS (RT) Earl Scott M.D.   3 mL at 11/05/22 1053    insulin regular (HumuLIN R,NovoLIN R) injection  1-6 Units Subcutaneous Q6HRS Earl Scott M.D.   2 Units at 11/05/22 0553    And    dextrose 10 % BOLUS 25 g  25 g Intravenous Q15 MIN PRN Earl Scott M.D.        ondansetron (ZOFRAN) syringe/vial injection 4 mg  4 mg Intravenous Q4HRS PRN Earl Scott M.D.        norepinephrine (Levophed) 8 mg in 250 mL NS infusion (premix)  0-30 mcg/min Intravenous Continuous Earl Scott M.D. 46.9 mL/hr at 11/05/22 1035 25 mcg/min at 11/05/22 1035    dexamethasone (DECADRON) injection 6 mg  6 mg Intravenous DAILY Earl Scott M.D.   6 mg at 11/05/22 0548    heparin infusion 25,000 units in 500 mL 0.45% NACL  0-30 Units/kg/hr (Adjusted) Intravenous Continuous Earl Scott M.D.   Stopped. (Insulin or Heparin) at 11/05/22 0851    heparin injection 3,600 Units  40 Units/kg (Adjusted) Intravenous PRN Earl Scott M.D.        cefTRIAXone (Rocephin) syringe 2,000 mg  2,000 mg Intravenous Q24HRS Harshil Manuel M.D.   2,000 mg at 11/05/22 0547    vasopressin (Vasostrict) 20 Units in  mL Infusion  0.03 Units/min Intravenous Continuous Harshil Manuel M.D.   Held at 11/04/22 0645    heparin injection 2,800 Units  2,800 Units Intracatheter ACUTE DIALYSIS PRN Bre Partida D.O.   2,800 Units at 11/04/22 1000    famotidine (PEPCID) tablet  10 mg  10 mg Enteral Tube Q24HRS Harshil Manuel M.D.   10 mg at 11/05/22 0547    Or    famotidine (PEPCID) injection 10 mg  10 mg Intravenous Q24HRS Harshil Manuel M.D.           Fluids    Intake/Output Summary (Last 24 hours) at 11/5/2022 1201  Last data filed at 11/5/2022 0600  Gross per 24 hour   Intake 1829.42 ml   Output 1300 ml   Net 529.42 ml       Laboratory  Recent Labs     11/03/22 2120 11/04/22 0224 11/04/22 0224 11/04/22  0233 11/04/22  0514 11/05/22  0447   PEQXU05Y 7.16*  --   --   --   --   --    IKEZWS485T 31.7  --   --   --   --   --    JZYJF823S 100.7*  --   --   --   --   --    LDFJ8ZUL 95.6*  --   --   --   --   --    ARTHCO3 11*  --   --   --   --   --    ARTBE -17*  --   --   --   --   --    ISTATAPH  --  7.001*   < > 7.007* 7.041* 7.399*   ISTATAPCO2  --  61.2*   < > 61.8* 46.4* 36.6   ISTATAPO2  --  94*   < > 96* 105* 83   ISTATATCO2  --  17*   < > 17* 14* 24   MTSIHCL0PGL  --  91*   < > 92* 95 96   ISTATARTHCO3  --  15.1*   < > 15.5* 12.6* 22.6   ISTATARTBE  --  -17*   < > -16* -18* -2   ISTATTEMP  --  99.7 F   < > 99.5 F 100.0 F 99.0 F   ISTATFIO2  --  80   < > 80 80 50   ISTATSPEC  --  Arterial   < > Arterial Arterial Arterial   ISTATAPHTC  --  6.993*  --   --  7.031* 7.396*   FHALTASF5CA  --  98*  --   --  110* 84    < > = values in this interval not displayed.     Recent Labs     11/03/22  1955   TROPONINI 0.12*   BNPBTYPENAT 8230*     Recent Labs     11/04/22 0215 11/04/22 2023 11/05/22 0337   SODIUM 135 137 136   POTASSIUM 7.6* 5.4 5.0   CHLORIDE 100 98 100   CO2 13* 19* 20   * 60* 67*   CREATININE 7.66* 4.15* 3.74*   MAGNESIUM 2.2  --  1.8   PHOSPHORUS 10.3*  --  3.6   CALCIUM 6.7* 7.5* 7.7*     Recent Labs     11/03/22 1955 11/03/22 2255 11/04/22 0215 11/04/22 2023 11/05/22 0337   ALTSGPT 26  --   --   --   --    ASTSGOT 71*  --   --   --   --    ALKPHOSPHAT 104  --   --   --   --    TBILIRUBIN 0.7  --   --   --   --    PREALBUMIN  --   --   --   --  12.1*   GLUCOSE  131*   < > 135* 241* 292*    < > = values in this interval not displayed.     Recent Labs     11/03/22 1920 11/03/22 1955 11/04/22 0822 11/05/22 0337   WBC 9.8  --  10.3 6.6   NEUTSPOLYS  --   --  88.20* 79.30*   LYMPHOCYTES  --   --  1.70* 9.50*   MONOCYTES  --   --  5.10 6.90   EOSINOPHILS  --   --  0.00 0.00   BASOPHILS  --   --  0.00 0.00   ASTSGOT  --  71*  --   --    ALTSGPT  --  26  --   --    ALKPHOSPHAT  --  104  --   --    TBILIRUBIN  --  0.7  --   --      Recent Labs     11/03/22 1920 11/03/22 1955 11/04/22 0822 11/05/22 0337   RBC 4.34*  --  4.00* 3.52*   HEMOGLOBIN 13.1*  --  12.1* 10.8*   HEMATOCRIT 40.6*  --  38.6* 32.5*   PLATELETCT 148  --  125* 122*   PROTHROMBTM  --  28.1*  --   --    INR  --  2.84  --   --        Imaging  X-Ray:  I have personally reviewed the images and compared with prior images. and My impression is: Interstitial opacifications, left> right - stable CXR today    Assessment/Plan  * Acute hypoxemic respiratory failure due to COVID-19 (HCC)- (present on admission)  Assessment & Plan  COVID-19 pneumonia  Dexamethasone  Fluid balance - keep euvolemic, dialysis needs determined daily    Tocilizumab - evaluate for qualification  - Likely concurrent bacterial infection  - does not qualify    Ventilator dependent respiratory failure  Modify ventilator to optimize oxygenation and ventilation  HOB > 30  Chlorhexidine  Perform spontaneous breathing trial when able  Early mobility  Significant acidosis   - dialysis    All bundles are in place    Septic shock (HCC)- (present on admission)  Assessment & Plan  This is Septic shock Present on admission  SIRS criteria identified on my evaluation include: Fever, with temperature greater than 101 deg F, Tachycardia, with heart rate greater than 90 BPM and Tachypnea, with respirations greater than 20 per minute  Indicators of septic shock include: Severe sepsis present and persistent hypotension after 30 ml/kg completed.   Sources is:  COVID-19 pneumonia  Sepsis protocol initiated  Crystalloid Fluid Administration: Patient has COVID-19 pneumonia, CHF w/ EF of 35-40%, and acute kidney injury w/ creatinine of 8.0 and CrCl of 10.3 mL/min, for these reasons it is unsafe for this patient to receive 30 mL/kg of fluid  Partial Fluid Dose Given: 15 mL/kg per current body weight given at sending hospital, patient to be reassessed shortly after completion of partial fluid bolus to ensure adequacy of resuscitation  There are no antibiotics appropriate for this source of sepsis  Reassessment: I have reassessed the patient's hemodynamic status    Titrate norepinephrine to maintain MAP > 65  Fluid resuscitation - received 15 mL/kg  Lactate resolved    Awaiting speciation from sputum with GPC    Acute on chronic systolic congestive heart failure (HCC)- (present on admission)  Assessment & Plan  BNP 8230  Troponin 0.12    TTE is pending on 11/5    MYLA (acute kidney injury) (HCC)- (present on admission)  Assessment & Plan  Likely due to dehydration and COVID-19  Avoid nephrotoxins  Monitor electrolytes and replete as needed  Renally dose adjust medications  Strict I/O monitoring    Daily determination of dialysis    Hyperkalemia- (present on admission)  Assessment & Plan  K 6.6  Now getting emergent dialysis through femoral line placed overnight    Resolved with dialysis    Paroxysmal atrial fibrillation (HCC)- (present on admission)  Assessment & Plan  Chronic paroxysmal afib  Maximize electrolytes (keep K > 4.0 and Mag > 2.0)  Anticoagulation - therapeutic w/ heparin infusion       VTE:  Heparin gtt  Ulcer: H2 Antagonist  Lines: Central Line  Ongoing indication addressed, Arterial Line  Ongoing indication addressed, and Ball Catheter  Ongoing indication addressed    I have performed a physical exam and reviewed and updated ROS and Plan today (11/5/2022). In review of yesterday's note (11/4/2022), there are no changes except as documented above.     Discussed  patient condition and risk of morbidity and/or mortality with RN, RT, Pharmacy, , and nephrology    The patient remains critically ill.  Critical care time = 56 minutes in directly providing and coordinating critical care and extensive data review.  No time overlap and excludes procedures.

## 2022-11-06 PROBLEM — K68.3 NONTRAUMATIC RETROPERITONEAL HEMATOMA: Status: ACTIVE | Noted: 2022-01-01

## 2022-11-06 PROBLEM — R65.21 SEPTIC SHOCK WITH ACUTE ORGAN DYSFUNCTION DUE TO METHICILLIN SUSCEPTIBLE STAPHYLOCOCCUS AUREUS (MSSA) (HCC): Status: ACTIVE | Noted: 2022-01-01

## 2022-11-06 PROBLEM — A41.01 SEPTIC SHOCK WITH ACUTE ORGAN DYSFUNCTION DUE TO METHICILLIN SUSCEPTIBLE STAPHYLOCOCCUS AUREUS (MSSA) (HCC): Status: ACTIVE | Noted: 2022-01-01

## 2022-11-06 PROBLEM — T14.8XXA HEMATOMA: Status: ACTIVE | Noted: 2022-01-01

## 2022-11-06 NOTE — PROGRESS NOTES
Mar MORRISON at bedside for CVC placement  Time Out: 1104  See Mar for medications given.    Patient tolerated procedure well.  Procedure End Time:

## 2022-11-06 NOTE — PROGRESS NOTES
"[facility] -  PROGRESS NOTE               Author: Jules Rios M.D. Date & Time: 11/6/2022  10:01 AM     HPI:  This is a 77 y.o. male w/ h/o CHF, COPD, afib, DM who presented 11/4/2022 in transfer from [facility] where he presented with worsening shortness of breath and fatigue.  At [place] he was found to be COVID-positive, was initially started on BiPAP and then intubated, in acute kidney injury with a creatinine of 8.0 and potassium of 6.6.  He was transferred to [facility] for further evaluation and treatment. Here his Scr was 7.6 with a K+ of 7.6. Nephrology was asked to consult for MYLA and critical hyperkalemia.      This history was obtained from the medical records since patient is not able to communicate.    DAILY NEPHROLOGY SUMMARY:  11/5 - Scr down to 3.74. K+ is 5.0. Non-oliguric. Still on vent support.  11/6: Scr continues to trend down. Non-oliguric. ECHO: The left ventricle is not well visualized due to body habitus. Left ventricular systolic function is probably normal. The ejection fraction is roughly estimated to be 55%. A reliable estimation of diastolic function cannot be made due to mitral valve disease. Severely dilated right ventricle.Reduced right ventricular systolic function.    REVIEW OF SYSTEMS:    Unable to assess given on vent    PMH/PSH/SH/FH:   Reviewed and unchanged since admission note    CURRENT MEDICATIONS:   Reviewed from admission to present day    VS:  /68   Pulse 64   Temp 37.4 °C (99.3 °F)   Resp 16   Ht 1.829 m (6' 0.01\")   Wt 107 kg (235 lb 3.7 oz)   SpO2 97%   BMI 31.90 kg/m²     Physical Exam  Vitals and nursing note reviewed.   Constitutional:       Appearance: He is ill-appearing.   HENT:      Head: Normocephalic and atraumatic.      Nose: Nose normal.   Eyes:      Conjunctiva/sclera: Conjunctivae normal.   Cardiovascular:      Rate and Rhythm: Normal rate.      Pulses: Normal pulses.      Heart " sounds: Normal heart sounds.   Pulmonary:      Effort: No respiratory distress.      Breath sounds: Rales present.   Abdominal:      General: Abdomen is flat. Bowel sounds are normal.      Palpations: Abdomen is soft.   Musculoskeletal:      Cervical back: Normal range of motion.      Right lower leg: Edema present.      Left lower leg: Edema present.   Skin:     General: Skin is warm.      Capillary Refill: Capillary refill takes less than 2 seconds.   Neurological:      Comments: Unable to assess   Psychiatric:      Comments: Unable to assess     Fluids:  In: 2739.7 [I.V.:1124.7; Blood:530]  Out: 900     LABS:  Recent Labs     11/04/22 2023 11/05/22  0337 11/06/22  0515   SODIUM 137 136 141   POTASSIUM 5.4 5.0 5.5   CHLORIDE 98 100 106   CO2 19* 20 19*   GLUCOSE 241* 292* 347*   BUN 60* 67* 86*   CREATININE 4.15* 3.74* 2.96*   CALCIUM 7.5* 7.7* 7.4*         IMAGING:   All imaging reviewed from admission to present day    IMPRESSION:  # MYLA - etiology is not clear at this time but suspect sec to Septic ATN  # Septic shock  # Acute hypoxemic respiratory failure due to COVID-19  # Acidosis  # hyperkalemia  # PAfib  # ? CHF  # Anemia  # Hyperphosphatemia  # Hypocalcemia     PLAN:  - No further need for RRT for now. Has high BUN and K+. Likely from hematoma.  - Daily evaluation for RRT needs  - Dose all meds per eGFR < 15  - Avoid nephrotoxins  - Monitor UOP  - Keep MAP >/= to 65  - ECHO results pending

## 2022-11-06 NOTE — ASSESSMENT & PLAN NOTE
This is Septic shock Present on admission  SIRS criteria identified on my evaluation include: Fever, with temperature greater than 101 deg F, Tachycardia, with heart rate greater than 90 BPM and Tachypnea, with respirations greater than 20 per minute  Indicators of septic shock include: Severe sepsis present and persistent hypotension after 30 ml/kg completed.   Sources is: COVID-19 pneumonia and MSSA pneumonia  Sepsis protocol initiated  Crystalloid Fluid Administration: Patient has COVID-19 pneumonia, CHF w/ EF of 35-40%, and acute kidney injury w/ creatinine of 8.0 and CrCl of 10.3 mL/min, for these reasons it is unsafe for this patient to receive 30 mL/kg of fluid  Partial Fluid Dose Given: 15 mL/kg per current body weight given at [facility] hospital, patient to be reassessed shortly after completion of partial fluid bolus to ensure adequacy of resuscitation  There are no antibiotics appropriate for this source of sepsis  Reassessment: I have reassessed the patient's hemodynamic status    S/p fluid resuscitation and pressor  Off pressor since 11/6   Lactate resolved  On cefazolin for MSSA pneumonia. Treat till end date 11/13  On dexamethasone for COVID. Treat for 10 days.

## 2022-11-06 NOTE — ASSESSMENT & PLAN NOTE
She developed worsening anemia and abdominal distention & pain with associated tachycardia    On 11/6 in the morning CT scan is concerning for significant RP hematoma with mild intraperitoneal involvment  - Gen surg consult, appreciate recs  - IR consult, will take for venogram    Likely culprit is gonadal vein  H/H q4 hours  TEG normal  Platelets normal    Calcium replacement with blood products, follow ionized calcium    Keep in ICU

## 2022-11-06 NOTE — PROGRESS NOTES
"Critical Care Progress Note    Date of admission  11/4/2022    Chief Complaint  \"77 y.o. male w/ h/o CHF, COPD, afib, DM who presented 11/4/2022 in transfer from [facility] where he presented today with worsening shortness of breath and fatigue.  At [place] he was found to be COVID-positive, was initially started on BiPAP and then intubated, in acute kidney injury with a creatinine of 8.0 and potassium of 6.6.  He was reportedly treated with calcium gluconate and insulin/glucose and then transferred to [facility] for further evaluation and treatment.     On arrival the patient was intubated and sedated and I was unable to gain any additional history other than by review of the outside hospital records.\" - Dr Scott    Hospital Course  11/3 - Taken to [facility]  11/4 - Transfer to [facility], intubation / lines placed  11/5 - Coagulopathic, hematoma forming in right thigh.     Interval Problem Update  Reviewed last 24 hour events:  Tmax: Afebrile overnight  Diet: TF  Vasopressors: Norepinephrine, Vasopressin    Antibx:   Ceftriaxone 11/4 - present    GPC on gram stain of tracheal aspirate  - awaiting speciation / sensitivity      Intake / Output    Intake/Output Summary (Last 24 hours) at 11/6/2022 1227  Last data filed at 11/6/2022 1000  Gross per 24 hour   Intake 2357.33 ml   Output 990 ml   Net 1367.33 ml     Net IO Since Admission: 3.41 mL [11/04/22 0615]     Review of Systems  Review of Systems   Unable to perform ROS: Intubated      Vital Signs for last 24 hours   Temp:  [37.4 °C (99.3 °F)-38.1 °C (100.6 °F)] 37.4 °C (99.3 °F)  Pulse:  [] 67  Resp:  [12-41] 22  BP: ()/(50-78) 104/57  SpO2:  [93 %-98 %] 95 %    Hemodynamic parameters for last 24 hours       Respiratory Information for the last 24 hours  Vent Mode: Spont  Rate (breaths/min): 24  Vt Target (mL): 470  PEEP/CPAP: 8  P Support: 5  MAP: 12  Control VTE (exp VT): 458    Physical Exam   Physical Exam  Vitals and " nursing note reviewed. Exam conducted with a chaperone present.   Constitutional:       Appearance: He is obese. He is ill-appearing.      Interventions: He is sedated and intubated.   HENT:      Head: Normocephalic.      Mouth/Throat:      Mouth: Mucous membranes are moist.   Eyes:      Extraocular Movements: Extraocular movements intact.   Cardiovascular:      Rate and Rhythm: Normal rate. Rhythm irregular.      Pulses: Normal pulses.   Pulmonary:      Effort: He is intubated.      Breath sounds: Rhonchi present.   Abdominal:      General: There is no distension.      Palpations: Abdomen is soft.      Tenderness: There is no abdominal tenderness. There is no guarding or rebound.   Musculoskeletal:      Cervical back: Normal range of motion and neck supple.      Right lower leg: Edema present.      Left lower leg: Edema present.   Skin:     General: Skin is warm and dry.      Capillary Refill: Capillary refill takes less than 2 seconds.   Neurological:      Comments: RASS -4       Medications  Current Facility-Administered Medications   Medication Dose Route Frequency Provider Last Rate Last Admin    insulin GLARGINE (Lantus,Semglee) injection  15 Units Subcutaneous QAM INSULIN Harshil Manuel M.D.        fentaNYL (SUBLIMAZE) injection 100 mcg  100 mcg Intravenous Q15 MIN PRN Harshil Manuel M.D.   100 mcg at 11/05/22 1517    And    fentaNYL (SUBLIMAZE) injection 200 mcg  200 mcg Intravenous Q15 MIN PRN Harshil Manuel M.D.        And    fentaNYL (SUBLIMAZE) 50 mcg/mL in 50mL (Continuous Infusion)   Intravenous Continuous Harshil Manuel M.D. 1 mL/hr at 11/06/22 0710 50 mcg/hr at 11/06/22 0710    And    dexmedetomidine (PRECEDEX) 400 mcg/100mL NS premix infusion  0-1.5 mcg/kg/hr Intravenous Continuous Harshil Manuel M.D. 10.7 mL/hr at 11/06/22 0710 0.4 mcg/kg/hr at 11/06/22 0710    ceFAZolin in dextrose (ANCEF) IVPB premix 1 g  1 g Intravenous Q24HRS Harshil Manuel M.D.   Stopped at 11/05/22 1827    Nozin nasal   swab  1 Applicator Each Nostril BID Harshil Manuel M.D.   1 Applicator at 11/06/22 0542    Respiratory Therapy Consult   Nebulization Continuous RT Earl Scott M.D.        senna-docusate (PERICOLACE or SENOKOT S) 8.6-50 MG per tablet 2 Tablet  2 Tablet Enteral Tube BID Earl Scott M.D.   2 Tablet at 11/05/22 0547    And    polyethylene glycol/lytes (MIRALAX) PACKET 1 Packet  1 Packet Enteral Tube QDAY PRN Earl Scott M.D.        And    bisacodyl (DULCOLAX) suppository 10 mg  10 mg Rectal QDAY PRN Earl Scott M.D.        lidocaine (XYLOCAINE) 1 % injection 2 mL  2 mL Tracheal Tube Q30 MIN PRN Earl Scott M.D.        Pharmacy Consult: Enteral tube insertion - review meds/change route/product selection  1 Each Other PHARMACY TO DOSE Earl Scott M.D.        ipratropium-albuterol (DUONEB) nebulizer solution  3 mL Nebulization Q2HRS PRN (RT) Earl Scott M.D.   3 mL at 11/04/22 0515    ipratropium-albuterol (DUONEB) nebulizer solution  3 mL Nebulization Q4HRS (RT) Earl Scott M.D.   3 mL at 11/06/22 0948    insulin regular (HumuLIN R,NovoLIN R) injection  1-6 Units Subcutaneous Q6HRS Earl Scott M.D.   5 Units at 11/06/22 0521    And    dextrose 10 % BOLUS 25 g  25 g Intravenous Q15 MIN PRN Earl Scott M.D.        ondansetron (ZOFRAN) syringe/vial injection 4 mg  4 mg Intravenous Q4HRS PRN Earl Scott M.D.        norepinephrine (Levophed) 8 mg in 250 mL NS infusion (premix)  0-30 mcg/min Intravenous Continuous Earl Scott M.D. 15 mL/hr at 11/06/22 1115 8 mcg/min at 11/06/22 1115    dexamethasone (DECADRON) injection 6 mg  6 mg Intravenous DAILY Earl Scott M.D.   6 mg at 11/06/22 0540    vasopressin (Vasostrict) 20 Units in  mL Infusion  0.03 Units/min Intravenous Continuous Harshil Manuel M.D.   Stopped at 11/06/22 0835    heparin injection 2,800 Units  2,800 Units Intracatheter ACUTE DIALYSIS PRN Bre Partida,  D.O.   2,800 Units at 11/04/22 1000    famotidine (PEPCID) tablet 10 mg  10 mg Enteral Tube Q24HRS Harshil Manuel M.D.   10 mg at 11/05/22 0547    Or    famotidine (PEPCID) injection 10 mg  10 mg Intravenous Q24HRS Harshil Manuel M.D.   10 mg at 11/06/22 0537       Fluids    Intake/Output Summary (Last 24 hours) at 11/6/2022 1227  Last data filed at 11/6/2022 1000  Gross per 24 hour   Intake 2357.33 ml   Output 990 ml   Net 1367.33 ml       Laboratory  Recent Labs     11/03/22 2120 11/04/22 0224 11/04/22  0514 11/05/22  0447 11/06/22  0531   MZJXG02X 7.16*  --   --   --   --    JQBZFG205P 31.7  --   --   --   --    DAIFZ328X 100.7*  --   --   --   --    YRHS5SHT 95.6*  --   --   --   --    ARTHCO3 11*  --   --   --   --    ARTBE -17*  --   --   --   --    ISTATAPH  --    < > 7.041* 7.399* 7.436   ISTATAPCO2  --    < > 46.4* 36.6 34.9   ISTATAPO2  --    < > 105* 83 96*   ISTATATCO2  --    < > 14* 24 25   KBQIBSB8XKW  --    < > 95 96 98   ISTATARTHCO3  --    < > 12.6* 22.6 23.5   ISTATARTBE  --    < > -18* -2 -1   ISTATTEMP  --    < > 100.0 F 99.0 F 99.7 F   ISTATFIO2  --    < > 80 50 50   ISTATSPEC  --    < > Arterial Arterial Arterial   ISTATAPHTC  --    < > 7.031* 7.396* 7.427   BMERYMFN1NT  --    < > 110* 84 100*    < > = values in this interval not displayed.     Recent Labs     11/03/22 1955   TROPONINI 0.12*   BNPBTYPENAT 8230*     Recent Labs     11/04/22  0215 11/04/22 2023 11/05/22 0337 11/06/22 0515   SODIUM 135 137 136 141   POTASSIUM 7.6* 5.4 5.0 5.5   CHLORIDE 100 98 100 106   CO2 13* 19* 20 19*   * 60* 67* 86*   CREATININE 7.66* 4.15* 3.74* 2.96*   MAGNESIUM 2.2  --  1.8 2.2   PHOSPHORUS 10.3*  --  3.6 2.5   CALCIUM 6.7* 7.5* 7.7* 7.4*     Recent Labs     11/03/22 1955 11/03/22 2255 11/04/22 2023 11/05/22 0337 11/06/22 0515   ALTSGPT 26  --   --   --   --    ASTSGOT 71*  --   --   --   --    ALKPHOSPHAT 104  --   --   --   --    TBILIRUBIN 0.7  --   --   --   --    PREALBUMIN  --   --    --  12.1*  --    GLUCOSE 131*   < > 241* 292* 347*    < > = values in this interval not displayed.     Recent Labs     11/03/22 1955 11/04/22 0822 11/05/22 0337 11/05/22 1915 11/06/22 0515   WBC  --  10.3 6.6 5.7 7.0   NEUTSPOLYS  --  88.20* 79.30*  --  70.60   LYMPHOCYTES  --  1.70* 9.50*  --  15.10*   MONOCYTES  --  5.10 6.90  --  13.50*   EOSINOPHILS  --  0.00 0.00  --  0.00   BASOPHILS  --  0.00 0.00  --  0.00   ASTSGOT 71*  --   --   --   --    ALTSGPT 26  --   --   --   --    ALKPHOSPHAT 104  --   --   --   --    TBILIRUBIN 0.7  --   --   --   --      Recent Labs     11/03/22 1955 11/04/22 0822 11/05/22 0337 11/05/22 1915 11/06/22 0515   RBC  --    < > 3.52* 2.88* 2.81*   HEMOGLOBIN  --    < > 10.8* 8.8* 8.5*   HEMATOCRIT  --    < > 32.5* 26.3* 25.5*   PLATELETCT  --    < > 122* 103* 128*   PROTHROMBTM 28.1*  --   --   --   --    INR 2.84  --   --   --   --     < > = values in this interval not displayed.       Imaging  X-Ray:  I have personally reviewed the images and compared with prior images. and My impression is: Interstitial opacifications, left> right - stable CXR today    Assessment/Plan  * Septic shock with acute organ dysfunction due to methicillin susceptible Staphylococcus aureus (MSSA) ([facility])  Assessment & Plan  This is Septic shock Present on admission  SIRS criteria identified on my evaluation include: Fever, with temperature greater than 101 deg F, Tachycardia, with heart rate greater than 90 BPM and Tachypnea, with respirations greater than 20 per minute  Indicators of septic shock include: Severe sepsis present and persistent hypotension after 30 ml/kg completed.   Sources is: COVID-19 pneumonia and MSSA pneumonia  Sepsis protocol initiated  Crystalloid Fluid Administration: Patient has COVID-19 pneumonia, CHF w/ EF of 35-40%, and acute kidney injury w/ creatinine of 8.0 and CrCl of 10.3 mL/min, for these reasons it is unsafe for this patient to receive 30 mL/kg of fluid  Partial  Fluid Dose Given: 15 mL/kg per current body weight given at sending hospital, patient to be reassessed shortly after completion of partial fluid bolus to ensure adequacy of resuscitation  There are no antibiotics appropriate for this source of sepsis  Reassessment: I have reassessed the patient's hemodynamic status    Titrate norepinephrine to maintain MAP > 65  Fluid resuscitation - received 15 mL/kg  Lactate resolved    Change to ancef for MSSA    Hematoma  Assessment & Plan  Right Groin hematoma  Due to trialyis catheter and coagulopathy    Much better on 11/6  Follow clinically    Acute hypoxemic respiratory failure due to COVID-19 (HCC)- (present on admission)  Assessment & Plan  COVID-19 pneumonia & MSSA  Dexamethasone  Fluid balance - keep euvolemic, dialysis needs determined daily    No other therapies available due to MSSA    Ventilator dependent respiratory failure  Modify ventilator to optimize oxygenation and ventilation  HOB > 30  Chlorhexidine  Perform spontaneous breathing trial when able  Early mobility  Significant acidosis   - dialysis    All bundles are in place    Acute on chronic systolic congestive heart failure (HCC)- (present on admission)  Assessment & Plan  BNP 8230  Troponin 0.12    TTE  - Likely normal LV  - Dilated and reduced RV    Goal euvolemia    MYLA (acute kidney injury) (HCC)- (present on admission)  Assessment & Plan  Likely due to dehydration and COVID-19  Avoid nephrotoxins  Monitor electrolytes and replete as needed  Renally dose adjust medications  Strict I/O monitoring    Daily determination of dialysis  - none yesterday  - renal function improving  - making urine    Hyperkalemia- (present on admission)  Assessment & Plan  Improved with dialysis  Now making urine    Paroxysmal atrial fibrillation (HCC)- (present on admission)  Assessment & Plan  Chronic paroxysmal afib  Maximize electrolytes (keep K > 4.0 and Mag > 2.0)    Anticoagulation  - DC heparin infusion given hematoma  and coagulopathy  - Trend ability to restart       VTE:  Heparin gtt  Ulcer: H2 Antagonist  Lines: Central Line  Ongoing indication addressed, Arterial Line  Ongoing indication addressed, and Ball Catheter  Ongoing indication addressed    I have performed a physical exam and reviewed and updated ROS and Plan today (11/6/2022). In review of yesterday's note (11/5/2022), there are no changes except as documented above.     Discussed patient condition and risk of morbidity and/or mortality with RN, RT, Pharmacy, , and nephrology    The patient remains critically ill.  Critical care time = 74 minutes in directly providing and coordinating critical care and extensive data review.  No time overlap and excludes procedures.

## 2022-11-06 NOTE — PROCEDURES
Central Line Insertion    Date/Time: 11/6/2022 12:06 PM  Performed by: Harshil Manuel M.D.  Authorized by: Harshil Manuel M.D.     Consent:     Consent obtained:  Verbal    Consent given by:  Healthcare agent    Risks discussed:  Arterial puncture, incorrect placement, infection and pneumothorax  Pre-procedure details:     Hand hygiene: Hand hygiene performed prior to insertion      Sterile barrier technique: All elements of maximal sterile technique followed      Skin preparation:  ChloraPrep    Skin preparation agent: Skin preparation agent completely dried prior to procedure    Sedation:     Sedation type: Versed, Dilaudid.  Anesthesia:     Anesthesia method:  Local infiltration    Local anesthetic:  Lidocaine 1% w/o epi  Procedure details:     Location:  L internal jugular    Patient position:  Trendelenburg    Procedural supplies:  Triple lumen    Catheter size:  7.5 Fr    Landmarks identified: yes      Ultrasound guidance: yes      Sterile ultrasound techniques: Sterile gel and sterile probe covers were used      Number of attempts:  1    Successful placement: yes    Post-procedure details:     Post-procedure:  Dressing applied and line sutured    Guidewire: guidewire removal confirmed      Assessment:  Blood return through all ports, free fluid flow, placement verified by x-ray and no pneumothorax on x-ray    Patient tolerance of procedure:  Tolerated well, no immediate complications  Comments:        Vascular access was easily obtained.  There was difficulty passing the wire past ~18cm.  I tried numberous times to get the wire to pass smoothly past 18cm and it would not.  I did not appreciate the wire traversing the left subclavian vein, and suspected it was meeting resistance from his pacing wires.    I placed the line and indeed on CXR it appears likely the line hit the pacing wires and was redirected upwards.  There was only ~2-3cm of line redirected upwards.  I anticipated the same problem from a right  sided approach.  Therefore I removed the line a few cm and re-sutured it in place.    From the last CXR taken, I pulled the line back another cm or so.  It is therefor likely placed in SVC and is ok to use for now.      Guidewire is out and has been accounted for at the end of the procedure.  The line is OK to use.

## 2022-11-06 NOTE — CARE PLAN
Problem: Ventilation  Goal: Ability to achieve and maintain unassisted ventilation or tolerate decreased levels of ventilator support  Description: Target End Date:  4 days     Document on Vent flowsheet    1.  Support and monitor invasive and noninvasive mechanical ventilation  2.  Monitor ventilator weaning response  3.  Perform ventilator associated pneumonia prevention interventions  4.  Manage ventilation therapy by monitoring diagnostic test results  Outcome: Progressing      Ventilator Daily Summary    Vent Day # 3 8@ 26    APVCMV: 26/470/+8/50%    Weaning trials: no    Plan: Continue current ventilator settings and wean mechanical ventilation as tolerated per physician orders.

## 2022-11-06 NOTE — ASSESSMENT & PLAN NOTE
Right Groin hematoma  Due to trialyis catheter and coagulopathy  Much better on 11/6  Hematoma stable on exam 11/7. Soft, no increasing induration   11/8 benign exam on right groin.  Follow clinically

## 2022-11-06 NOTE — CARE PLAN
Problem: Safety - Medical Restraint  Goal: Remains free of injury from restraints (Restraint for Interference with Medical Device)  Flowsheets (Taken 11/5/2022 2051)  Addressed this shift: Remains free of injury from restraints (restraint for interference with medical device):   Determine that other, less restrictive measures have been tried or would not be effective before applying the restraint   Evaluate the patient's condition at the time of restraint application   Inform patient/family regarding the reason for restraint   Every 2 hours: Monitor safety, psychosocial status, comfort, nutrition and hydration   The patient is Unstable - High likelihood or risk of patient condition declining or worsening    Shift Goals  Clinical Goals: MAP > 65, wean ventilator, comfort and pain relief  Patient Goals: Unable to assess  Family Goals: Unable to assess

## 2022-11-07 NOTE — DISCHARGE PLANNING
Unable to assess pt as he is vented and isolated for COVID 19.     PC to son , Theo. He said that pt was living alone and independent with ADLs. He lives in a 2 story home with about 12 steps. Pt uses a cane normally. Pt no longer drives so Theo drives him to appointment or shopping. Otherwise, pt able to perform IADLs.     PCP is Amanda Hanson but Theo unable to confirm.     We discussed the possibility of pt needing to go so SNF prior to returning home. Theo is agreeable but asked that CM call him back when pt is ready.         Care Transition Team Assessment    Information Source  Orientation Level: Unable to assess  Information Given By: Other (Comments)  Informant's Name: son Theo  Who is responsible for making decisions for patient? : Adult child    Readmission Evaluation  Is this a readmission?: No    Elopement Risk  Legal Hold: No  Ambulatory or Self Mobile in Wheelchair: No-Not an Elopement Risk  Elopement Risk: Not at Risk for Elopement    Interdisciplinary Discharge Planning  Does Admitting Nurse Feel This Could be a Complex Discharge?: No  Primary Care Physician: Dr Amanda Hanson  Lives with - Patient's Self Care Capacity: Alone and Able to Care For Self  Support Systems: Children  Housing / Facility: 2 Story House  Do You Take your Prescribed Medications Regularly: Yes  Able to Return to Previous ADL's: Future Time w/Therapy  Mobility Issues: Yes  Prior Services: Home-Independent  Patient Prefers to be Discharged to:: SNF  Assistance Needed: Yes  Durable Medical Equipment: Other - Specify (cane)    Discharge Preparedness  What is your plan after discharge?: Skilled nursing facility  What are your discharge supports?: Child  Prior Functional Level: Ambulatory, Independent with Activities of Daily Living, Independent with Medication Management, Uses Walker  Difficulity with ADLs: Toileting, Walking, Dressing, Bathing  Difficulity with IADLs: Cooking, Driving, Keeping track of finances,  Shopping    Functional Assesment  Prior Functional Level: Ambulatory, Independent with Activities of Daily Living, Independent with Medication Management, Uses Walker    Finances  Financial Barriers to Discharge: No  Prescription Coverage: Yes    Vision / Hearing Impairment  Vision Impairment : Yes  Hearing Impairment : No    Values / Beliefs / Concerns  Values / Beliefs Concerns : No    Advance Directive  Advance Directive?: None    Discharge Risks or Barriers  Discharge risks or barriers?: Post-acute placement / services, Complex medical needs  Patient risk factors: Cognitive / sensory / physical deficit, Complex medical needs, Vulnerable adult    Anticipated Discharge Information  Discharge Disposition: D/T to SNF with Medicare cert in anticipation of skilled care (03)

## 2022-11-07 NOTE — PROGRESS NOTES
"[facility] -  PROGRESS NOTE               Author: Ponce Murguia M.D. Date & Time: 11/7/2022  10:47 AM     HPI:  This is a 77 y.o. male w/ h/o CHF, COPD, afib, DM who presented 11/4/2022 in transfer from [facility] where he presented with worsening shortness of breath and fatigue.  At [place] he was found to be COVID-positive, was initially started on BiPAP and then intubated, in acute kidney injury with a creatinine of 8.0 and potassium of 6.6.  He was transferred to [facility] for further evaluation and treatment. Here his Scr was 7.6 with a K+ of 7.6. Nephrology was asked to consult for MYLA and critical hyperkalemia.      This history was obtained from the medical records since patient is not able to communicate.    DAILY NEPHROLOGY SUMMARY:  11/5 - Scr down to 3.74. K+ is 5.0. Non-oliguric. Still on vent support.  11/6: Scr continues to trend down. Non-oliguric. ECHO: The left ventricle is not well visualized due to body habitus. Left ventricular systolic function is probably normal. The ejection fraction is roughly estimated to be 55%. A reliable estimation of diastolic function cannot be made due to mitral valve disease. Severely dilated right ventricle.Reduced right ventricular systolic function.  11/7: Remains on vent/ steroids    REVIEW OF SYSTEMS:    Unable to assess given on vent    PMH/PSH/SH/FH:   Reviewed and unchanged since admission note    CURRENT MEDICATIONS:   Reviewed from admission to present day    VS:  /58   Pulse 71   Temp 37.4 °C (99.3 °F)   Resp 20   Ht 1.829 m (6' 0.01\")   Wt 102 kg (224 lb 13.9 oz)   SpO2 95%   BMI 30.49 kg/m²     Physical Exam  Vitals and nursing note reviewed.   HENT:      Mouth/Throat:      Comments: ETT in place  Constitutional:       Appearance: He is ill-appearing.   HENT:      Head: Normocephalic and atraumatic.      Nose: Nose normal.   Eyes:      Conjunctiva/sclera: Conjunctivae normal. "   Cardiovascular:      Rate and Rhythm: Normal rate.      Pulses: Normal pulses.      Heart sounds: Normal heart sounds.   Pulmonary:      Effort: No respiratory distress.      Breath sounds: Rales present.   Abdominal:      General: Abdomen is flat. Bowel sounds are normal.      Palpations: Abdomen is soft.   Musculoskeletal:      Cervical back: Normal range of motion.      Right lower leg: Edema present.      Left lower leg: Edema present.   Skin:     General: Skin is warm.      Capillary Refill: Capillary refill takes less than 2 seconds.   Neurological:      Comments: Unable to assess   Psychiatric:      Comments: Unable to assess     Fluids:  In: 1444.1 [I.V.:489.1]  Out: 1950     LABS:  Recent Labs     11/05/22  0337 11/06/22  0515 11/06/22  1801   SODIUM 136 141 139   POTASSIUM 5.0 5.5 4.9   CHLORIDE 100 106 104   CO2 20 19* 21   GLUCOSE 292* 347* 345*   BUN 67* 86* 90*   CREATININE 3.74* 2.96* 2.38*   CALCIUM 7.7* 7.4* 7.5*         IMAGING:   All imaging reviewed from admission to present day    IMPRESSION:  # MYLA - etiology is not clear at this time but suspect sec to Septic ATN  -non 0liguric  -progressive azotemia second to steroids  # Septic shock  # Acute hypoxemic respiratory failure due to COVID-19  -remains on vent  # Acidosis  # PAfib  # ? CHF  # Anemia  # Hyperphosphatemia  # Hypocalcemia     PLAN:  - No further need for RRT for now.   - Daily evaluation for RRT needs  - decrease steroids as tolerated  - vent per ICU  - labs in am

## 2022-11-07 NOTE — CARE PLAN
Problem: Safety - Medical Restraint  Goal: Remains free of injury from restraints (Restraint for Interference with Medical Device)  Flowsheets (Taken 11/6/2022 3756)  Addressed this shift: Remains free of injury from restraints (restraint for interference with medical device):   Determine that other, less restrictive measures have been tried or would not be effective before applying the restraint   Evaluate the patient's condition at the time of restraint application   Inform patient/family regarding the reason for restraint   Every 2 hours: Monitor safety, psychosocial status, comfort, nutrition and hydration   The patient is Unstable - High likelihood or risk of patient condition declining or worsening    Shift Goals  Clinical Goals: hemodynamic stability  Patient Goals: comfort  Family Goals: information

## 2022-11-07 NOTE — DIETARY
Nutrition Services Brief Update:    Tube feed with Novasource Renal was running at goal rate of 45 ml/hour, however, tube became clogged. NG removed and unable to be replaced per nursing. If able to extubate soon, recommend advance diet per MD. If unable to extubate, recommend reconsider nutrition support.    Problem: Nutritional:  Goal: Nutrition support tolerated and meeting greater than 85% of estimated needs  Outcome: Not met    RD following

## 2022-11-07 NOTE — PROGRESS NOTES
"Critical Care Progress Note    Date of admission  11/4/2022    Chief Complaint  \"77 y.o. male w/ h/o CHF, COPD, afib, DM who presented 11/4/2022 in transfer from [facility] where he presented today with worsening shortness of breath and fatigue.  At [place] he was found to be COVID-positive, was initially started on BiPAP and then intubated, in acute kidney injury with a creatinine of 8.0 and potassium of 6.6.  He was reportedly treated with calcium gluconate and insulin/glucose and then transferred to [facility] for further evaluation and treatment.     On arrival the patient was intubated and sedated and I was unable to gain any additional history other than by review of the outside hospital records.\" - Dr Scott    Hospital Course  11/3 - Taken to [facility]  11/4 - Transfer to [facility], intubation / lines placed  11/5 - Coagulopathic, hematoma forming in right thigh.     Interval Problem Update  Reviewed last 24 hour events:  Remains critically ill  Neuro: sedation with dexmedetomidine. Fentanyl gtt.   - Keep off sedation.   - Fentanyl IV prn only     Cardiac: Off pressor overnight at 9pm  - HR in 60-70s, paced. MAP >65    Resp: on full vent support, FiO2 45%, PEEP 10  - Daily SAT/SBT/mobility    Renal;   - Creatinine 2.38 (improving from 2.9).   - HCO3 improving at 21  - I/O even in the past 24 hours, +2L since admission. Good UOP    ID: afebrile. On cefazolin for MSSA pneumonia  - Resp culture + MSSA   - Blood cultures negative to date  - on dexamethasone 6mg daily for 10 days    GI: tolerating tube feed at goal.   Endo: -300s. On ISS  Heme: no issues      Intake / Output    Intake/Output Summary (Last 24 hours) at 11/7/2022 0650  Last data filed at 11/7/2022 0200  Gross per 24 hour   Intake 1341.53 ml   Output 1750 ml   Net -408.47 ml       Net IO Since Admission: 3.41 mL [11/04/22 0615]     Review of Systems  Review of Systems   Unable to perform ROS: Intubated      Vital " Signs for last 24 hours   Pulse:  [] 72  Resp:  [14-31] 18  BP: ()/(54-94) 124/67  SpO2:  [91 %-99 %] 97 %    Hemodynamic parameters for last 24 hours       Respiratory Information for the last 24 hours  Vent Mode: APVCMV  Rate (breaths/min): 22  Vt Target (mL): 470  PEEP/CPAP: 10  P Support: 5  MAP: 12  Length of Weaning Trial (Hours): 4  Control VTE (exp VT): 562    Physical Exam   Physical Exam  Vitals and nursing note reviewed. Exam conducted with a chaperone present.   Constitutional:       Appearance: He is obese. He is ill-appearing.      Interventions: He is sedated and intubated.   HENT:      Head: Normocephalic.      Mouth/Throat:      Mouth: Mucous membranes are moist.   Eyes:      Extraocular Movements: Extraocular movements intact.   Cardiovascular:      Rate and Rhythm: Normal rate. Rhythm irregular.      Pulses: Normal pulses.   Pulmonary:      Effort: He is intubated.      Breath sounds: Rhonchi present.   Abdominal:      General: There is no distension.      Palpations: Abdomen is soft.      Tenderness: There is no abdominal tenderness. There is no guarding or rebound.   Musculoskeletal:      Cervical back: Normal range of motion and neck supple.      Right lower leg: Edema present.      Left lower leg: Edema present.   Skin:     General: Skin is warm and dry.      Capillary Refill: Capillary refill takes less than 2 seconds.   Neurological:      Comments: RASS -4       Medications  Current Facility-Administered Medications   Medication Dose Route Frequency Provider Last Rate Last Admin    insulin GLARGINE (Lantus,Semglee) injection  15 Units Subcutaneous QAM INSULIN Harshil Manuel M.D.   15 Units at 11/07/22 0631    ipratropium-albuterol (DUONEB) nebulizer solution  3 mL Nebulization 4X/DAY Harshil Manuel M.D.        fentaNYL (SUBLIMAZE) injection 100 mcg  100 mcg Intravenous Q15 MIN PRN Harshil Manuel M.D.   100 mcg at 11/06/22 2226    And    fentaNYL (SUBLIMAZE) injection 200 mcg  200  mcg Intravenous Q15 MIN PRN Harshil Manuel M.D.        And    fentaNYL (SUBLIMAZE) 50 mcg/mL in 50mL (Continuous Infusion)   Intravenous Continuous Harshil Manuel M.D. 1 mL/hr at 11/06/22 0710 50 mcg/hr at 11/06/22 0710    And    dexmedetomidine (PRECEDEX) 400 mcg/100mL NS premix infusion  0-1.5 mcg/kg/hr Intravenous Continuous Harshil Manuel M.D. 13.4 mL/hr at 11/07/22 0014 0.5 mcg/kg/hr at 11/07/22 0014    ceFAZolin in dextrose (ANCEF) IVPB premix 1 g  1 g Intravenous Q24HRS Harshil Manuel M.D.   Stopped at 11/06/22 1813    Nozin nasal  swab  1 Applicator Each Nostril BID Harshil Manuel M.D.   1 Applicator at 11/06/22 1744    Respiratory Therapy Consult   Nebulization Continuous RT Earl Scott M.D.        senna-docusate (PERICOLACE or SENOKOT S) 8.6-50 MG per tablet 2 Tablet  2 Tablet Enteral Tube BID Earl Scott M.D.   2 Tablet at 11/06/22 1744    And    polyethylene glycol/lytes (MIRALAX) PACKET 1 Packet  1 Packet Enteral Tube QDAY PRN Earl Scott M.D.        And    bisacodyl (DULCOLAX) suppository 10 mg  10 mg Rectal QDAY PRN Earl Scott M.D.        lidocaine (XYLOCAINE) 1 % injection 2 mL  2 mL Tracheal Tube Q30 MIN PRN Earl Scott M.D.        Pharmacy Consult: Enteral tube insertion - review meds/change route/product selection  1 Each Other PHARMACY TO DOSE Earl Scott M.D.        ipratropium-albuterol (DUONEB) nebulizer solution  3 mL Nebulization Q2HRS PRN (RT) Earl Scott M.D.   3 mL at 11/04/22 0515    insulin regular (HumuLIN R,NovoLIN R) injection  1-6 Units Subcutaneous Q6HRS Earl Scott M.D.   1 Units at 11/07/22 0632    And    dextrose 10 % BOLUS 25 g  25 g Intravenous Q15 MIN PRN Earl Scott M.D.        ondansetron (ZOFRAN) syringe/vial injection 4 mg  4 mg Intravenous Q4HRS PRN Earl Scott M.D.        norepinephrine (Levophed) 8 mg in 250 mL NS infusion (premix)  0-30 mcg/min Intravenous Continuous Earl Scott,  M.D.   Paused at 11/06/22 2100    dexamethasone (DECADRON) injection 6 mg  6 mg Intravenous DAILY Earl Scott M.D.   6 mg at 11/07/22 0626    vasopressin (Vasostrict) 20 Units in  mL Infusion  0.03 Units/min Intravenous Continuous Harshil Manuel M.D.   Stopped at 11/06/22 0835    heparin injection 2,800 Units  2,800 Units Intracatheter ACUTE DIALYSIS PRN Bre Partida D.O.   2,800 Units at 11/04/22 1000    famotidine (PEPCID) tablet 10 mg  10 mg Enteral Tube Q24HRS Harshil Manuel M.D.   10 mg at 11/05/22 0547    Or    famotidine (PEPCID) injection 10 mg  10 mg Intravenous Q24HRS Harshil Manuel M.D.   10 mg at 11/07/22 0627       Fluids    Intake/Output Summary (Last 24 hours) at 11/7/2022 0650  Last data filed at 11/7/2022 0200  Gross per 24 hour   Intake 1341.53 ml   Output 1750 ml   Net -408.47 ml         Laboratory  Recent Labs     11/05/22  0447 11/06/22  0531   ISTATAPH 7.399* 7.436   ISTATAPCO2 36.6 34.9   ISTATAPO2 83 96*   ISTATATCO2 24 25   [id number] 96 98   ISTATARTHCO3 22.6 23.5   ISTATARTBE -2 -1   ISTATTEMP 99.0 F 99.7 F   ISTATFIO2 50 50   ISTATSPEC Arterial Arterial   ISTATAPHTC 7.396* 7.427   [id number] 84 100*             Recent Labs     11/05/22  0337 11/06/22  0515 11/06/22  1801   SODIUM 136 141 139   POTASSIUM 5.0 5.5 4.9   CHLORIDE 100 106 104   CO2 20 19* 21   BUN 67* 86* 90*   CREATININE 3.74* 2.96* 2.38*   MAGNESIUM 1.8 2.2 2.2   PHOSPHORUS 3.6 2.5  --    CALCIUM 7.7* 7.4* 7.5*       Recent Labs     11/05/22  0337 11/06/22  0515 11/06/22  1801   PREALBUMIN 12.1*  --   --    GLUCOSE 292* 347* 345*       Recent Labs     11/04/22 0822 11/05/22 0337 11/05/22 1915 11/06/22 0515   WBC 10.3 6.6 5.7 7.0   NEUTSPOLYS 88.20* 79.30*  --  70.60   LYMPHOCYTES 1.70* 9.50*  --  15.10*   MONOCYTES 5.10 6.90  --  13.50*   EOSINOPHILS 0.00 0.00  --  0.00   BASOPHILS 0.00 0.00  --  0.00       Recent Labs     11/05/22 0337 11/05/22 1915 11/06/22 0515   RBC 3.52* 2.88* 2.81*    HEMOGLOBIN 10.8* 8.8* 8.5*   HEMATOCRIT 32.5* 26.3* 25.5*   PLATELETCT 122* 103* 128*         Imaging  X-Ray:  I have personally reviewed the images and compared with prior images. and My impression is: Interstitial opacifications, left> right - stable CXR today    Assessment/Plan  * Septic shock with acute organ dysfunction due to methicillin susceptible Staphylococcus aureus (MSSA) ([facility])  Assessment & Plan  This is Septic shock Present on admission  SIRS criteria identified on my evaluation include: Fever, with temperature greater than 101 deg F, Tachycardia, with heart rate greater than 90 BPM and Tachypnea, with respirations greater than 20 per minute  Indicators of septic shock include: Severe sepsis present and persistent hypotension after 30 ml/kg completed.   Sources is: COVID-19 pneumonia and MSSA pneumonia  Sepsis protocol initiated  Crystalloid Fluid Administration: Patient has COVID-19 pneumonia, CHF w/ EF of 35-40%, and acute kidney injury w/ creatinine of 8.0 and CrCl of 10.3 mL/min, for these reasons it is unsafe for this patient to receive 30 mL/kg of fluid  Partial Fluid Dose Given: 15 mL/kg per current body weight given at [facility], patient to be reassessed shortly after completion of partial fluid bolus to ensure adequacy of resuscitation  There are no antibiotics appropriate for this source of sepsis  Reassessment: I have reassessed the patient's hemodynamic status    Titrate norepinephrine to maintain MAP > 65  Fluid resuscitation - received 15 mL/kg  Lactate resolved  On cefazolin. Treat till end date 11/13    Paroxysmal atrial fibrillation (HCC)- (present on admission)  Assessment & Plan  Chronic paroxysmal afib  Maximize electrolytes (keep K > 4.0 and Mag > 2.0)  Heparin gtt was stopped due to hematoma.   Will start lovenox as DVT prophx.   CHADVasc score is at least 5.   Given his high risk of bleeding, will hold for full anticoagulation for now      Hematoma  Assessment &  Plan  Right Groin hematoma  Due to trialyis catheter and coagulopathy  Much better on 11/6  Hematoma stable on exam. Soft, no increasing induration   Follow clinically    Acute on chronic systolic congestive heart failure (HCC)- (present on admission)  Assessment & Plan  BNP 8230  Troponin 0.12    TTE  - Likely normal LV  - Dilated and reduced RV    Goal euvolemia    Hyperkalemia- (present on admission)  Assessment & Plan  Resolved. Makes urine.     MYLA (acute kidney injury) (HCC)- (present on admission)  Assessment & Plan  Likely due to dehydration and COVID-19  Improving overall    Plan:   Avoid nephrotoxins  Monitor electrolytes and replete as needed  Renally dose adjust medications  Strict I/O monitoring    Acute hypoxemic respiratory failure due to COVID-19 (HCC)- (present on admission)  Assessment & Plan  COVID-19 pneumonia & MSSA  On full vent support, minimal setting    Plan:   Daily SAT/SBT  Keep off sedation. Goal RASS 0  Dexamethasone 6mg daily for 10 days  Continue cefazolin x7 days (end date 11/13)  Early mobility       VTE:  Heparin gtt  Ulcer: H2 Antagonist  Lines: Central Line  Ongoing indication addressed, Arterial Line  Ongoing indication addressed, and Ball Catheter  Ongoing indication addressed    I have performed a physical exam and reviewed and updated ROS and Plan today (11/7/2022). In review of yesterday's note (11/6/2022), there are no changes except as documented above.     Discussed patient condition and risk of morbidity and/or mortality with RN, RT, Pharmacy, , and nephrology    The patient remains critically ill.  Critical care time = 80 minutes in directly providing and coordinating critical care and extensive data review.  No time overlap and excludes procedures.

## 2022-11-07 NOTE — CARE PLAN
Problem: Ventilation  Goal: Ability to achieve and maintain unassisted ventilation or tolerate decreased levels of ventilator support  Description: Target End Date:  4 days     Document on Vent flowsheet    1.  Support and monitor invasive and noninvasive mechanical ventilation  2.  Monitor ventilator weaning response  3.  Perform ventilator associated pneumonia prevention interventions  4.  Manage ventilation therapy by monitoring diagnostic test results  Outcome: Not Met      Ventilator Daily Summary    Vent Day #3    Ventilator settings changed this shift: FiO2 decreased (24/470/+8/40%)    Weaning trials: SBT x 1 (4 hours)    Respiratory Procedures: No    Plan: Continue current ventilator settings and wean mechanical ventilation as tolerated per physician orders.

## 2022-11-07 NOTE — CARE PLAN
Problem: Ventilation  Goal: Ability to achieve and maintain unassisted ventilation or tolerate decreased levels of ventilator support  Description: Target End Date:  4 days     Document on Vent flowsheet    1.  Support and monitor invasive and noninvasive mechanical ventilation  2.  Monitor ventilator weaning response  3.  Perform ventilator associated pneumonia prevention interventions  4.  Manage ventilation therapy by monitoring diagnostic test results  Outcome: Not Met      Ventilator Daily Summary    Vent Day #4    Ventilator settings changed this shift: no    Weaning trials: no    Respiratory Procedures: No    Plan: Continue current ventilator settings and wean mechanical ventilation as tolerated per physician orders.

## 2022-11-07 NOTE — PROGRESS NOTES
After working with detangler, shampoo and a comb it is obvious that the matted hair to patient back of head puts him at risk for developing a pressure injury. I updated his son Binh and he was okay for us to remove the matted portion of patient hair if medically necessary. Pt unable to participate fully in understanding although I did explain the situation to the patient.

## 2022-11-07 NOTE — CARE PLAN
The patient is Watcher - Medium risk of patient condition declining or worsening    Shift Goals  Clinical Goals: hemodynamic stability  Patient Goals: comfort  Family Goals: information    Progress made toward(s) clinical / shift goals:    Problem: Safety - Medical Restraint  Goal: Remains free of injury from restraints (Restraint for Interference with Medical Device)  Outcome: Progressing  Monitoring need for restraint, safety of restraints,      Problem: Skin Integrity  Goal: Skin integrity is maintained or improved  Outcome: Progressing   Skin will be assessed frequently for breakdown and pt will remain clean, dry, and intact. All listed wounds will be assessed. Pressure ulcer prevention will be utilized when appropriate & based on Pt stability      Patient is not progressing towards the following goals:

## 2022-11-07 NOTE — CARE PLAN
Problem: Ventilation  Goal: Ability to achieve and maintain unassisted ventilation or tolerate decreased levels of ventilator support  Description: Target End Date:  4 days     Document on Vent flowsheet    1.  Support and monitor invasive and noninvasive mechanical ventilation  2.  Monitor ventilator weaning response  3.  Perform ventilator associated pneumonia prevention interventions  4.  Manage ventilation therapy by monitoring diagnostic test results  Outcome: Progressing                                       Ventilator Daily Summary     Vent Day #4     APVCMV 20/470/+8/40%     Weaning trials: Spont since 1030 am on 5/8 40%        Plan: Continue current ventilator settings and wean mechanical ventilation as tolerated per physician orders

## 2022-11-08 PROBLEM — E87.0 HYPERNATREMIA: Status: ACTIVE | Noted: 2022-01-01

## 2022-11-08 NOTE — ASSESSMENT & PLAN NOTE
Sodium was in 151, started on D5W  Sodium normalized in 144 today  On D5W gtt to 125cc/hr. Will discontinue and change to LR at 75cc/hr  Appreciate nephrology/pharmacy

## 2022-11-08 NOTE — PROGRESS NOTES
Critical Care Progress Note    Date of admission  11/4/2022    Chief Complaint  77 y.o. male w/ h/o CHF, ? COPD (never a smoker), paroxysmal afib (on coumadin), DM, s/p MVR bioprosthetic in 2012 at [facility] by Dr. Lechuga who presented 11/4/2022, a transfer from [facility] for acute resp failure, COVID positive and MYLA.     Hospital Course  11/3 - Taken to [facility]  11/4 - Transfer to [facility], intubation / lines placed  11/5 - Coagulopathic, hematoma forming in right thigh. Heparin gtt was topped  11/7 - tolerating SBT, off sedation (both precedex and fentanyl). Remains encephalopathic  11/8 - extubated    Interval Problem Update  Reviewed last 24 hour events:  Remains critically ill  Neuro: off sedation on 11/7 am.   - alert, but not following commands for the most part though intermittently were following. Nonfocal. Flat affect  - Earlier this am there was concern of left upper ext weakness, CTH non con negative.    - Fentanyl IV prn only     Cardiac: Remains off pressor. Last was on 11/6. HR in 60-70s, paced. MAP >65  Resp: doing well on SBT.   - Extubated this am, on 5L NC.     Renal;   - Creatinine continues to improve at 2.02   - HCO3 improving at 25  - Sodium 151 (increasing from 146)  - I/O negative 1.5L in the past 24hours, +1.1L since admission     ID: afebrile. On cefazolin for MSSA pneumonia  - Resp culture + MSSA   - Blood cultures negative to date  - on dexamethasone 6mg daily for 10 days (end date 11/13)    GI: tolerating tube feed at goal.   Endo: -300s. On ISS  Heme: no issues. Right groin hematoma stable.   - INR 1.43 subtherapeutic      Intake / Output    Intake/Output Summary (Last 24 hours) at 11/8/2022 0913  Last data filed at 11/8/2022 0600  Gross per 24 hour   Intake 744.4 ml   Output 2155 ml   Net -1410.6 ml       Net IO Since Admission: 3.41 mL [11/04/22 0615]     Review of Systems  Review of Systems   Unable to perform ROS: Intubated      Vital Signs for last 24 hours   Pulse:   [] 69  Resp:  [15-23] 19  BP: (127-176)/(63-98) 144/85  SpO2:  [93 %-100 %] 100 %    Hemodynamic parameters for last 24 hours       Respiratory Information for the last 24 hours  Vent Mode: Spont  PEEP/CPAP: 8  P Support: 5  MAP: 10    Physical Exam   Physical Exam  Vitals and nursing note reviewed. Exam conducted with a chaperone present.   Constitutional:       Appearance: He is obese. He is ill-appearing.      Interventions: He is sedated and intubated.   HENT:      Head: Normocephalic.      Mouth/Throat:      Comments: ET tube in place  Cardiovascular:      Rate and Rhythm: Normal rate. Rhythm irregular.      Pulses: Normal pulses.      Comments: Paced   Pulmonary:      Effort: Pulmonary effort is normal. No respiratory distress. He is intubated.      Breath sounds: Rhonchi present. No wheezing.   Abdominal:      General: There is no distension.      Palpations: Abdomen is soft.      Tenderness: There is no abdominal tenderness. There is no guarding.   Musculoskeletal:      Cervical back: Neck supple.      Right lower leg: Edema present.      Left lower leg: Edema present.   Skin:     General: Skin is warm and dry.      Capillary Refill: Capillary refill takes less than 2 seconds.   Neurological:      Comments: RASS 0 but intermittently following commands.     Pt was re-evaluated post intubation. Mental status the same       Medications  Current Facility-Administered Medications   Medication Dose Route Frequency Provider Last Rate Last Admin    phosphorus (K-Phos-Neutral) per tablet 500 mg  500 mg Enteral Tube Q6HRS Kalpesh Cox D.O.        [START ON 11/9/2022] insulin GLARGINE (Lantus,Semglee) injection  20 Units Subcutaneous QAM INSULIN Kalpesh Cox D.O.        insulin GLARGINE (Lantus,Semglee) injection  5 Units Subcutaneous Once Kalpesh Cox D.O.        insulin regular (HumuLIN R,NovoLIN R) injection  2-9 Units Subcutaneous Q6HRS PEEWEE SosaO.   3 Units at 11/08/22 0448    And    dextrose 10 % BOLUS  25 g  25 g Intravenous Q15 MIN PRN PEEWEE SosaOMichaela        ceFAZolin in dextrose (ANCEF) IVPB premix 1 g  1 g Intravenous Q12HRS PEEWEE SosaO.   Stopped at 11/08/22 0353    enoxaparin (Lovenox) inj 40 mg  40 mg Subcutaneous DAILY AT 1800 KEATON Sosa.O.   40 mg at 11/07/22 1814    HYDROmorphone (Dilaudid) injection 0.5 mg  0.5 mg Intravenous Q HOUR PRN KEATON Sosa.O.   0.5 mg at 11/07/22 1629    Or    HYDROmorphone (Dilaudid) injection 1 mg  1 mg Intravenous Q HOUR PRN KEATON Sosa.O.   1 mg at 11/08/22 0433    labetalol (NORMODYNE/TRANDATE) injection 10 mg  10 mg Intravenous Q4HRS PRN Kimberli BHAGAT Latona        oxyCODONE immediate-release (ROXICODONE) tablet 5 mg  5 mg Enteral Tube Q4HRS PRN Kimberli BHAGAT Latona   5 mg at 11/08/22 0525    ipratropium-albuterol (DUONEB) nebulizer solution  3 mL Nebulization 4X/DAY Harshil Manuel M.D.   3 mL at 11/08/22 0632    Nozin nasal  swab  1 Applicator Each Nostril BID Harshil Manuel M.D.   1 Applicator at 11/08/22 0432    Respiratory Therapy Consult   Nebulization Continuous RT Earl Scott M.D.        senna-docusate (PERICOLACE or SENOKOT S) 8.6-50 MG per tablet 2 Tablet  2 Tablet Enteral Tube BID Earl Scott M.D.   2 Tablet at 11/08/22 0432    And    polyethylene glycol/lytes (MIRALAX) PACKET 1 Packet  1 Packet Enteral Tube QDAY PRN Earl Scott M.D.   1 Packet at 11/07/22 1815    And    bisacodyl (DULCOLAX) suppository 10 mg  10 mg Rectal QDAY PRN Earl Scott M.D.        lidocaine (XYLOCAINE) 1 % injection 2 mL  2 mL Tracheal Tube Q30 MIN PRN Earl Scott M.D.        Pharmacy Consult: Enteral tube insertion - review meds/change route/product selection  1 Each Other PHARMACY TO DOSE Earl Scott M.D.        ipratropium-albuterol (DUONEB) nebulizer solution  3 mL Nebulization Q2HRS PRN (RT) Earl Scott M.D.   3 mL at 11/04/22 0515    ondansetron (ZOFRAN) syringe/vial injection 4 mg  4 mg Intravenous Q4HRS PRN Earl MIKE  PAUL Scott        dexamethasone (DECADRON) injection 6 mg  6 mg Intravenous DAILY Earl Scott M.D.   6 mg at 11/08/22 0432    heparin injection 2,800 Units  2,800 Units Intracatheter ACUTE DIALYSIS PRSERENITY Partida D.O.   2,800 Units at 11/04/22 1000    famotidine (PEPCID) tablet 10 mg  10 mg Enteral Tube Q24HRS Harshil Manuel M.D.   10 mg at 11/08/22 0432    Or    famotidine (PEPCID) injection 10 mg  10 mg Intravenous Q24HRS Harshil Manuel M.D.   10 mg at 11/07/22 0627       Fluids    Intake/Output Summary (Last 24 hours) at 11/8/2022 0913  Last data filed at 11/8/2022 0600  Gross per 24 hour   Intake 744.4 ml   Output 2155 ml   Net -1410.6 ml         Laboratory  Recent Labs     11/06/22  0531   ISTATAPH 7.436   ISTATAPCO2 34.9   ISTATAPO2 96*   ISTATATCO2 25   CDQMLRV7JWB 98   ISTATARTHCO3 23.5   ISTATARTBE -1   ISTATTEMP 99.7 F   ISTATFIO2 50   ISTATSPEC Arterial   ISTATAPHTC 7.427   LOQMRUCF4BW 100*             Recent Labs     11/06/22  0515 11/06/22  1801 11/07/22  0945 11/08/22  0445   SODIUM 141 139 146* 151*   POTASSIUM 5.5 4.9 5.1 5.0   CHLORIDE 106 104 110 115*   CO2 19* 21 20 25   BUN 86* 90* 93* 89*   CREATININE 2.96* 2.38* 2.27* 2.02*   MAGNESIUM 2.2 2.2 2.3 2.3   PHOSPHORUS 2.5  --  2.4* 1.8*   CALCIUM 7.4* 7.5* 8.0* 8.4*       Recent Labs     11/06/22  1801 11/07/22  0945 11/08/22  0445   ALTSGPT  --  9  --    ASTSGOT  --  23  --    ALKPHOSPHAT  --  70  --    TBILIRUBIN  --  0.4  --    PREALBUMIN  --  14.2*  --    GLUCOSE 345* 271* 260*       Recent Labs     11/06/22  0515 11/07/22  0945 11/08/22  0533   WBC 7.0 12.9* 10.2   NEUTSPOLYS 70.60 73.20* 66.40   LYMPHOCYTES 15.10* 9.80* 11.70*   MONOCYTES 13.50* 14.90* 19.20*   EOSINOPHILS 0.00 0.00 0.00   BASOPHILS 0.00 0.20 0.30   ASTSGOT  --  23  --    ALTSGPT  --  9  --    ALKPHOSPHAT  --  70  --    TBILIRUBIN  --  0.4  --        Recent Labs     11/06/22  0515 11/07/22  0945 11/08/22  0533   RBC 2.81* 2.63* 2.52*   HEMOGLOBIN 8.5* 7.9*  7.6*   HEMATOCRIT 25.5* 24.9* 24.1*   PLATELETCT 128* 158* 181         Imaging  X-Ray:  I have personally reviewed the images and compared with prior images. and My impression is: Interstitial opacifications, left> right - stable CXR today    Assessment/Plan  * Septic shock with acute organ dysfunction due to methicillin susceptible Staphylococcus aureus (MSSA) (HCC)  Assessment & Plan  This is Septic shock Present on admission  SIRS criteria identified on my evaluation include: Fever, with temperature greater than 101 deg F, Tachycardia, with heart rate greater than 90 BPM and Tachypnea, with respirations greater than 20 per minute  Indicators of septic shock include: Severe sepsis present and persistent hypotension after 30 ml/kg completed.   Sources is: COVID-19 pneumonia and MSSA pneumonia  Sepsis protocol initiated  Crystalloid Fluid Administration: Patient has COVID-19 pneumonia, CHF w/ EF of 35-40%, and acute kidney injury w/ creatinine of 8.0 and CrCl of 10.3 mL/min, for these reasons it is unsafe for this patient to receive 30 mL/kg of fluid  Partial Fluid Dose Given: 15 mL/kg per current body weight given at [facility] hospital, patient to be reassessed shortly after completion of partial fluid bolus to ensure adequacy of resuscitation  There are no antibiotics appropriate for this source of sepsis  Reassessment: I have reassessed the patient's hemodynamic status    S/p fluid resuscitation and pressor  Off pressor since 11/6   Lactate resolved  On cefazolin for MSSA pneumonia. Treat till end date 11/13  On dexamethasone for COVID. Treat for 10 days.     Paroxysmal atrial fibrillation (HCC)- (present on admission)  Assessment & Plan  Chronic paroxysmal afib  Maximize electrolytes (keep K > 4.0 and Mag > 2.0)  Heparin gtt was stopped due to hematoma.   Will start lovenox as DVT prophx.   CHADVasc score is at least 5.   Will resume heparin gtt and resume coumadin      Hypernatremia  Assessment & Plan  Increasing  sodium in the past 2 days.   Will start D5W gtt. Serial sodium  Nephrology following     Hematoma  Assessment & Plan  Right Groin hematoma  Due to trialyis catheter and coagulopathy  Much better on 11/6  Hematoma stable on exam 11/7. Soft, no increasing induration   11/8 benign exam on right groin.  Follow clinically    Acute on chronic systolic congestive heart failure (HCC)- (present on admission)  Assessment & Plan  BNP 8230  Troponin 0.12    TTE  - Likely normal LV  - Dilated and reduced RV    Goal euvolemia    Hyperkalemia- (present on admission)  Assessment & Plan  Resolved. Makes urine.     MYLA (acute kidney injury) (HCC)- (present on admission)  Assessment & Plan  Likely due to dehydration and COVID-19  Improving overall    Plan:   Avoid nephrotoxins  Monitor electrolytes and replete as needed  Renally dose adjust medications  Strict I/O monitoring    Acute hypoxemic respiratory failure due to COVID-19 (HCC)- (present on admission)  Assessment & Plan  COVID-19 pneumonia & MSSA  On full vent support, minimal setting  11/7 off sedation   11/8 Did well on SAT/SBT, extubated.     Plan:   Dexamethasone 6mg daily for 10 days  Continue cefazolin x7 days (end date 11/13)  Early mobility. PT/OT       VTE:  Heparin gtt  Ulcer: H2 Antagonist  Lines: Central Line  Ongoing indication addressed, Arterial Line  Ongoing indication addressed, and Ball Catheter  Ongoing indication addressed    I have performed a physical exam and reviewed and updated ROS and Plan today (11/8/2022). In review of yesterday's note (11/7/2022), there are no changes except as documented above.     Discussed patient condition and risk of morbidity and/or mortality with RN, RT, Pharmacy, , and nephrology    The patient remains critically ill.  Critical care time = 60 minutes in directly providing and coordinating critical care and extensive data review.  No time overlap and excludes procedures.

## 2022-11-08 NOTE — CARE PLAN
The patient is Watcher - Medium risk of patient condition declining or worsening    Shift Goals  Clinical Goals: stable hemodynaims, remain off sedation  Patient Goals: ALLIE  Family Goals: ALLIE    Pt has been hemodynamically stable while off pressors. Has been off sedation since this AM. Has IV dilaudid PRN for pain management.      Progress made toward(s) clinical / shift goals:    Problem: Skin Integrity  Goal: Skin integrity is maintained or improved  Outcome: Progressing     Problem: Fall Risk  Goal: Patient will remain free from falls  Outcome: Progressing     Problem: Pain - Standard  Goal: Alleviation of pain or a reduction in pain to the patient’s comfort goal  Outcome: Progressing       Patient is not progressing towards the following goals:  Problem: Knowledge Deficit - Standard  Goal: Patient and family/care givers will demonstrate understanding of plan of care, disease process/condition, diagnostic tests and medications  Outcome: Progressing

## 2022-11-08 NOTE — CARE PLAN
The patient is Watcher - Medium risk of patient condition declining or worsening    Shift Goals  Clinical Goals: stable hemodynaims, remain off sedation  Patient Goals: ALLIE  Family Goals: ALLIE    Progress made toward(s) clinical / shift goals:    Problem: Safety - Medical Restraint  Goal: Remains free of injury from restraints (Restraint for Interference with Medical Device)  Outcome: Met  Goal: Free from restraint(s) (Restraint for Interference with Medical Device)  Outcome: Met     Problem: Pain - Standard  Goal: Alleviation of pain or a reduction in pain to the patient’s comfort goal  Outcome: Progressing       Patient is not progressing towards the following goals:

## 2022-11-08 NOTE — PROGRESS NOTES
Monitor summary:  Sinus rhythm, atrial and ventricular pacing with PVCs. Agree with monitor reading.

## 2022-11-08 NOTE — PROGRESS NOTES
"[facility] -  PROGRESS NOTE               Author: Ponce Murguia M.D. Date & Time: 11/8/2022  11:10 AM     HPI:  This is a 77 y.o. male w/ h/o CHF, COPD, afib, DM who presented 11/4/2022 in transfer from [facility] where he presented with worsening shortness of breath and fatigue.  At [place] he was found to be COVID-positive, was initially started on BiPAP and then intubated, in acute kidney injury with a creatinine of 8.0 and potassium of 6.6.  He was transferred to [facility] for further evaluation and treatment. Here his Scr was 7.6 with a K+ of 7.6. Nephrology was asked to consult for MYAL and critical hyperkalemia.      This history was obtained from the medical records since patient is not able to communicate.    DAILY NEPHROLOGY SUMMARY:  11/5 - Scr down to 3.74. K+ is 5.0. Non-oliguric. Still on vent support.  11/6: Scr continues to trend down. Non-oliguric. ECHO: The left ventricle is not well visualized due to body habitus. Left ventricular systolic function is probably normal. The ejection fraction is roughly estimated to be 55%. A reliable estimation of diastolic function cannot be made due to mitral valve disease. Severely dilated right ventricle.Reduced right ventricular systolic function.  11/7: Remains on vent/ steroids  11/8: extubated, but remains confused    REVIEW OF SYSTEMS:    Unable to assess given mentals tatus    PMH/PSH/SH/FH:   Reviewed and unchanged since admission note    CURRENT MEDICATIONS:   Reviewed from admission to present day    VS:  BP (!) 144/85   Pulse 69   Temp 37.4 °C (99.3 °F)   Resp 19   Ht 1.829 m (6' 0.01\")   Wt 102 kg (224 lb 13.9 oz)   SpO2 100%   BMI 30.49 kg/m²     Constitutional:       Appearance: He is ill-appearing.   HENT:      Head: Normocephalic and atraumatic.      Nose: Nose normal.   Eyes:      Conjunctiva/sclera: Conjunctivae normal.   Cardiovascular:      Rate and Rhythm: Normal rate.      " Pulses: Normal pulses.      Heart sounds: Normal heart sounds.   Pulmonary:      Effort: No respiratory distress.      Breath sounds: Rales present.   Abdominal:      General: Abdomen is flat. Bowel sounds are normal.      Palpations: Abdomen is soft.   Musculoskeletal:      Cervical back: Normal range of motion.      Right lower leg: Edema present.      Left lower leg: Edema present.   Skin:     General: Skin is warm.      Capillary Refill: Capillary refill takes less than 2 seconds.   Neurological:      Comments: Unable to assess   Psychiatric:      Comments: Unable to assess     Fluids:  In: 803.2 [I.V.:43.2; Other:30; Enteral:90]  Out: 2305     LABS:  Recent Labs     11/06/22  1801 11/07/22  0945 11/08/22  0445   SODIUM 139 146* 151*   POTASSIUM 4.9 5.1 5.0   CHLORIDE 104 110 115*   CO2 21 20 25   GLUCOSE 345* 271* 260*   BUN 90* 93* 89*   CREATININE 2.38* 2.27* 2.02*   CALCIUM 7.5* 8.0* 8.4*         IMAGING:   All imaging reviewed from admission to present day    IMPRESSION:  # MYLA - etiology is not clear at this time but suspect sec to Septic ATN  -non 0liguric-improved  # Acute hypoxemic respiratory failure due to COVID-19  -extubated  # PAfib  # ? CHF  # Anemia  # Hyperphosphatemia  # Hypocalcemia  #Hypernatremia     PLAN:  - start peripheral free water 110/hr  - decrease steroids as tolerated  - swallow eval  - labs in am

## 2022-11-08 NOTE — CARE PLAN
The patient is Watcher - Medium risk of patient condition declining or worsening    Shift Goals  Clinical Goals: stable hemodynamics, off sedation  Patient Goals: ALLIE  Family Goals: ALLIE    Progress made toward(s) clinical / shift goals:      Patient's pain is controlled with PRN medication and repositioning. Patient remains free from injury from restraints with frequent assessments per protocol.     Problem: Safety - Medical Restraint  Goal: Remains free of injury from restraints (Restraint for Interference with Medical Device)  Outcome: Progressing     Problem: Pain - Standard  Goal: Alleviation of pain or a reduction in pain to the patient’s comfort goal  Outcome: Progressing          Left arm;

## 2022-11-08 NOTE — THERAPY
"Speech Language Pathology   Clinical Swallow Evaluation     Patient Name: Sean Kincaid  AGE:  77 y.o., SEX:  male  Medical Record #: [id number]  Today's Date: 2022     Precautions  Precautions: (P) Fall Risk, Swallow Precautions ( See Comments)    Assessment    HPI: 77 y.o. man transferred from [facility] on 22 for acute respiratory failure, COVID positive, MYLA. Intubated 11/3- (5 days).     CMH: septic shock with acute organ dysfunction d/t MSSA, paroxysmal afib, hypernatremia, hematoma, acute on chronic systolic CHF, hyperkalemia, MYLA, acute hypoxemic respiratory failure d/t COVID.     Previously seen by service in 2020 for CSE, recommended RG7/TN0 diet.     Imagin22 CT Head: No CT evidence of acute infarct, hemorrhage or mass. Moderate global parenchymal atrophy. Chronic small vessel ischemic changes.    22 CXR: Mildly improved left basilar opacity.    MRI brain not completed r/t pace maker.     Level of Consciousness: Alert  Affect/Behavior: Agitated, Cooperative  Follows Directives: Inconsistent  Orientation: Disoriented x 4  Hearing: Functional hearing  Vision: Functional vision      Prior Living Situation & Level of Function:  Per chart review, patient lives at home independently.       Oral Mechanism Evaluation  Facial Symmetry: Pt did not follow commands to assess  Facial Sensation: Pt did not follow commands to assess  Labial Observations: Pt did not follow commands to assess  Lingual Observations: Pt did not follow commands for assessment  Dentition: Fair, Some missing dentition  Comments: no gross asymmetry noted      Voice  Quality: WFL  Resonance: WFL  Intensity: Appropriate  Cough: Pt did not follow commands to assess  Comments:      Current Method of Nutrition   NPO until cleared by speech pathology      Subjective  Patient with no meaningful verbalizations in response to patient interview. Patient frequently stating \"ow\" but unable to state location/level of pain.      "   Assessment  Positioning: Weiss's (60-90 degrees)  Bolus Administration: SLP  Oxygen Requirements:  5 L Nasal Cannula  Factor(s) Affecting Performance: Impaired mental status, Impaired command following    Swallowing Trials  Thin Liquid (TN0): WFL  Liquidised (LQ3): WFL  Regular (RG7): Impaired    Comments: Adequate oral bolus acceptance/containment; incomplete AP transfer with entirety of mastication cracker in oral cavity for prolonged amount of time. Patient apparently distracted and required cues to chew and swallow. Liquid wash x2 to clear. Prolonged mastication of solids. No cough/throat clear appreciated with PO. Vocal quality remained stable throughout assessment. Single swallow completed per bolus. Patient did not self-feed despite max verbal/tactile cues, thus he requires 1:1 feeding assist with meals. Please encourage self-feeding.        Clinical Impressions  Patient presents with oral phase deficits characterized by prolonged mastication with incomplete AP transfer of regular solids. No clinical indicators of aspiration nor signs concerning for pharyngeal inefficiency. No dysphonia with minimal verbalizations. Patient is at increased risk for aspiration related to intubation; however, patient presents with a functional pharyngeal swallow clinically. Recommend cautious initiation of diet with direct supervision.        Recommendations  1.  Minced and moist solids with thin liquids  2.  Instrumentation: Instrumental swallow study pending clinical progress  3.  Swallowing Instructions & Precautions:   Supervision: 1:1 feeding with constant supervision, Encourage self-feeding  Positioning: Fully upright and midline during oral intake  Medication: Whole with puree  Strategies: Small bites/sips, Alternate bites and sips, Slow rate of intake  Oral Care: Q6h  4.  Following for dysphagia management      Plan    Recommend Speech Therapy 3 times per week until therapy goals are met for the following treatments:   "Dysphagia Training.    Discharge Recommendations: (P) Recommend post-acute placement for additional speech therapy services prior to discharge home       Objective       11/08/22 1231   Charge Group   SLP Oral Pharyngeal Evaluation Oral Pharyngeal Evaluation   Total Treatment Time   SLP Time Spent Yes   SLP Oral Pharyngeal Evaluation (Mins) 16   Initial Contact Note    Initial Contact Note  Order Received and Verified, Speech Therapy Evaluation in Progress with Full Report to Follow.   Precautions   Precautions Fall Risk;Swallow Precautions ( See Comments)   Vitals   O2 (LPM) 5   O2 Delivery Device Silicone Nasal Cannula   Pain 0 - 10 Group   Therapist Pain Assessment Post Activity Pain Same as Prior to Activity   Prior Level Of Function   Patient's Primary Language English   Dysphagia Rating   Nutritional Liquid Intake Rating Scale Non thickened beverages   Nutritional Food Intake Rating Scale Total oral diet with multiple consistencies but requiring special preparation or compensations   Patient / Family Goals   Patient / Family Goal #1 \"Ow!\"   Short Term Goals   Short Term Goal # 1 Patient will consume meals of  MM5/TN0 without overt indicators of aspiration or decline in pulmonary status   Education Group   Education Provided Dysphagia;Role of Speech Therapy   Dysphagia Patient Response Patient;Explanation;No Learning Evidence   Role of SLP Patient Response Patient;Explanation;No Learning Evidence   Problem List   Problem List Dysphagia   Anticipated Discharge Needs   Discharge Recommendations Recommend post-acute placement for additional speech therapy services prior to discharge home   Therapy Recommendations Upon DC Dysphagia Training   Interdisciplinary Plan of Care Collaboration   IDT Collaboration with  Nursing   Patient Position at End of Therapy In Bed;Bed Alarm On;Call Light within Reach   Collaboration Comments RN updated     "

## 2022-11-08 NOTE — CARE PLAN
Problem: Ventilation  Goal: Ability to achieve and maintain unassisted ventilation or tolerate decreased levels of ventilator support  Description: Target End Date:  4 days     Document on Vent flowsheet    1.  Support and monitor invasive and noninvasive mechanical ventilation  2.  Monitor ventilator weaning response  3.  Perform ventilator associated pneumonia prevention interventions  4.  Manage ventilation therapy by monitoring diagnostic test results  Outcome: Progressing       Ventilator Daily Summary    Vent Day # 5    ETT 8.0 @ 24    Ventilator settings changed this shift:no     8 40%    Weaning trials:no    Respiratory Procedures:no    Plan: Continue current ventilator settings and wean mechanical ventilation as tolerated per physician orders.

## 2022-11-08 NOTE — DOCUMENTATION QUERY
[facility]                                                                       Query Response Note      PATIENT:               SHARONDA NEGRO  ACCT #:                  [id number]  MRN:                     [id number]  :                      1945  ADMIT DATE:       2022 12:59 AM  DISCH DATE:          RESPONDING  PROVIDER #:        [id number]           QUERY TEXT:    Anemia is documented in the Medical Record. Please specify the underlying cause of the anemia.    The patient's Clinical Indicators include:   -   RBC: 4.00 -> 3.52 -> 2.88 -> 2.81 -> 2.63 -> 2.52  Hgb: 12.1 -> 10.8 -> 8.8 -> 8.5 -> 7.9 -> 7.6  Hct: 38.6 -> 32.5 -> 26.3 -> 25.5 -> 24.9 -> 24.1  MCHC: 31.3 -> 33.2 -> 33.5 -> 33.3 -> 31.7 -> 31.5  Platelets: 125 -> 122 -> 103 -> 128 -> 158 -> 181     -  Heparin Xa <0.10; 0.82;  >1.10;  1.04;  0.33     TEG-Basic: Reaction time: >17.0; Clot Angle: <39.0; Clot Strength: <40.0     CC Consult: Acute resp failure w/ hypoxia; COVID-19; PNA; MYLA; septic shock   CC PN: Hx iron deficiency anemia. Coagulopathic, hematoma forming R thigh.    CC PN: DC heparin infusion given hematoma and coagulopathy    Treatment: Transfusion FFP/PRBC's/cryoprecipitate; DC heparin; CBC  Risk Factors: Coagulopathy w/ hematoma; COVID-19; hx iron def. anemia    Thank You,  Michelle Dowling RN  Clinical    Connect via TaskR[employer]  Options provided:   -- Blood loss anemia, acute   -- Chronic anemia   -- Iron deficiency anemia   -- Anemia, (Please specify anemia type)   -- Other explanation, (please specify other explanation)   -- Unable to determine      Query created by: Michelle Dowling on 2022 9:48 AM    RESPONSE TEXT:    Blood loss anemia, acute          Electronically signed by:  SUYAPA SUN DO 2022 10:16 AM

## 2022-11-08 NOTE — CONSULTS
Reason for PC Consult:  ACP    Consulted by: Dr. Scott    Assessment:  General: Sean Kincaid is a 77 y.o. male who presented 22 to [facility] with worsening shortness of breath and fatigue. At [place] pt COVID-positive, initially requiring BiPAP and then intubated. MYLA with creatinine 8.0 and K= 6.6. Transferred to [facility] for further evaluation. Started on norepinephrine and requiring and emergent dialysis 22. Echo from 22 LVEF 55% and vasopressin added for hypotension.   SAT and SBT trials.     PMH: DM, Afib on anticoagulation, CHF, COPD, Asthma    Social: Sean lives alone in [place] [facility] and prior to admission was independent in ADLs. His wife recently  and his son, Theo, drives him to appointments and shopping. His other son, Binh, lives in [place].     Consults: Nephrology    Dyspnea: unable to assess, pt on ventilator.   Last BM: 22-    Pain: unable to assess  Depression: unable to assess, pt on ventilator.   Dementia: unable to assess, pt on ventilator.      Spiritual:  Is Holiness or spirituality important for coping with this illness?  Unable to determine    Has a  or spiritual provider visit been requested?  Unable to determine    Palliative Performance Scale: 30 %    Advance Directive: None. Per Theo he does not believe his father ever completed ACP documents. PC RN provided education that in lieu of AD Theo and Binh are equal parts the pt's NOK decision makers if he is unable to make decisions for himself. Theo verbalized understanding and stated that he has been in touch with Binh and they are in agreement with the current plan of care. Complete ACP documents prior to discharge as appropriate.  DPOA: No   POLST: None     Code Status: Full. Per previous documentation, Full code status was discussed with pt prior to transfer. PC RN confirmed full code status with Theo and provided brief education on measures taken during a full  code. Theo confirmed full code status and hopes to discuss with the pt when extubated.     Outcome:  1030: Consult received and EMR reviewed. Discussed case with Dr. Cox and ICU RN at bedside, updates appreciated.     1645: Phone call to pt's son, Theo. PC RNs Stella and Buffy introduced themselves and the role of palliative care. Theo updated RN on the social and medical history. Assessed Theo's understanding of current medical status, overall health picture, and options for future care. Theo understands that the IDT is working towards extubation and the pt is participating in SAT/SBT trials today, but pt only inconsistently following commands thus possible extubation tomorrow. Theo demonstrated a good understanding of the current clinical picture.    PC RN answered all questions and provided Theo with contact information.     Updated: Dr. Cox, ICU RN Rosa Isela    Plan: PC RN will continue to follow and assist when needs arise.     Thank you for allowing Palliative Care to participate in this patient's care. Please feel free to call [phone] with any questions or concerns.

## 2022-11-08 NOTE — CARE PLAN
The patient is Watcher - Medium risk of patient condition declining or worsening    Shift Goals  Clinical Goals: stable hemodynamics, off sedation  Patient Goals: ALLIE  Family Goals: ALLIE    Progress made toward(s) clinical / shift goals:    Problem: Safety - Medical Restraint  Goal: Remains free of injury from restraints (Restraint for Interference with Medical Device)  Outcome: Progressing     Problem: Pain - Standard  Goal: Alleviation of pain or a reduction in pain to the patient’s comfort goal  Outcome: Progressing       Patient is not progressing towards the following goals:      Problem: Safety - Medical Restraint  Goal: Free from restraint(s) (Restraint for Interference with Medical Device)  Outcome: Not Progressing

## 2022-11-09 PROBLEM — Z71.89 GOALS OF CARE, COUNSELING/DISCUSSION: Status: ACTIVE | Noted: 2022-01-01

## 2022-11-09 NOTE — PROGRESS NOTES
"[facility] -  PROGRESS NOTE               Author: Ponce Murguia M.D. Date & Time: 11/9/2022  9:12 AM     HPI:  This is a 77 y.o. male w/ h/o CHF, COPD, afib, DM who presented 11/4/2022 in transfer from [facility] where he presented with worsening shortness of breath and fatigue.  At [place] he was found to be COVID-positive, was initially started on BiPAP and then intubated, in acute kidney injury with a creatinine of 8.0 and potassium of 6.6.  He was transferred to [facility] for further evaluation and treatment. Here his Scr was 7.6 with a K+ of 7.6. Nephrology was asked to consult for MYLA and critical hyperkalemia.      This history was obtained from the medical records since patient is not able to communicate.    DAILY NEPHROLOGY SUMMARY:  11/5 - Scr down to 3.74. K+ is 5.0. Non-oliguric. Still on vent support.  11/6: Scr continues to trend down. Non-oliguric. ECHO: The left ventricle is not well visualized due to body habitus. Left ventricular systolic function is probably normal. The ejection fraction is roughly estimated to be 55%. A reliable estimation of diastolic function cannot be made due to mitral valve disease. Severely dilated right ventricle.Reduced right ventricular systolic function.  11/7: Remains on vent/ steroids  11/8: extubated, but remains confused  11/9: Passed swallow eval, but not taking signficant PO    REVIEW OF SYSTEMS:    Unable to assess given mentals tatus    PMH/PSH/SH/FH:   Reviewed and unchanged since admission note    CURRENT MEDICATIONS:   Reviewed from admission to present day    VS:  /70   Pulse 70   Temp 37.4 °C (99.3 °F)   Resp (!) 25   Ht 1.829 m (6' 0.01\")   Wt 102 kg (224 lb 13.9 oz)   SpO2 94%   BMI 30.49 kg/m²     Constitutional:       Appearance: He is ill-appearing.   HENT:      Head: Normocephalic and atraumatic.      Nose: Nose normal.   Eyes:      Conjunctiva/sclera: Conjunctivae normal. "   Cardiovascular:      Rate and Rhythm: Normal rate.      Pulses: Normal pulses.      Heart sounds: Normal heart sounds.   Pulmonary:      Effort: No respiratory distress.      Breath sounds: Rales present.   Abdominal:      General: Abdomen is flat. Bowel sounds are normal.      Palpations: Abdomen is soft.   Musculoskeletal:      Cervical back: Normal range of motion.      Right lower leg: Edema present.      Left lower leg: Edema present.   Skin:     General: Skin is warm.      Capillary Refill: Capillary refill takes less than 2 seconds.   Neurological:      Comments: Unable to assess   Psychiatric:      Comments: Unable to assess     Fluids:  In: 2143.4 [P.O.:118; I.V.:1837.6]  Out: 2575     LABS:  Recent Labs     11/07/22  0945 11/08/22  0445 11/08/22  1755 11/09/22  0119 11/09/22  0500   SODIUM 146* 151* 148* 148* 149*   POTASSIUM 5.1 5.0  --   --  4.4   CHLORIDE 110 115*  --   --  113*   CO2 20 25  --   --  24   GLUCOSE 271* 260*  --   --  211*   BUN 93* 89*  --   --  71*   CREATININE 2.27* 2.02*  --   --  1.55*   CALCIUM 8.0* 8.4*  --   --  8.5         IMAGING:   All imaging reviewed from admission to present day    IMPRESSION:  # MYLA - etiology is not clear at this time but suspect sec to Septic ATN  -non 0liguric-improved  # Acute hypoxemic respiratory failure due to COVID-19  -extubated  # PAfib  # ? CHF  # Anemi  #Hypernatremia     PLAN:  - increase peripheral IV, but if pt requires another NG can use half GI/Half peripaheral fluid  - decrease steroids as tolerated  - will sign off

## 2022-11-09 NOTE — CARE PLAN
Problem: Ventilation  Goal: Ability to achieve and maintain unassisted ventilation or tolerate decreased levels of ventilator support  Description: Target End Date:  4 days     Document on Vent flowsheet    1.  Support and monitor invasive and noninvasive mechanical ventilation  2.  Monitor ventilator weaning response  3.  Perform ventilator associated pneumonia prevention interventions  4.  Manage ventilation therapy by monitoring diagnostic test results  11/8/2022 1606 by Brooke Castillo, RRT  Outcome: Met        Respiratory Update  Extubated today, Q4 albuterol inhaler, 2L nc    Pt tolerating current treatments well with no adverse reactions.

## 2022-11-09 NOTE — THERAPY
"Physical Therapy   Initial Evaluation     Patient Name: Sean Kincaid  Age:  77 y.o., Sex:  male  Medical Record #: [id number]  Today's Date: 11/9/2022     Precautions: Fall Risk;Swallow Precautions ( See Comments)    Assessment  Patient is 77 y.o. male admitted from OSH with worsening SOB and fatigue, found to be COVID positive.  Patient admitted with acute hypoxemic respiratory failure (extubated 11/8), septic shock, hyperkalemia, and MYLA.  PMH includes CHF, COPD, Afib, and DM.  Today patient presented with impaired strength, balance, activity tolerance, coordination, initiation, and cognition.  Patient required max A for bed mobility and STS with 2 person assist.  He minimally followed simple, one step commands and primarily responded to questions with \"Dear God\" or \"yeah.\"  Patient would benefit from continued acute skilled PT and post acute placement.     Plan    Recommend Physical Therapy 3 times per week until therapy goals are met for the following treatments:  Bed Mobility, Equipment, Gait Training, Neuro Re-Education / Balance, Self Care/Home Evaluation, Therapeutic Activities, and Therapeutic Exercises    DC Equipment Recommendations: Unable to determine at this time  Discharge Recommendations: Recommend post-acute placement for additional physical therapy services prior to discharge home     Objective     11/09/22 0805   Precautions   Precautions Fall Risk;Swallow Precautions ( See Comments)   Prior Living Situation   Prior Services Home-Independent   Housing / Facility 2 Story House   Steps In Home 12   Equipment Owned Single Point Cane   Lives with - Patient's Self Care Capacity Alone and Able to Care For Self   Comments PLS & PLOF obtained from chart review.  Per chart pt's son drives him to appointments/store   Prior Level of Functional Mobility   Bed Mobility Independent   Transfer Status Independent   Ambulation Independent   Assistive Devices Used Single Point Cane   Stairs Independent " "  Cognition    Cognition / Consciousness X   Level of Consciousness Alert   Safety Awareness Impaired   New Learning Impaired   Attention Impaired   Sequencing Impaired   Initiation Impaired   Comments Cooperative, poor command follow, primarily responded \"Dear God\", \"Ow!\", \"Yeah\"   Active ROM Lower Body    Active ROM Lower Body  X   Comments Grossly WFL, limited formal testing due to poor command follow   Strength Lower Body   Lower Body Strength  X   Comments Grossly weak, B knee ext 2+/5   Sensation Lower Body   Comments \"Ow!\" with any touch to [facility]   Balance Assessment   Sitting Balance (Static) Fair -   Sitting Balance (Dynamic) Poor +   Standing Balance (Static) Trace   Standing Balance (Dynamic) Dependent   Weight Shift Sitting Poor   Weight Shift Standing Absent   Gait Analysis   Gait Level Of Assist Unable to Participate   Bed Mobility    Supine to Sit Maximal Assist   Sit to Supine Maximal Assist   Scooting Maximal Assist   Rolling Maximum Assist to Lt.   Functional Mobility   Sit to Stand Maximal Assist (x 2 people)   Bed, Chair, Wheelchair Transfer Unable to Participate   Mobility bed mobility, STS x 1   Comments Poor effort to stand   Activity Tolerance   Sitting Edge of Bed 8-10 min   Standing ~10 sec   Short Term Goals    Short Term Goal # 1 Pt will performs supine <> sit with min A within 6 visits to progress bed mobility   Short Term Goal # 2 Pt will perform STS with LRAD and min A within 6 visits to progress OOB mobility   Short Term Goal # 3 Pt will perform functional transfers with LRAD and mod A within 6 visits to progress OOB mobility   Short Term Goal # 4 Pt will ambulate 20 ft with LRAD and mod A within 6 visits to initiate gait training   Anticipated Discharge Equipment and Recommendations   DC Equipment Recommendations Unable to determine at this time   Discharge Recommendations Recommend post-acute placement for additional physical therapy services prior to discharge home     "

## 2022-11-09 NOTE — HEART FAILURE PROGRAM
Admission primarily for sepsis related to MSSA. He is also noted to have SARS CoV2 pneumonia. Noted to have elevated NT -proBNP RV dilation, moderate MR valve team notified.    Extubated this morning. Therapies recommending post acute placement. I've asked schedulers to arrange HF f/u in a few weeks to take place after SNF.    Special Clinical Considerations Pertinent to HF    Pneumococcal vaccine: previous  Influenza vaccine: reached out to Dr. Cox to ask if patient's current infectious illnesses are a contraindication for vaccines at this time  Nicotine Status: never smoker per Dr. Scott's consult on 11/4/22  Documentation of nicotine cessation counseling: n/a  DM dx: yes, on insulin here, has not been receiving atorvastatin which, per med rec he takes at home  Atrial arrhythmia dx: yes, on warfarin at home  Candidate for consideration of Hydralazine Hydrochloride/Isosorbide Dinitrate per facesheet: n/a  HF Education documented: no, messaged RN Rosa Isela Collins. Acknowledged that pt was only just extubated and asked if patient is appropriate that HF education be started and documented in the education tab.  HF f/u appointment requested: let schedulers know about SNF and that [place] [facility] [place] [facility] requested that schedulers notify me if patient chooses not to go to the HF Clinic and/or is already established with cardiology closer to where he lives  [facility] Interactive HF education prompt placed in f/u section: yes      Nurses: Please document HF education in the education tab and populate the new and improved HF Care Plan. These tools will guide day to day care of the HF patient.    Providers: below are Guideline Directed Medical Therapy (GDMT) for HFpEF. If any cannot be prescribed by discharge, would you please note the clinical reason for each in your discharge summary? Thanks! Becca  Anticoagulation for atrial arrhythmia, if applicable  Glycemic control for DM + HF, if applicable  Lipid lowering medication  for DM + HF, if applicable  Pneumococcal vaccine, if not previously received, If refused PLEASE DOCUMENT  Influenza vaccine for the current flu season, if not previously received If refused PLEASE DOCUMENT  Documentation of nicotine cessation counseling, if applicable  Acute Referral to disease management program specializing in heart failure care- asked that schedulers notify me if patient is not able to be seen at the Heart Failure Clinic  7 calendar day f/u appointment scheduled prior to discharge, appearing on signed after visit summary.

## 2022-11-09 NOTE — CARE PLAN
The patient is Watcher - Medium risk of patient condition declining or worsening      Problem: Knowledge Deficit - Standard  Goal: Patient and family/care givers will demonstrate understanding of plan of care, disease process/condition, diagnostic tests and medications  Outcome: Not Progressing     Problem: Fall Risk  Goal: Patient will remain free from falls  Outcome: Progressing     Problem: Pain - Standard  Goal: Alleviation of pain or a reduction in pain to the patient’s comfort goal  Outcome: Progressing

## 2022-11-09 NOTE — CARE PLAN
The patient is Watcher - Medium risk of patient condition declining or worsening    Shift Goals  Clinical Goals: improved neuro exam, pain relief  Patient Goals: ALLIE  Family Goals: ALLIE    Progress made toward(s) clinical / shift goals:  pt has Q4 neuro checks in place. Will intermittently follow commands. Will not speak in full or meaningful sentences.     Patient is not progressing towards the following goals:    Problem: Knowledge Deficit - Standard  Goal: Patient and family/care givers will demonstrate understanding of plan of care, disease process/condition, diagnostic tests and medications  Outcome: Progressing     Problem: Skin Integrity  Goal: Skin integrity is maintained or improved  Outcome: Progressing     Problem: Fall Risk  Goal: Patient will remain free from falls  Outcome: Progressing     Problem: Pain - Standard  Goal: Alleviation of pain or a reduction in pain to the patient’s comfort goal  Outcome: Progressing            See previous HPI.

## 2022-11-09 NOTE — THERAPY
Occupational Therapy   Initial Evaluation     Patient Name: Sean Kincaid  Age:  77 y.o., Sex:  male  Medical Record #: [id number]  Today's Date: 11/9/2022     Precautions  Precautions: Fall Risk, Swallow Precautions ( See Comments)    Assessment  Patient is 77 y.o. male with a diagnosis of SOB, COVID.   Pt currently limited by decreased functional mobility, activity tolerance, cognition, sensation, strength, AROM, coordination, balance, and pain which are affecting pt's ability to complete ADLs/IADLs at baseline. Pt would benefit from OT services in the acute care setting to maximize functional recovery.       Plan    Recommend Occupational Therapy 3 times per week for 3 visits for the following treatments:  Adaptive Equipment.       Discharge Recommendations: (P) Recommend post-acute placement for additional occupational therapy services prior to discharge home        11/09/22 0754   Prior Living Situation   Prior Services Home-Independent   Housing / Facility 2 Story House   Steps In Home 12   Equipment Owned Single Point Cane   Lives with - Patient's Self Care Capacity Alone and Able to Care For Self   Comments per chart, pt unable to give PLOF 2/2 confusion   Prior Level of ADL Function   Self Feeding Unable To Determine At This Time   Dressing Unable To Determine At This Time   ADL Assessment   Grooming Maximal Assist   Upper Body Dressing Total Assist   Lower Body Dressing Total Assist   Toileting Total Assist   Functional Mobility   Sit to Stand Maximal Assist   Bed, Chair, Wheelchair Transfer Unable to Participate   Short Term Goals   Short Term Goal # 1 min A with UB dressing   Short Term Goal # 2 mod A with LB dressing   Short Term Goal # 3 mod A with ADL txfs   Anticipated Discharge Equipment and Recommendations   Discharge Recommendations Recommend post-acute placement for additional occupational therapy services prior to discharge home

## 2022-11-09 NOTE — PROGRESS NOTES
Critical Care Progress Note    Date of admission  11/4/2022    Chief Complaint  77 y.o. male w/ h/o CHF, ? COPD (never a smoker), paroxysmal afib (on coumadin), DM, s/p MVR bioprosthetic in 2012 at [facility] by Dr. Lechuga who presented 11/4/2022, a transfer from [facility] for acute resp failure, COVID positive and MYLA.     Hospital Course  11/3 - Taken to [facility]  11/4 - Transfer to [facility], intubation / lines placed  11/5 - Coagulopathic, hematoma forming in right thigh. Heparin gtt was topped  11/7 - tolerating SBT, off sedation (both precedex and fentanyl). Remains encephalopathic  11/8 - extubated    Interval Problem Update  Reviewed last 24 hour events:  Remains confused, delirious  Unable to get MRI due to pacemaker  Remains off pressor in the past 2 days.   Extubated yesterday with 2L NC, sat >94%  Creatinine continues to improve to 1.5, good UOP  I/O negative 1L in the past 24 hours, +1.1 L since admission   On cefazolin for MSSA pneumonia  On dexamethasone 6mg daily for 10 days (end date 11/13)  Heme: no issues. Right groin hematoma stable.   INR 1.43 subtherapeutic  Palliative care in discussion with goal of care with family and family decided DNR/DNI    Intake / Output    Intake/Output Summary (Last 24 hours) at 11/9/2022 0654  Last data filed at 11/9/2022 0600  Gross per 24 hour   Intake 2143.38 ml   Output 2575 ml   Net -431.62 ml       Net IO Since Admission: 3.41 mL [11/04/22 0615]     Review of Systems  Review of Systems   Unable to perform ROS: Intubated      Vital Signs for last 24 hours   Pulse:  [] 70  Resp:  [17-73] 25  BP: (122-169)/(63-97) 129/70  SpO2:  [89 %-96 %] 94 %    Hemodynamic parameters for last 24 hours       Respiratory Information for the last 24 hours       Physical Exam   Physical Exam  Vitals and nursing note reviewed. Exam conducted with a chaperone present.   Constitutional:       Appearance: He is obese. He is ill-appearing.      Interventions: He is sedated  and intubated.      Comments: Confused, intermittently following commands   HENT:      Head: Normocephalic.   Cardiovascular:      Rate and Rhythm: Normal rate. Rhythm irregular.      Pulses: Normal pulses.      Comments: Paced   Pulmonary:      Effort: Pulmonary effort is normal. No respiratory distress. He is intubated.      Breath sounds: Rhonchi present. No wheezing.   Abdominal:      General: There is no distension.      Palpations: Abdomen is soft.      Tenderness: There is no abdominal tenderness. There is no guarding.   Musculoskeletal:      Cervical back: Neck supple.      Right lower leg: Edema present.      Left lower leg: Edema present.   Skin:     General: Skin is warm and dry.      Capillary Refill: Capillary refill takes less than 2 seconds.   Neurological:      Mental Status: He is alert.      Motor: Weakness present.      Comments: RASS 0 but intermittently following commands.     Pt was re-evaluated post intubation. Mental status the same       Medications  Current Facility-Administered Medications   Medication Dose Route Frequency Provider Last Rate Last Admin    insulin GLARGINE (Lantus,Semglee) injection  20 Units Subcutaneous QAM INSULIN PEEWEE SosaO.   20 Units at 11/09/22 0523    insulin regular (HumuLIN R,NovoLIN R) injection  3-14 Units Subcutaneous 4X/DAY ACHS PEEWEE SosaO.   3 Units at 11/08/22 2142    And    dextrose 10 % BOLUS 25 g  25 g Intravenous Q15 MIN PRN Kalpesh Cox D.O.        albuterol inhaler 2 Puff  2 Puff Inhalation Q4HRS WHILE AWAKE (RT) Kalpesh Cox D.O.   2 Puff at 11/08/22 1845    dextrose 5% infusion   Intravenous Continuous Ponce Murguia M.D. 110 mL/hr at 11/08/22 2212 New Bag at 11/08/22 2212    ceFAZolin (Ancef) 2 g in  mL IVPB  2 g Intravenous Q8HRS Kalpesh Cox D.O.   Stopped at 11/09/22 0546    enoxaparin (Lovenox) inj 40 mg  40 mg Subcutaneous DAILY AT 1800 PEEWEE SosaOMichaela   40 mg at 11/08/22 1740    HYDROmorphone (Dilaudid) injection 0.5 mg  0.5  mg Intravenous Q HOUR PRN Kalpesh Cox D.OMichaela   0.5 mg at 11/07/22 1629    Or    HYDROmorphone (Dilaudid) injection 1 mg  1 mg Intravenous Q HOUR PRN Kalpesh Cox D.O.   1 mg at 11/09/22 0314    oxyCODONE immediate-release (ROXICODONE) tablet 5 mg  5 mg Enteral Tube Q4HRS PRN Kimberli BHAGAT Latona   5 mg at 11/09/22 0513    Nozin nasal  swab  1 Applicator Each Nostril BID Harshil Manuel M.D.   1 Applicator at 11/09/22 0514    Respiratory Therapy Consult   Nebulization Continuous RT Earl Scott M.D.        senna-docusate (PERICOLACE or SENOKOT S) 8.6-50 MG per tablet 2 Tablet  2 Tablet Enteral Tube BID Earl Scott M.D.   2 Tablet at 11/09/22 0513    And    polyethylene glycol/lytes (MIRALAX) PACKET 1 Packet  1 Packet Enteral Tube QDAY PRN Earl Scott M.D.   1 Packet at 11/07/22 1815    And    bisacodyl (DULCOLAX) suppository 10 mg  10 mg Rectal QDAY PRN Earl Scott M.D.        lidocaine (XYLOCAINE) 1 % injection 2 mL  2 mL Tracheal Tube Q30 MIN PRN Earl Scott M.D.        Pharmacy Consult: Enteral tube insertion - review meds/change route/product selection  1 Each Other PHARMACY TO DOSE Earl Scott M.D.        ondansetron (ZOFRAN) syringe/vial injection 4 mg  4 mg Intravenous Q4HRS PRN Earl Scott M.D.        dexamethasone (DECADRON) injection 6 mg  6 mg Intravenous DAILY Earl Scott M.D.   6 mg at 11/09/22 0514    heparin injection 2,800 Units  2,800 Units Intracatheter ACUTE DIALYSIS PRN Bre Partida D.O.   2,800 Units at 11/04/22 1000    famotidine (PEPCID) tablet 10 mg  10 mg Enteral Tube Q24HRS Harshil Manuel M.D.   10 mg at 11/08/22 0432    Or    famotidine (PEPCID) injection 10 mg  10 mg Intravenous Q24HRS Harshil Manuel M.D.   10 mg at 11/09/22 0514       Fluids    Intake/Output Summary (Last 24 hours) at 11/9/2022 0654  Last data filed at 11/9/2022 0600  Gross per 24 hour   Intake 2143.38 ml   Output 2575 ml   Net -431.62 ml         Laboratory               Recent Labs     11/07/22  0945 11/08/22 0445 11/08/22  1755 11/09/22  0119 11/09/22  0500   SODIUM 146* 151* 148* 148* 149*   POTASSIUM 5.1 5.0  --   --  4.4   CHLORIDE 110 115*  --   --  113*   CO2 20 25  --   --  24   BUN 93* 89*  --   --  71*   CREATININE 2.27* 2.02*  --   --  1.55*   MAGNESIUM 2.3 2.3  --   --  2.1   PHOSPHORUS 2.4* 1.8*  --   --  2.9   CALCIUM 8.0* 8.4*  --   --  8.5       Recent Labs     11/07/22  0945 11/08/22  0445 11/09/22  0500   ALTSGPT 9  --   --    ASTSGOT 23  --   --    ALKPHOSPHAT 70  --   --    TBILIRUBIN 0.4  --   --    PREALBUMIN 14.2*  --   --    GLUCOSE 271* 260* 211*       Recent Labs     11/07/22  0945 11/08/22  0533 11/09/22  0500   WBC 12.9* 10.2 10.6   NEUTSPOLYS 73.20* 66.40 64.80   LYMPHOCYTES 9.80* 11.70* 16.90*   MONOCYTES 14.90* 19.20* 16.10*   EOSINOPHILS 0.00 0.00 0.10   BASOPHILS 0.20 0.30 0.20   ASTSGOT 23  --   --    ALTSGPT 9  --   --    ALKPHOSPHAT 70  --   --    TBILIRUBIN 0.4  --   --        Recent Labs     11/07/22  0945 11/08/22  0533 11/08/22  0920 11/09/22  0500   RBC 2.63* 2.52*  --  2.41*   HEMOGLOBIN 7.9* 7.6*  --  7.2*   HEMATOCRIT 24.9* 24.1*  --  23.6*   PLATELETCT 158* 181  --  196   PROTHROMBTM  --   --  17.2*  --    INR  --   --  1.43*  --          Imaging  X-Ray:  I have personally reviewed the images and compared with prior images. and My impression is: Interstitial opacifications, left> right - stable CXR today    Assessment/Plan  * Septic shock with acute organ dysfunction due to methicillin susceptible Staphylococcus aureus (MSSA) ([facility])  Assessment & Plan  This is Septic shock Present on admission  SIRS criteria identified on my evaluation include: Fever, with temperature greater than 101 deg F, Tachycardia, with heart rate greater than 90 BPM and Tachypnea, with respirations greater than 20 per minute  Indicators of septic shock include: Severe sepsis present and persistent hypotension after 30 ml/kg completed.   Sources is:  COVID-19 pneumonia and MSSA pneumonia  Sepsis protocol initiated  Crystalloid Fluid Administration: Patient has COVID-19 pneumonia, CHF w/ EF of 35-40%, and acute kidney injury w/ creatinine of 8.0 and CrCl of 10.3 mL/min, for these reasons it is unsafe for this patient to receive 30 mL/kg of fluid  Partial Fluid Dose Given: 15 mL/kg per current body weight given at sending hospital, patient to be reassessed shortly after completion of partial fluid bolus to ensure adequacy of resuscitation  There are no antibiotics appropriate for this source of sepsis  Reassessment: I have reassessed the patient's hemodynamic status    S/p fluid resuscitation and pressor  Off pressor since 11/6   Lactate resolved  On cefazolin for MSSA pneumonia. Treat till end date 11/13  On dexamethasone for COVID. Treat for 10 days.     Paroxysmal atrial fibrillation (HCC)- (present on admission)  Assessment & Plan  Chronic paroxysmal afib  Maximize electrolytes (keep K > 4.0 and Mag > 2.0)  Heparin gtt was stopped due to hematoma. Hematoma stable  CHADVasc score is at least 5.   Will resume heparin gtt and resume coumadin  Will not bolus, only rebolus.       Goals of care, counseling/discussion  Assessment & Plan  Palliative care assisting in goal of care discussion   Family decided for DNR/DNi    Hypernatremia  Assessment & Plan  Increasing sodium in the past 2 days.   Sodium still in 149, increasing D5W gtt to 125cc/hr  Appreciate nephrology/pharmacy  Nephrology following     Hematoma  Assessment & Plan  Right Groin hematoma  Due to trialyis catheter and coagulopathy  Much better on 11/6  Hematoma stable on exam 11/7. Soft, no increasing induration   11/8 benign exam on right groin.  Follow clinically    Acute on chronic systolic congestive heart failure (HCC)- (present on admission)  Assessment & Plan  BNP 8230  Troponin 0.12    TTE  - Likely normal LV  - Dilated and reduced RV    Goal euvolemia    Hyperkalemia- (present on  admission)  Assessment & Plan  Resolved. Makes urine.     MYLA (acute kidney injury) (HCC)- (present on admission)  Assessment & Plan  Likely due to dehydration and COVID-19  Improving overall    Plan:   Avoid nephrotoxins  Monitor electrolytes and replete as needed  Renally dose adjust medications  Strict I/O monitoring    Acute hypoxemic respiratory failure due to COVID-19 (HCC)- (present on admission)  Assessment & Plan  COVID-19 pneumonia & MSSA  On full vent support, minimal setting  11/7 off sedation   11/8 Did well on SAT/SBT, extubated.     Plan:   Dexamethasone 6mg daily for 10 days  Continue cefazolin x7 days (end date 11/13)  Early mobility. PT/OT       VTE:  Heparin gtt  Ulcer: H2 Antagonist  Lines: Central Line  Ongoing indication addressed, Arterial Line  Ongoing indication addressed, and Ball Catheter  Ongoing indication addressed    I have performed a physical exam and reviewed and updated ROS and Plan today (11/9/2022). In review of yesterday's note (11/8/2022), there are no changes except as documented above.     Discussed patient condition and risk of morbidity and/or mortality with RN, RT, Pharmacy, , and nephrology    The patient remains critically ill.  Critical care time = 50 minutes in directly providing and coordinating critical care and extensive data review.  No time overlap and excludes procedures.

## 2022-11-09 NOTE — PALLIATIVE CARE
"Palliative Care follow-up  1345:PC RN to pt's bedside along with his son, Binh, the pt's granddaughter, and ICU RN Rosa Isela.     PC RN Stella. Introduced self and role of palliative care. Binh updated RN on the social and medical history. Assessed Binh's understanding of current medical status, overall health picture, and options for future care. Demonstrates a good understanding of the current clinical picture.    PC RN asked if the patient had ever completed and ACP documents. Binh doesn't think Sean ever completed documents, but believes, \"he should be a DNR.\" PC RN asked Binh to expand on his thoughts. Binh stated, \"He has no quality of life, he is not there.\" PC RN queried if Binh believes Sean would want to be re-intubated if needed. Per, Binh he doesn't think his dad would want that. PC RN discussed that pt is currently a FULL CODE and Theo should be included in a conversation to change that. Binh in agreement to have PC RN discuss with Theo. PC RN also provided NOK education and that both he and Theo are equal part decision makers. Binh verbalized understanding, but also stated that Theo has been much closer with Sean.     1430: Phone call to pt's son, Theo. PC RN provided brief updates. Per Theo, he was at the bedside last night and was, \"shocked\" by his dad's condition. PC RN provided education on next steps such as PT/OT/SLP and rehab. Theo understands.     PC RN discussed Binh's concern for the pt's quality of life and code status. Theo states that he, \"hasn't thought much about it.\"  PC RN described the measures employed in a full code (including CPR, medications, and possibly defibrillation) and survival statistics. Further explained that a DNR would not preclude any treatments/interventions offered to pt. Expressed concern that due to patient's acute situation, progressive disease, age, and co-morbidities, he is unlikely to endure invasive code measures well, and even if he was " successfully resuscitated, he may come out of it with significantly reduced quality of life from even his current state. At this time Theo expressed concern about his father's current quality of life and fear that a resuscitation attempt or reintubation would not be something Sean would want. Theo in agreement to change pt's code status to DNAR/DNI. Dr. Cox and ICU RN Rosa Isela notified.    All questions answered.  PC contact information given. Acknowledged family's courage in having this discussion. They expressed appreciation for support.    Updated: Dr. Cox, ICU RN Rosa Isela    Plan: PC RN will continue to follow and assist when needs arise.     Thank you for allowing Palliative Care to support this patient and family. Contact [phone] for additional assistance, change in patient status, or with any questions/concerns.

## 2022-11-09 NOTE — DIETARY
Nutrition Services: Update   Day 5 of admit.  Sean Kincaid is a 77 y.o. male with admitting DX of Acute hypoxemic respiratory failure due to COVID-19.    Pt passed swallow evaluation and is currently on Level 5 - minced and moist, thin liquid diet. Tube feed was discontinued. Recorded PO intake 11/8 = <25%. Per RN, pt did eat better at breakfast today, %, but may benefit from addition of Boost. Will add Boost Glucose Control with meals. As pt with history of Diabetes and current hyperglycemia, will add Consistent CHO diet restriction. RD to monitor for adequacy of PO intake.    Recommendations/Plan:  Boost Glucose Control with all meals.   Add Consistent CHO diet restriction.  Encourage intake of meals  Document intake of all meals as % taken in ADL's to provide interdisciplinary communication across all shifts.   Monitor weight.  Nutrition rep will continue to see patient for ongoing meal and snack preferences.    RD following

## 2022-11-10 PROBLEM — J15.211 MSSA (METHICILLIN SUSCEPTIBLE STAPHYLOCOCCUS AUREUS) PNEUMONIA (HCC): Status: ACTIVE | Noted: 2022-01-01

## 2022-11-10 PROBLEM — R41.0 DELIRIUM: Status: ACTIVE | Noted: 2022-01-01

## 2022-11-10 NOTE — DIETARY
Nutrition Services:  Consult received for Heart Failure diet education. Pt confused per notes. Currently NPO pending FEES. Diet education currently not appropriate. RD to follow up for diet education as appropriate before discharge.

## 2022-11-10 NOTE — HEART FAILURE PROGRAM
Patient has been able to transfer out of ICU. Notes indicating that patient will likely need post acute care. The following appointment is appropriate for that discharge plan.    Your appointments    Dec 08, 2022  3:40 PM   Follow up - [facility] with Sean Segovia M.D.   [facility] (--) [address] [place] [id number]   [phone]

## 2022-11-10 NOTE — ASSESSMENT & PLAN NOTE
Initially improved with dialysis and now nephrology has been reconsulted and following  Unsure of baseline,  this admission creatinine seems to have stabilized within the 2.5-3.0 range  -CTM  -nephrology following w/no need for emergent dialysis at this time, still oliguric making greater than 1500 mL of urine a day, poor long term dialysis patient given co-morbidities  -IV fluids held due to pulmonary edema when given fluids previously  -family elected for comfort care/hospice care

## 2022-11-10 NOTE — ASSESSMENT & PLAN NOTE
CT abdomen showing Left retroperitoneal hematoma along the left iliopsoas/iliac is muscle measuring approximately 4.3 x 3.4 x 5.9 cm.  Was on warfarin which was held and was given FFP and vit K to reverse INR   Also received 2 unit of RBC, Warfarin then switched to apixaban  -apixaban held due to hematuria

## 2022-11-10 NOTE — ASSESSMENT & PLAN NOTE
Patient previously treated for pneumonia course of Zosyn for hospital-acquired pneumonia, weaned off of oxygen, however concerned on CT chest CT for possible aspiration pneumonia, will hold n.p.o. until speech-language pathology sees  - Speech-language pathology recommendations

## 2022-11-10 NOTE — PROGRESS NOTES
Patient transferred to T702 via ICU bed - transferred to T7 bed assisted by transport, ACT, and RN's. Patient is alert & oriented x2, restless, minimally able to follow commands, and complaining of back pack. Telemetry monitor placed and patient remains on 2L NC. SCDs on and heparin gtt verified by 2 RN's. PRN IV Dilaudid given.

## 2022-11-10 NOTE — ASSESSMENT & PLAN NOTE
Palliative care consulted with family discussion plan completed on 12/06  Due to his co-morbidities with MYLA and hematuria and now with severe pharyngeal dysphagia, his son's have elected for comfort care and hospice care.

## 2022-11-10 NOTE — ASSESSMENT & PLAN NOTE
Hx of anticoagulation. Warfarin was held and pt received VIt K and also FFP  -comfort care, comfort meds only

## 2022-11-10 NOTE — CARE PLAN
The patient is Watcher - Medium risk of patient condition declining or worsening    Shift Goals  Clinical Goals: Stable neuro exam, Hemodynamic stability.  Patient Goals: ALLIE  Family Goals: ALLIE    Progress made toward(s) clinical / shift goals:  Patient and family updated on plan of care.     Problem: Knowledge Deficit - Standard  Goal: Patient and family/care givers will demonstrate understanding of plan of care, disease process/condition, diagnostic tests and medications  Outcome: Progressing     Problem: Skin Integrity  Goal: Skin integrity is maintained or improved  Outcome: Progressing     Problem: Fall Risk  Goal: Patient will remain free from falls  Outcome: Progressing     Problem: Pain - Standard  Goal: Alleviation of pain or a reduction in pain to the patient’s comfort goal  Outcome: Progressing

## 2022-11-10 NOTE — CARE PLAN
The patient is Watcher - Medium risk of patient condition declining or worsening    Shift Goals  Clinical Goals: improved neuro exam  Patient Goals: ALLIE  Family Goals: ALLIE    Progress made toward(s) clinical / shift goals:      Patient's pain is controlled with PRN pain medication, therapeutic communication, and maintaining a calm environment. Knowledge deficit with family members improves with updating plan of care.     Problem: Knowledge Deficit - Standard  Goal: Patient and family/care givers will demonstrate understanding of plan of care, disease process/condition, diagnostic tests and medications  Outcome: Progressing     Problem: Pain - Standard  Goal: Alleviation of pain or a reduction in pain to the patient’s comfort goal  Outcome: Progressing

## 2022-11-10 NOTE — ASSESSMENT & PLAN NOTE
Waxing and waning, requiring soft restraints at this time  Standard precautions and out of delirium  Avoid offending medication  Avoid interruption of circadian rhythm  CT head negative for acute changes, unable to provide a MRI imaging secondary to patient's pacemaker.  -D/c tele  -No vitals overnight  -[place] if disoriented  -Patient alert and oriented x 3 at bedside  -Ok for physical restraints for increased agitation  - comfort care as of 12/7

## 2022-11-10 NOTE — ASSESSMENT & PLAN NOTE
Holding Coumadin. Originally replaced w/ apixiban 5mg BID but now held in setting of hematuria  -no anticoagulation, comfort care

## 2022-11-10 NOTE — ASSESSMENT & PLAN NOTE
Likely metabolic encephalopathy  He's non focal though reportedly has some intermittent left side weakness  CTH non con negative for bleeding  Unable to perform MRI due to his pacemaker.   We are correcting the hypernatremia.   Will transfer pt out of ICU  Non-pharmacological delirium therapy  Family has been visiting

## 2022-11-10 NOTE — THERAPY
"Speech Language Pathology  Daily Treatment     Patient Name: Sean Kincaid  Age:  77 y.o., Sex:  male  Medical Record #: [id number]  Today's Date: 11/10/2022     Precautions  Precautions: (P) Fall Risk, Swallow Precautions ( See Comments)    HPI: 77 y.o. man transferred from [facility] on 11/4/22 for acute respiratory failure, COVID positive, MYLA. Intubated 11/3-11/8 (5 days).     Assessment    Patient seen this date for dysphagia management by RN request. RN reports patient \"choked\" on \"everything\" yesterday with coughing. Patient alert and positioned to upright in bed upon SLP entry. Mentation appears improved with increased participation in conversation, though still confused. Patient oriented to self, states \"[place]\" for place. Updated CXR from this date \"mildly improved.\"    PO presentations of TN0, PU4, MM5 from breakfast tray. Adequate oral bolus acceptance/containment. Incomplete AP transfer with majority of PU4 bolus remaining in oral cavity, cue to swallow cleared bolus; mild oral bolus residue with MM5 which clears with liquid wash. Prolonged mastication/oral bolus holding. Delayed cough/throat clear in 3/8 trials of cup sip TN0 - only with initial trials. Vocal quality remained stable throughout assessment. Single swallow completed per bolus.  Discussed need for instrumental assessment of swallow function given increased clinical indicators appreciated and per RN report. Patient agreeable and appears to tolerate with finger probe. Awaiting orders from MD. Please keep NPO pending diagnostic.     Recommendations  1.  NPO pending diagnostic  2.  Instrumentation: FEES  3.  Swallowing Instructions & Precautions:   Supervision: 1:1 feeding with constant supervision  Positioning: Fully upright and midline during oral intake  Medication: Whole with puree  Oral Care: Q6h  4.  Following for dysphagia management      Plan    Continue current treatment plan.    Discharge Recommendations: (P) Recommend " "post-acute placement for additional speech therapy services prior to discharge home       Objective       11/10/22 0854   Charge Group   SLP Swallowing Dysfunction Treatment Swallowing Dysfunction Treatment   Total Treatment Time   SLP Time Spent Yes   SLP Swallowing Dysfunction Treatment (Mins) 15   Precautions   Precautions Fall Risk;Swallow Precautions ( See Comments)   Vitals   O2 (LPM) 2   O2 Delivery Device Silicone Nasal Cannula   Pain 0 - 10 Group   Therapist Pain Assessment Post Activity Pain Same as Prior to Activity;0   Dysphagia    Dysphagia X   Diet / Liquid Recommendation NPO   Nutritional Liquid Intake Rating Scale Nothing by mouth   Nutritional Food Intake Rating Scale Nothing by mouth   Skilled Intervention Verbal Cueing   Recommended Route of Medication Administration   Medication Administration  Crush all Medications in Puree   Patient / Family Goals   Patient / Family Goal #1 \"Ow!\"   Goal #1 Outcome Progressing as expected   Short Term Goals   Short Term Goal # 1 Patient will consume meals of  MM5/TN0 without overt indicators of aspiration or decline in pulmonary status   Goal Outcome # 1 Progressing slower than expected   Education Group   Education Provided Dysphagia   Dysphagia Patient Response Patient;Acceptance;Explanation;Reinforcement Needed   Anticipated Discharge Needs   Discharge Recommendations Recommend post-acute placement for additional speech therapy services prior to discharge home   Therapy Recommendations Upon DC Dysphagia Training   Interdisciplinary Plan of Care Collaboration   IDT Collaboration with  Nursing;Physician   Patient Position at End of Therapy In Bed;Bed Alarm On;Call Light within Reach   Collaboration Comments RN udpdated     "

## 2022-11-10 NOTE — THERAPY
Speech Language Pathology   Fiberoptic Endoscopic Evaluation of Swallow     Patient Name: Sean Kincaid  AGE:  77 y.o., SEX:  male  Medical Record #: [id number]  Today's Date: 11/10/2022     Precautions  Precautions: (P) Fall Risk, Swallow Precautions ( See Comments)    Assessment    Current Method of Nutrition   NPO until cleared by speech pathology    Pertinent Information:  Affect/Behavior: Appropriate, Calm, Cooperative  Oxygen Requirements:  2 L Nasal Cannula  Feeding Tube: None  Dentition: Fair  Factor(s) Affecting Performance: Impaired mental status, Impaired command following    Discussed the risks, benefits, and alternatives of the FEES procedure. Patient/family acknowledged and agreed to proceed.     Assessment  Flexible Endoscopic Evaluation of Swallowing (FEES) completed at bedside today. The endoscope was passed transnasally via right nare to evaluate the anatomy and physiology of swallowing. Pt tolerated the procedure with no apparent distress.    Anatomic Findings: Unremarkable  Vocal Fold Motion: Bilateral movement  Secretion Management: WNL  PO trials: ice chips, thin liquids, mildly thick liquids, liquidized, puree, soft & bite sized, mixed consistency      Consistency PAS Score Timing Comments   Ice Chips 1 N/A    Thin Liquid 5 During swallow (inferred) and Post swallow Tsp PAS 1  Tsp PAS 1  Cup sip PAS 5  Cup sip PAS 5   3 Second Prep- unable to control bolus PAS 5   Mildly Thick Liquid 1 N/A    Liquidized 1 N/A    Puree 5 During swallow (inferred) and Post swallow Penetration above the folds during the swallow with penetrated bolus eventually reaching the VF. Cued cough not effective to expel penetrated material.    Soft & Bite Sized 1 N/A    Mixed Consistency 1 N/A      Penetration-Aspiration Scale (PAS)  1     No contrast enters airway  2     Contrast enters the airway, remains above the vocal folds, and is ejected from the airway (not seen in the airway at the end of the swallow).  3      Contrast enters the airway, remains above the vocal folds, and is not ejected from the airway (is seen in the airway after the swallow).  4     Contrast enters the airway, contacts the vocal folds, and is ejected from the airway.  5     Contrast enters the airway, contacts the vocal folds, and is not ejected from the airway  6     Contrast enters the airway, crosses the plane of the vocal folds, and is ejected from the airway.  7     Contrast enters the airway, crosses the plane of the vocal folds, and is not ejected from the airway despite effort.  8     Contrast enters the airway, crosses the plane of the vocal folds, is not ejected from the airway and there is no response to aspiration.    Oral phase:    The patient presents with prolonged oral holding and impaired bolus control resulting in premature spillage to the pyriform sinuses despite prompts to complete 3 second preps, and prolonged holding with liquidized, purees and soft and bite sized textures. Appropriate oral acceptance and containment was observed.     Pharyngeal phase:    Deficits include impaired tongue base retraction, impaired epiglottic inversion and impaired pharyngeal shortening.   1) Penetration to the VF inferred during the swallow and after the swallow with bolus spilling over the aryepiglottic folds with thin liquids and pureed consistencies.  2) Trace vallecular residue with mildly thick liquids and thin liquids; moderate vallecular residue with pureed textures. Liquid wash of mildly thick liquids was effective to clear majority of the residue.   3)Weak cough not effective to expel aspirated material from the laryngeal vestibule.     Clinical Impressions  The pt presents with a moderate oropharyngeal dysphagia, likely acute related to intubation x5 days, altered mental status and generalized weakness. Patient is currently appropriate for a short-term modified diet of liquidized with mildly thick liquids. Patient is a moderate risk for  "aspiration PNA given his ICU status, impaired mobility and deep consistent penetration with thin and pureed textures. Suspect good prognosis given patients improving mental status and ability to follow simple commands intermittently.     Recommendations  Liquidized textures and mildly thick liquids   2.  Swallowing Instructions & Precautions:   Supervision: 1:1 feeding with constant supervision  Positioning: Fully upright and midline during oral intake  Medication: Crush with applesauce or puree, as appropriate  Strategies: Small bites/sips  Oral Care: BID    Plan    Recommend Speech Therapy 3 times per week until therapy goals are met for the following treatments:  Dysphagia Training.    Discharge Recommendations: Recommend post-acute placement for additional speech therapy services prior to discharge home    Objective       11/10/22 1106   Precautions   Precautions Fall Risk;Swallow Precautions ( See Comments)   Vitals   O2 (LPM) 2   O2 Delivery Device Silicone Nasal Cannula   Pain 0 - 10 Group   Therapist Pain Assessment Post Activity Pain Same as Prior to Activity;Nurse Notified;0   Prior Living Situation   Lives with - Patient's Self Care Capacity Alone and Able to Care For Self   Prior Level Of Function   Patient's Primary Language English   Dysphagia Rating   Nutritional Liquid Intake Rating Scale Thickened beverages (mildly thick unless otherwise specified)   Nutritional Food Intake Rating Scale Total oral diet of a single consistency   Problem List   Problem List Dysphagia   Evaluation Recommendations   Diet / Liquid Recommendation Mildly Thick (2) - (Nectar Thick);Liquidised (3) - (Nectar Thick Full Liquid)   Medication Administration  Crush all Medications in Puree   Patient / Family Goals   Patient / Family Goal #1 \"Ow!\"   Goal #1 Outcome Progressing as expected   Short Term Goals   Short Term Goal # 1 Patient will consume meals of  MM5/TN0 without overt indicators of aspiration or decline in pulmonary " status   Goal Outcome # 1 Goal not met   Short Term Goal # 2 Pt will consume liquidized textures with mildly thick liquids without any overt s/sx of aspiration   Education Group   Education Provided Dysphagia   Dysphagia Patient Response Patient;Acceptance;Explanation;Reinforcement Needed

## 2022-11-10 NOTE — PROGRESS NOTES
Critical Care Progress Note    Date of admission  11/4/2022    Chief Complaint  77 y.o. male w/ h/o CHF, ? COPD (never a smoker), paroxysmal afib (on coumadin), DM, s/p MVR bioprosthetic in 2012 at [facility] by Dr. Lechuga who presented 11/4/2022, a transfer from [facility] for acute resp failure, COVID positive and MYLA.     Hospital Course  11/3 - Taken to [facility]  11/4 - Transfer to [facility], intubation / lines placed  11/5 - Coagulopathic, hematoma forming in right thigh. Heparin gtt was topped  11/7 - tolerating SBT, off sedation (both precedex and fentanyl). Remains encephalopathic  11/8 - extubated    Interval Problem Update  Reviewed last 24 hour events:  Remains confused, delirious  Unable to get MRI due to pacemaker  Remains off pressor in the past 2 days.   Extubated yesterday with 2L NC, sat >94%  Creatinine continues to improve to 1.5, good UOP  I/O negative 1L in the past 24 hours, +1.1 L since admission   On cefazolin for MSSA pneumonia  On dexamethasone 6mg daily for 10 days (end date 11/13)  Heme: no issues. Right groin hematoma stable.   INR 1.43 subtherapeutic  Palliative care in discussion with goal of care with family and family decided DNR/DNI    Intake / Output    Intake/Output Summary (Last 24 hours) at 11/10/2022 0625  Last data filed at 11/10/2022 0200  Gross per 24 hour   Intake 2261.16 ml   Output 2225 ml   Net 36.16 ml       Net IO Since Admission: 3.41 mL [11/04/22 0615]     Review of Systems  Review of Systems   Unable to perform ROS: Intubated      Vital Signs for last 24 hours   Pulse:  [] 70  Resp:  [23-74] 52  BP: (141-190)/() 141/76  SpO2:  [91 %-98 %] 96 %    Hemodynamic parameters for last 24 hours       Respiratory Information for the last 24 hours       Physical Exam   Physical Exam  Vitals and nursing note reviewed. Exam conducted with a chaperone present.   Constitutional:       Appearance: He is obese. He is ill-appearing.      Interventions: He is sedated  and intubated.      Comments: Confused, intermittently following commands   HENT:      Head: Normocephalic.   Cardiovascular:      Rate and Rhythm: Normal rate. Rhythm irregular.      Pulses: Normal pulses.      Comments: Paced   Pulmonary:      Effort: Pulmonary effort is normal. No respiratory distress. He is intubated.      Breath sounds: Rhonchi present. No wheezing.   Abdominal:      General: There is no distension.      Palpations: Abdomen is soft.      Tenderness: There is no abdominal tenderness. There is no guarding.   Musculoskeletal:      Cervical back: Neck supple.      Right lower leg: Edema present.      Left lower leg: Edema present.   Skin:     General: Skin is warm and dry.      Capillary Refill: Capillary refill takes less than 2 seconds.   Neurological:      Mental Status: He is alert.      Motor: Weakness present.      Comments: RASS 0 but intermittently following commands.     Pt was re-evaluated post intubation. Mental status the same       Medications  Current Facility-Administered Medications   Medication Dose Route Frequency Provider Last Rate Last Admin    MD Alert...Warfarin per Pharmacy   Other PHARMACY TO DOSE Kalpesh Cox D.O.        heparin infusion 25,000 units in 500 mL 0.45% NACL  0-30 Units/kg/hr (Adjusted) Intravenous Continuous PEEWEE SosaOMichaela 31.5 mL/hr at 11/10/22 0227 18 Units/kg/hr at 11/10/22 0227    heparin injection 3,500 Units  40 Units/kg (Adjusted) Intravenous PRN PEEWEE SosaOMichaela        magnesium hydroxide (MILK OF MAGNESIA) suspension 30 mL  30 mL Oral QDAY PRN PEEWEE SosaO.   30 mL at 11/09/22 1811    insulin GLARGINE (Lantus,Semglee) injection  20 Units Subcutaneous QAM INSULIN PEEWEE SosaO.   20 Units at 11/10/22 0603    insulin regular (HumuLIN R,NovoLIN R) injection  3-14 Units Subcutaneous 4X/DAY ACHS PEEWEE SosaO.   3 Units at 11/09/22 2226    And    dextrose 10 % BOLUS 25 g  25 g Intravenous Q15 MIN PRN PEEWEE SosaOMichaela        albuterol inhaler 2 Puff   2 Puff Inhalation Q4HRS WHILE AWAKE (RT) PEEWEE SosaO.   2 Puff at 11/09/22 2150    dextrose 5% infusion   Intravenous Continuous Kalpesh Cox D.O. 125 mL/hr at 11/10/22 0411 New Bag at 11/10/22 0411    ceFAZolin (Ancef) 2 g in  mL IVPB  2 g Intravenous Q8HRS KEATON Sosa.O. 200 mL/hr at 11/10/22 0548 2 g at 11/10/22 0548    HYDROmorphone (Dilaudid) injection 0.5 mg  0.5 mg Intravenous Q HOUR PRN Kalpesh Cox D.O.   0.5 mg at 11/09/22 1313    Or    HYDROmorphone (Dilaudid) injection 1 mg  1 mg Intravenous Q HOUR PRN Kalpesh Cox D.O.   1 mg at 11/09/22 2230    oxyCODONE immediate-release (ROXICODONE) tablet 5 mg  5 mg Enteral Tube Q4HRS PRN Kimberli BHAGAT Latona   5 mg at 11/09/22 1811    Nozin nasal  swab  1 Applicator Each Nostril BID Harshil Manuel M.D.   1 Applicator at 11/10/22 0547    Respiratory Therapy Consult   Nebulization Continuous RT Earl Scott M.D.        senna-docusate (PERICOLACE or SENOKOT S) 8.6-50 MG per tablet 2 Tablet  2 Tablet Enteral Tube BID Earl Scott M.D.   2 Tablet at 11/09/22 1800    And    polyethylene glycol/lytes (MIRALAX) PACKET 1 Packet  1 Packet Enteral Tube QDAY PRN Earl Scott M.D.   1 Packet at 11/09/22 0852    And    bisacodyl (DULCOLAX) suppository 10 mg  10 mg Rectal QDAY PRN Earl Scott M.D.        lidocaine (XYLOCAINE) 1 % injection 2 mL  2 mL Tracheal Tube Q30 MIN PRN Earl Scott M.D.        Pharmacy Consult: Enteral tube insertion - review meds/change route/product selection  1 Each Other PHARMACY TO DOSE Earl Scott M.D.        ondansetron (ZOFRAN) syringe/vial injection 4 mg  4 mg Intravenous Q4HRS PRN Earl Scott M.D.        dexamethasone (DECADRON) injection 6 mg  6 mg Intravenous DAILY Earl Scott M.D.   6 mg at 11/10/22 0546    famotidine (PEPCID) tablet 10 mg  10 mg Enteral Tube Q24HRS Harshil Manuel M.D.   10 mg at 11/08/22 0432    Or    famotidine (PEPCID) injection 10 mg  10 mg Intravenous  Q24HRS Harshil Manuel M.D.   10 mg at 11/10/22 0546       Fluids    Intake/Output Summary (Last 24 hours) at 11/10/2022 0625  Last data filed at 11/10/2022 0200  Gross per 24 hour   Intake 2261.16 ml   Output 2225 ml   Net 36.16 ml         Laboratory              Recent Labs     11/08/22  0445 11/08/22  1755 11/09/22  0500 11/09/22  1230 11/09/22  2015 11/10/22  0415   SODIUM 151*   < > 149* 147* 146* 144   POTASSIUM 5.0  --  4.4  --   --  4.2   CHLORIDE 115*  --  113*  --   --  111   CO2 25  --  24  --   --  23   BUN 89*  --  71*  --   --  49*   CREATININE 2.02*  --  1.55*  --   --  1.29   MAGNESIUM 2.3  --  2.1  --   --  1.8   PHOSPHORUS 1.8*  --  2.9  --   --  3.0   CALCIUM 8.4*  --  8.5  --   --  8.3*    < > = values in this interval not displayed.       Recent Labs     11/07/22  0945 11/08/22  0445 11/09/22  0500 11/10/22  0415   ALTSGPT 9  --   --   --    ASTSGOT 23  --   --   --    ALKPHOSPHAT 70  --   --   --    TBILIRUBIN 0.4  --   --   --    PREALBUMIN 14.2*  --   --   --    GLUCOSE 271* 260* 211* 153*       Recent Labs     11/07/22  0945 11/08/22  0533 11/09/22  0500 11/10/22  0415   WBC 12.9* 10.2 10.6 10.2   NEUTSPOLYS 73.20* 66.40 64.80 67.80   LYMPHOCYTES 9.80* 11.70* 16.90* 16.20*   MONOCYTES 14.90* 19.20* 16.10* 13.00   EOSINOPHILS 0.00 0.00 0.10 1.20   BASOPHILS 0.20 0.30 0.20 0.20   ASTSGOT 23  --   --   --    ALTSGPT 9  --   --   --    ALKPHOSPHAT 70  --   --   --    TBILIRUBIN 0.4  --   --   --        Recent Labs     11/08/22  0533 11/08/22  0920 11/09/22  0500 11/09/22  1630 11/10/22  0415   RBC 2.52*  --  2.41*  --  2.46*   HEMOGLOBIN 7.6*  --  7.2*  --  7.3*   HEMATOCRIT 24.1*  --  23.6*  --  23.7*   PLATELETCT 181  --  196  --  216   PROTHROMBTM  --  17.2*  --  17.1* 17.3*   APTT  --   --   --  25.6  --    INR  --  1.43*  --  1.43* 1.45*         Imaging  X-Ray:  I have personally reviewed the images and compared with prior images. and My impression is: Interstitial opacifications, left>  right - stable CXR today    Assessment/Plan  * Septic shock with acute organ dysfunction due to methicillin susceptible Staphylococcus aureus (MSSA) ([facility])  Assessment & Plan  This is Septic shock Present on admission  SIRS criteria identified on my evaluation include: Fever, with temperature greater than 101 deg F, Tachycardia, with heart rate greater than 90 BPM and Tachypnea, with respirations greater than 20 per minute  Indicators of septic shock include: Severe sepsis present and persistent hypotension after 30 ml/kg completed.   Sources is: COVID-19 pneumonia and MSSA pneumonia  Sepsis protocol initiated  Crystalloid Fluid Administration: Patient has COVID-19 pneumonia, CHF w/ EF of 35-40%, and acute kidney injury w/ creatinine of 8.0 and CrCl of 10.3 mL/min, for these reasons it is unsafe for this patient to receive 30 mL/kg of fluid  Partial Fluid Dose Given: 15 mL/kg per current body weight given at sending hospital, patient to be reassessed shortly after completion of partial fluid bolus to ensure adequacy of resuscitation  There are no antibiotics appropriate for this source of sepsis  Reassessment: I have reassessed the patient's hemodynamic status    S/p fluid resuscitation and pressor  Off pressor since 11/6   Lactate resolved  On cefazolin for MSSA pneumonia. Treat till end date 11/13  On dexamethasone for COVID. Treat for 10 days.     Paroxysmal atrial fibrillation (HCC)- (present on admission)  Assessment & Plan  Chronic paroxysmal afib  Maximize electrolytes (keep K > 4.0 and Mag > 2.0)  Heparin gtt was stopped due to hematoma. Hematoma stable  CHADVasc score is at least 5.   Continue heparin gtt and resume coumadin per pharmacy protocol        Delirium  Assessment & Plan  Likely metabolic encephalopathy  He's non focal though reportedly has some intermittent left side weakness  CTH non con negative for bleeding  Unable to perform MRI due to his pacemaker.   We are correcting the hypernatremia.    Will transfer pt out of ICU  Non-pharmacological delirium therapy  Family has been visiting    Goals of care, counseling/discussion  Assessment & Plan  Palliative care assisting in goal of care discussion   Family decided for DNR/DNi    Hypernatremia  Assessment & Plan  Sodium was in 151, started on D5W  Sodium normalized in 144 today  On D5W gtt to 125cc/hr. Will discontinue and change to LR at 75cc/hr  Appreciate nephrology/pharmacy      Hematoma  Assessment & Plan  Right Groin hematoma  Due to trialyis catheter and coagulopathy  Much better on 11/6  Hematoma stable on exam 11/7. Soft, no increasing induration   11/8 benign exam on right groin.  Follow clinically    Acute on chronic systolic congestive heart failure (HCC)- (present on admission)  Assessment & Plan  BNP 8230  Troponin 0.12    TTE  - Likely normal LV  - Dilated and reduced RV    Goal euvolemia    Hyperkalemia- (present on admission)  Assessment & Plan  Resolved. Makes urine.     MYLA (acute kidney injury) (HCC)- (present on admission)  Assessment & Plan  Likely due to dehydration and COVID-19  Improving overall and making good UOP    Plan:   Avoid nephrotoxins  Monitor electrolytes and replete as needed  Renally dose adjust medications  Strict I/O monitoring    Acute hypoxemic respiratory failure due to COVID-19 (HCC)- (present on admission)  Assessment & Plan  COVID-19 pneumonia & MSSA  On full vent support, minimal setting  11/7 off sedation   11/8 Did well on SAT/SBT, extubated.     Plan:   Dexamethasone 6mg daily for 10 days (end date 11/13)  Continue cefazolin x7 days (end date 11/13)  Early mobility. PT/OT       VTE:  Heparin gtt  Ulcer: H2 Antagonist  Lines: Central Line  Ongoing indication addressed, Arterial Line  Ongoing indication addressed, and Ball Catheter  Ongoing indication addressed    I have performed a physical exam and reviewed and updated ROS and Plan today (11/10/2022). In review of yesterday's note (11/9/2022), there are no  changes except as documented above.     Discussed patient condition and risk of morbidity and/or mortality with RN, RT, Pharmacy, , and nephrology    Pt can be transferred to telemetry  D/w Dr. Lin, [facility]  Will sign off, please call with questions

## 2022-11-10 NOTE — PROGRESS NOTES
4 Eyes Skin Assessment Completed by NORAH Goff and NORAH Ortiz.    Head WDL  Ears WDL  Nose WDL  Mouth WDL  Neck WDL  Breast/Chest WDL  Shoulder Blades WDL  Spine WDL  (R) Arm/Elbow/Hand Redness, Blanching, and Bruising  (L) Arm/Elbow/Hand Redness, Blanching, and Bruising  Abdomen Bruising  Groin Bruising  Scrotum/Coccyx/Buttocks Redness and Blanching  (R) Leg Bruising  (L) Leg Bruising  (R) Heel/Foot/Toe Redness and Blanching  (L) Heel/Foot/Toe Redness and Blanching          Devices In Places Tele Box, Blood Pressure Cuff, Pulse Ox, SCD's, Central Line, Condom Cath, and Nasal Cannula      Interventions In Place Gray Ear Foams, Sacral Mepilex, Heel Float Boots, TAP System, Pillows, Q2 Turns, Barrier Cream, and Pressure Redistribution Mattress    Possible Skin Injury No    Pictures Uploaded Into Epic N/A  Wound Consult Placed N/A  RN Wound Prevention Protocol Ordered Yes

## 2022-11-10 NOTE — CONSULTS
Hospital Medicine Consultation    Date of Service  11/10/2022    Referring Physician  Kalpesh Cox M.D.    Consulting Physician  Devyn Lin M.D.    Reason for Consultation  Hospital medicine consultation in a patient admitted with respiratory failure    History of Presenting Illness  77 y.o. male who presented 11/4/2022 with history of COPD, atrial fibrillation, diabetes type 2, history of congestive heart failure, EF of 45%,, initially presented to an outlying facility itself like [facility], complaining of worsening shortness of breath and fatigue.  The patient was diagnosed with COVID-19, started initially on BiPAP and then eventually required to be intubated, was found with an MYLA, creatinine of 8.0, potassium 6.6, the patient chronically anticoagulated for atrial fibrillation, history of complete heart block, history of bioprosthetic replacement of the mitral valve, ANALY, after presentation to [facility] he was admitted to the ICU, he continued to be treated in ICU with mechanical ventilation, required vasopressors including norepinephrine and vasopressin, placed on empiric antibiotics in form of ceftriaxone, nephrology consulted for MYLA, nonoliguric, had brief initiation of RRT but then picked up his urine output and required no further RRT as per nephrology.  He developed a right thigh/femoral hematoma related to a try Alysis catheter placement., anticoagulation was held, the patient required Precedex for sedation, fentanyl for pain control, his respiratory culture turned positive for MSSA and the patient is being treated for MSSA pneumonia in form of Ancef.  His COVID-19 pneumonia was treated with dexamethasone 6 mg for 10 days.  The patient eventually was extubated on 11/8, he was found significantly encephalopathic, a CT of the head was negative for acute changes, the patient unable to be evaluated by MRI secondary to the patient's pacemaker.  He progressed to some degree but is  still encephalopathic with mental slowing, confusion, being awake/alert  and oriented x1, moving all 4 extremities fairly equally.  Palliative care was consulted to discuss goals of care with family and the decision was reached in light of the patient's condition and age to change his CODE STATUS to DNR/DNI.  Patient had evaluation with speech therapy, he passed a swallow evaluation, his p.o. intake remains fairly poor.  On 11/10 the patient is evaluated, he continues to be confused and delirious, he remains off pressors, currently on 2 L nasal can oxygen, saturating 94%, he has overall good urine output, his right groin hematoma remained stable, his anticoagulation was restarted in form of heparin drip and resuming Coumadin.  The patient had significant difficulty with hypernatremia and acute 8-hour sodium levels were checked, this had improved.  The patient fortunately is encephalopathic and at this time not a reliable historian, I discussed his current condition with the bedside RN and Dr. Cox from intensive care  Review of Systems  Review of Systems   Unable to perform ROS: Medical condition     Past Medical History   has a past medical history of Acquired renal cyst (5/5/2020), Anticoagulation monitoring, INR range 2.5-3.5, Arthritis (2011), ASTHMA, Atrial fibrillation (HCC), Back pain, Backpain, Blood transfusion, without reported diagnosis (08/07/2012), Bursitis of right hip (2/8/2013), Cardiomyopathy, nonischemic ([facility]), Chronic anticoagulation, Chronic systolic congestive heart failure (HCC) (12/29/2015), Coronary artery disease involving native coronary artery of native heart without angina pectoris, Diabetes ([facility]), Dyslipidemia, Eosinophilia (9/4/2014), Essential hypertension, GERD (gastroesophageal reflux disease), GERD (gastroesophageal reflux disease) (11/12/2014), H/O pneumococcal pneumonia, Headache(784.0), Hematuria (9/4/2014), History of complete AV block, Hyperglycemia (9/4/2014), Hypertension,  Insomnia, Iron deficiency anemia (2/8/2013), Nocturnal hypoxemia, Paroxysmal atrial fibrillation (HCC), Restless leg syndrome, Right hip pain (2/8/2013), S/P cardiac pacemaker procedure, S/P mitral valve replacement with bioprosthetic valve, Seasonal allergies, Sleep apnea, Tinea cruris (2/8/2013), Type 2 diabetes mellitus (HCC) (9/5/2014), Type 2 diabetes mellitus without complication (HCC) (10/13/2016), and Ventral hernia (7/2014).    Surgical History   has a past surgical history that includes mitral valve replacement (2012); knee arthroplasty total (Right); maze procedure; appendectomy; hernia repair; tonsillectomy; pacemaker insertion; other orthopedic surgery (2005); inguinal hernia repair (2007); pr lap umbilical hernia repair; pr removal of tonsils,<13 y/o; maze procedure (8/7/2012); mitral valve replace (8/7/2012); other orthopedic surgery (1965); tonsillectomy (1949); appendectomy child (1957); pacemaker insertion (August 2012); stent placement (11/2015); and colonoscopy - endo (1/2/2017).    Family History  family history includes Cancer in his brother and sister; Diabetes in his brother and father; Heart Disease in his mother; Other in his brother.    Social History   reports that he has never smoked. He has never used smokeless tobacco. He reports that he does not drink alcohol and does not use drugs.    Medications  Prior to Admission Medications   Prescriptions Last Dose Informant Patient Reported? Taking?   B-D ULTRAFINE III SHORT PEN   No No   Sig: USE AS DIRECTED WITH INSULIN PEN   Blood Glucose Monitoring Suppl Device   No No   Sig: Meter: Dispense Device of Insurance Preference. Sig. Use as directed for blood sugar monitoring. #1. NR.   Blood Glucose Test Strips   No No   Sig: Dispense strips of insurance preference. Use to test once daily. Dx E11.9 Pt not on insulin   Continuous Blood Gluc  (FREESTYLE KENDRA 2 READER) Device   No No   Sig: Use with Freestyle Kendra sensors   Continuous  Blood Gluc Sensor (FREESTYLE KENDRA 2 SENSOR) [facility]   No No   Sig: Use with Freestyle Kendra [place] to read blood sugars   HYDROcodone/acetaminophen (NORCO)  MG Tab   Yes No   LANTUS SOLOSTAR 100 UNIT/ML Solution Pen-injector injection   No No   Sig: INJECT 25 UNITS UNDER THE SKIN EVERY EVENING   Lancets   No No   Sig: Dispense lancets of insurance preference. Use to test once daily. Dx E11.9 Pt not on insulin   ROPINIRole (REQUIP) 0.5 MG Tab   No No   Sig: Take 2 Tablets by mouth every day.   albuterol 108 (90 Base) MCG/ACT Aero Soln inhalation aerosol   No No   Sig: Inhale 2 Puffs by mouth every 6 hours as needed for Shortness of Breath.   atorvastatin (LIPITOR) 40 MG Tab   No No   Sig: TAKE ONE TABLET BY MOUTH EVERY DAY   carvedilol (COREG) 25 MG Tab   No No   Sig: Take 1 Tablet by mouth 2 times a day with meals.   cyclobenzaprine (FLEXERIL) 10 MG Tab   Yes No   Sig: Take 10 mg by mouth.   fluticasone (FLONASE) 50 MCG/ACT nasal spray   No No   Sig: Spray 1 Spray in nose every day.   furosemide (LASIX) 40 MG Tab   No No   Sig: Take 1 Tablet by mouth every day.   ipratropium (ATROVENT) 0.06 % Solution   No No   Sig: Spray 2 Sprays in nose 4 times a day.   lisinopril (PRINIVIL) 20 MG Tab   No No   Sig: TAKE ONE TABLET BY MOUTH TWICE A DAY   nystatin (MYCOSTATIN) powder   No No   Sig: APPLY TOPICALLY TWICE DAILY TO SKIN FOLDS UNDER ABDOMEN AND IN GROIN AREA (CONTINUE UNTIL SKIN NORMALIZED FOR 2 TO 3 DAYS)   omeprazole (PRILOSEC) 20 MG delayed-release capsule   No No   Sig: TAKE ONE CAPSULE BY MOUTH EVERY DAY   spironolactone (ALDACTONE) 25 MG Tab   No No   Sig: TAKE ONE TABLET BY MOUTH EVERY DAY   warfarin (COUMADIN) 2.5 MG Tab   No No   Sig: Take 1 Tablet by mouth 2 times a day.      Facility-Administered Medications: None       Allergies  No Known Allergies    Physical Exam  Temp:  [36.9 °C (98.4 °F)-37.6 °C (99.7 °F)] 36.9 °C (98.4 °F)  Pulse:  [] 74  Resp:  [23-74] 27  BP: (135-190)/()  179/89  SpO2:  [93 %-98 %] 94 %    Physical Exam  Vitals and nursing note reviewed.   Constitutional:       Appearance: He is well-developed. He is ill-appearing.      Comments: Pt seen and examined.   HENT:      Head: Normocephalic and atraumatic.      Mouth/Throat:      Mouth: Mucous membranes are dry.   Eyes:      Pupils: Pupils are equal, round, and reactive to light.   Cardiovascular:      Rate and Rhythm: Normal rate. Rhythm irregular.      Heart sounds: Normal heart sounds.   Pulmonary:      Effort: Pulmonary effort is normal.      Breath sounds: Rhonchi and rales present.   Abdominal:      General: Bowel sounds are normal.      Palpations: Abdomen is soft.   Musculoskeletal:         General: Normal range of motion.      Cervical back: Normal range of motion and neck supple.      Comments: Right groin hematoma   Skin:     General: Skin is warm and dry.   Neurological:      General: No focal deficit present.      Mental Status: He is alert and oriented to person, place, and time.   Psychiatric:         Behavior: Behavior normal.       Fluids  Date 11/10/22 0700 - 11/11/22 0659   Shift 1851-2919 7597-1906 0903-5350 24 Hour Total   INTAKE   P.O. 120   120   I.V. 2104.3   2104.3   Shift Total 2224.3   2224.3   OUTPUT   Urine 750   750   Shift Total 750   750   Weight (kg) 105 105 105 105       Laboratory  Recent Labs     11/08/22  0533 11/09/22  0500 11/10/22  0415   WBC 10.2 10.6 10.2   RBC 2.52* 2.41* 2.46*   HEMOGLOBIN 7.6* 7.2* 7.3*   HEMATOCRIT 24.1* 23.6* 23.7*   MCV 95.6 97.9* 96.3   MCH 30.2 29.9 29.7   MCHC 31.5* 30.5* 30.8*   RDW 49.6 51.1* 48.3   PLATELETCT 181 196 216   MPV 11.4 11.2 11.0     Recent Labs     11/08/22  0445 11/08/22  1755 11/09/22  0500 11/09/22  1230 11/09/22  2015 11/10/22  0415 11/10/22  1318   SODIUM 151*   < > 149*   < > 146* 144 159*   POTASSIUM 5.0  --  4.4  --   --  4.2  --    CHLORIDE 115*  --  113*  --   --  111  --    CO2 25  --  24  --   --  23  --    GLUCOSE 260*  --  211*   --   --  153*  --    BUN 89*  --  71*  --   --  49*  --    CREATININE 2.02*  --  1.55*  --   --  1.29  --    CALCIUM 8.4*  --  8.5  --   --  8.3*  --     < > = values in this interval not displayed.     Recent Labs     11/08/22  0920 11/09/22  1630 11/10/22  0415   APTT  --  25.6  --    INR 1.43* 1.43* 1.45*                 Imaging  DX-CHEST-PORTABLE (1 VIEW)   Final Result      Mildly improved aeration and stable to slightly improved vascular congestion.      Mildly improved left basilar atelectasis.      DX-CHEST-PORTABLE (1 VIEW)   Final Result      1.  Interval extubation and removal of NG tube.   2.  Increased bibasilar airspace disease.   3.  Increased interstitial edema.   4.  Small left pleural effusion.      CT-HEAD W/O   Final Result      1. No CT evidence of acute infarct, hemorrhage or mass.   2. Moderate global parenchymal atrophy. Chronic small vessel ischemic changes.      DX-CHEST-PORTABLE (1 VIEW)   Final Result      Mildly improved left basilar opacity.      DX-ABDOMEN FOR TUBE PLACEMENT   Final Result      Enteric tube tip projects over the gastric antrum or proximal duodenum      DX-CHEST-PORTABLE (1 VIEW)   Final Result         1. Stable lines and tubes.   2. Worsening left basilar opacity. Small left pleural effusion. Mild right infrahilar atelectasis.         DX-CHEST-PORTABLE (1 VIEW)   Final Result      1.  Cardiomegaly with interstitial edema.      2.  Left lung base atelectasis and minimal left pleural effusion.      DX-CHEST-PORTABLE (1 VIEW)   Final Result      1.  Improved left pleural effusion with adjacent airspace disease.   2.  Improved interstitial infiltrates versus edema.      EC-ECHOCARDIOGRAM COMPLETE W/O CONT   Final Result      DX-CHEST-PORTABLE (1 VIEW)   Final Result      1.  Small left pleural effusion with adjacent airspace disease.   2.  Worsening interstitial infiltrates versus edema.   3.  Right basilar atelectasis.      DX-ABDOMEN FOR TUBE PLACEMENT   Final Result       Enteric tube tip projects over the proximal duodenum      DX-ABDOMEN FOR TUBE PLACEMENT   Final Result         1.  Nonspecific bowel gas pattern in the upper abdomen.   2.  Nasogastric tube, not visualized beyond the mid thorax, see ordered repeat abdominal x-ray for further characterization.   3.  Cardiomegaly   4.  Left lung base atelectasis or infiltrate.      OUTSIDE IMAGES-DX CHEST   Final Result      DX-CHEST-PORTABLE (1 VIEW)   Final Result         1.  Bibasilar atelectasis or early infiltrates.   2.  Trace bilateral pleural effusions   3.  Cardiomegaly      MR-BRAIN-W/O    (Results Pending)       Assessment/Plan  * Acute hypoxemic respiratory failure due to COVID-19 (HCC)- (present on admission)  Assessment & Plan  In conjunction with MSSA pneumonia,  The patient is being treated for pneumonia,  Continue Decadron course 6 mg for 10 days  Pulmonary toilet  Respiratory therapy  Wean oxygen as tolerated  Incentive spirometry    Delirium- (present on admission)  Assessment & Plan  Likely in light of the patient's acute hospitalization, acute critical condition, disease  Standard precautions and out of delirium  Avoid offending medication  Avoid interruption of circadian rhythm  CT head negative for acute changes, unable to provide a MRI imaging secondary to patient's pacemaker, although today he has no evidence of focal findings    MSSA (methicillin susceptible Staphylococcus aureus) pneumonia ([facility])- (present on admission)  Assessment & Plan  On treatment with Ancef    Septic shock with acute organ dysfunction due to methicillin susceptible Staphylococcus aureus (MSSA) ([facility])  Assessment & Plan  Present on admission, treated adequately    Paroxysmal atrial fibrillation ([facility])- (present on admission)  Assessment & Plan  History of, ongoing anticoagulation with warfarin/heparin overlapping    Acute on chronic systolic congestive heart failure (HCC)- (present on admission)  Assessment & Plan  Continue with medical  therapy including Coreg, diuresis  It appears his EF has been improving    CONCLUSIONS  The left ventricle is not well visualized due to body habitus.  Left ventricular systolic function is probably normal.  The ejection fraction is roughly estimated to be 55%.  A reliable estimation of diastolic function cannot be made due to   mitral valve disease.  Severely dilated right ventricle.  Reduced right ventricular systolic function.  Estimated right ventricular systolic pressure is 40 mmHg.  Severe biatrial dilation.  Mild mitral regurgitation.  Aortic valve sclerosis without significant stenosis.  Mild tricuspid regurgitation.  Normal inferior vena cava size and inspiratory collapse    MYLA (acute kidney injury) (HCC)- (present on admission)  Assessment & Plan  Acute initially, currently improved, the patient had a brief episode of RRT  Improving currently,  Avoid nephrotoxins    Goals of care, counseling/discussion- (present on admission)  Assessment & Plan  Palliative care assisting, family decision to DNR/DNI status  Will likely benefit from future documentation, POLST etc.    Hypernatremia- (present on admission)  Assessment & Plan  Likely a function of free water deficit,  Recheck, monitor and provide adequate free water supplementation    Hematoma- (present on admission)  Assessment & Plan  Right groin secondary to dialysis catheter on anticoagulation, stabilized, monitor    Hyperkalemia- (present on admission)  Assessment & Plan  Initially on presentation present, treated, improved, monitor    Restless leg syndrome- (present on admission)  Assessment & Plan  Resume Requip    Chronic anticoagulation- (present on admission)  Assessment & Plan  Resume heparin/Coumadin    Pacemaker- (present on admission)  Assessment & Plan  In situ, telemetry monitoring, history of complete heart block    Plan  Continue with treatment for the patient's pneumonia  Conjunction of COVID-19 as well as MSSA  Continue with tight glycemic  control, modify and optimize as indicated  Resume his outpatient regimen including Lipitor, Coreg for blood pressure and A. fib rate control  Anticoagulation overlapping heparin as well as warfarin therapy right now  Resume treatment for GERD  Resume treatment for restless leg syndrome  Monitor sodium levels and adjust as indicated  Gentle pain control  Hopefully able to improve the patient's delirium, likely related to patient's acute illness, ICU treatment in light of advanced age  See orders  Medically complex high-risk patient    Thank you for consulting with us, we will follow along closely while the patient is hospitalized and hopefully transition the patient to outpatient care soon      Please note that this dictation was created using voice recognition software. I have made every reasonable attempt to correct obvious errors, but I expect that there are errors of grammar and possibly context that I did not discover before finalizing the note.

## 2022-11-10 NOTE — CARE PLAN
The patient is Stable - Low risk of patient condition declining or worsening    Shift Goals  Clinical Goals: increased oral intake, stable neuro exam, hemodynamic stability, mobility  Patient Goals: ALLIE  Family Goals: ALLIE    Progress made toward(s) clinical / shift goals:    Problem: Skin Integrity  Goal: Skin integrity is maintained or improved  Outcome: Progressing     Problem: Fall Risk  Goal: Patient will remain free from falls  Outcome: Progressing     Problem: Pain - Standard  Goal: Alleviation of pain or a reduction in pain to the patient’s comfort goal  Outcome: Progressing       Patient is not progressing towards the following goals:

## 2022-11-11 NOTE — PROGRESS NOTES
Report received, pt care assumed, tele box on. VSS, pt assessment complete. Pt aaox1, no signs of distress noted at this time. POC discussed with pt and verbalizes no questions.  Pt denies any additional needs at this time. Bed in lowest position, bed alarm on, pt educated on fall risk and verbalized understanding, call light within reach.

## 2022-11-11 NOTE — CARE PLAN
The patient is Watcher - Medium risk of patient condition declining or worsening    Shift Goals  Clinical Goals: VSS, increase oral intake, reorient  Patient Goals: n/a  Family Goals: n/a    Progress made toward(s) clinical / shift goals:    Problem: Knowledge Deficit - Standard  Goal: Patient and family/care givers will demonstrate understanding of plan of care, disease process/condition, diagnostic tests and medications  Outcome: Progressing     Problem: Skin Integrity  Goal: Skin integrity is maintained or improved  Outcome: Progressing     Problem: Fall Risk  Goal: Patient will remain free from falls  Outcome: Progressing     Problem: Pain - Standard  Goal: Alleviation of pain or a reduction in pain to the patient’s comfort goal  Outcome: Progressing     Problem: Care Map:  Admission Optimal Outcome for the Heart Failure Patient  Goal: Admission:  Optimal Care of the heart failure patient  Outcome: Progressing     Problem: Care Map:  Day 1 Optimal Outcome for the Heart Failure Patient  Goal: Day 1:  Optimal Care of the heart failure patient  Outcome: Progressing     Problem: Care Map:  Day 2 Optimal Outcome for the Heart Failure Patient  Goal: Day 2:  Optimal Care of the heart failure patient  Outcome: Progressing     Problem: Care Map:  Day 3 Optimal Outcome for the Heart Failure Patient  Goal: Day 3:  Optimal Care of the heart failure patient  Outcome: Progressing     Problem: Care Map:  Day Before Discharge Optimal Outcome for the Heart Failure Patient  Goal: Day Before Discharge:  Optimal Care of the heart failure patient  Outcome: Progressing     Problem: Care Map:  Day of Discharge Optimal Outcome for the Heart Failure Patient  Goal: Day of Discharge:  Optimal Care of the heart failure patient  Outcome: Progressing       Patient is not progressing towards the following goals:

## 2022-11-11 NOTE — HOSPITAL COURSE
76 y/o M with hx COPD, afib (was on anticoag), T2DM, hx of CHF and complete heart block, bioprosthetic replacement of mitral valve, ANALY, EF 45% who presented 11/4/2022 as transfer from [facility] with worsening SOB and fatigue. Dx w COVID-19, started initially on BiPAP and then was intubated. Found to have MYLA with Cr 8.0 and K 6.6 and admitted to [facility] ICU requiring ventilation, vasopressors (norepinephrine, vasopressin) and on empiric abx with ceftriaxone. Nephro consulted for MYLA/nonoliguria and was initiated on RRT with improvement in urine output so no longer needed. Developed R thigh/femoral hematoma related to a trialysis catheter placement so anticoagulation held. Required Precedex for sedation, fentanyl for pain control. Respiratory culture +MSSA and initiated on ancef. COVID-19 pneumonia was treated with dexamethasone 6 mg for 10 days. Extubated on 11/8 and was significantly encephalopathic. CT of head was negative for acute changes. Couldn’t do MRI due to pacemaker incompatibility. Mentation improved but still had mental slowing, confusion, A&O x1, moving all 4 extremities fairly equally. Palliative care was consulted to discuss goals of care with family and the decision was reached in light of the patient's condition and age to change his CODE STATUS to DNR/DNI. Also noted to have had hypernatremia which resolved. Per SLP, passed swallow evaluation although p.o. intake poor. R groin hematoma stabilizing so restarted on heparin gtt and resumed coumadin with eventual transition to apixaban. Hgb trended down; concern for GI bleed in addition to his hematuria so anticoagulation held.      #MYLA- Creatinine remains elevated with nephrology following with recs that dialysis is not needed at this time. Will continue to avoid nephrotoxins. Urine still slightly blood tinged with small clots. Fluids cannot be given due to risk of worsening his pulmonary edema.       #Hypoxic respiratory failure due  to covid s/p intubation c/b HAP, aspiration pneumonia  s/p decadron and now at 2-3 LNC, weaned down to 2.5 LNC. Covid isolation has been lifted.      #Constipation  Repeat KUB without colonic stool burden, will continue with bowel regimen.     #Right psoas hematoma- stabilized     #Severe pharyngeal dysphagia  Aspiration risk discussed during meeting w sonia Purcell/Theo on 12/6 with palliative care Stella Vidal. SLP FEES eval completed with difficulty due to pt not cooperating/agitation. Pt repeatedly stated he wanted to eat and was observed to be coughing following FEES. Sonia agreed to the risk of aspiration to allow patient to eat as he vocalizes that he wants to.

## 2022-11-11 NOTE — PROGRESS NOTES
Inpatient Anticoagulation Service Note    Date: 11/11/2022  Reason for Anticoagulation: Atrial Fibrillation & MVR  Target INR: 2.0 to 3.0    [id number] VASc Score: 5  HAS-BLED Score: 3    Hemoglobin Value: (!) 7.4  Hematocrit Value: (!) 24.1  Lab Platelet Value: 232    INR from last 7 days       Date/Time INR Value    11/11/22 0317 1.51    11/10/22 0415 1.45    11/09/22 1630 1.43    11/08/22 0920 1.43          Dose from last 7 days       Date/Time Dose (mg)    11/11/22 1431 5    11/10/22 1413 5    11/09/22 1535 0          Significant Interactions: Corticosteroids, Antibiotics  Bridge Therapy: Yes  Bridge Therapy Start Date: 11/10/22  Days of Overlap Therapy: 2  INR Value Greater than 2 Prior to Discontinuation of Parenteral Anticoagulation: N/A  Reversal Agent Administered: N/A    A/P:  - PTA warfarin cont'd for AF & MVR  - INR remains subtherapeutic @ 1.51 this AM, as anticipated  - bridging w/ heparin gtt, day #2 overlap therapy  - Hgb drifting (13 > 7) throughout admit, recent retroperitoneal hematoma noted, however no active bleeding noted  - DDIs w/ dexamethasone & cefazolin noted  - no concerns w/ hepatic fx  - continue conservative dosing w/ warfarin 5 mg daily given recent hematoma w/ drifting Hgb, DDIs above, acute infection  - continue bridging w/ heparin gtt  - subsequent INRs/CBCs ordered    Education Material Provided?: No (PTA warfarin)  Pharmacist suggested discharge dosing: TBD pending subsequent INR trends, however 5 mg daily may be appropriate, regardless would recommend INR f/u w/in 48 hrs of discharge     Kenji Duff, PharmD  [id number]

## 2022-11-11 NOTE — PROGRESS NOTES
Hospital Medicine Daily Progress Note    Date of Service  11/11/2022    Chief Complaint  Sean Kincaid is a 77 y.o. male admitted 11/4/2022 with respiratory failure    Hospital Course  77 y.o. male who presented 11/4/2022 with history of COPD, atrial fibrillation, diabetes type 2, history of congestive heart failure, EF of 45%,, initially presented to an outlying facility itself like [facility], complaining of worsening shortness of breath and fatigue.  The patient was diagnosed with COVID-19, started initially on BiPAP and then eventually required to be intubated, was found with an MYLA, creatinine of 8.0, potassium 6.6, the patient chronically anticoagulated for atrial fibrillation, history of complete heart block, history of bioprosthetic replacement of the mitral valve, ANALY, after presentation to [facility] he was admitted to the ICU, he continued to be treated in ICU with mechanical ventilation, required vasopressors including norepinephrine and vasopressin, placed on empiric antibiotics in form of ceftriaxone, nephrology consulted for MYLA, nonoliguric, had brief initiation of RRT but then picked up his urine output and required no further RRT as per nephrology.  He developed a right thigh/femoral hematoma related to a try Alysis catheter placement., anticoagulation was held, the patient required Precedex for sedation, fentanyl for pain control, his respiratory culture turned positive for MSSA and the patient is being treated for MSSA pneumonia in form of Ancef.  His COVID-19 pneumonia was treated with dexamethasone 6 mg for 10 days.  The patient eventually was extubated on 11/8, he was found significantly encephalopathic, a CT of the head was negative for acute changes, the patient unable to be evaluated by MRI secondary to the patient's pacemaker.  He progressed to some degree but is still encephalopathic with mental slowing, confusion, being awake/alert  and oriented x1, moving all 4  extremities fairly equally.  Palliative care was consulted to discuss goals of care with family and the decision was reached in light of the patient's condition and age to change his CODE STATUS to DNR/DNI.  Patient had evaluation with speech therapy, he passed a swallow evaluation, his p.o. intake remains fairly poor.  On 11/10 the patient is evaluated, he continues to be confused and delirious, he remains off pressors, currently on 2 L nasal can oxygen, saturating 94%, he has overall good urine output, his right groin hematoma remained stable, his anticoagulation was restarted in form of heparin drip and resuming Coumadin.  The patient had significant difficulty with hypernatremia and acute 8-hour sodium levels were checked, this had improved.    Interval Problem Update  11/11/2022:  Patient continues to be encephalopathic however is only on 2 L submental oxygen at present.  Continue present medical therapy.    I have discussed this patient's plan of care and discharge plan at IDT rounds today with Case Management, Nursing, Nursing leadership, and other members of the IDT team.    Consultants/Specialty  critical care    Code Status  DNAR/DNI    Disposition  Patient is not medically cleared for discharge.   Anticipate discharge to to skilled nursing facility.  I have placed the appropriate orders for post-discharge needs.    Review of Systems  ROS     Physical Exam  Temp:  [36.2 °C (97.2 °F)-36.7 °C (98.1 °F)] 36.2 °C (97.2 °F)  Pulse:  [65-97] 79  Resp:  [22-32] 24  BP: (120-159)/(74-86) 151/81  SpO2:  [91 %-95 %] 94 %    Physical Exam    Fluids    Intake/Output Summary (Last 24 hours) at 11/11/2022 1400  Last data filed at 11/11/2022 0935  Gross per 24 hour   Intake 580 ml   Output --   Net 580 ml       Laboratory  Recent Labs     11/09/22  0500 11/10/22  0415 11/11/22  0317   WBC 10.6 10.2 11.1*   RBC 2.41* 2.46* 2.49*   HEMOGLOBIN 7.2* 7.3* 7.4*   HEMATOCRIT 23.6* 23.7* 24.1*   MCV 97.9* 96.3 96.8   MCH 29.9  29.7 29.7   MCHC 30.5* 30.8* 30.7*   RDW 51.1* 48.3 47.6   PLATELETCT 196 216 232   MPV 11.2 11.0 11.0     Recent Labs     11/09/22  0500 11/09/22  1230 11/10/22  0415 11/10/22  1318 11/10/22  1550 11/10/22  2241 11/11/22  0317   SODIUM 149*   < > 144   < > 142 146* 145   POTASSIUM 4.4  --  4.2  --   --   --  5.0   CHLORIDE 113*  --  111  --   --   --  112   CO2 24  --  23  --   --   --  23   GLUCOSE 211*  --  153*  --   --   --  111*   BUN 71*  --  49*  --   --   --  41*   CREATININE 1.55*  --  1.29  --   --   --  1.39   CALCIUM 8.5  --  8.3*  --   --   --  8.0*    < > = values in this interval not displayed.     Recent Labs     11/09/22  1630 11/10/22  0415 11/11/22  0317   APTT 25.6  --   --    INR 1.43* 1.45* 1.51*               Imaging  DX-CHEST-PORTABLE (1 VIEW)   Final Result      Mildly improved aeration and stable to slightly improved vascular congestion.      Mildly improved left basilar atelectasis.      DX-CHEST-PORTABLE (1 VIEW)   Final Result      1.  Interval extubation and removal of NG tube.   2.  Increased bibasilar airspace disease.   3.  Increased interstitial edema.   4.  Small left pleural effusion.      CT-HEAD W/O   Final Result      1. No CT evidence of acute infarct, hemorrhage or mass.   2. Moderate global parenchymal atrophy. Chronic small vessel ischemic changes.      DX-CHEST-PORTABLE (1 VIEW)   Final Result      Mildly improved left basilar opacity.      DX-ABDOMEN FOR TUBE PLACEMENT   Final Result      Enteric tube tip projects over the gastric antrum or proximal duodenum      DX-CHEST-PORTABLE (1 VIEW)   Final Result         1. Stable lines and tubes.   2. Worsening left basilar opacity. Small left pleural effusion. Mild right infrahilar atelectasis.         DX-CHEST-PORTABLE (1 VIEW)   Final Result      1.  Cardiomegaly with interstitial edema.      2.  Left lung base atelectasis and minimal left pleural effusion.      DX-CHEST-PORTABLE (1 VIEW)   Final Result      1.  Improved left  pleural effusion with adjacent airspace disease.   2.  Improved interstitial infiltrates versus edema.      EC-ECHOCARDIOGRAM COMPLETE W/O CONT   Final Result      DX-CHEST-PORTABLE (1 VIEW)   Final Result      1.  Small left pleural effusion with adjacent airspace disease.   2.  Worsening interstitial infiltrates versus edema.   3.  Right basilar atelectasis.      DX-ABDOMEN FOR TUBE PLACEMENT   Final Result      Enteric tube tip projects over the proximal duodenum      DX-ABDOMEN FOR TUBE PLACEMENT   Final Result         1.  Nonspecific bowel gas pattern in the upper abdomen.   2.  Nasogastric tube, not visualized beyond the mid thorax, see ordered repeat abdominal x-ray for further characterization.   3.  Cardiomegaly   4.  Left lung base atelectasis or infiltrate.      OUTSIDE IMAGES-DX CHEST   Final Result      DX-CHEST-PORTABLE (1 VIEW)   Final Result         1.  Bibasilar atelectasis or early infiltrates.   2.  Trace bilateral pleural effusions   3.  Cardiomegaly           Assessment/Plan  * Acute hypoxemic respiratory failure due to COVID-19 (HCC)- (present on admission)  Assessment & Plan  In conjunction with MSSA pneumonia,  The patient is being treated for pneumonia,  Continue Decadron course 6 mg for 10 days  Pulmonary toilet  Respiratory therapy  Wean oxygen as tolerated  Incentive spirometry    Delirium- (present on admission)  Assessment & Plan  Likely in light of the patient's acute hospitalization, acute critical condition, disease  Standard precautions and out of delirium  Avoid offending medication  Avoid interruption of circadian rhythm  CT head negative for acute changes, unable to provide a MRI imaging secondary to patient's pacemaker, although today he has no evidence of focal findings    MSSA (methicillin susceptible Staphylococcus aureus) pneumonia (HCC)- (present on admission)  Assessment & Plan  [facility]    Goals of care, counseling/discussion- (present on  admission)  Assessment & Plan  Palliative care assisting, family decision to DNR/DNI status  Will likely benefit from future documentation, POLST etc.    Hypernatremia- (present on admission)  Assessment & Plan  Likely a function of free water deficit,  Recheck, monitor and provide adequate free water supplementation    Septic shock with acute organ dysfunction due to methicillin susceptible Staphylococcus aureus (MSSA) ([facility])  Assessment & Plan  Present on admission, treated adequately    Hematoma- (present on admission)  Assessment & Plan  Right groin secondary to dialysis catheter on anticoagulation, stabilized, monitor    Acute on chronic systolic congestive heart failure (HCC)- (present on admission)  Assessment & Plan  Continue with medical therapy including Coreg, diuresis  It appears his EF has been improving    CONCLUSIONS  The left ventricle is not well visualized due to body habitus.  Left ventricular systolic function is probably normal.  The ejection fraction is roughly estimated to be 55%.  A reliable estimation of diastolic function cannot be made due to   mitral valve disease.  Severely dilated right ventricle.  Reduced right ventricular systolic function.  Estimated right ventricular systolic pressure is 40 mmHg.  Severe biatrial dilation.  Mild mitral regurgitation.  Aortic valve sclerosis without significant stenosis.  Mild tricuspid regurgitation.  Normal inferior vena cava size and inspiratory collapse    Hyperkalemia- (present on admission)  Assessment & Plan  Initially on presentation present, treated, improved, monitor    MYLA (acute kidney injury) ([facility])- (present on admission)  Assessment & Plan  Acute initially, currently improved, the patient had a brief episode of RRT  Improving currently,  Avoid nephrotoxins    Restless leg syndrome- (present on admission)  Assessment & Plan  Resume Requip    Chronic anticoagulation- (present on admission)  Assessment & Plan  Resume  heparin/Coumadin    Paroxysmal atrial fibrillation (HCC)- (present on admission)  Assessment & Plan  History of, ongoing anticoagulation with warfarin/heparin overlapping    Pacemaker- (present on admission)  Assessment & Plan  In situ, telemetry monitoring, history of complete heart block       Please note that this dictation was created using voice recognition software. I have made every reasonable attempt to correct obvious errors, but I expect that there are errors of grammar and possibly context that I did not discover before finalizing the note.    VTE prophylaxis: SCDs/TEDs and therapeutic anticoagulation with heparin infusion continuous with bridge to warfarin    I have performed a physical exam and reviewed and updated ROS and Plan today (11/11/2022). In review of yesterday's note (11/10/2022), there are no changes except as documented above.

## 2022-11-11 NOTE — THERAPY
Speech Language Pathology  Daily Treatment     Patient Name: Sean Kincaid  Age:  77 y.o., Sex:  male  Medical Record #: [id number]  Today's Date: 11/11/2022     Precautions  Precautions: (P) Fall Risk, Swallow Precautions ( See Comments)    HPI: 77 y.o. man transferred from [facility] on 11/4/22 for acute respiratory failure, COVID positive, MYLA. Intubated 11/3-11/8 (5 days). FEES 11/10: PAS 5 TN0, PU4.     Assessment    Patient seen this date for dysphagia management. RN inquiring about use of straws; this SLP recommended no straws given impaired bolus hold contributing to deep laryngeal penetration on FEES 11/10. Baseline cough r/t COVID+. Patient with stridor upon exhale.     PO presentations of MT2 and LQ3 from lunch tray. Patient consumed approximately 5 oz thickened boost and 5 bites LQ3. Patient continues to not feed himself despite encouragement; note intermittent jerking/twitches of extremities may impede. Adequate oral bolus acceptance/containment, complete AP transfer though sometimes prolonged. Note cough following initial sips of MT2 - difficult to ascertain r/t PO or COVID. Vocal quality remained stable throughout session. One to two swallows completed per bolus. Attempted training of effortful swallow exercise re: FEES physiology, but patient did not follow directives to complete. Will continue to follow and target exercises as able. Pudding cup at bedside, not in alignment with SLP recommendations; straw at bedside as well, though no previously specified use of straws. Of note, swallow precautions sign did not transfer with patient to new room. New swallow sign posted with emphasis on medication administration and no use of straws. Discussed with RN who expressed understanding; all questions addressed.       Recommendations  Liquidized textures and mildly thick liquids   2.  Swallowing Instructions & Precautions:   Supervision: 1:1 feeding with constant supervision  Positioning: Fully upright and midline  "during oral intake  Medication: Crush with applesauce, as appropriate NO PUREE  Strategies: Small bites/sips, No straws  Oral Care: BID    Following for dysphagia management.    Plan    Continue current treatment plan.    Discharge Recommendations: (P) Recommend post-acute placement for additional speech therapy services prior to discharge home       Objective       11/11/22 0932   Charge Group   SLP Swallowing Dysfunction Treatment Swallowing Dysfunction Treatment   Total Treatment Time   SLP Time Spent Yes   SLP Swallowing Dysfunction Treatment (Mins) 16   Precautions   Precautions Fall Risk;Swallow Precautions ( See Comments)   Vitals   O2 (LPM) 2   O2 Delivery Device Nasal Cannula   Pain 0 - 10 Group   Therapist Pain Assessment Post Activity Pain Same as Prior to Activity;0   Dysphagia    Dysphagia X   Diet / Liquid Recommendation Mildly Thick (2) - (Nectar Thick);Liquidised (3) - (Nectar Thick Full Liquid)   Nutritional Liquid Intake Rating Scale Thickened beverages (mildly thick unless otherwise specified)   Nutritional Food Intake Rating Scale Total oral diet of a single consistency   Nursing Communication Swallow Precaution Sign Posted at Head of Bed   Skilled Intervention Verbal Cueing   Recommended Route of Medication Administration   Medication Administration  Crush all Medications in Puree   Patient / Family Goals   Patient / Family Goal #1 \"Ow!\"   Goal #1 Outcome Progressing as expected   Short Term Goals   Short Term Goal # 1 Patient will complete swallowing exercises targeting BOT retraction, epiglottic inversion, and pharyngeal contraction x20.   Short Term Goal # 2 Pt will consume liquidized textures with mildly thick liquids without any overt s/sx of aspiration   Goal Outcome # 2  Progressing as expected   Education Group   Education Provided Dysphagia   Dysphagia Patient Response Patient;Acceptance;Explanation;No Learning Evidence   Anticipated Discharge Needs   Discharge Recommendations Recommend " post-acute placement for additional speech therapy services prior to discharge home   Therapy Recommendations Upon DC Dysphagia Training   Interdisciplinary Plan of Care Collaboration   IDT Collaboration with  Nursing   Patient Position at End of Therapy In Bed;Bed Alarm On;Call Light within Reach   Collaboration Comments RN updated

## 2022-11-11 NOTE — CARE PLAN
Problem: Knowledge Deficit - Standard  Goal: Patient and family/care givers will demonstrate understanding of plan of care, disease process/condition, diagnostic tests and medications  Outcome: Progressing     Problem: Skin Integrity  Goal: Skin integrity is maintained or improved  Outcome: Progressing     Problem: Fall Risk  Goal: Patient will remain free from falls  Outcome: Progressing     Problem: Pain - Standard  Goal: Alleviation of pain or a reduction in pain to the patient’s comfort goal  Outcome: Progressing     Problem: Care Map:  Day 3 Optimal Outcome for the Heart Failure Patient  Goal: Day 3:  Optimal Care of the heart failure patient  Outcome: Progressing   The patient is Watcher - Medium risk of patient condition declining or worsening    Shift Goals  Clinical Goals: hemodynamic stability, monitor O2  Patient Goals: rest  Family Goals: ALLIE    Progress made toward(s) clinical / shift goals:  monitor Na and attempt to wean off oxygen and decrease work of breathing

## 2022-11-12 NOTE — CARE PLAN
The patient is Watcher - Medium risk of patient condition declining or worsening    Shift Goals  Clinical Goals: Safety, rest, VSS, no aspiration  Patient Goals: n/a  Family Goals: n/a    Progress made toward(s) clinical / shift goals:    Problem: Knowledge Deficit - Standard  Goal: Patient and family/care givers will demonstrate understanding of plan of care, disease process/condition, diagnostic tests and medications  Outcome: Progressing     Problem: Skin Integrity  Goal: Skin integrity is maintained or improved  Outcome: Progressing     Problem: Fall Risk  Goal: Patient will remain free from falls  Outcome: Progressing     Problem: Pain - Standard  Goal: Alleviation of pain or a reduction in pain to the patient’s comfort goal  Outcome: Progressing     Problem: Care Map:  Admission Optimal Outcome for the Heart Failure Patient  Goal: Admission:  Optimal Care of the heart failure patient  Outcome: Progressing     Problem: Care Map:  Day 1 Optimal Outcome for the Heart Failure Patient  Goal: Day 1:  Optimal Care of the heart failure patient  Outcome: Progressing     Problem: Care Map:  Day 2 Optimal Outcome for the Heart Failure Patient  Goal: Day 2:  Optimal Care of the heart failure patient  Outcome: Progressing     Problem: Care Map:  Day 3 Optimal Outcome for the Heart Failure Patient  Goal: Day 3:  Optimal Care of the heart failure patient  Outcome: Progressing     Problem: Care Map:  Day Before Discharge Optimal Outcome for the Heart Failure Patient  Goal: Day Before Discharge:  Optimal Care of the heart failure patient  Outcome: Progressing     Problem: Care Map:  Day of Discharge Optimal Outcome for the Heart Failure Patient  Goal: Day of Discharge:  Optimal Care of the heart failure patient  Outcome: Progressing     Problem: Psychosocial  Goal: Patient's level of anxiety will decrease  Outcome: Progressing  Goal: Patient's ability to verbalize feelings about condition will improve  Outcome:  Progressing  Goal: Patient's ability to re-evaluate and adapt role responsibilities will improve  Outcome: Progressing  Goal: Patient and family will demonstrate ability to cope with life altering diagnosis and/or procedure  Outcome: Progressing  Goal: Spiritual and cultural needs incorporated into hospitalization  Outcome: Progressing     Problem: Communication  Goal: The ability to communicate needs accurately and effectively will improve  Outcome: Progressing     Problem: Discharge Barriers/Planning  Goal: Patient's continuum of care needs are met  Outcome: Progressing     Problem: Hemodynamics  Goal: Patient's hemodynamics, fluid balance and neurologic status will be stable or improve  Outcome: Progressing     Problem: Respiratory  Goal: Patient will achieve/maintain optimum respiratory ventilation and gas exchange  Outcome: Progressing     Problem: Chest Tube Management  Goal: Complications related to chest tube will be avoided or minimized  Outcome: Progressing     Problem: Fluid Volume  Goal: Fluid volume balance will be maintained  Outcome: Progressing     Problem: Dysphagia  Goal: Dysphagia will improve  Outcome: Progressing     Problem: Risk for Aspiration  Goal: Patient's risk for aspiration will be absent or decrease  Outcome: Progressing     Problem: Nutrition  Goal: Patient's nutritional and fluid intake will be adequate or improve  Outcome: Progressing  Goal: Enteral nutrition will be maintained or improve  Outcome: Progressing  Goal: Enteral nutrition will be maintained or improve  Outcome: Progressing     Problem: Gastrointestinal Irritability  Goal: Nausea and vomiting will be absent or improve  Outcome: Progressing  Goal: Diarrhea will be absent or improved  Outcome: Progressing     Problem: Mobility  Goal: Patient's capacity to carry out activities will improve  Outcome: Progressing     Problem: Infection - Standard  Goal: Patient will remain free from infection  Outcome: Progressing        Patient is not progressing towards the following goals:

## 2022-11-12 NOTE — PROGRESS NOTES
Inpatient Anticoagulation Service Note    Date: 11/12/2022  Reason for Anticoagulation: Afib & Bioprosthetic MVR  Target INR: 2.0 to 3.0    [id number] VASc Score: 5  HAS-BLED Score: 3    Hemoglobin Value: (!) 7.6  Hematocrit Value: (!) 25  Lab Platelet Value: 235    INR from last 7 days       Date/Time INR Value    11/12/22 0308 1.56    11/11/22 0317 1.51    11/10/22 0415 1.45    11/09/22 1630 1.43    11/08/22 0920 1.43          Dose from last 7 days       Date/Time Dose (mg)    11/12/22 1419 7.5    11/11/22 1431 5    11/10/22 1413 5    11/09/22 1535 0          Significant Interactions: Corticosteroids  Bridge Therapy: Yes  Bridge Therapy Start Date: 11/10/22  Days of Overlap Therapy: 3  INR Value Greater than 2 Prior to Discontinuation of Parenteral Anticoagulation: N/A  Reversal Agent Administered: N/A    A/P:  - PTA warfarin cont'd for Afib & bioprosthetic MVR  - INR remains subtherapeutic @ 1.56 this AM, very slow uptrend noted  - Hgb drifting (13 > 7) throughout admit, recent retroperitoneal hematoma, however no active bleeding noted  - DDI w/ dexamethasone noted, abx's completed last night  - no concerns w/ hepatic fx  - should be seeing a bit more movement in INR after 2 doses > will increase dose to 7.5 mg daily, however will need close monitoring given recent hematoma & anemia  - continue bridging w/ heparin gtt, day #3 overlap therapy  - subsequent INRs/CBCs ordered     Education Material Provided?: No (PTA warfarin)  Pharmacist suggested discharge dosing: TBD pending subsequent INR trends, regardless would recommend INR f/u w/in 48 hrs of discharge     Kenji Duff, KassandraD

## 2022-11-12 NOTE — PROGRESS NOTES
"Received bedside report from RN, pt care assumed, VSS, pt assessment complete. Pt AAOx1, oriented to self only, with no c/o of pain at this time. No signs of acute distress noted at this time. Plan of care discussed with pt and verbalizes no questions, just states \"okay whatever you say\". Pt denies any additional needs at this time. Bed locked/in lowest position, bed alarm on, pt educated on fall risk and verbalized understanding, call light within reach, hourly rounding initiated. Telesitter in place.         "

## 2022-11-12 NOTE — CARE PLAN
Problem: Knowledge Deficit - Standard  Goal: Patient and family/care givers will demonstrate understanding of plan of care, disease process/condition, diagnostic tests and medications  Outcome: Progressing     Problem: Skin Integrity  Goal: Skin integrity is maintained or improved  Outcome: Progressing     Problem: Fall Risk  Goal: Patient will remain free from falls  Outcome: Progressing     Problem: Care Map:  Admission Optimal Outcome for the Heart Failure Patient  Goal: Admission:  Optimal Care of the heart failure patient  Outcome: Progressing     Problem: Care Map:  Day 3 Optimal Outcome for the Heart Failure Patient  Goal: Day 3:  Optimal Care of the heart failure patient  Outcome: Progressing     Problem: Psychosocial  Goal: Patient's level of anxiety will decrease  Outcome: Progressing  Goal: Patient's ability to verbalize feelings about condition will improve  Outcome: Progressing     Problem: Communication  Goal: The ability to communicate needs accurately and effectively will improve  Outcome: Progressing     Problem: Hemodynamics  Goal: Patient's hemodynamics, fluid balance and neurologic status will be stable or improve  Outcome: Progressing     Problem: Respiratory  Goal: Patient will achieve/maintain optimum respiratory ventilation and gas exchange  Outcome: Progressing     Problem: Dysphagia  Goal: Dysphagia will improve  Outcome: Progressing   The patient is Watcher - Medium risk of patient condition declining or worsening    Shift Goals  Clinical Goals: hemodynamic stability, safety  Patient Goals: ALLIE  Family Goals: ALLIE    Progress made toward(s) clinical / shift goals:  fall precautions placed, patient fed with 1:1 feed, patient verbalized needs more clearly    Patient is not progressing towards the following goals:

## 2022-11-12 NOTE — PROGRESS NOTES
Report received, pt care assumed, tele box on. VSS, pt assessment complete. Pt aaox1, no signs of distress noted at this time.  Pt denies any additional needs at this time. Bed in lowest position, bed alarm on, pt educated on fall risk and verbalized understanding, call light within reach, will continue to monitor.

## 2022-11-12 NOTE — DISCHARGE PLANNING
Called Theo to determine SNF choices.  He didn't look at them.  Re-printed and advised him I'll put them in the patient's room again so he and his father could chose where he would like to go to SNF rehab.  Theo, [phone] said he would make choices tonight.

## 2022-11-12 NOTE — PROGRESS NOTES
Pt's son at bedside and requesting to change patients code status back to full code, NURIA Brooke notified, per APRN will have day team discuss in more detail with family and follow up in morning.

## 2022-11-13 NOTE — PALLIATIVE CARE
"Palliative Care follow-up  1000: PC RN placed phone call to Theo, no answer. Left voice message including name, and call back number.     1005: Received return phone call from Theo. PC RN queired about code status change. Theo stated that he spoke with his dad and he was, \"adamant about not being DNR\" and he would like resuscitation attempted.      1403: Pt's son (Binh) and daughter-in-law (Shelbie) at pt's bedside. Sean states that he is feeling, \"wonderful.\" Family has noticed pt is acting more like himself and that his sarcasm has returned. Binh is in agreement with Full code status.     Updated: Dr. Rosales    Plan: :  Palliative care to continue to follow, provide support, and help facilitate decision-making as clinical picture evolves.    Thank you for allowing Palliative Care to support this patient and family. Contact [phone] for additional assistance, change in patient status, or with any questions/concerns.    "

## 2022-11-13 NOTE — PALLIATIVE CARE
PALLIATIVE CARE FOLLOW-UP:    Have attempted to be noified when son at bedside to complete DNR/DNI POLST past two days but he comes in late night. Apparently changed pt's code status last night.    Discussed with:    Dr. Rosales    Plan:  Palliative Care to follow-up with sons tomorrow re: code status/GOC.    Thank you for allowing Palliative Care to support this pt and his family.  Contact [phone] for additional assistance, patient status change, questions or concerns.

## 2022-11-13 NOTE — PROGRESS NOTES
Changed Pt Central line at about 75% of the dressing off pt became noncompliant and was throwing arms out and saying no go away and clamping his chin down over the IJ site.  and Nam called [facility] with a distraction by security was able to get the line dressing off and then a new one on the connectors where changed in the morning when pt was far more amiable. We did a very through scrub of the site with the cholahexidine scrub provided in the kit all effort was taken to maintain cleanliness as best as possible he did not touch with his hands at the site but he was tucking his chin, post conversation with Antonio determine in the best of our opinion with the night workforce that this was the best pathway for the pt, and the safest.

## 2022-11-13 NOTE — PROGRESS NOTES
Hospital Medicine Daily Progress Note    Date of Service  11/13/2022    Chief Complaint  Sean Kincaid is a 77 y.o. male admitted 11/4/2022 with respiratory failure    Hospital Course  77 y.o. male who presented 11/4/2022 with history of COPD, atrial fibrillation, diabetes type 2, history of congestive heart failure, EF of 45%,, initially presented to an outlying facility itself like [facility], complaining of worsening shortness of breath and fatigue.  The patient was diagnosed with COVID-19, started initially on BiPAP and then eventually required to be intubated, was found with an MYLA, creatinine of 8.0, potassium 6.6, the patient chronically anticoagulated for atrial fibrillation, history of complete heart block, history of bioprosthetic replacement of the mitral valve, ANALY, after presentation to [facility] he was admitted to the ICU, he continued to be treated in ICU with mechanical ventilation, required vasopressors including norepinephrine and vasopressin, placed on empiric antibiotics in form of ceftriaxone, nephrology consulted for MYLA, nonoliguric, had brief initiation of RRT but then picked up his urine output and required no further RRT as per nephrology.  He developed a right thigh/femoral hematoma related to a try Alysis catheter placement., anticoagulation was held, the patient required Precedex for sedation, fentanyl for pain control, his respiratory culture turned positive for MSSA and the patient is being treated for MSSA pneumonia in form of Ancef.  His COVID-19 pneumonia was treated with dexamethasone 6 mg for 10 days.  The patient eventually was extubated on 11/8, he was found significantly encephalopathic, a CT of the head was negative for acute changes, the patient unable to be evaluated by MRI secondary to the patient's pacemaker.  He progressed to some degree but is still encephalopathic with mental slowing, confusion, being awake/alert  and oriented x1, moving all 4  extremities fairly equally.  Palliative care was consulted to discuss goals of care with family and the decision was reached in light of the patient's condition and age to change his CODE STATUS to DNR/DNI.  Patient had evaluation with speech therapy, he passed a swallow evaluation, his p.o. intake remains fairly poor.  On 11/10 the patient is evaluated, he continues to be confused and delirious, he remains off pressors, currently on 2 L nasal can oxygen, saturating 94%, he has overall good urine output, his right groin hematoma remained stable, his anticoagulation was restarted in form of heparin drip and resuming Coumadin.  The patient had significant difficulty with hypernatremia and acute 8-hour sodium levels were checked, this had improved.    Interval Problem Update  11/11/2022:  Patient continues to be encephalopathic however is only on 2 L submental oxygen at present.  Continue present medical therapy.    11/12/2022:  Continue with therapeutic bridging repeat INR today 1.58.  Appreciate pharmacy recommendations in this regard patient high risk of bleeding so continue to bridge slowly.    11/13/2022:  Continue present medical management patient continues to be alert and oriented x1-2.  Continue with plans for placement.  Patient is eligible for SNF placement on 11/14/2022.  Presently we will establish alternative IV access discontinue central line.  INR is improved today goal range 2-3.  Patient undergoing heparin to warfarin bridge.  I have discussed this patient's plan of care and discharge plan at IDT rounds today with Case Management, Nursing, Nursing leadership, and other members of the IDT team.    Consultants/Specialty  critical care    Code Status  Full Code    Disposition  Patient is not medically cleared for discharge.   Anticipate discharge to to skilled nursing facility.  I have placed the appropriate orders for post-discharge needs.    Review of Systems  Review of Systems   Unable to perform ROS:  Acuity of condition      Physical Exam  Temp:  [35.9 °C (96.6 °F)-36.7 °C (98.1 °F)] 35.9 °C (96.6 °F)  Pulse:  [65-86] 69  Resp:  [16-19] 17  BP: (130-164)/(72-78) 152/76  SpO2:  [90 %-96 %] 90 %    Physical Exam  Vitals and nursing note reviewed.   Constitutional:       Appearance: He is well-developed. He is ill-appearing.      Comments: Pt seen and examined.   HENT:      Head: Normocephalic and atraumatic.      Mouth/Throat:      Mouth: Mucous membranes are dry.   Eyes:      Pupils: Pupils are equal, round, and reactive to light.   Cardiovascular:      Rate and Rhythm: Normal rate. Rhythm irregular.      Heart sounds: Normal heart sounds.   Pulmonary:      Effort: Pulmonary effort is normal.      Breath sounds: Rhonchi and rales present.   Abdominal:      General: Bowel sounds are normal.      Palpations: Abdomen is soft.   Musculoskeletal:         General: Normal range of motion.      Cervical back: Normal range of motion and neck supple.      Comments: Right groin hematoma   Skin:     General: Skin is warm and dry.   Neurological:      General: No focal deficit present.      Mental Status: He is alert and oriented to person, place, and time.   Psychiatric:         Behavior: Behavior normal.       Fluids    Intake/Output Summary (Last 24 hours) at 11/13/2022 1352  Last data filed at 11/13/2022 1000  Gross per 24 hour   Intake 240 ml   Output --   Net 240 ml       Laboratory  Recent Labs     11/11/22  0317 11/12/22  0308 11/13/22  0630   WBC 11.1* 12.9* 13.2*   RBC 2.49* 2.54* 2.62*   HEMOGLOBIN 7.4* 7.6* 8.0*   HEMATOCRIT 24.1* 25.0* 26.1*   MCV 96.8 98.4* 99.6*   MCH 29.7 29.9 30.5   MCHC 30.7* 30.4* 30.7*   RDW 47.6 48.7 49.5   PLATELETCT 232 235 244   MPV 11.0 11.1 11.4     Recent Labs     11/11/22  0317 11/11/22  1344 11/12/22  0525 11/12/22  1430 11/13/22  0630   SODIUM 145   < > 145 140 140   POTASSIUM 5.0  --   --   --   --    CHLORIDE 112  --   --   --   --    CO2 23  --   --   --   --    GLUCOSE 111*   --   --   --   --    BUN 41*  --   --   --   --    CREATININE 1.39  --   --   --   --    CALCIUM 8.0*  --   --   --   --     < > = values in this interval not displayed.     Recent Labs     11/11/22  0317 11/12/22  0308 11/13/22  0745   INR 1.51* 1.56* 1.98*               Imaging  DX-CHEST-PORTABLE (1 VIEW)   Final Result      Mildly improved aeration and stable to slightly improved vascular congestion.      Mildly improved left basilar atelectasis.      DX-CHEST-PORTABLE (1 VIEW)   Final Result      1.  Interval extubation and removal of NG tube.   2.  Increased bibasilar airspace disease.   3.  Increased interstitial edema.   4.  Small left pleural effusion.      CT-HEAD W/O   Final Result      1. No CT evidence of acute infarct, hemorrhage or mass.   2. Moderate global parenchymal atrophy. Chronic small vessel ischemic changes.      DX-CHEST-PORTABLE (1 VIEW)   Final Result      Mildly improved left basilar opacity.      DX-ABDOMEN FOR TUBE PLACEMENT   Final Result      Enteric tube tip projects over the gastric antrum or proximal duodenum      DX-CHEST-PORTABLE (1 VIEW)   Final Result         1. Stable lines and tubes.   2. Worsening left basilar opacity. Small left pleural effusion. Mild right infrahilar atelectasis.         DX-CHEST-PORTABLE (1 VIEW)   Final Result      1.  Cardiomegaly with interstitial edema.      2.  Left lung base atelectasis and minimal left pleural effusion.      DX-CHEST-PORTABLE (1 VIEW)   Final Result      1.  Improved left pleural effusion with adjacent airspace disease.   2.  Improved interstitial infiltrates versus edema.      EC-ECHOCARDIOGRAM COMPLETE W/O CONT   Final Result      DX-CHEST-PORTABLE (1 VIEW)   Final Result      1.  Small left pleural effusion with adjacent airspace disease.   2.  Worsening interstitial infiltrates versus edema.   3.  Right basilar atelectasis.      DX-ABDOMEN FOR TUBE PLACEMENT   Final Result      Enteric tube tip projects over the proximal  duodenum      DX-ABDOMEN FOR TUBE PLACEMENT   Final Result         1.  Nonspecific bowel gas pattern in the upper abdomen.   2.  Nasogastric tube, not visualized beyond the mid thorax, see ordered repeat abdominal x-ray for further characterization.   3.  Cardiomegaly   4.  Left lung base atelectasis or infiltrate.      OUTSIDE IMAGES-DX CHEST   Final Result      DX-CHEST-PORTABLE (1 VIEW)   Final Result         1.  Bibasilar atelectasis or early infiltrates.   2.  Trace bilateral pleural effusions   3.  Cardiomegaly             Assessment/Plan  * Acute hypoxemic respiratory failure due to COVID-19 (HCC)- (present on admission)  Assessment & Plan  In conjunction with MSSA pneumonia,  The patient is being treated for pneumonia,  Continue Decadron course 6 mg for 10 days  Pulmonary toilet  Respiratory therapy  Wean oxygen as tolerated  Incentive spirometry  Improving    Delirium- (present on admission)  Assessment & Plan  Likely in light of the patient's acute hospitalization, acute critical condition, disease  Standard precautions and out of delirium  Avoid offending medication  Avoid interruption of circadian rhythm  CT head negative for acute changes, unable to provide a MRI imaging secondary to patient's pacemaker, although today he has no evidence of focal findings    MSSA (methicillin susceptible Staphylococcus aureus) pneumonia ([facility])- (present on admission)  Assessment & Plan  On treatment with Ancef    Goals of care, counseling/discussion- (present on admission)  Assessment & Plan  Palliative care assisting, family decision to DNR/DNI status  Will likely benefit from future documentation, POLST etc.    Hypernatremia- (present on admission)  Assessment & Plan  Likely a function of free water deficit,  Recheck, monitor and provide adequate free water supplementation    Septic shock with acute organ dysfunction due to methicillin susceptible Staphylococcus aureus (MSSA) ([facility])  Assessment & Plan  Present on  admission, treated adequately    Hematoma- (present on admission)  Assessment & Plan  Right groin secondary to dialysis catheter on anticoagulation, stabilized, monitor  Stabilized    Acute on chronic systolic congestive heart failure (HCC)- (present on admission)  Assessment & Plan  Continue with medical therapy including Coreg, diuresis  It appears his EF has been improving  Continue present medical therapy    CONCLUSIONS  The left ventricle is not well visualized due to body habitus.  Left ventricular systolic function is probably normal.  The ejection fraction is roughly estimated to be 55%.  A reliable estimation of diastolic function cannot be made due to   mitral valve disease.  Severely dilated right ventricle.  Reduced right ventricular systolic function.  Estimated right ventricular systolic pressure is 40 mmHg.  Severe biatrial dilation.  Mild mitral regurgitation.  Aortic valve sclerosis without significant stenosis.  Mild tricuspid regurgitation.  Normal inferior vena cava size and inspiratory collapse    Hyperkalemia- (present on admission)  Assessment & Plan  Initially on presentation present, treated, improved, monitor    MYLA (acute kidney injury) (HCC)- (present on admission)  Assessment & Plan  Acute initially, currently improved, the patient had a brief episode of RRT  Improving currently,  Avoid nephrotoxins    Restless leg syndrome- (present on admission)  Assessment & Plan  Resume Requip    Chronic anticoagulation- (present on admission)  Assessment & Plan  Resume heparin/Coumadin    Paroxysmal atrial fibrillation (HCC)- (present on admission)  Assessment & Plan  History of, ongoing anticoagulation with warfarin/heparin overlapping  Continue with heparin and warfarin bridging.  INR improving goal range between 2 and 3    Pacemaker- (present on admission)  Assessment & Plan  In situ, telemetry monitoring, history of complete heart block     Please note that this dictation was created using voice  recognition software. I have made every reasonable attempt to correct obvious errors, but I expect that there are errors of grammar and possibly context that I did not discover before finalizing the note.    VTE prophylaxis: SCDs/TEDs and therapeutic anticoagulation with heparin infusion continuous with bridge to warfarin    I have performed a physical exam and reviewed and updated ROS and Plan today (11/13/2022). In review of yesterday's note (11/12/2022), there are no changes except as documented above.

## 2022-11-13 NOTE — DISCHARGE PLANNING
Received Choice form at [phone]  Agency/Facility Name: [facility]   Referral sent per Choice form @ [phone]

## 2022-11-13 NOTE — PROGRESS NOTES
Hospital Medicine Daily Progress Note    Date of Service  11/12/2022    Chief Complaint  Sean Kincaid is a 77 y.o. male admitted 11/4/2022 with respiratory failure    Hospital Course  77 y.o. male who presented 11/4/2022 with history of COPD, atrial fibrillation, diabetes type 2, history of congestive heart failure, EF of 45%,, initially presented to an outlying facility itself like [facility], complaining of worsening shortness of breath and fatigue.  The patient was diagnosed with COVID-19, started initially on BiPAP and then eventually required to be intubated, was found with an MYLA, creatinine of 8.0, potassium 6.6, the patient chronically anticoagulated for atrial fibrillation, history of complete heart block, history of bioprosthetic replacement of the mitral valve, ANALY, after presentation to [facility] he was admitted to the ICU, he continued to be treated in ICU with mechanical ventilation, required vasopressors including norepinephrine and vasopressin, placed on empiric antibiotics in form of ceftriaxone, nephrology consulted for MYLA, nonoliguric, had brief initiation of RRT but then picked up his urine output and required no further RRT as per nephrology.  He developed a right thigh/femoral hematoma related to a try Alysis catheter placement., anticoagulation was held, the patient required Precedex for sedation, fentanyl for pain control, his respiratory culture turned positive for MSSA and the patient is being treated for MSSA pneumonia in form of Ancef.  His COVID-19 pneumonia was treated with dexamethasone 6 mg for 10 days.  The patient eventually was extubated on 11/8, he was found significantly encephalopathic, a CT of the head was negative for acute changes, the patient unable to be evaluated by MRI secondary to the patient's pacemaker.  He progressed to some degree but is still encephalopathic with mental slowing, confusion, being awake/alert  and oriented x1, moving all 4  extremities fairly equally.  Palliative care was consulted to discuss goals of care with family and the decision was reached in light of the patient's condition and age to change his CODE STATUS to DNR/DNI.  Patient had evaluation with speech therapy, he passed a swallow evaluation, his p.o. intake remains fairly poor.  On 11/10 the patient is evaluated, he continues to be confused and delirious, he remains off pressors, currently on 2 L nasal can oxygen, saturating 94%, he has overall good urine output, his right groin hematoma remained stable, his anticoagulation was restarted in form of heparin drip and resuming Coumadin.  The patient had significant difficulty with hypernatremia and acute 8-hour sodium levels were checked, this had improved.    Interval Problem Update  11/11/2022:  Patient continues to be encephalopathic however is only on 2 L submental oxygen at present.  Continue present medical therapy.    11/12/2022:  Continue with therapeutic bridging repeat INR today 1.58.  Appreciate pharmacy recommendations in this regard patient high risk of bleeding so continue to bridge slowly.    I have discussed this patient's plan of care and discharge plan at IDT rounds today with Case Management, Nursing, Nursing leadership, and other members of the IDT team.    Consultants/Specialty  critical care    Code Status  Full Code    Disposition  Patient is not medically cleared for discharge.   Anticipate discharge to to skilled nursing facility.  I have placed the appropriate orders for post-discharge needs.    Review of Systems  Review of Systems   Unable to perform ROS: Acuity of condition      Physical Exam  Temp:  [36.1 °C (97 °F)-37.1 °C (98.8 °F)] 36.1 °C (97 °F)  Pulse:  [64-86] 69  Resp:  [16-30] 16  BP: (133-150)/(72-77) 135/77  SpO2:  [90 %-96 %] 95 %    Physical Exam  Vitals and nursing note reviewed.   Constitutional:       Appearance: He is well-developed. He is ill-appearing.      Comments: Pt seen and  examined.   HENT:      Head: Normocephalic and atraumatic.      Mouth/Throat:      Mouth: Mucous membranes are dry.   Eyes:      Pupils: Pupils are equal, round, and reactive to light.   Cardiovascular:      Rate and Rhythm: Normal rate. Rhythm irregular.      Heart sounds: Normal heart sounds.   Pulmonary:      Effort: Pulmonary effort is normal.      Breath sounds: Rhonchi and rales present.   Abdominal:      General: Bowel sounds are normal.      Palpations: Abdomen is soft.   Musculoskeletal:         General: Normal range of motion.      Cervical back: Normal range of motion and neck supple.      Comments: Right groin hematoma   Skin:     General: Skin is warm and dry.   Neurological:      General: No focal deficit present.      Mental Status: He is alert and oriented to person, place, and time.   Psychiatric:         Behavior: Behavior normal.       Fluids    Intake/Output Summary (Last 24 hours) at 11/12/2022 1718  Last data filed at 11/12/2022 0400  Gross per 24 hour   Intake 120 ml   Output 1 ml   Net 119 ml         Laboratory  Recent Labs     11/10/22  0415 11/11/22  0317 11/12/22  0308   WBC 10.2 11.1* 12.9*   RBC 2.46* 2.49* 2.54*   HEMOGLOBIN 7.3* 7.4* 7.6*   HEMATOCRIT 23.7* 24.1* 25.0*   MCV 96.3 96.8 98.4*   MCH 29.7 29.7 29.9   MCHC 30.8* 30.7* 30.4*   RDW 48.3 47.6 48.7   PLATELETCT 216 232 235   MPV 11.0 11.0 11.1       Recent Labs     11/10/22  0415 11/10/22  1318 11/11/22  0317 11/11/22  1344 11/11/22  2030 11/12/22  0525 11/12/22  1430   SODIUM 144   < > 145   < > 145 145 140   POTASSIUM 4.2  --  5.0  --   --   --   --    CHLORIDE 111  --  112  --   --   --   --    CO2 23  --  23  --   --   --   --    GLUCOSE 153*  --  111*  --   --   --   --    BUN 49*  --  41*  --   --   --   --    CREATININE 1.29  --  1.39  --   --   --   --    CALCIUM 8.3*  --  8.0*  --   --   --   --     < > = values in this interval not displayed.       Recent Labs     11/10/22  0415 11/11/22  0317 11/12/22  0308   INR  1.45* 1.51* 1.56*                 Imaging  DX-CHEST-PORTABLE (1 VIEW)   Final Result      Mildly improved aeration and stable to slightly improved vascular congestion.      Mildly improved left basilar atelectasis.      DX-CHEST-PORTABLE (1 VIEW)   Final Result      1.  Interval extubation and removal of NG tube.   2.  Increased bibasilar airspace disease.   3.  Increased interstitial edema.   4.  Small left pleural effusion.      CT-HEAD W/O   Final Result      1. No CT evidence of acute infarct, hemorrhage or mass.   2. Moderate global parenchymal atrophy. Chronic small vessel ischemic changes.      DX-CHEST-PORTABLE (1 VIEW)   Final Result      Mildly improved left basilar opacity.      DX-ABDOMEN FOR TUBE PLACEMENT   Final Result      Enteric tube tip projects over the gastric antrum or proximal duodenum      DX-CHEST-PORTABLE (1 VIEW)   Final Result         1. Stable lines and tubes.   2. Worsening left basilar opacity. Small left pleural effusion. Mild right infrahilar atelectasis.         DX-CHEST-PORTABLE (1 VIEW)   Final Result      1.  Cardiomegaly with interstitial edema.      2.  Left lung base atelectasis and minimal left pleural effusion.      DX-CHEST-PORTABLE (1 VIEW)   Final Result      1.  Improved left pleural effusion with adjacent airspace disease.   2.  Improved interstitial infiltrates versus edema.      EC-ECHOCARDIOGRAM COMPLETE W/O CONT   Final Result      DX-CHEST-PORTABLE (1 VIEW)   Final Result      1.  Small left pleural effusion with adjacent airspace disease.   2.  Worsening interstitial infiltrates versus edema.   3.  Right basilar atelectasis.      DX-ABDOMEN FOR TUBE PLACEMENT   Final Result      Enteric tube tip projects over the proximal duodenum      DX-ABDOMEN FOR TUBE PLACEMENT   Final Result         1.  Nonspecific bowel gas pattern in the upper abdomen.   2.  Nasogastric tube, not visualized beyond the mid thorax, see ordered repeat abdominal x-ray for further  characterization.   3.  Cardiomegaly   4.  Left lung base atelectasis or infiltrate.      OUTSIDE IMAGES-DX CHEST   Final Result      DX-CHEST-PORTABLE (1 VIEW)   Final Result         1.  Bibasilar atelectasis or early infiltrates.   2.  Trace bilateral pleural effusions   3.  Cardiomegaly             Assessment/Plan  * Acute hypoxemic respiratory failure due to COVID-19 (HCC)- (present on admission)  Assessment & Plan  In conjunction with MSSA pneumonia,  The patient is being treated for pneumonia,  Continue Decadron course 6 mg for 10 days  Pulmonary toilet  Respiratory therapy  Wean oxygen as tolerated  Incentive spirometry    Delirium- (present on admission)  Assessment & Plan  Likely in light of the patient's acute hospitalization, acute critical condition, disease  Standard precautions and out of delirium  Avoid offending medication  Avoid interruption of circadian rhythm  CT head negative for acute changes, unable to provide a MRI imaging secondary to patient's pacemaker, although today he has no evidence of focal findings    MSSA (methicillin susceptible Staphylococcus aureus) pneumonia ([facility])- (present on admission)  Assessment & Plan  On treatment with Ancef    Goals of care, counseling/discussion- (present on admission)  Assessment & Plan  Palliative care assisting, family decision to DNR/DNI status  Will likely benefit from future documentation, POLST etc.    Hypernatremia- (present on admission)  Assessment & Plan  Likely a function of free water deficit,  Recheck, monitor and provide adequate free water supplementation    Septic shock with acute organ dysfunction due to methicillin susceptible Staphylococcus aureus (MSSA) ([facility])  Assessment & Plan  Present on admission, treated adequately    Hematoma- (present on admission)  Assessment & Plan  Right groin secondary to dialysis catheter on anticoagulation, stabilized, monitor    Acute on chronic systolic congestive heart failure (HCC)- (present on  admission)  Assessment & Plan  Continue with medical therapy including Coreg, diuresis  It appears his EF has been improving    CONCLUSIONS  The left ventricle is not well visualized due to body habitus.  Left ventricular systolic function is probably normal.  The ejection fraction is roughly estimated to be 55%.  A reliable estimation of diastolic function cannot be made due to   mitral valve disease.  Severely dilated right ventricle.  Reduced right ventricular systolic function.  Estimated right ventricular systolic pressure is 40 mmHg.  Severe biatrial dilation.  Mild mitral regurgitation.  Aortic valve sclerosis without significant stenosis.  Mild tricuspid regurgitation.  Normal inferior vena cava size and inspiratory collapse    Hyperkalemia- (present on admission)  Assessment & Plan  Initially on presentation present, treated, improved, monitor    MYLA (acute kidney injury) (HCC)- (present on admission)  Assessment & Plan  Acute initially, currently improved, the patient had a brief episode of RRT  Improving currently,  Avoid nephrotoxins    Restless leg syndrome- (present on admission)  Assessment & Plan  Resume Requip    Chronic anticoagulation- (present on admission)  Assessment & Plan  Resume heparin/Coumadin    Paroxysmal atrial fibrillation (HCC)- (present on admission)  Assessment & Plan  History of, ongoing anticoagulation with warfarin/heparin overlapping    Pacemaker- (present on admission)  Assessment & Plan  In situ, telemetry monitoring, history of complete heart block       Please note that this dictation was created using voice recognition software. I have made every reasonable attempt to correct obvious errors, but I expect that there are errors of grammar and possibly context that I did not discover before finalizing the note.    VTE prophylaxis: SCDs/TEDs and therapeutic anticoagulation with heparin infusion continuous with bridge to warfarin    I have performed a physical exam and reviewed  and updated ROS and Plan today (11/12/2022). In review of yesterday's note (11/11/2022), there are no changes except as documented above.

## 2022-11-13 NOTE — PROGRESS NOTES
Received bedside report from RN, pt care assumed, VSS, pt assessment complete. Pt AAOx1-2, self only, sometimes oriented to place, with no c/o of pain at this time. No signs of acute distress noted at this time. Plan of care discussed with pt and verbalizes no questions. Pt denies any additional needs at this time. Bed locked/in lowest position, bed alarm on, pt educated on fall risk and verbalized understanding, call light within reach, hourly rounding initiated.

## 2022-11-13 NOTE — PROGRESS NOTES
Inpatient Anticoagulation Service Note    Date: 11/13/2022  Reason for Anticoagulation: Afib & bioprosthetic MVR  Target INR: 2.0 to 3.0    [id number] VASc Score: 5  HAS-BLED Score: 3    Hemoglobin Value: (!) 8  Hematocrit Value: (!) 26.1  Lab Platelet Value: 244    INR from last 7 days       Date/Time INR Value    11/13/22 0745 1.98    11/12/22 0308 1.56    11/11/22 0317 1.51    11/10/22 0415 1.45    11/09/22 1630 1.43    11/08/22 0920 1.43          Dose from last 7 days       Date/Time Dose (mg)    11/13/22 1125 7.5    11/12/22 1419 7.5    11/11/22 1431 5    11/10/22 1413 5    11/09/22 1535 0          Significant Interactions: None  Bridge Therapy: Yes  Bridge Therapy Start Date: 11/10/22  Days of Overlap Therapy: 4  INR Value Greater than 2 Prior to Discontinuation of Parenteral Anticoagulation: N/A  Reversal Agent Administered: N/A    A/P:  - PTA warfarin cont'd for Afib & bioprosthetic MVR  - now seeing movement in INR, remains borderline subtherapeutic @ 1.98  - Hgb stable w/ recent retroperitoneal hematoma, no active bleeding noted  - no additional DDIs noted, completed ancef & dex  - no concerns w/ hepatic fx  - continue w/ warfarin 7.5 mg daily for now  - continue bridging w/ heparin gtt, day #4 overlap therapy  - subsequent INRs/CBCs ordered     Education Material Provided?: No (PTA warfarin)  Pharmacist suggested discharge dosing: TBD pending subsequent INR trends, regardless would recommend INR f/u w/in 48 hrs of discharge     Kenji Duff, PharmD

## 2022-11-13 NOTE — PROGRESS NOTES
Pts son at bedside. 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 I told him to be expecting a phone call from the palliative care team and he states he is willing to workout a good time to have a phone conference.

## 2022-11-14 NOTE — THERAPY
Occupational Therapy  Daily Treatment     Patient Name: Sean Kincaid  Age:  77 y.o., Sex:  male  Medical Record #: [id number]  Today's Date: 11/14/2022     Precautions  Precautions: Fall Risk, Swallow Precautions ( See Comments)    Assessment    Pt currently limited by decreased functional mobility, activity tolerance, cognition, sensation, strength, AROM, coordination, balance, and pain which are affecting pt's ability to complete ADLs/IADLs at baseline. Pt would benefit from OT services in the acute care setting to maximize functional recovery.      Plan    Continue current treatment plan.       Discharge Recommendations: (P) Recommend post-acute placement for additional occupational therapy services prior to discharge home         11/14/22 1013   Activities of Daily Living   Grooming Moderate Assist   Upper Body Dressing Moderate Assist   Lower Body Dressing Maximal Assist   Toileting Maximal Assist   Functional Mobility   Sit to Stand Moderate Assist   Bed, Chair, Wheelchair Transfer Unable to Participate   Short Term Goals   Short Term Goal # 1 min A with UB dressing   Goal Outcome # 1 Progressing as expected   Short Term Goal # 2 mod A with LB dressing   Goal Outcome # 2 Progressing as expected   Short Term Goal # 3 mod A with ADL txfs   Goal Outcome # 3 Progressing slower than expected   Anticipated Discharge Equipment and Recommendations   Discharge Recommendations Recommend post-acute placement for additional occupational therapy services prior to discharge home

## 2022-11-14 NOTE — DISCHARGE PLANNING
0916  Agency/Facility Name: [facility]   Outcome: DPA left Vmail regarding referral with request of call back.     0919  Agency/Facility Name: [facility]   Spoke To: Shelbie   Outcome: Per Shelbie referral still under review and Shelbie requesting updated PT/OT to be manually faxed to [phone]. 1206  Agency/Facility Name: [facility]   Outcome: DPA left Vmail regarding referral with request of call back.     1512  DPA manually fax updated PT/OT notes to [facility].

## 2022-11-14 NOTE — THERAPY
Physical Therapy   Daily Treatment     Patient Name: Sean Kincaid  Age:  77 y.o., Sex:  male  Medical Record #: [id number]  Today's Date: 11/14/2022     Precautions  Precautions: Fall Risk;Swallow Precautions ( See Comments)    Assessment  Pt seen for PT tx session. Pt still somewhat confused today, but easily redirectable and able to follow most commands. Pt with slight improvement compared to last session (generally Mod A), but still limited by weakness and fatigue. Pt not able to ambulate yet and had difficulty with sequencing marching in place. Unable to read O2 sats during session due to all 3 pulse ox's being unable to read vitals. Pt was SOB with standing, but able to recover sufficiently in sitting. Continue to recommend post acute placement at this time to maximize functional independence. Will follow.     Plan    Continue current treatment plan.    DC Equipment Recommendations: Unable to determine at this time  Discharge Recommendations: Recommend post-acute placement for additional physical therapy services prior to discharge home        Vitals   O2 (LPM) 0   O2 Delivery Device None - Room Air   Vitals Comments attempted to see SpO2 during session, however all 3 of pt's pulse ox's were not able to read pt vitals   Pain 0 - 10 Group   Location Scrotum   Location Orientation Mid   Pain Rating Scale (NPRS)   (not quantified)   Therapist Pain Assessment During Activity   Cognition    Cognition / Consciousness X   Ability To Follow Commands 1 Step  (needing increased cues at times)   Safety Awareness Impaired   Attention Impaired   Sequencing Impaired   Initiation Impaired   Comments poor command following and initiation, pt needing tactiles cues to initiate movement at times   Balance   Sitting Balance (Static) Fair   Sitting Balance (Dynamic) Fair -   Standing Balance (Static) Poor   Standing Balance (Dynamic) Poor -   Weight Shift Sitting Poor   Weight Shift Standing Poor   Skilled Intervention Verbal  Cuing;Sequencing   Comments FWW and Min A in standing   Gait Analysis   Gait Level Of Assist Unable to Participate   Comments attempted standing marching, but pt was not able to sequence even with VC's   Bed Mobility    Supine to Sit Moderate Assist   Sit to Supine Moderate Assist   Scooting Minimal Assist  (EOB)   Skilled Intervention Other (See Comments);Sequencing   Comments HOB elevated   Functional Mobility   Sit to Stand Moderate Assist   Mobility FWW   Skilled Intervention Verbal Cuing;Facilitation;Postural Facilitation   Comments STS x2 with pt standing for 30-40 sec each time. pt with slight retropulsion and needing to be encouraged to stay standing as he feels weak   How much difficulty does the patient currently have...   Turning over in bed (including adjusting bedclothes, sheets and blankets)? 2   Sitting down on and standing up from a chair with arms (e.g., wheelchair, bedside commode, etc.) 1   Moving from lying on back to sitting on the side of the bed? 1   How much help from another person does the patient currently need...   Moving to and from a bed to a chair (including a wheelchair)? 2   Need to walk in a hospital room? 2   Climbing 3-5 steps with a railing? 1   6 clicks Mobility Score 9   Activity Tolerance   Sitting Edge of Bed 10 min   Standing 2 min   Comments OOB activity limited by fatigue and weakness   Short Term Goals    Short Term Goal # 1 Pt will performs supine <> sit with min A within 6 visits to progress bed mobility   Goal Outcome # 1 Progressing as expected   Short Term Goal # 2 Pt will perform STS with LRAD and min A within 6 visits to progress OOB mobility   Goal Outcome # 2 Progressing as expected   Short Term Goal # 3 Pt will perform functional transfers with LRAD and mod A within 6 visits to progress OOB mobility   Goal Outcome # 3 Progressing slower than expected   Short Term Goal # 4 Pt will ambulate 20 ft with LRAD and mod A within 6 visits to initiate gait training   Goal  Outcome # 4 Goal not met   Anticipated Discharge Equipment and Recommendations   DC Equipment Recommendations Unable to determine at this time   Discharge Recommendations Recommend post-acute placement for additional physical therapy services prior to discharge home   Interdisciplinary Plan of Care Collaboration   IDT Collaboration with  Nursing;Occupational Therapist   Patient Position at End of Therapy In Bed;Bed Alarm On;Call Light within Reach;Tray Table within Reach;Phone within Reach   Collaboration Comments RN updated   Session Information   Date / Session Number  11/14- 2 (2/3, 11/15)

## 2022-11-14 NOTE — DIETARY
Nutrition Services Brief Update:  Day 10 of admit.  Sean Kincaid is a 77 y.o. male with admitting DX of Acute hypoxemic respiratory failure due to COVID-19 ([facility]) [U07.1, J96.01]  MSSA (methicillin susceptible Staphylococcus aureus) pneumonia ([facility]) [J15.211]    Current Diet: Level 3 - liquidized w/ Boost Glucose Control  Pt drinking % of boost and mostly 25-50% of meals.     Problem: Nutritional:  Goal: Achieve adequate nutritional intake  Description: Patient will consume ~50% of meals  Outcome: Progressing    RD Following

## 2022-11-14 NOTE — PROGRESS NOTES
Report received from Rn. Assumed pt care. Pt is sleeping comfortably in bed on RA. Tele monitoring has been discontinued. Monitor room notified. Fall precautions in place, call light and belongings within reach, bed in lowest position. No signs of distress.

## 2022-11-14 NOTE — PROGRESS NOTES
Hospital Medicine Daily Progress Note    Date of Service  11/14/2022    Chief Complaint  Sean Kincaid is a 77 y.o. male admitted 11/4/2022 with respiratory failure    Hospital Course  77 y.o. male who presented 11/4/2022 with history of COPD, atrial fibrillation, diabetes type 2, history of congestive heart failure, EF of 45%,, initially presented to an outlying facility itself like [facility], complaining of worsening shortness of breath and fatigue.  The patient was diagnosed with COVID-19, started initially on BiPAP and then eventually required to be intubated, was found with an MYLA, creatinine of 8.0, potassium 6.6, the patient chronically anticoagulated for atrial fibrillation, history of complete heart block, history of bioprosthetic replacement of the mitral valve, ANALY, after presentation to [facility] he was admitted to the ICU, he continued to be treated in ICU with mechanical ventilation, required vasopressors including norepinephrine and vasopressin, placed on empiric antibiotics in form of ceftriaxone, nephrology consulted for MYLA, nonoliguric, had brief initiation of RRT but then picked up his urine output and required no further RRT as per nephrology.  He developed a right thigh/femoral hematoma related to a try Alysis catheter placement., anticoagulation was held, the patient required Precedex for sedation, fentanyl for pain control, his respiratory culture turned positive for MSSA and the patient is being treated for MSSA pneumonia in form of Ancef.  His COVID-19 pneumonia was treated with dexamethasone 6 mg for 10 days.  The patient eventually was extubated on 11/8, he was found significantly encephalopathic, a CT of the head was negative for acute changes, the patient unable to be evaluated by MRI secondary to the patient's pacemaker.  He progressed to some degree but is still encephalopathic with mental slowing, confusion, being awake/alert  and oriented x1, moving all 4  extremities fairly equally.  Palliative care was consulted to discuss goals of care with family and the decision was reached in light of the patient's condition and age to change his CODE STATUS to DNR/DNI.  Patient had evaluation with speech therapy, he passed a swallow evaluation, his p.o. intake remains fairly poor.  On 11/10 the patient is evaluated, he continues to be confused and delirious, he remains off pressors, currently on 2 L nasal can oxygen, saturating 94%, he has overall good urine output, his right groin hematoma remained stable, his anticoagulation was restarted in form of heparin drip and resuming Coumadin.  The patient had significant difficulty with hypernatremia and acute 8-hour sodium levels were checked, this had improved.    Interval Problem Update  11/11/2022:  Patient continues to be encephalopathic however is only on 2 L submental oxygen at present.  Continue present medical therapy.    11/12/2022:  Continue with therapeutic bridging repeat INR today 1.58.  Appreciate pharmacy recommendations in this regard patient high risk of bleeding so continue to bridge slowly.    11/13/2022:  Continue present medical management patient continues to be alert and oriented x1-2.  Continue with plans for placement.  Patient is eligible for SNF placement on 11/14/2022.  Presently we will establish alternative IV access discontinue central line.  INR is improved today goal range 2-3.  Patient undergoing heparin to warfarin bridge.  11/14/2022:  Patient finally within therapeutic range for INR.  Pharmacy support appreciated.  Patient discontinued from heparin drip.  Patient suitable now for discharge.  Place an appropriate facility when available.  Otherwise continue present medical management.  No acute events overnight.  I have discussed this patient's plan of care and discharge plan at IDT rounds today with Case Management, Nursing, Nursing leadership, and other members of the IDT  team.    Consultants/Specialty  critical care    Code Status  Full Code    Disposition  Patient is medically cleared for discharge.   Anticipate discharge to to skilled nursing facility.  I have placed the appropriate orders for post-discharge needs.    Review of Systems  Review of Systems   Unable to perform ROS: Acuity of condition      Physical Exam  Temp:  [36.5 °C (97.7 °F)-37.2 °C (99 °F)] 37.2 °C (99 °F)  Pulse:  [68-86] 86  Resp:  [17-18] 18  BP: ()/() 109/44  SpO2:  [89 %-94 %] 90 %    Physical Exam  Vitals and nursing note reviewed.   Constitutional:       Appearance: He is well-developed. He is ill-appearing.      Comments: Pt seen and examined.   HENT:      Head: Normocephalic and atraumatic.      Mouth/Throat:      Mouth: Mucous membranes are dry.   Eyes:      Pupils: Pupils are equal, round, and reactive to light.   Cardiovascular:      Rate and Rhythm: Normal rate. Rhythm irregular.      Heart sounds: Normal heart sounds.   Pulmonary:      Effort: Pulmonary effort is normal.      Breath sounds: Rhonchi and rales present.   Abdominal:      General: Bowel sounds are normal.      Palpations: Abdomen is soft.   Musculoskeletal:         General: Normal range of motion.      Cervical back: Normal range of motion and neck supple.      Comments: Right groin hematoma   Skin:     General: Skin is warm and dry.   Neurological:      General: No focal deficit present.      Mental Status: He is alert and oriented to person, place, and time.   Psychiatric:         Behavior: Behavior normal.       Fluids    Intake/Output Summary (Last 24 hours) at 11/14/2022 1402  Last data filed at 11/14/2022 0911  Gross per 24 hour   Intake 1239.44 ml   Output --   Net 1239.44 ml         Laboratory  Recent Labs     11/12/22  0308 11/13/22  0630 11/14/22  0350   WBC 12.9* 13.2* 16.1*   RBC 2.54* 2.62* 2.76*   HEMOGLOBIN 7.6* 8.0* 8.2*   HEMATOCRIT 25.0* 26.1* 26.8*   MCV 98.4* 99.6* 97.1   MCH 29.9 30.5 29.7   MCHC 30.4*  30.7* 30.6*   RDW 48.7 49.5 49.7   PLATELETCT 235 244 272   MPV 11.1 11.4 11.2       Recent Labs     11/12/22  1430 11/13/22  0630 11/14/22  0350   SODIUM 140 140 139   POTASSIUM  --   --  4.5   CHLORIDE  --   --  110   CO2  --   --  18*   GLUCOSE  --   --  175*   BUN  --   --  37*   CREATININE  --   --  1.21   CALCIUM  --   --  8.2*       Recent Labs     11/13/22  0745 11/13/22  1625 11/14/22  0350   INR 1.98* 2.26* 3.03*                 Imaging  DX-CHEST-PORTABLE (1 VIEW)   Final Result      Mildly improved aeration and stable to slightly improved vascular congestion.      Mildly improved left basilar atelectasis.      DX-CHEST-PORTABLE (1 VIEW)   Final Result      1.  Interval extubation and removal of NG tube.   2.  Increased bibasilar airspace disease.   3.  Increased interstitial edema.   4.  Small left pleural effusion.      CT-HEAD W/O   Final Result      1. No CT evidence of acute infarct, hemorrhage or mass.   2. Moderate global parenchymal atrophy. Chronic small vessel ischemic changes.      DX-CHEST-PORTABLE (1 VIEW)   Final Result      Mildly improved left basilar opacity.      DX-ABDOMEN FOR TUBE PLACEMENT   Final Result      Enteric tube tip projects over the gastric antrum or proximal duodenum      DX-CHEST-PORTABLE (1 VIEW)   Final Result         1. Stable lines and tubes.   2. Worsening left basilar opacity. Small left pleural effusion. Mild right infrahilar atelectasis.         DX-CHEST-PORTABLE (1 VIEW)   Final Result      1.  Cardiomegaly with interstitial edema.      2.  Left lung base atelectasis and minimal left pleural effusion.      DX-CHEST-PORTABLE (1 VIEW)   Final Result      1.  Improved left pleural effusion with adjacent airspace disease.   2.  Improved interstitial infiltrates versus edema.      EC-ECHOCARDIOGRAM COMPLETE W/O CONT   Final Result      DX-CHEST-PORTABLE (1 VIEW)   Final Result      1.  Small left pleural effusion with adjacent airspace disease.   2.  Worsening  interstitial infiltrates versus edema.   3.  Right basilar atelectasis.      DX-ABDOMEN FOR TUBE PLACEMENT   Final Result      Enteric tube tip projects over the proximal duodenum      DX-ABDOMEN FOR TUBE PLACEMENT   Final Result         1.  Nonspecific bowel gas pattern in the upper abdomen.   2.  Nasogastric tube, not visualized beyond the mid thorax, see ordered repeat abdominal x-ray for further characterization.   3.  Cardiomegaly   4.  Left lung base atelectasis or infiltrate.      OUTSIDE IMAGES-DX CHEST   Final Result      DX-CHEST-PORTABLE (1 VIEW)   Final Result         1.  Bibasilar atelectasis or early infiltrates.   2.  Trace bilateral pleural effusions   3.  Cardiomegaly             Assessment/Plan  * Acute hypoxemic respiratory failure due to COVID-19 (HCC)- (present on admission)  Assessment & Plan  In conjunction with MSSA pneumonia,  The patient is being treated for pneumonia,  Continue Decadron course 6 mg for 10 days  Pulmonary toilet  Respiratory therapy  Wean oxygen as tolerated  Incentive spirometry  Improving    Delirium- (present on admission)  Assessment & Plan  Likely in light of the patient's acute hospitalization, acute critical condition, disease  Standard precautions and out of delirium  Avoid offending medication  Avoid interruption of circadian rhythm  CT head negative for acute changes, unable to provide a MRI imaging secondary to patient's pacemaker, although today he has no evidence of focal findings    MSSA (methicillin susceptible Staphylococcus aureus) pneumonia ([facility])- (present on admission)  Assessment & Plan  On treatment with Ancef    Goals of care, counseling/discussion- (present on admission)  Assessment & Plan  Palliative care assisting, family decision to DNR/DNI status  Will likely benefit from future documentation, POLST etc.    Hypernatremia- (present on admission)  Assessment & Plan  Likely a function of free water deficit,  Recheck, monitor and provide adequate free  water supplementation    Septic shock with acute organ dysfunction due to methicillin susceptible Staphylococcus aureus (MSSA) ([facility])  Assessment & Plan  Present on admission, treated adequately    Hematoma- (present on admission)  Assessment & Plan  Right groin secondary to dialysis catheter on anticoagulation, stabilized, monitor  Stabilized    Acute on chronic systolic congestive heart failure (HCC)- (present on admission)  Assessment & Plan  Continue with medical therapy including Coreg, diuresis  It appears his EF has been improving  Continue present medical therapy    CONCLUSIONS  The left ventricle is not well visualized due to body habitus.  Left ventricular systolic function is probably normal.  The ejection fraction is roughly estimated to be 55%.  A reliable estimation of diastolic function cannot be made due to   mitral valve disease.  Severely dilated right ventricle.  Reduced right ventricular systolic function.  Estimated right ventricular systolic pressure is 40 mmHg.  Severe biatrial dilation.  Mild mitral regurgitation.  Aortic valve sclerosis without significant stenosis.  Mild tricuspid regurgitation.  Normal inferior vena cava size and inspiratory collapse    Hyperkalemia- (present on admission)  Assessment & Plan  Initially on presentation present, treated, improved, monitor    MYLA (acute kidney injury) ([facility])- (present on admission)  Assessment & Plan  Acute initially, currently improved, the patient had a brief episode of RRT  Improving currently,  Avoid nephrotoxins    Restless leg syndrome- (present on admission)  Assessment & Plan  Resume Requip    Chronic anticoagulation- (present on admission)  Assessment & Plan  Resume heparin/Coumadin    Paroxysmal atrial fibrillation (HCC)- (present on admission)  Assessment & Plan  History of, ongoing anticoagulation with warfarin/heparin overlapping  Continue with heparin and warfarin bridging.  INR improving goal range between 2 and 3    Pacemaker-  (present on admission)  Assessment & Plan  In situ, telemetry monitoring, history of complete heart block       Please note that this dictation was created using voice recognition software. I have made every reasonable attempt to correct obvious errors, but I expect that there are errors of grammar and possibly context that I did not discover before finalizing the note.    VTE prophylaxis: SCDs/TEDs and therapeutic anticoagulation with warfarin    I have performed a physical exam and reviewed and updated ROS and Plan today (11/14/2022). In review of yesterday's note (11/13/2022), there are no changes except as documented above.

## 2022-11-14 NOTE — DISCHARGE PLANNING
Case Management Discharge Planning    Admission Date: 11/4/2022  GMLOS: 13.2  ALOS: 10    6-Clicks ADL Score: 12  6-Clicks Mobility Score: 8  PT and/or OT Eval ordered: Yes  Post-acute Referrals Ordered: Yes  Post-acute Choice Obtained: Yes  Has referral(s) been sent to post-acute provider:  Yes      Anticipated Discharge Dispo: Discharge Disposition: D/T to SNF with Medicare cert in anticipation of skilled care (03)    DME Needed: No    Action(s) Taken: Updated Provider/Nurse on Discharge Plan    LSW sent a VOALTE message to PT Shell Stovall and OT Roe Pineda requesting updates PT/OT notes. Updated notes were requested by David Bartlett/[facility].    Escalations Completed: PT/OT    Medically Clear: Yes    Next Steps: LSW will continue to follow up with pending SNF placement regarding acceptance.     Barriers to Discharge: Pending Placement and Pending PT Evaluation    Is the patient up for discharge tomorrow: Yes    Is transport arranged for discharge disposition: No, pt pending SNF acceptance.

## 2022-11-14 NOTE — PROGRESS NOTES
Inpatient Anticoagulation Service Note    Date: 11/14/2022    Reason for Anticoagulation: Bioprosthetic Valve Replacement, Atrial Fibrillation   Target INR: 2.0 to 3.0  [id number] VASc Score: 5  HAS-BLED Score: 3   Hemoglobin Value: (!) 8.2  Hematocrit Value: (!) 26.8  Lab Platelet Value: 272    INR from last 7 days       Date/Time INR Value    11/14/22 0350 3.03    11/13/22 1625 2.26    11/13/22 0745 1.98    11/12/22 0308 1.56    11/11/22 0317 1.51    11/10/22 0415 1.45    11/09/22 1630 1.43    11/08/22 0920 1.43          Dose from last 7 days       Date/Time Dose (mg)    11/14/22 0816 5    11/13/22 1125 7.5    11/12/22 1419 7.5    11/11/22 1431 5    11/10/22 1413 5    11/09/22 1535 0          Significant Interactions: Not Applicable  Bridge Therapy: No  Bridge Therapy Start Date: 11/10/22  Days of Overlap Therapy: 5   INR Value Greater than 2 Prior to Discontinuation of Parenteral Anticoagulation: Yes   Reversal Agent Administered: Not Applicable  Comments: INR now therapeutic at 3.03, given jump in INR will reduce daily dose to 5 mg and trend of INR.  Education Material Provided?: No  Pharmacist suggested discharge dosing: anticipate 5 mg daily and INR check within 48 hours of discharge.     Alyssa Hyde, PharmD  [id number]

## 2022-11-15 NOTE — CARE PLAN
The patient is Stable - Low risk of patient condition declining or worsening    Shift Goals  Clinical Goals: Maintain skin integrity and safety  Patient Goals: ALLIE  Family Goals: ALLIE    Progress made toward(s) clinical / shift goals:    Problem: Skin Integrity  Goal: Skin integrity is maintained or improved  Outcome: Progressing     Problem: Fall Risk  Goal: Patient will remain free from falls  Outcome: Progressing     Problem: Psychosocial  Goal: Patient's level of anxiety will decrease  Outcome: Progressing     Problem: Hemodynamics  Goal: Patient's hemodynamics, fluid balance and neurologic status will be stable or improve  Outcome: Progressing       Patient is not progressing towards the following goals: NA

## 2022-11-15 NOTE — DISCHARGE PLANNING
Agency/Facility Name: [facility]  Outcome: DPA left v-mail requesting update on referral. DPA provided callback number.     1129-  Agency/Facility Name: [facility] /   [facility] / [place]  Referral sent per Choice form @ 1129 (via Communication Management), per LSW's request.     1324-  Agency/Facility Name: [facility]   Outcome: DPA left v-mail requesting update on referral. DPA provided callback number.     1325-  Agency/Facility Name: [facility]  Spoke To: [place]   Outcome: DPA requested update on referral, informed it is currently being reviewed by SNF's DON.     1330-  Agency/Facility Name: [facility] [facility]   Outcome: DPA left v-mail requesting update on referral. DPA provided callback number.

## 2022-11-15 NOTE — INFECTION CONTROL
This patient is considered COVID RECOVERED.    Patient initially tested positive for COVID on 11/3/2022.  Patient is greater than or equal to 10 days from symptom onset and/or positive test, with symptom improvement.  Per the CDC guidance, this patient no longer requires transmission based precautions.  Patient may be placed on any unit per the bed assignment policy, including placement in a semi-private room with a roommate.

## 2022-11-15 NOTE — CARE PLAN
The patient is Stable - Low risk of patient condition declining or worsening    Shift Goals  Clinical Goals: maintain skin integrity and safety  Patient Goals: ALLIE  Family Goals: ALLIE    Progress made toward(s) clinical / shift goals:       Problem: Knowledge Deficit - Standard  Goal: Patient and family/care givers will demonstrate understanding of plan of care, disease process/condition, diagnostic tests and medications  Outcome: Progressing     Problem: Skin Integrity  Goal: Skin integrity is maintained or improved  Outcome: Progressing     Problem: Fall Risk  Goal: Patient will remain free from falls  Outcome: Progressing       Patient is not progressing towards the following goals:n/a

## 2022-11-15 NOTE — PROGRESS NOTES
Report received from day shift RN, assumed care of pt. Pt A&Ox1 Oriented to self only. Plan of care discussed with pt, labs and chart reviewed. All needs met at this time. On RA. Call light within reach, bed locked and in lowest position. All fall precautions and hourly rounding in place.

## 2022-11-15 NOTE — PROGRESS NOTES
Hospital Medicine Daily Progress Note    Date of Service  11/15/2022    Chief Complaint  Sean Kincaid is a 77 y.o. male admitted 11/4/2022 with respiratory failure    Hospital Course  77 y.o. male who presented 11/4/2022 with history of COPD, atrial fibrillation, diabetes type 2, history of congestive heart failure, EF of 45%,, initially presented to an outlying facility itself like [facility], complaining of worsening shortness of breath and fatigue.  The patient was diagnosed with COVID-19, started initially on BiPAP and then eventually required to be intubated, was found with an MYLA, creatinine of 8.0, potassium 6.6, the patient chronically anticoagulated for atrial fibrillation, history of complete heart block, history of bioprosthetic replacement of the mitral valve, ANALY, after presentation to [facility] he was admitted to the ICU, he continued to be treated in ICU with mechanical ventilation, required vasopressors including norepinephrine and vasopressin, placed on empiric antibiotics in form of ceftriaxone, nephrology consulted for MYLA, nonoliguric, had brief initiation of RRT but then picked up his urine output and required no further RRT as per nephrology.  He developed a right thigh/femoral hematoma related to a try Alysis catheter placement., anticoagulation was held, the patient required Precedex for sedation, fentanyl for pain control, his respiratory culture turned positive for MSSA and the patient is being treated for MSSA pneumonia in form of Ancef.  His COVID-19 pneumonia was treated with dexamethasone 6 mg for 10 days.  The patient eventually was extubated on 11/8, he was found significantly encephalopathic, a CT of the head was negative for acute changes, the patient unable to be evaluated by MRI secondary to the patient's pacemaker.  He progressed to some degree but is still encephalopathic with mental slowing, confusion, being awake/alert  and oriented x1, moving all 4  extremities fairly equally.  Palliative care was consulted to discuss goals of care with family and the decision was reached in light of the patient's condition and age to change his CODE STATUS to DNR/DNI.  Patient had evaluation with speech therapy, he passed a swallow evaluation, his p.o. intake remains fairly poor.  On 11/10 the patient is evaluated, he continues to be confused and delirious, he remains off pressors, currently on 2 L nasal can oxygen, saturating 94%, he has overall good urine output, his right groin hematoma remained stable, his anticoagulation was restarted in form of heparin drip and resuming Coumadin.  The patient had significant difficulty with hypernatremia and acute 8-hour sodium levels were checked, this had improved.    Interval Problem Update  11/11/2022:  Patient continues to be encephalopathic however is only on 2 L submental oxygen at present.  Continue present medical therapy.    11/12/2022:  Continue with therapeutic bridging repeat INR today 1.58.  Appreciate pharmacy recommendations in this regard patient high risk of bleeding so continue to bridge slowly.    11/13/2022:  Continue present medical management patient continues to be alert and oriented x1-2.  Continue with plans for placement.  Patient is eligible for SNF placement on 11/14/2022.  Presently we will establish alternative IV access discontinue central line.  INR is improved today goal range 2-3.  Patient undergoing heparin to warfarin bridge.  11/14/2022:  Patient finally within therapeutic range for INR.  Pharmacy support appreciated.  Patient discontinued from heparin drip.  Patient suitable now for discharge.  Place an appropriate facility when available.  Otherwise continue present medical management.  No acute events overnight.  I have discussed this patient's plan of care and discharge plan at IDT rounds today with Case Management, Nursing, Nursing leadership, and other members of the IDT team.  11/15: Patient  seen and examined, afebrile, no acute events overnight, no nausea or vomiting,  resting in bed, afebrile  Awaiting placement     Consultants/Specialty  critical care    Code Status  Full Code    Disposition  Patient is medically cleared for discharge.   Anticipate discharge to to skilled nursing facility.  I have placed the appropriate orders for post-discharge needs.    Review of Systems  Review of Systems   Unable to perform ROS: Acuity of condition      Physical Exam  Temp:  [37 °C (98.6 °F)] 37 °C (98.6 °F)  Pulse:  [71-74] 74  Resp:  [18-20] 18  BP: (107-143)/(48-65) 119/60  SpO2:  [74 %-93 %] 93 %    Physical Exam  Vitals and nursing note reviewed.   Constitutional:       Appearance: He is well-developed. He is ill-appearing.      Comments: Pt seen and examined.   HENT:      Head: Normocephalic and atraumatic.      Mouth/Throat:      Mouth: Mucous membranes are dry.   Eyes:      General: No scleral icterus.     Pupils: Pupils are equal, round, and reactive to light.   Cardiovascular:      Rate and Rhythm: Normal rate. Rhythm irregular.      Heart sounds: Normal heart sounds.   Pulmonary:      Effort: Pulmonary effort is normal.      Breath sounds: Rhonchi and rales present.   Abdominal:      General: Bowel sounds are normal.      Palpations: Abdomen is soft.      Tenderness: There is no abdominal tenderness. There is no guarding or rebound.   Musculoskeletal:         General: Normal range of motion.      Cervical back: Normal range of motion and neck supple.      Comments: Right groin hematoma   Skin:     General: Skin is warm and dry.   Neurological:      General: No focal deficit present.      Mental Status: He is alert and oriented to person, place, and time.   Psychiatric:         Behavior: Behavior normal.       Fluids    Intake/Output Summary (Last 24 hours) at 11/15/2022 1031  Last data filed at 11/15/2022 0409  Gross per 24 hour   Intake 640 ml   Output 0 ml   Net 640 ml         Laboratory  Recent Labs      11/13/22  0630 11/14/22  0350 11/15/22  0345   WBC 13.2* 16.1* 13.9*   RBC 2.62* 2.76* 2.66*   HEMOGLOBIN 8.0* 8.2* 8.0*   HEMATOCRIT 26.1* 26.8* 25.7*   MCV 99.6* 97.1 96.6   MCH 30.5 29.7 30.1   MCHC 30.7* 30.6* 31.1*   RDW 49.5 49.7 50.1*   PLATELETCT 244 272 237   MPV 11.4 11.2 11.3       Recent Labs     11/12/22  1430 11/13/22  0630 11/14/22  0350   SODIUM 140 140 139   POTASSIUM  --   --  4.5   CHLORIDE  --   --  110   CO2  --   --  18*   GLUCOSE  --   --  175*   BUN  --   --  37*   CREATININE  --   --  1.21   CALCIUM  --   --  8.2*       Recent Labs     11/13/22  1625 11/14/22  0350 11/15/22  0345   INR 2.26* 3.03* 2.45*                 Imaging  DX-CHEST-PORTABLE (1 VIEW)   Final Result      Mildly improved aeration and stable to slightly improved vascular congestion.      Mildly improved left basilar atelectasis.      DX-CHEST-PORTABLE (1 VIEW)   Final Result      1.  Interval extubation and removal of NG tube.   2.  Increased bibasilar airspace disease.   3.  Increased interstitial edema.   4.  Small left pleural effusion.      CT-HEAD W/O   Final Result      1. No CT evidence of acute infarct, hemorrhage or mass.   2. Moderate global parenchymal atrophy. Chronic small vessel ischemic changes.      DX-CHEST-PORTABLE (1 VIEW)   Final Result      Mildly improved left basilar opacity.      DX-ABDOMEN FOR TUBE PLACEMENT   Final Result      Enteric tube tip projects over the gastric antrum or proximal duodenum      DX-CHEST-PORTABLE (1 VIEW)   Final Result         1. Stable lines and tubes.   2. Worsening left basilar opacity. Small left pleural effusion. Mild right infrahilar atelectasis.         DX-CHEST-PORTABLE (1 VIEW)   Final Result      1.  Cardiomegaly with interstitial edema.      2.  Left lung base atelectasis and minimal left pleural effusion.      DX-CHEST-PORTABLE (1 VIEW)   Final Result      1.  Improved left pleural effusion with adjacent airspace disease.   2.  Improved interstitial  infiltrates versus edema.      EC-ECHOCARDIOGRAM COMPLETE W/O CONT   Final Result      DX-CHEST-PORTABLE (1 VIEW)   Final Result      1.  Small left pleural effusion with adjacent airspace disease.   2.  Worsening interstitial infiltrates versus edema.   3.  Right basilar atelectasis.      DX-ABDOMEN FOR TUBE PLACEMENT   Final Result      Enteric tube tip projects over the proximal duodenum      DX-ABDOMEN FOR TUBE PLACEMENT   Final Result         1.  Nonspecific bowel gas pattern in the upper abdomen.   2.  Nasogastric tube, not visualized beyond the mid thorax, see ordered repeat abdominal x-ray for further characterization.   3.  Cardiomegaly   4.  Left lung base atelectasis or infiltrate.      OUTSIDE IMAGES-DX CHEST   Final Result      DX-CHEST-PORTABLE (1 VIEW)   Final Result         1.  Bibasilar atelectasis or early infiltrates.   2.  Trace bilateral pleural effusions   3.  Cardiomegaly             Assessment/Plan  * Acute hypoxemic respiratory failure due to COVID-19 (HCC)- (present on admission)  Assessment & Plan  In conjunction with MSSA pneumonia,  The patient is being treated for pneumonia,  Continue Decadron course 6 mg for 10 days  Pulmonary toilet  Respiratory therapy  Wean oxygen as tolerated  Incentive spirometry  Improving    Delirium- (present on admission)  Assessment & Plan  Likely in light of the patient's acute hospitalization, acute critical condition, disease  Standard precautions and out of delirium  Avoid offending medication  Avoid interruption of circadian rhythm  CT head negative for acute changes, unable to provide a MRI imaging secondary to patient's pacemaker, although today he has no evidence of focal findings    MSSA (methicillin susceptible Staphylococcus aureus) pneumonia (HCC)- (present on admission)  Assessment & Plan  On treatment with Ancef    Goals of care, counseling/discussion- (present on admission)  Assessment & Plan  Palliative care assisting, family decision to  DNR/DNI status  Will likely benefit from future documentation, POLST etc.    Hypernatremia- (present on admission)  Assessment & Plan  Likely a function of free water deficit,  Recheck, monitor and provide adequate free water supplementation    Septic shock with acute organ dysfunction due to methicillin susceptible Staphylococcus aureus (MSSA) ([facility])  Assessment & Plan  Present on admission, treated adequately    Hematoma- (present on admission)  Assessment & Plan  Right groin secondary to dialysis catheter on anticoagulation, stabilized, monitor  Stabilized    Acute on chronic systolic congestive heart failure (HCC)- (present on admission)  Assessment & Plan  Continue with medical therapy including Coreg, diuresis  It appears his EF has been improving  Continue present medical therapy    CONCLUSIONS  The left ventricle is not well visualized due to body habitus.  Left ventricular systolic function is probably normal.  The ejection fraction is roughly estimated to be 55%.  A reliable estimation of diastolic function cannot be made due to   mitral valve disease.  Severely dilated right ventricle.  Reduced right ventricular systolic function.  Estimated right ventricular systolic pressure is 40 mmHg.  Severe biatrial dilation.  Mild mitral regurgitation.  Aortic valve sclerosis without significant stenosis.  Mild tricuspid regurgitation.  Normal inferior vena cava size and inspiratory collapse    Hyperkalemia- (present on admission)  Assessment & Plan  Initially on presentation present, treated, improved, monitor    MYLA (acute kidney injury) ([facility])- (present on admission)  Assessment & Plan  Acute initially, currently improved, the patient had a brief episode of RRT  Improving currently,  Avoid nephrotoxins    Restless leg syndrome- (present on admission)  Assessment & Plan  Resume Requip    Chronic anticoagulation- (present on admission)  Assessment & Plan  Resume heparin/Coumadin    Paroxysmal atrial fibrillation  (HCC)- (present on admission)  Assessment & Plan  History of, ongoing anticoagulation with warfarin/heparin overlapping  Continue with heparin and warfarin bridging.  INR improving goal range between 2 and 3    Pacemaker- (present on admission)  Assessment & Plan  In situ, telemetry monitoring, history of complete heart block       Please note that this dictation was created using voice recognition software. I have made every reasonable attempt to correct obvious errors, but I expect that there are errors of grammar and possibly context that I did not discover before finalizing the note.    VTE prophylaxis: SCDs/TEDs and therapeutic anticoagulation with warfarin    I have performed a physical exam and reviewed and updated ROS and Plan today (11/15/2022). In review of yesterday's note (11/14/2022), there are no changes except as documented above.

## 2022-11-15 NOTE — PROGRESS NOTES
Inpatient Anticoagulation Service Note    Date: 11/15/2022    Reason for Anticoagulation: Bioprosthetic Valve Replacement, Atrial Fibrillation   Target INR: 2.0 to 3.0  [id number] VASc Score: 5  HAS-BLED Score: 3   Hemoglobin Value: (!) 8  Hematocrit Value: (!) 25.7  Lab Platelet Value: 237    INR from last 7 days       Date/Time INR Value    11/15/22 0345 2.45    11/14/22 0350 3.03    11/13/22 1625 2.26    11/13/22 0745 1.98    11/12/22 0308 1.56    11/11/22 0317 1.51    11/10/22 0415 1.45    11/09/22 1630 1.43          Dose from last 7 days       Date/Time Dose (mg)    11/15/22 1209 6    11/14/22 0816 5    11/13/22 1125 7.5    11/12/22 1419 7.5    11/11/22 1431 5    11/10/22 1413 5    11/09/22 1535 0          Significant Interactions: Not Applicable  Bridge Therapy: No  Bridge Therapy Start Date: 11/10/22  Days of Overlap Therapy: 5   INR Value Greater than 2 Prior to Discontinuation of Parenteral Anticoagulation: Yes   Reversal Agent Administered: Not Applicable  Comments: Continue with 6 mg and trending of INR  Education Material Provided?: No  Pharmacist suggested discharge dosing: warfarin 6 mg and INR check within 48 hours of discharge.     Alyssa Hyde, Julia  [id number]

## 2022-11-15 NOTE — DISCHARGE PLANNING
Case Management Discharge Planning    Admission Date: 11/4/2022  GMLOS: 13.2  ALOS: 11    6-Clicks ADL Score: 12  6-Clicks Mobility Score: 9  PT and/or OT Eval ordered: Yes  Post-acute Referrals Ordered: Yes  Post-acute Choice Obtained: Yes  Has referral(s) been sent to post-acute provider:  Yes      Anticipated Discharge Dispo: Discharge Disposition: D/T to SNF with Medicare cert in anticipation of skilled care (03)    DME Needed: No    Action(s) Taken: Referral(s) sent    ALVARADO Garza will send out additional referrals to [facility], [facility] and [facility].     Escalations Completed: None    Medically Clear: Yes    Next Steps: LSW will follow up with SNF referrals.     Barriers to Discharge: Pending Placement    Is the patient up for discharge tomorrow: No

## 2022-11-15 NOTE — HEART FAILURE PROGRAM
Plan appears to still to be discharge to SNF. Pt's appt with Dr. Segovia at [facility]  on 12/8 remains appropriate.

## 2022-11-16 NOTE — PROGRESS NOTES
Report received and assumed patient care. Pt A&O x 2, disoriented to time and situation, and on room air. No signs of distress noted at this time. Pt has medical orders. Pt updated on plan of care for the day and has call light within reach. Fall precautions are in place.

## 2022-11-16 NOTE — DISCHARGE PLANNING
[facility] Transitional Care Coordination    Referral from: DEVON Dockery    Insurance Provider on Facesheet: [facility]    Potential Rehab Diagnosis: [facility]    Chart review indicates patient may have on going medical management and may have therapy needs to possibly meet inpatient rehab facility criteria with the goal of returning to community.    D/C support will need to be verified: Son    Physiatry consultation forwarded per protocol.      Thank you for the referral.

## 2022-11-16 NOTE — THERAPY
"Speech Language Pathology  Daily Treatment     Patient Name: Sean Kincaid  Age:  77 y.o., Sex:  male  Medical Record #: [id number]  Today's Date: 11/16/2022     Precautions  Precautions: Fall Risk, Swallow Precautions ( See Comments)    HPI: 77 y.o. man transferred from [facility] on 11/4/22 for acute respiratory failure 2' to COVID w/ MYLA. Intubated 11/3-11/8 (5 days). FEES 11/10: PAS 5 TN0, PU4.     Subjective  Pt agreeable and somewhat cooperative w/ SLP tx tasks. Consistent complaint of pain in hands, Requested to have his hands wiped off because they were \"Sticky\" and yelped in pain with light touch from towel to clean hands. Pt reported lunch was \"Good\" but was unable to provided details.    Assessment  PO trials administered by SLP d/t pt self-limiting with upper extremity mobility 2' to pain. Cup sips MT2, approx. 4 oz. No overt s/sx concerning for airway invasion noted throughout, no immediate cough/clear, no change in vocal quality, no increase in WOB. Cough appreciated x2 throughout evaluation without immediate presentation of PO. Cough unproductive. Pt requested straw for assistance with drinking; SLP provided education re recommendation for no straws and rationale for recommendations. Pt verbalized understanding, stating \"Okay, fine.\" Oral phase unremarkable. Pt attempted swallowing exercises: effortful swallow x10+ and lingual protrusion against resistance with effortful swallow x10. Fair strength and accuracy throughout, pt minimally responsive to cues from SLP to improve accuracy and strength.     MT2 juice and LQ3 applesauce left at tray within reach of pt, who can self-feed with distance supervision as desired.     Clinical Impressions  Pt continues to demonstrate tolerance of current diet of LQ/MT given precautions as posted. Continues to be self-limiting with PO intake and upper extremity mobility for feeding, may need 1:1 at this time. Swallow prognosis is guarded, given limited participation and " "general weakness/malaise during therapy. SLP will follow. Pt will need repeat instrumental prior to diet upgrade due to consistent PAS 5 with TN and PU; however, pt does not demonstrate readiness for repeat instrumental at this time.      Recommendations  Liquidized textures and mildly thick liquids   2.  Swallowing Instructions & Precautions:   Supervision: 1:1 feeding with constant supervision  Positioning: Fully upright and midline during oral intake  Medication: Crush with applesauce, as appropriate NO PUREE  Strategies: Small bites/sips, No straws  Oral Care: BID     Following for dysphagia management.      Plan    Continue current treatment plan.    Discharge Recommendations: Recommend post-acute placement for additional speech therapy services prior to discharge home       Objective   11/16/22 1413   Vitals   O2 Delivery Device None - Room Air   Pain 0 - 10 Group   Therapist Pain Assessment Post Activity Pain Same as Prior to Activity;0  (Consistent unrated pain in his hands, worsened with contact. RN aware.)   Dysphagia    Diet / Liquid Recommendation Mildly Thick (2) - (Nectar Thick);Liquidised (3) - (Nectar Thick Full Liquid)   Nutritional Liquid Intake Rating Scale Thickened beverages (mildly thick unless otherwise specified)   Nutritional Food Intake Rating Scale Total oral diet of a single consistency   Nursing Communication Swallow Precaution Sign Posted at Head of Bed   Recommended Route of Medication Administration   Medication Administration  Other (See Comments)  (Crush in applesause ONLY - no puree!!)   Patient / Family Goals   Patient / Family Goal #1 \"Ow!\"   Goal #1 Outcome Progressing as expected   Short Term Goals   Short Term Goal # 1 Patient will complete swallowing exercises targeting BOT retraction, epiglottic inversion, and pharyngeal contraction x20.   Goal Outcome # 1 Progressing slower than expected   Short Term Goal # 2 Pt will consume liquidized textures with mildly thick liquids " without any overt s/sx of aspiration   Goal Outcome # 2  Progressing as expected   Education Group   Education Provided Dysphagia;Role of Speech Therapy   Additional Comments Pt with limited learning evidence throughout   Interdisciplinary Plan of Care Collaboration   Collaboration Comments RN updated

## 2022-11-16 NOTE — PROGRESS NOTES
Assumed care of PT A&O 1. Pt resting in bed with no signs of labored breathing. On RA. Call light within reach, bed in lowest position, upper bed rails up. Pt was updated on plan of care for the day.

## 2022-11-16 NOTE — CONSULTS
"    Physical Medicine and Rehabilitation Consultation              Date of initial consultation: 11/16/2022  Requesting provider: Jeri Dockery MD   Consulting provider: Shira Meyer D.O.  Reason for consultation: assess for acute inpatient rehab appropriateness  LOS: 12 Day(s)    Chief complaint: Shortness of breath    HPI: The patient is a 77 y.o. male with a past medical history of COPD, history of mitral valve replacement, history of pacemaker placement, obstructive sleep apnea, atrial fibrillation, type 2 diabetes, history of CHF, and recent COVID-19 diagnosis;  who presented on 11/4/2022 12:59 AM with worsening shortness of breath.  Per documentation, patient was transferred from an outside hospital with a diagnosis of COVID-19, and ultimately required BiPAP and then patient was required intubation.   patient's hospital course has been complicated by MYLA with creatinine up to 8.0, hyperkalemia with potassium up to 6.6, prolonged ICU stay requiring vasopressors with norepinephrine and vasopressin.  Patient has been treated with Ancef for MSSA pneumonia and he completed dexamethasone x10 days for his COVID-pneumonia.  Patient was able to be extubated on 11/8, at that time he was significantly encephalopathic.  A CT head was obtained which was negative for acute processes.  Patient is unable to have an MRI due to his pacemaker.  Patient's respiratory status has improved, he is currently only on 2 L nasal cannula.  And his anticoagulation has been restarted on Coumadin.  Previously his Coumadin was held due to evidence of a right thigh/femoral hematoma while he was in the ICU.  Functionally, patient has been willing to participate with therapy for transfers however he is refusing to participate with gait.    Patient seen and examined at bedside, answers most questions with sarcasm or refused to answer questions, says \"get the fuck away from me\" when trying to do any exam. With asking patient to show me that he can " "move his limbs, says \"I dont want to.\" Patient reports he does want to feel better, but is unwilling to participate with my exam. Patient refuses attempts at gait with therapy.     Social Hx:  Patient lives alone in a two-story house, 12 steps inside home, son lives locally and drives him to appointments. Tells me his sons lives with the bears now.   Unable to tell me how many stairs to enter, says \"maybe there are stairs\"   At prior level of function patient was independent with mobility and ADLs, he used a 4 wheeled walker for longer distances.    Tobacco: Denies   Alcohol: Denies   Drugs: Denies     THERAPY:  Restrictions: Fall risk, swallow precautions  PT: Functional mobility   11/15 min assist bed mobility, mod assist sit to stand, refused options to participate in gait    OT: ADLs  11/14 mod assist sit to stand, max assist lower body dressing, max assist toileting    SLP:   11/16 liquidized textures and mildly thickened liquids    IMAGING:  DX-CHEST-PORTABLE (1 VIEW)   Final Result       Mildly improved aeration and stable to slightly improved vascular congestion.       Mildly improved left basilar atelectasis.       DX-CHEST-PORTABLE (1 VIEW)   Final Result       1.  Interval extubation and removal of NG tube.   2.  Increased bibasilar airspace disease.   3.  Increased interstitial edema.   4.  Small left pleural effusion.       CT-HEAD W/O   Final Result       1. No CT evidence of acute infarct, hemorrhage or mass.   2. Moderate global parenchymal atrophy. Chronic small vessel ischemic changes.       DX-CHEST-PORTABLE (1 VIEW)   Final Result       Mildly improved left basilar opacity.       DX-ABDOMEN FOR TUBE PLACEMENT   Final Result       Enteric tube tip projects over the gastric antrum or proximal duodenum       DX-CHEST-PORTABLE (1 VIEW)   Final Result           1. Stable lines and tubes.   2. Worsening left basilar opacity. Small left pleural effusion. Mild right infrahilar atelectasis.         "   DX-CHEST-PORTABLE (1 VIEW)   Final Result       1.  Cardiomegaly with interstitial edema.       2.  Left lung base atelectasis and minimal left pleural effusion.       DX-CHEST-PORTABLE (1 VIEW)   Final Result       1.  Improved left pleural effusion with adjacent airspace disease.   2.  Improved interstitial infiltrates versus edema.       EC-ECHOCARDIOGRAM COMPLETE W/O CONT   Final Result       DX-CHEST-PORTABLE (1 VIEW)   Final Result       1.  Small left pleural effusion with adjacent airspace disease.   2.  Worsening interstitial infiltrates versus edema.   3.  Right basilar atelectasis.       DX-ABDOMEN FOR TUBE PLACEMENT   Final Result       Enteric tube tip projects over the proximal duodenum       DX-ABDOMEN FOR TUBE PLACEMENT   Final Result           1.  Nonspecific bowel gas pattern in the upper abdomen.   2.  Nasogastric tube, not visualized beyond the mid thorax, see ordered repeat abdominal x-ray for further characterization.   3.  Cardiomegaly   4.  Left lung base atelectasis or infiltrate.       OUTSIDE IMAGES-DX CHEST   Final Result       DX-CHEST-PORTABLE (1 VIEW)   Final Result           1.  Bibasilar atelectasis or early infiltrates.   2.  Trace bilateral pleural effusions   3.  Cardiomegaly       PROCEDURES:  None     PMH:  Past Medical History:   Diagnosis Date    Acquired renal cyst 5/5/2020    Anticoagulation monitoring, INR range 2.5-3.5     Arthritis 2011    ASTHMA     Atrial fibrillation (HCC)     Back pain     Backpain     Blood transfusion, without reported diagnosis 08/07/2012    Bursitis of right hip 2/8/2013    Cardiomyopathy, nonischemic (HCC)     EF 45%    Chronic anticoagulation     Chronic systolic congestive heart failure (HCC) 12/29/2015    Coronary artery disease involving native coronary artery of native heart without angina pectoris     Diabetes (HCC)     Dyslipidemia     Eosinophilia 9/4/2014    Essential hypertension     GERD (gastroesophageal reflux disease)     GERD  (gastroesophageal reflux disease) 11/12/2014    H/O pneumococcal pneumonia     Headache(784.0)     Hematuria 9/4/2014    History of complete AV block     Hyperglycemia 9/4/2014    fasting serum glucose 125 (9/5/2014)    Hypertension     Insomnia     Iron deficiency anemia 2/8/2013    Nocturnal hypoxemia     Paroxysmal atrial fibrillation (HCC)     Restless leg syndrome     Right hip pain 2/8/2013    S/P cardiac pacemaker procedure     S/P mitral valve replacement with bioprosthetic valve     Seasonal allergies     Sleep apnea     Tinea cruris 2/8/2013    Type 2 diabetes mellitus (HCC) 9/5/2014    Type 2 diabetes mellitus without complication (HCC) 10/13/2016    Ventral hernia 7/2014       PSH:  Past Surgical History:   Procedure Laterality Date    COLONOSCOPY - ENDO  1/2/2017    Procedure: COLONOSCOPY - ENDO;  Surgeon: Joel Barney M.D.;  Location: SURGERY [facility];  Service:     STENT PLACEMENT  11/2015    coronary artery    MAZE PROCEDURE  8/7/2012    Performed by JEREMIE BRADLEY at SURGERY [facility]    MITRAL VALVE REPLACE  8/7/2012    Performed by JEREMIE BRADLEY at SURGERY [facility]    PACEMAKER INSERTION  August 2012    [employer] Altrua 60 S 606 implanted by Dr. Dennis Gray.    MITRAL VALVE REPLACEMENT  2012    bioprosthetic    INGUINAL HERNIA REPAIR  2007    Left side    OTHER ORTHOPEDIC SURGERY  2005    Right knee replacement    OTHER ORTHOPEDIC SURGERY  1965    Back surgery    APPENDECTOMY CHILD  1957    TONSILLECTOMY  1949    APPENDECTOMY      HERNIA REPAIR      KNEE ARTHROPLASTY TOTAL Right     MAZE PROCEDURE      PACEMAKER INSERTION      OH LAP UMBILICAL HERNIA REPAIR      OH REMOVAL OF TONSILS,<14 Y/O      TONSILLECTOMY         FHX:  Family History   Problem Relation Age of Onset    Heart Disease Mother         Valvular heart problems./valvular heart disease    Diabetes Father     Cancer Sister     Cancer Brother         lung cancer    Diabetes Brother     Other Brother   "       heart bypass       Medications:  Current Facility-Administered Medications   Medication Dose    omeprazole (PRILOSEC) capsule 20 mg  20 mg    warfarin (COUMADIN) tablet 6 mg  6 mg    oxyCODONE immediate-release (ROXICODONE) tablet 5 mg  5 mg    atorvastatin (LIPITOR) tablet 40 mg  40 mg    carvedilol (COREG) tablet 12.5 mg  12.5 mg    magnesium hydroxide (MILK OF MAGNESIA) suspension 30 mL  30 mL    senna-docusate (PERICOLACE or SENOKOT S) 8.6-50 MG per tablet 2 Tablet  2 Tablet    And    polyethylene glycol/lytes (MIRALAX) PACKET 1 Packet  1 Packet    And    bisacodyl (DULCOLAX) suppository 10 mg  10 mg    ROPINIRole (REQUIP) tablet 1 mg  1 mg    albuterol inhaler 2 Puff  2 Puff    MD Alert...Warfarin per Pharmacy      insulin GLARGINE (Lantus,Semglee) injection  20 Units    insulin regular (HumuLIN R,NovoLIN R) injection  3-14 Units    And    dextrose 10 % BOLUS 25 g  25 g    Respiratory Therapy Consult      lidocaine (XYLOCAINE) 1 % injection 2 mL  2 mL    ondansetron (ZOFRAN) syringe/vial injection 4 mg  4 mg       Allergies:  No Known Allergies      Physical Exam:  Vitals: /50   Pulse 74   Temp 36.3 °C (97.3 °F) (Temporal)   Resp 17   Ht 1.88 m (6' 2\")   Wt 107 kg (235 lb 10.8 oz)   SpO2 92%   Gen: NAD, seated in recliner   Head:  NC/AT  Eyes/ Nose/ Mouth: PERRLA, moist mucous membranes  Cardio: RRR, good distal perfusion, warm extremities  Pulm: normal respiratory effort, no cyanosis , breathing comfortably on RA   Abd: Soft NTND, negative borborygmi   Ext: No peripheral edema. No calf tenderness. No clubbing.    Mental status: oriented to person and hospital, refuses to answer questions about anything else   Speech: fluent, no aphasia or dysarthria    CRANIAL NERVES:  2,3: visual acuity grossly intact, PERRL  3,4,6: EOMI bilaterally, no nystagmus or diplopia  5: sensation intact to light touch bilaterally and symmetric  7: no facial asymmetry  8: hearing grossly intact      Motor:  Refuses " to participate in formal MMT   B/l UE at least 3/5 EF, EE, and SAB   B/l 5/5 DF, PF, at least 3/5 KE   Sensory:   intact to light touch through out, appears to be hypersensitive in hands , tells me to stop touching him with light touch to his hands         Labs: Reviewed and significant for   Recent Labs     11/14/22  0350 11/15/22  0345 11/16/22  0257   RBC 2.76* 2.66*  --    HEMOGLOBIN 8.2* 8.0*  --    HEMATOCRIT 26.8* 25.7*  --    PLATELETCT 272 237  --    PROTHROMBTM 30.3* 25.8* 24.5*   INR 3.03* 2.45* 2.28*     Recent Labs     11/14/22  0350   SODIUM 139   POTASSIUM 4.5   CHLORIDE 110   CO2 18*   GLUCOSE 175*   BUN 37*   CREATININE 1.21   CALCIUM 8.2*     Recent Results (from the past 24 hour(s))   POCT glucose device results    Collection Time: 11/15/22  2:54 PM   Result Value Ref Range    POC Glucose, Blood 125 (H) 65 - 99 mg/dL   POCT glucose device results    Collection Time: 11/15/22  6:35 PM   Result Value Ref Range    POC Glucose, Blood 180 (H) 65 - 99 mg/dL   POCT glucose device results    Collection Time: 11/15/22  9:31 PM   Result Value Ref Range    POC Glucose, Blood 175 (H) 65 - 99 mg/dL   Prothrombin Time    Collection Time: 11/16/22  2:57 AM   Result Value Ref Range    PT 24.5 (H) 12.0 - 14.6 sec    INR 2.28 (H) 0.87 - 1.13   POCT glucose device results    Collection Time: 11/16/22  6:31 AM   Result Value Ref Range    POC Glucose, Blood 134 (H) 65 - 99 mg/dL         ASSESSMENT:  Patient is a 77 y.o. male admitted with covid 19 PNA and encephalopathy     [facility] Code / Diagnosis to Support: 0002.1 - Brain Dysfunction: Non-Traumatic    Rehabilitation: Impaired ADLs and mobility  Patient is a poor candidate for inpatient rehab based on refusing to attempt gait with therapists.     Barriers to transfer include: Insurance authorization, TCCs to verify disposition, medical clearance and bed availability     Additional Recommendations:  COVID-19   -Required intubation, was able to be extubated on  11/8  -Completed 10 days of dexamethasone  - Is now out of COVID-19 isolation  - Has been weaned down to 2 L O2    MSSA pneumonia  -11/10 chest x-ray with evidence of improving atelectasis  - Completed full course of Ancef    Encephalopathy  -Status post prolonged ICU stay with intubation  - Cognition improving, oriented to person and place  - frequently refusing to participate with staff, does not want to be touch for physical exam.   - recommend improved sleep wake cycles,   - starting scheduled melatonin tonight   - encourage lights on and blinds open during the day  - minimize lights and sounds at night     Atrial fibrillation  -Chronically on Coumadin, has been restarted on 11/15    Dispo:  - patient is currently functioning below their level of baseline, recommend post acute rehab  - recommend  SNF level therapy for prolonged rehab course prior to returning home   - PM&R to sign off at this time     Medical Complexity:  COVID-19   MSSA pneumonia  Encephalopathy  Atrial fibrillation  MYLA  DM   Impaired mobility and ADLs    DVT PPX: coumadin       Thank you for allowing us to participate in the care of this patient.     Patient was seen for 89 minutes on unit/floor of which > 50% of time was spent on counseling and coordination of care regarding the above, including prognosis, risk reduction, benefits of treatment, and options for next stage of care.    Shira Meyer D.O.   Physical Medicine and Rehabilitation     Please note that this dictation was created using voice recognition software. I have made every reasonable attempt to correct obvious errors, but there may be errors of grammar and possibly content that I did not discover before finalizing the note.

## 2022-11-16 NOTE — CARE PLAN
The patient is Stable - Low risk of patient condition declining or worsening    Shift Goals  Clinical Goals: maintain skin integrity, safety  Patient Goals: ALLIE  Family Goals: ALLIE    Progress made toward(s) clinical / shift goals:      Problem: Skin Integrity  Goal: Skin integrity is maintained or improved  Outcome: Progressing     Problem: Fall Risk  Goal: Patient will remain free from falls  Outcome: Progressing     Problem: Pain - Standard  Goal: Alleviation of pain or a reduction in pain to the patient’s comfort goal  Outcome: Progressing     Problem: Psychosocial  Goal: Patient's level of anxiety will decrease  Outcome: Progressing     Problem: Communication  Goal: The ability to communicate needs accurately and effectively will improve  Outcome: Progressing     Problem: Hemodynamics  Goal: Patient's hemodynamics, fluid balance and neurologic status will be stable or improve  Outcome: Progressing     Problem: Mobility  Goal: Patient's capacity to carry out activities will improve  Outcome: Progressing       Patient is not progressing towards the following goals: Patient does not display evidence of learning related to disease process or plan of care      Problem: Knowledge Deficit - Standard  Goal: Patient and family/care givers will demonstrate understanding of plan of care, disease process/condition, diagnostic tests and medications  Outcome: Not Progressing

## 2022-11-16 NOTE — PROGRESS NOTES
Inpatient Anticoagulation Service Note    Date: 11/16/2022    Reason for Anticoagulation: Bioprosthetic Valve Replacement, Atrial Fibrillation   Target INR: 2.0 to 3.0  [id number] VASc Score: 5  HAS-BLED Score: 3   Hemoglobin Value: (!) 8  Hematocrit Value: (!) 25.7  Lab Platelet Value: 237    INR from last 7 days       Date/Time INR Value    11/16/22 0257 2.28    11/15/22 0345 2.45    11/14/22 0350 3.03    11/13/22 1625 2.26    11/13/22 0745 1.98    11/12/22 0308 1.56    11/11/22 0317 1.51    11/10/22 0415 1.45    11/09/22 1630 1.43          Dose from last 7 days       Date/Time Dose (mg)    11/16/22 1126 6    11/15/22 1209 6    11/14/22 0816 5    11/13/22 1125 7.5    11/12/22 1419 7.5    11/11/22 1431 5    11/10/22 1413 5    11/09/22 1535 0          Significant Interactions: Not Applicable  Bridge Therapy: No  Bridge Therapy Start Date: 11/10/22  Days of Overlap Therapy: 5   INR Value Greater than 2 Prior to Discontinuation of Parenteral Anticoagulation: Yes   Reversal Agent Administered: Not Applicable  Comments: Continue with 6 mg and trending of INR  Education Material Provided?: No  Pharmacist suggested discharge dosing: warfarin 6 mg and INR check within 48 hours of discharge.     Alyssa Hyde PharmD  [id number]

## 2022-11-16 NOTE — DISCHARGE PLANNING
Case Management Discharge Planning    Admission Date: 11/4/2022  GMLOS: 13.2  ALOS: 12    6-Clicks ADL Score: 12  6-Clicks Mobility Score: 8  PT and/or OT Eval ordered: Yes  Post-acute Referrals Ordered: Yes  Post-acute Choice Obtained: Yes  Has referral(s) been sent to post-acute provider:  Yes      Anticipated Discharge Dispo: Discharge Disposition: D/T to SNF with Medicare cert in anticipation of skilled care (03)    DME Needed: No    Action(s) Taken: Updated Provider/Nurse on Discharge Plan    Escalations Completed: Pending Discharge Destination    Medically Clear: Yes, 11.15.22    Next Steps: Awaiting accepting SNF.  Avoidable days applied.     Barriers to Discharge: Pending Placement, Called [facility] declined, not in [facility] network.      Is the patient up for discharge tomorrow: No    11:34  Called Theo, son, and advised there are no facilities they chose that are accepting him.  Advised patient is medically cleared pending placement.  Asked if patient has the financial means to pay for residential care.  Theo said no.  He said he would talk with his wife and call this CM back after discussion.      12:10  Theo called back and requested we send referral to [facility] for review.  DPA will send to [place].

## 2022-11-16 NOTE — DISCHARGE PLANNING
Agency/Facility Name: [facility]   Spoke To: Carmen  Outcome: DPA asked SNF if they received referral, confirmed that they did. SNF to review further, as Pt's address may be a barrier.    RN CM notified.     1131-  Agency/Facility Name: [facility]  Spoke To: Ponce  Outcome: DPA requested update on referral, informed Pt has been declined due to care exceeding capacity.     RN CM notified.     1135-  Agency/Facility Name: [facility]  Outcome: DPA left v-mail for admissions, requesting update on referral.     RN CM notified.     1213-  Agency/Facility Name: [facility]  Referral sent per Choice form @ 1213 (via Communication Management), per RN CM's request.     1512-  Agency/Facility Name: [facility]   Spoke To: Carmen  Outcome: DPA informed that PT is declined due to Dialysis needs.     RN NAA notified.    1515-  Agency/Facility Name: [facility]   Spoke To: Camren  Outcome: DPA informed SNF the Pt is not on dialysis. SNF says his referral states he needs 3.5 hours per dialysis treatment, and is day-to-day.    RN CM notified.     1518-  Agency/Facility Name: [facility]   Outcome: DPA left v-mail informing SNF that, per RN NAA, the Pt is not on dialysis. The Pt was initially consulted by nephrology for MYLA, but his kidney levels are good. DPA provided callback number.     1522-  Agency/Facility Name: [facility]   Outcome: DPA left v-mail for intake liaison, requesting update on referral. DPA provided callback number, as well as RN CM's number.     RN CM notified.     1534-  Agency/Facility Name: [facility]   Spoke To: Polly   Outcome: DPA informed that SNF is declining Pt, as they don't take their insurance and they have no skilled beds available.     RN CM notified.     1548-  Agency/Facility Name: [facility]  Spoke To: Carmen  Outcome: SNF informed DPA that [facility] will come by for an on-site visit with Pt sometime early tomorrow morning. DPA provided numbers for RN CMs to call upon  arrival.     RN CM notified.

## 2022-11-16 NOTE — PROGRESS NOTES
Hospital Medicine Daily Progress Note    Date of Service  11/16/2022    Chief Complaint  Sean Kincaid is a 77 y.o. male admitted 11/4/2022 with respiratory failure    Hospital Course  77 y.o. male who presented 11/4/2022 with history of COPD, atrial fibrillation, diabetes type 2, history of congestive heart failure, EF of 45%,, initially presented to an outlying facility itself like [facility], complaining of worsening shortness of breath and fatigue.  The patient was diagnosed with COVID-19, started initially on BiPAP and then eventually required to be intubated, was found with an MYLA, creatinine of 8.0, potassium 6.6, the patient chronically anticoagulated for atrial fibrillation, history of complete heart block, history of bioprosthetic replacement of the mitral valve, ANALY, after presentation to [facility] he was admitted to the ICU, he continued to be treated in ICU with mechanical ventilation, required vasopressors including norepinephrine and vasopressin, placed on empiric antibiotics in form of ceftriaxone, nephrology consulted for MYLA, nonoliguric, had brief initiation of RRT but then picked up his urine output and required no further RRT as per nephrology.  He developed a right thigh/femoral hematoma related to a try Alysis catheter placement., anticoagulation was held, the patient required Precedex for sedation, fentanyl for pain control, his respiratory culture turned positive for MSSA and the patient is being treated for MSSA pneumonia in form of Ancef.  His COVID-19 pneumonia was treated with dexamethasone 6 mg for 10 days.  The patient eventually was extubated on 11/8, he was found significantly encephalopathic, a CT of the head was negative for acute changes, the patient unable to be evaluated by MRI secondary to the patient's pacemaker.  He progressed to some degree but is still encephalopathic with mental slowing, confusion, being awake/alert  and oriented x1, moving all 4  extremities fairly equally.  Palliative care was consulted to discuss goals of care with family and the decision was reached in light of the patient's condition and age to change his CODE STATUS to DNR/DNI.  Patient had evaluation with speech therapy, he passed a swallow evaluation, his p.o. intake remains fairly poor.  On 11/10 the patient is evaluated, he continues to be confused and delirious, he remains off pressors, currently on 2 L nasal can oxygen, saturating 94%, he has overall good urine output, his right groin hematoma remained stable, his anticoagulation was restarted in form of heparin drip and resuming Coumadin.  The patient had significant difficulty with hypernatremia and acute 8-hour sodium levels were checked, this had improved.    Interval Problem Update  11/11/2022:  Patient continues to be encephalopathic however is only on 2 L submental oxygen at present.  Continue present medical therapy.    11/12/2022:  Continue with therapeutic bridging repeat INR today 1.58.  Appreciate pharmacy recommendations in this regard patient high risk of bleeding so continue to bridge slowly.    11/13/2022:  Continue present medical management patient continues to be alert and oriented x1-2.  Continue with plans for placement.  Patient is eligible for SNF placement on 11/14/2022.  Presently we will establish alternative IV access discontinue central line.  INR is improved today goal range 2-3.  Patient undergoing heparin to warfarin bridge.  11/14/2022:  Patient finally within therapeutic range for INR.  Pharmacy support appreciated.  Patient discontinued from heparin drip.  Patient suitable now for discharge.  Place an appropriate facility when available.  Otherwise continue present medical management.  No acute events overnight.  I have discussed this patient's plan of care and discharge plan at IDT rounds today with Case Management, Nursing, Nursing leadership, and other members of the IDT team.  11/15: Patient  seen and examined, afebrile, no acute events overnight, no nausea or vomiting,  resting in bed, afebrile  Awaiting placement   11/16: Patient seen and examined, afebrile, resting in bed, no overnight events, awaiting for placement   Consultants/Specialty  critical care    Code Status  Full Code    Disposition  Patient is medically cleared for discharge.   Anticipate discharge to to skilled nursing facility.  I have placed the appropriate orders for post-discharge needs.    Review of Systems  Review of Systems   Unable to perform ROS: Acuity of condition      Physical Exam  Temp:  [36.6 °C (97.8 °F)-37.3 °C (99.1 °F)] 37.3 °C (99.1 °F)  Pulse:  [65-78] 73  Resp:  [17-18] 17  BP: (105-136)/(50-74) 120/50  SpO2:  [92 %-94 %] 92 %    Physical Exam  Vitals and nursing note reviewed.   Constitutional:       Appearance: He is well-developed. He is ill-appearing.      Comments: Pt seen and examined.   HENT:      Head: Normocephalic and atraumatic.      Mouth/Throat:      Mouth: Mucous membranes are dry.   Eyes:      General: No scleral icterus.     Pupils: Pupils are equal, round, and reactive to light.   Cardiovascular:      Rate and Rhythm: Normal rate. Rhythm irregular.      Heart sounds: Normal heart sounds.   Pulmonary:      Effort: Pulmonary effort is normal.      Breath sounds: Rhonchi and rales present.   Abdominal:      General: Bowel sounds are normal.      Palpations: Abdomen is soft.      Tenderness: There is no abdominal tenderness. There is no guarding or rebound.   Musculoskeletal:         General: Normal range of motion.      Cervical back: Normal range of motion and neck supple.      Comments: Right groin hematoma   Skin:     General: Skin is warm and dry.   Neurological:      General: No focal deficit present.      Mental Status: He is alert and oriented to person, place, and time.   Psychiatric:         Behavior: Behavior normal.       Fluids    Intake/Output Summary (Last 24 hours) at 11/16/2022  1137  Last data filed at 11/16/2022 0200  Gross per 24 hour   Intake 720 ml   Output --   Net 720 ml       Laboratory  Recent Labs     11/14/22  0350 11/15/22  0345   WBC 16.1* 13.9*   RBC 2.76* 2.66*   HEMOGLOBIN 8.2* 8.0*   HEMATOCRIT 26.8* 25.7*   MCV 97.1 96.6   MCH 29.7 30.1   MCHC 30.6* 31.1*   RDW 49.7 50.1*   PLATELETCT 272 237   MPV 11.2 11.3     Recent Labs     11/14/22  0350   SODIUM 139   POTASSIUM 4.5   CHLORIDE 110   CO2 18*   GLUCOSE 175*   BUN 37*   CREATININE 1.21   CALCIUM 8.2*     Recent Labs     11/14/22  0350 11/15/22  0345 11/16/22  0257   INR 3.03* 2.45* 2.28*               Imaging  DX-CHEST-PORTABLE (1 VIEW)   Final Result      Mildly improved aeration and stable to slightly improved vascular congestion.      Mildly improved left basilar atelectasis.      DX-CHEST-PORTABLE (1 VIEW)   Final Result      1.  Interval extubation and removal of NG tube.   2.  Increased bibasilar airspace disease.   3.  Increased interstitial edema.   4.  Small left pleural effusion.      CT-HEAD W/O   Final Result      1. No CT evidence of acute infarct, hemorrhage or mass.   2. Moderate global parenchymal atrophy. Chronic small vessel ischemic changes.      DX-CHEST-PORTABLE (1 VIEW)   Final Result      Mildly improved left basilar opacity.      DX-ABDOMEN FOR TUBE PLACEMENT   Final Result      Enteric tube tip projects over the gastric antrum or proximal duodenum      DX-CHEST-PORTABLE (1 VIEW)   Final Result         1. Stable lines and tubes.   2. Worsening left basilar opacity. Small left pleural effusion. Mild right infrahilar atelectasis.         DX-CHEST-PORTABLE (1 VIEW)   Final Result      1.  Cardiomegaly with interstitial edema.      2.  Left lung base atelectasis and minimal left pleural effusion.      DX-CHEST-PORTABLE (1 VIEW)   Final Result      1.  Improved left pleural effusion with adjacent airspace disease.   2.  Improved interstitial infiltrates versus edema.      EC-ECHOCARDIOGRAM COMPLETE W/O  CONT   Final Result      DX-CHEST-PORTABLE (1 VIEW)   Final Result      1.  Small left pleural effusion with adjacent airspace disease.   2.  Worsening interstitial infiltrates versus edema.   3.  Right basilar atelectasis.      DX-ABDOMEN FOR TUBE PLACEMENT   Final Result      Enteric tube tip projects over the proximal duodenum      DX-ABDOMEN FOR TUBE PLACEMENT   Final Result         1.  Nonspecific bowel gas pattern in the upper abdomen.   2.  Nasogastric tube, not visualized beyond the mid thorax, see ordered repeat abdominal x-ray for further characterization.   3.  Cardiomegaly   4.  Left lung base atelectasis or infiltrate.      OUTSIDE IMAGES-DX CHEST   Final Result      DX-CHEST-PORTABLE (1 VIEW)   Final Result         1.  Bibasilar atelectasis or early infiltrates.   2.  Trace bilateral pleural effusions   3.  Cardiomegaly             Assessment/Plan  * Acute hypoxemic respiratory failure due to COVID-19 (HCC)- (present on admission)  Assessment & Plan  In conjunction with MSSA pneumonia,  The patient is being treated for pneumonia,  Continue Decadron course 6 mg for 10 days  Pulmonary toilet  Respiratory therapy  Wean oxygen as tolerated  Incentive spirometry  Improving    Delirium- (present on admission)  Assessment & Plan  Likely in light of the patient's acute hospitalization, acute critical condition, disease  Standard precautions and out of delirium  Avoid offending medication  Avoid interruption of circadian rhythm  CT head negative for acute changes, unable to provide a MRI imaging secondary to patient's pacemaker, although today he has no evidence of focal findings    MSSA (methicillin susceptible Staphylococcus aureus) pneumonia (HCC)- (present on admission)  Assessment & Plan  On treatment with Ancef    Goals of care, counseling/discussion- (present on admission)  Assessment & Plan  Palliative care assisting, family decision to DNR/DNI status  Will likely benefit from future documentation,  POLST etc.    Hypernatremia- (present on admission)  Assessment & Plan  Likely a function of free water deficit,  Recheck, monitor and provide adequate free water supplementation    Septic shock with acute organ dysfunction due to methicillin susceptible Staphylococcus aureus (MSSA) ([facility])  Assessment & Plan  Present on admission, treated adequately    Hematoma- (present on admission)  Assessment & Plan  Right groin secondary to dialysis catheter on anticoagulation, stabilized, monitor  Stabilized    Acute on chronic systolic congestive heart failure (HCC)- (present on admission)  Assessment & Plan  Continue with medical therapy including Coreg, diuresis  It appears his EF has been improving  Continue present medical therapy    CONCLUSIONS  The left ventricle is not well visualized due to body habitus.  Left ventricular systolic function is probably normal.  The ejection fraction is roughly estimated to be 55%.  A reliable estimation of diastolic function cannot be made due to   mitral valve disease.  Severely dilated right ventricle.  Reduced right ventricular systolic function.  Estimated right ventricular systolic pressure is 40 mmHg.  Severe biatrial dilation.  Mild mitral regurgitation.  Aortic valve sclerosis without significant stenosis.  Mild tricuspid regurgitation.  Normal inferior vena cava size and inspiratory collapse    Hyperkalemia- (present on admission)  Assessment & Plan  Initially on presentation present, treated, improved, monitor    MYLA (acute kidney injury) ([facility])- (present on admission)  Assessment & Plan  Acute initially, currently improved, the patient had a brief episode of RRT  Improving currently,  Avoid nephrotoxins    Restless leg syndrome- (present on admission)  Assessment & Plan  Resume Requip    Chronic anticoagulation- (present on admission)  Assessment & Plan  Resume heparin/Coumadin    Paroxysmal atrial fibrillation (HCC)- (present on admission)  Assessment & Plan  History of,  ongoing anticoagulation with warfarin/heparin overlapping  Continue with heparin and warfarin bridging.  INR improving goal range between 2 and 3    Pacemaker- (present on admission)  Assessment & Plan  In situ, telemetry monitoring, history of complete heart block     Please note that this dictation was created using voice recognition software. I have made every reasonable attempt to correct obvious errors, but I expect that there are errors of grammar and possibly context that I did not discover before finalizing the note.    VTE prophylaxis: SCDs/TEDs and therapeutic anticoagulation with warfarin    I have performed a physical exam and reviewed and updated ROS and Plan today (11/16/2022). In review of yesterday's note (11/15/2022), there are no changes except as documented above.

## 2022-11-16 NOTE — THERAPY
"Physical Therapy   Daily Treatment     Patient Name: Sean Kincaid  Age:  77 y.o., Sex:  male  Medical Record #: [id number]  Today's Date: 11/15/2022     Precautions  Precautions: Fall Risk;Swallow Precautions ( See Comments)    Assessment    Pt seen for follow up PT tx. Pt demonstrated progress with bed mobility and transfer. Refused attempt to ambulate despite encouragement. Recommended pt sit in chair for 30-60 min but once PT returned ~1 hour later pt refusing to return to bed stating \"I can't, my legs are broken.\" RN made aware and recommended 2 assist transfer back to bed once pt willing to cooperate. PT will cont at 3x/wk     Plan    Continue current treatment plan.    DC Equipment Recommendations: Unable to determine at this time  Discharge Recommendations: Recommend post-acute placement for additional physical therapy services prior to discharge home         11/15/22 1510   Cognition    Cognition / Consciousness X   Level of Consciousness Alert   Ability To Follow Commands 1 Step   Safety Awareness Impaired   New Learning Impaired   Attention Impaired   Sequencing Impaired   Initiation Impaired   Comments pt very sarcastic and resistant to cues   Other Treatments   Other Treatments Provided attempted to return 1 hour after asisting pt to chair to assist back to bed but pt refusing to return to bed despite education. RN aware   Balance   Sitting Balance (Static) Fair   Sitting Balance (Dynamic) Fair -   Standing Balance (Static) Poor +   Standing Balance (Dynamic) Poor   Weight Shift Sitting Poor   Weight Shift Standing Poor   Skilled Intervention Verbal Cuing;Compensatory Strategies   Comments FWW   Gait Analysis   Gait Level Of Assist Refused   Bed Mobility    Supine to Sit Minimal Assist   Scooting Minimal Assist   Skilled Intervention Verbal Cuing   Comments left up in chair   Functional Mobility   Sit to Stand Moderate Assist   Bed, Chair, Wheelchair Transfer Moderate Assist   Transfer Method Stand " Step   Mobility EOB, STS, SPT to recliner   Skilled Intervention Verbal Cuing;Compensatory Strategies   Short Term Goals    Short Term Goal # 1 Pt will performs supine <> sit with min A within 6 visits to progress bed mobility   Goal Outcome # 1 Goal met, new goal added   Short Term Goal # 1 B  pt will be able to complete supine<>sitting from flat bed with SPV in 6tx in order to improve independence   Goal Outcome  # 1 B Goal not met   Short Term Goal # 2 Pt will perform STS with LRAD and min A within 6 visits to progress OOB mobility   Goal Outcome # 2 Progressing as expected   Short Term Goal # 3 Pt will perform functional transfers with LRAD and mod A within 6 visits to progress OOB mobility   Goal Outcome # 3 Goal met, new goal added   Short Term Goal # 3 B pt will be able to complete functional transfers with FWW and SPV in 6tx in order to decrease fall risk   Goal Outcome # 3 B Goal not met   Short Term Goal # 4 Pt will ambulate 20 ft with LRAD and mod A within 6 visits to initiate gait training   Goal Outcome # 4 Goal not met   Anticipated Discharge Equipment and Recommendations   DC Equipment Recommendations Unable to determine at this time   Discharge Recommendations Recommend post-acute placement for additional physical therapy services prior to discharge home

## 2022-11-16 NOTE — PROGRESS NOTES
Bedside shift report received from night RN. Updated patient on plan of care; all questions invited and answered. Patient denies pain. Whiteboard updated for shift. Call light within reach. Bed locked and in lowest position. Fall precautions remain in place. Patient up to chair this AM, chair alarm and waffle cushion in place.

## 2022-11-17 NOTE — HEART FAILURE PROGRAM
Patient has been determined not to be a candidate for IRH. He is refusing to attempt gait with therapists, frequently refuses, with all staff.    We will need to keep an eye on his f/u with Dr. Segovia at [facility] in December. It may need to be pushed out if pt ends up having to go to long term care.

## 2022-11-17 NOTE — PROGRESS NOTES
This RN noticed the patient beginning to have an increased work of breathing and restlessness, now requiring 4L O2 via NC. Upon taking vital signs the patient was noted to have an increased temperature. MD was notified and came bedside to assess the patient. A stat chest x-ray and labs were ordered.

## 2022-11-17 NOTE — DISCHARGE PLANNING
Called [facility] regarding status of SNF acceptance.  [facility] has no SNF beds available.  [facility] to do bedside evaluation today, 11.17.22.  Requested a blanket [place] referral from [facility] who gave verbal auth to send to facilities in [facility].      DCP:  [place] referral for [facility] area faxed to [facility].  Condition has worsened.  Now not medically cleared.

## 2022-11-17 NOTE — PROGRESS NOTES
Report received and assumed patient care. Pt A&O x 1, oriented to self and on room air. No signs of distress noted at this time. Pt has medical orders. Pt updated on plan of care for the day and has call light within reach. Fall precautions are in place.      independent

## 2022-11-17 NOTE — PROGRESS NOTES
Hospital Medicine Daily Progress Note    Date of Service  11/17/2022    Chief Complaint  Sean Kincaid is a 77 y.o. male admitted 11/4/2022 with respiratory failure    Hospital Course  77 y.o. male who presented 11/4/2022 with history of COPD, atrial fibrillation, diabetes type 2, history of congestive heart failure, EF of 45%,, initially presented to an outlying facility itself like [facility], complaining of worsening shortness of breath and fatigue.  The patient was diagnosed with COVID-19, started initially on BiPAP and then eventually required to be intubated, was found with an MYLA, creatinine of 8.0, potassium 6.6, the patient chronically anticoagulated for atrial fibrillation, history of complete heart block, history of bioprosthetic replacement of the mitral valve, ANALY, after presentation to [facility] he was admitted to the ICU, he continued to be treated in ICU with mechanical ventilation, required vasopressors including norepinephrine and vasopressin, placed on empiric antibiotics in form of ceftriaxone, nephrology consulted for MYLA, nonoliguric, had brief initiation of RRT but then picked up his urine output and required no further RRT as per nephrology.  He developed a right thigh/femoral hematoma related to a try Alysis catheter placement., anticoagulation was held, the patient required Precedex for sedation, fentanyl for pain control, his respiratory culture turned positive for MSSA and the patient is being treated for MSSA pneumonia in form of Ancef.  His COVID-19 pneumonia was treated with dexamethasone 6 mg for 10 days.  The patient eventually was extubated on 11/8, he was found significantly encephalopathic, a CT of the head was negative for acute changes, the patient unable to be evaluated by MRI secondary to the patient's pacemaker.  He progressed to some degree but is still encephalopathic with mental slowing, confusion, being awake/alert  and oriented x1, moving all 4  extremities fairly equally.  Palliative care was consulted to discuss goals of care with family and the decision was reached in light of the patient's condition and age to change his CODE STATUS to DNR/DNI.  Patient had evaluation with speech therapy, he passed a swallow evaluation, his p.o. intake remains fairly poor.  On 11/10 the patient is evaluated, he continues to be confused and delirious, he remains off pressors, currently on 2 L nasal can oxygen, saturating 94%, he has overall good urine output, his right groin hematoma remained stable, his anticoagulation was restarted in form of heparin drip and resuming Coumadin.  The patient had significant difficulty with hypernatremia and acute 8-hour sodium levels were checked, this had improved.    Interval Problem Update  11/11/2022:  Patient continues to be encephalopathic however is only on 2 L submental oxygen at present.  Continue present medical therapy.    11/12/2022:  Continue with therapeutic bridging repeat INR today 1.58.  Appreciate pharmacy recommendations in this regard patient high risk of bleeding so continue to bridge slowly.    11/13/2022:  Continue present medical management patient continues to be alert and oriented x1-2.  Continue with plans for placement.  Patient is eligible for SNF placement on 11/14/2022.  Presently we will establish alternative IV access discontinue central line.  INR is improved today goal range 2-3.  Patient undergoing heparin to warfarin bridge.  11/14/2022:  Patient finally within therapeutic range for INR.  Pharmacy support appreciated.  Patient discontinued from heparin drip.  Patient suitable now for discharge.  Place an appropriate facility when available.  Otherwise continue present medical management.  No acute events overnight.  I have discussed this patient's plan of care and discharge plan at IDT rounds today with Case Management, Nursing, Nursing leadership, and other members of the IDT team.  11/15: Patient  seen and examined, afebrile, no acute events overnight, no nausea or vomiting,  resting in bed, afebrile  Awaiting placement   11/16: Patient seen and examined, afebrile, resting in bed, no overnight events, awaiting for placement  11/17: Patient seen and examined, overnight patient requiring more oxygen, also his procalcitonin is elevated. He has been started on IV abx . Also since cxr is showing pulmonary edema will start IV lasix today      Consultants/Specialty  critical care    Code Status  Full Code    Disposition  Patient is medically cleared for discharge.   Anticipate discharge to to skilled nursing facility.  I have placed the appropriate orders for post-discharge needs.    Review of Systems  Review of Systems   Unable to perform ROS: Acuity of condition      Physical Exam  Temp:  [36 °C (96.8 °F)-37.2 °C (99 °F)] 36.7 °C (98.1 °F)  Pulse:  [64-84] 79  Resp:  [17-22] 18  BP: ()/(54-93) 97/54  SpO2:  [91 %-94 %] 93 %    Physical Exam  Vitals and nursing note reviewed.   Constitutional:       Appearance: He is well-developed. He is ill-appearing.      Comments: Pt seen and examined.   HENT:      Head: Normocephalic and atraumatic.      Mouth/Throat:      Mouth: Mucous membranes are dry.   Eyes:      General: No scleral icterus.     Pupils: Pupils are equal, round, and reactive to light.   Cardiovascular:      Rate and Rhythm: Normal rate. Rhythm irregular.      Heart sounds: Normal heart sounds.   Pulmonary:      Effort: Pulmonary effort is normal.      Breath sounds: Rhonchi and rales present.   Abdominal:      General: Bowel sounds are normal.      Palpations: Abdomen is soft.      Tenderness: There is no abdominal tenderness. There is no guarding or rebound.   Musculoskeletal:         General: Normal range of motion.      Cervical back: Normal range of motion and neck supple.      Comments: Right groin hematoma   Skin:     General: Skin is warm and dry.   Neurological:      General: No focal deficit  present.      Mental Status: He is alert and oriented to person, place, and time.   Psychiatric:         Behavior: Behavior normal.       Fluids    Intake/Output Summary (Last 24 hours) at 11/17/2022 1103  Last data filed at 11/17/2022 0439  Gross per 24 hour   Intake 1114.58 ml   Output 275 ml   Net 839.58 ml       Laboratory  Recent Labs     11/15/22  0345 11/16/22  0257 11/16/22  2227   WBC 13.9* 9.8 12.8*   RBC 2.66* 2.66* 2.66*   HEMOGLOBIN 8.0* 8.0* 8.0*   HEMATOCRIT 25.7* 26.0* 25.8*   MCV 96.6 97.7 97.0   MCH 30.1 30.1 30.1   MCHC 31.1* 30.8* 31.0*   RDW 50.1* 51.4* 50.6*   PLATELETCT 237 232 244   MPV 11.3 12.3 11.0     Recent Labs     11/16/22  0257   SODIUM 135   POTASSIUM 4.3   CHLORIDE 106   CO2 19*   GLUCOSE 188*   BUN 42*   CREATININE 1.43*   CALCIUM 8.3*     Recent Labs     11/15/22  0345 11/16/22  0257 11/17/22  0027   INR 2.45* 2.28* 2.78*               Imaging  DX-CHEST-PORTABLE (1 VIEW)   Final Result      1.  Increased pulmonary edema   2.  Bibasilar underinflation atelectasis which could obscure an additional process. This is unchanged.   3.  Persistently enlarged cardiac silhouette      DX-CHEST-PORTABLE (1 VIEW)   Final Result      Mildly improved aeration and stable to slightly improved vascular congestion.      Mildly improved left basilar atelectasis.      DX-CHEST-PORTABLE (1 VIEW)   Final Result      1.  Interval extubation and removal of NG tube.   2.  Increased bibasilar airspace disease.   3.  Increased interstitial edema.   4.  Small left pleural effusion.      CT-HEAD W/O   Final Result      1. No CT evidence of acute infarct, hemorrhage or mass.   2. Moderate global parenchymal atrophy. Chronic small vessel ischemic changes.      DX-CHEST-PORTABLE (1 VIEW)   Final Result      Mildly improved left basilar opacity.      DX-ABDOMEN FOR TUBE PLACEMENT   Final Result      Enteric tube tip projects over the gastric antrum or proximal duodenum      DX-CHEST-PORTABLE (1 VIEW)   Final  Result         1. Stable lines and tubes.   2. Worsening left basilar opacity. Small left pleural effusion. Mild right infrahilar atelectasis.         DX-CHEST-PORTABLE (1 VIEW)   Final Result      1.  Cardiomegaly with interstitial edema.      2.  Left lung base atelectasis and minimal left pleural effusion.      DX-CHEST-PORTABLE (1 VIEW)   Final Result      1.  Improved left pleural effusion with adjacent airspace disease.   2.  Improved interstitial infiltrates versus edema.      EC-ECHOCARDIOGRAM COMPLETE W/O CONT   Final Result      DX-CHEST-PORTABLE (1 VIEW)   Final Result      1.  Small left pleural effusion with adjacent airspace disease.   2.  Worsening interstitial infiltrates versus edema.   3.  Right basilar atelectasis.      DX-ABDOMEN FOR TUBE PLACEMENT   Final Result      Enteric tube tip projects over the proximal duodenum      DX-ABDOMEN FOR TUBE PLACEMENT   Final Result         1.  Nonspecific bowel gas pattern in the upper abdomen.   2.  Nasogastric tube, not visualized beyond the mid thorax, see ordered repeat abdominal x-ray for further characterization.   3.  Cardiomegaly   4.  Left lung base atelectasis or infiltrate.      OUTSIDE IMAGES-DX CHEST   Final Result      DX-CHEST-PORTABLE (1 VIEW)   Final Result         1.  Bibasilar atelectasis or early infiltrates.   2.  Trace bilateral pleural effusions   3.  Cardiomegaly      DX-WRIST-LIMITED 2- RIGHT    (Results Pending)   US-EXTREMITY VENOUS UPPER UNILAT RIGHT    (Results Pending)          Assessment/Plan  * Acute hypoxemic respiratory failure due to COVID-19 (HCC)- (present on admission)  Assessment & Plan  In conjunction with MSSA pneumonia,  The patient is being treated for pneumonia,  Continue Decadron course 6 mg for 10 days  Pulmonary toilet  Respiratory therapy  Wean oxygen as tolerated  Incentive spirometry  Improving    Delirium- (present on admission)  Assessment & Plan  Likely in light of the patient's acute hospitalization, acute  critical condition, disease  Standard precautions and out of delirium  Avoid offending medication  Avoid interruption of circadian rhythm  CT head negative for acute changes, unable to provide a MRI imaging secondary to patient's pacemaker, although today he has no evidence of focal findings    MSSA (methicillin susceptible Staphylococcus aureus) pneumonia (HCC)- (present on admission)  Assessment & Plan  On treatment with Ancef    Goals of care, counseling/discussion- (present on admission)  Assessment & Plan  Palliative care assisting, family decision to DNR/DNI status  Will likely benefit from future documentation, POLST etc.    Hypernatremia- (present on admission)  Assessment & Plan  Likely a function of free water deficit,  Recheck, monitor and provide adequate free water supplementation    Septic shock with acute organ dysfunction due to methicillin susceptible Staphylococcus aureus (MSSA) (HCC)  Assessment & Plan  Present on admission, treated adequately    Hematoma- (present on admission)  Assessment & Plan  Right groin secondary to dialysis catheter on anticoagulation, stabilized, monitor  Stabilized    Acute on chronic systolic congestive heart failure (HCC)- (present on admission)  Assessment & Plan  Continue with medical therapy including Coreg, diuresis  It appears his EF has been improving  Continue present medical therapy    CONCLUSIONS  The left ventricle is not well visualized due to body habitus.  Left ventricular systolic function is probably normal.  The ejection fraction is roughly estimated to be 55%.  A reliable estimation of diastolic function cannot be made due to   mitral valve disease.  Severely dilated right ventricle.  Reduced right ventricular systolic function.  Estimated right ventricular systolic pressure is 40 mmHg.  Severe biatrial dilation.  Mild mitral regurgitation.  Aortic valve sclerosis without significant stenosis.  Mild tricuspid regurgitation.  Normal inferior vena cava  size and inspiratory collapse    Hyperkalemia- (present on admission)  Assessment & Plan  Initially on presentation present, treated, improved, monitor    MYLA (acute kidney injury) (HCC)- (present on admission)  Assessment & Plan  Acute initially, currently improved, the patient had a brief episode of RRT  Improving currently,  Avoid nephrotoxins    Restless leg syndrome- (present on admission)  Assessment & Plan  Resume Requip    Chronic anticoagulation- (present on admission)  Assessment & Plan  Resume heparin/Coumadin    Paroxysmal atrial fibrillation (HCC)- (present on admission)  Assessment & Plan  History of, ongoing anticoagulation with warfarin/heparin overlapping  Continue with heparin and warfarin bridging.  INR improving goal range between 2 and 3    Pacemaker- (present on admission)  Assessment & Plan  In situ, telemetry monitoring, history of complete heart block     Please note that this dictation was created using voice recognition software. I have made every reasonable attempt to correct obvious errors, but I expect that there are errors of grammar and possibly context that I did not discover before finalizing the note.    VTE prophylaxis: SCDs/TEDs and therapeutic anticoagulation with warfarin    I have performed a physical exam and reviewed and updated ROS and Plan today (11/17/2022). In review of yesterday's note (11/16/2022), there are no changes except as documented above.

## 2022-11-17 NOTE — PROGRESS NOTES
Inpatient Anticoagulation Service Note    Date: 11/17/2022    Reason for Anticoagulation: Bioprosthetic Valve Replacement, Atrial Fibrillation   Target INR: 2.0 to 3.0  [id number] VASc Score: 5  HAS-BLED Score: 3   Hemoglobin Value: (!) 8  Hematocrit Value: (!) 25.8  Lab Platelet Value: 244    INR from last 7 days       Date/Time INR Value    11/17/22 0027 2.78    11/16/22 0257 2.28    11/15/22 0345 2.45    11/14/22 0350 3.03    11/13/22 1625 2.26    11/13/22 0745 1.98    11/12/22 0308 1.56    11/11/22 0317 1.51          Dose from last 7 days       Date/Time Dose (mg)    11/17/22 1014 5    11/16/22 1126 6    11/15/22 1209 6    11/14/22 0816 5    11/13/22 1125 7.5    11/12/22 1419 7.5    11/11/22 1431 5    11/10/22 1413 5          Significant Interactions: Not Applicable  Bridge Therapy: No  Bridge Therapy Start Date: 11/10/22  Days of Overlap Therapy: 5   INR Value Greater than 2 Prior to Discontinuation of Parenteral Anticoagulation: Yes   Reversal Agent Administered: Not Applicable  Comments: Dose reduce to 5 mg given jump in INR.  This is home dosing.  Education Material Provided?: No  Pharmacist suggested discharge dosing: warfarin 5 mg and INR check within 48 hours of discharge.     Alyssa Hyde, KassandraD  [id number]

## 2022-11-17 NOTE — CARE PLAN
The patient is Stable - Low risk of patient condition declining or worsening    Shift Goals  Clinical Goals: Safety, prevent skin breakdown  Patient Goals: Pain control  Family Goals: ALLIE    Progress made toward(s) clinical / shift goals:    Problem: Skin Integrity  Goal: Skin integrity is maintained or improved  Outcome: Progressing     Problem: Fall Risk  Goal: Patient will remain free from falls  Outcome: Progressing     Problem: Pain - Standard  Goal: Alleviation of pain or a reduction in pain to the patient’s comfort goal  Outcome: Progressing     Problem: Psychosocial  Goal: Patient's level of anxiety will decrease  Outcome: Progressing     Problem: Hemodynamics  Goal: Patient's hemodynamics, fluid balance and neurologic status will be stable or improve  Outcome: Progressing     Problem: Dysphagia  Goal: Dysphagia will improve  Outcome: Progressing     Problem: Risk for Aspiration  Goal: Patient's risk for aspiration will be absent or decrease  Outcome: Progressing     Problem: Mobility  Goal: Patient's capacity to carry out activities will improve  Outcome: Progressing

## 2022-11-17 NOTE — DISCHARGE PLANNING
Received Choice form at 1011  Agency/Facility Name: [facility] [place]  Referral sent per Choice form @ 1015

## 2022-11-17 NOTE — CARE PLAN
The patient is Watcher - Medium risk of patient condition declining or worsening    Shift Goals  Clinical Goals: safety, maintain skin integrity  Patient Goals: ALLIE  Family Goals: ALLIE    Progress made toward(s) clinical / shift goals:      Problem: Skin Integrity  Goal: Skin integrity is maintained or improved  Outcome: Progressing     Problem: Fall Risk  Goal: Patient will remain free from falls  Outcome: Progressing     Problem: Pain - Standard  Goal: Alleviation of pain or a reduction in pain to the patient’s comfort goal  Outcome: Progressing       Patient is not progressing towards the following goals: Patients respiratory demands increased overnight, chest x-ray showed pna.      Problem: Knowledge Deficit - Standard  Goal: Patient and family/care givers will demonstrate understanding of plan of care, disease process/condition, diagnostic tests and medications  Outcome: Not Progressing     Problem: Respiratory  Goal: Patient will achieve/maintain optimum respiratory ventilation and gas exchange  Outcome: Not Progressing

## 2022-11-17 NOTE — PROGRESS NOTES
"[facility] Coverage Progress Note:     I was contacted this evening regarding nursing concerns for increased work of breathing and increased oxygen requirements from room air to 4 L supplemental oxygen via nasal cannula.    Labs significant for white blood cell count of 12.9 from 9.8 and elevated procalcitonin of 0.29.  Chest x-ray was obtained revealing increased pulmonary edema and by basilar underinflation atelectasis which could obscure an additional process.  Patient was seen at bedside.  He is alert to light tactile stimulation.  He is unable to answer questions or provide history.    BP (!) 129/92   Pulse 84   Temp 100.0 f (Temporal)   Resp 20   Ht 1.88 m (6' 2\")   Wt 107 kg (235 lb 10.8 oz)   SpO2 93%     Physical Exam  Constitutional:       General: He is not in acute distress.     Appearance: He is ill-appearing. He is not toxic-appearing.   HENT:      Mouth/Throat:      Mouth: Mucous membranes are moist.   Cardiovascular:      Rate and Rhythm: Normal rate and regular rhythm.      Pulses: Normal pulses.      Heart sounds: Normal heart sounds.   Pulmonary:      Effort: Tachypnea present. No respiratory distress.      Breath sounds: No stridor. Rales present. No wheezing or rhonchi.   Abdominal:      General: There is no distension.      Palpations: Abdomen is soft.      Tenderness: There is no abdominal tenderness. There is no guarding.   Musculoskeletal:      Right lower leg: Edema present.      Left lower leg: Edema present.      Comments: Trace bilateral edema   Skin:     General: Skin is warm and dry.      Capillary Refill: Capillary refill takes less than 2 seconds.   Neurological:      Mental Status: He is disoriented and confused.      GCS: GCS eye subscore is 2. GCS verbal subscore is 4. GCS motor subscore is 5.     Plan:   Acute hypoxic respiratory failure  Increased work of breathing and oxygen requirements with elevated procalcitonin and mild leukocytosis concerning for possible " pneumonia  Patient has been in the hospital for 12 days, recent history of MSSA pneumonia  -Patient to be started on Zosyn and vancomycin for HAP  -Repeat blood cultures pending  -MRSA nares ordered  -Sputum culture pending    NURIA Brock Hospitalist

## 2022-11-17 NOTE — CARE PLAN
The patient is Stable - Low risk of patient condition declining or worsening    Shift Goals  Clinical Goals: safety, maintain skin integrity  Patient Goals: ALLIE  Family Goals: ALLIE    Progress made toward(s) clinical / shift goals:    Problem: Skin Integrity  Goal: Skin integrity is maintained or improved  Outcome: Progressing     Problem: Fall Risk  Goal: Patient will remain free from falls  Outcome: Progressing       Patient is not progressing towards the following goals:

## 2022-11-17 NOTE — DISCHARGE PLANNING
Please review the consult from Dr. Meyer regarding post acute recommendations.  [facility] will no longer follow.  Please reach out to myself with any interval changes/questions.

## 2022-11-17 NOTE — PROGRESS NOTES
Assumed care of PT A&O 1. Pt resting in bed with no signs of labored breathing. On 4L. Call light within reach, bed in lowest position, upper bed rails up. Pt was updated on plan of care for the day.

## 2022-11-18 PROBLEM — D64.9 ANEMIA: Status: ACTIVE | Noted: 2022-01-01

## 2022-11-18 NOTE — ASSESSMENT & PLAN NOTE
Hgb trended up from 7.5 to 8.4  CT abdomen showing: Left retroperitoneal hematoma along the left iliopsoas/iliac is muscle measuring approximately 4.3 x 3.4 x 5.9 cm.  Received 2 units pRBC so far.  - comfort care as of 12/7

## 2022-11-18 NOTE — CARE PLAN
The patient is Watcher - Medium risk of patient condition declining or worsening    Shift Goals  Clinical Goals: IV ABX, pain mamagement  Patient Goals: pain management  Family Goals: ALLIE    Progress made toward(s) clinical / shift goals:      Problem: Skin Integrity  Goal: Skin integrity is maintained or improved  Outcome: Progressing     Problem: Fall Risk  Goal: Patient will remain free from falls  Outcome: Progressing     Problem: Pain - Standard  Goal: Alleviation of pain or a reduction in pain to the patient’s comfort goal  Outcome: Progressing     Problem: Psychosocial  Goal: Patient's level of anxiety will decrease  Outcome: Progressing       Patient is not progressing towards the following goals:      Problem: Knowledge Deficit - Standard  Goal: Patient and family/care givers will demonstrate understanding of plan of care, disease process/condition, diagnostic tests and medications  Outcome: Not Progressing

## 2022-11-18 NOTE — HEART FAILURE PROGRAM
Pt still believed to need SNF. We will leave his appointment with Dr. Segovia at [facility] on 12/8/22 as is for now.

## 2022-11-18 NOTE — PROGRESS NOTES
Assumed care of PT A&O 3. Pt resting in bed with no signs of labored breathing. On 1L. Tele monitor in place, cardiac rhythm being monitored. Call light within reach, bed in lowest position, upper bed rails up. Pt was updated on plan of care for the day.

## 2022-11-18 NOTE — DISCHARGE PLANNING
Agency/Facility Name: [facility]   Spoke To: Kay   Outcome: Per Kay Pt deenied due to not participating in therapy.     1109  Agency/Facility Name: [facility]   Spoke To: Simi   Outcome: Referral still under review and will contact [facility] with updates.

## 2022-11-18 NOTE — PROGRESS NOTES
Inpatient Anticoagulation Service Note    Date: 11/18/2022    Reason for Anticoagulation: Bioprosthetic Valve Replacement, Atrial Fibrillation   Target INR: 2.0 to 3.0  [id number] VASc Score: 5  HAS-BLED Score: 3   Hemoglobin Value: (!) 7  Hematocrit Value: (!) 23  Lab Platelet Value: 221    INR from last 7 days       Date/Time INR Value    11/18/22 0102 3.4    11/17/22 0027 2.78    11/16/22 0257 2.28    11/15/22 0345 2.45    11/14/22 0350 3.03    11/13/22 1625 2.26    11/13/22 0745 1.98    11/12/22 0308 1.56          Dose from last 7 days       Date/Time Dose (mg)    11/18/22 1156 0    11/17/22 1014 5    11/16/22 1126 6    11/15/22 1209 6    11/14/22 0816 5    11/13/22 1125 7.5    11/12/22 1419 7.5    11/11/22 1431 5          Significant Interactions: Not Applicable  Bridge Therapy: No  Bridge Therapy Start Date: 11/10/22  Days of Overlap Therapy: 5   INR Value Greater than 2 Prior to Discontinuation of Parenteral Anticoagulation: Yes     Reversal Agent Administered: Not Applicable  Comments: INR supratherapeutic, drop in H/H. Hematoma stable, No noted signs or symptoms of bleeding or expansion per RN. Will hold warfarin dose tonight, resume warfarin 5 mg PO qdaily tomorrow.    Plan:  Hold warfarin tonight  Education Material Provided?: No  Pharmacist suggested discharge dosing: Warfarin 5 mg PO qday with INR within 48h upon discharge     Graciela Winter V, PharmD

## 2022-11-18 NOTE — PROGRESS NOTES
Hospital Medicine Daily Progress Note    Date of Service  11/18/2022    Chief Complaint  Sean Kincaid is a 77 y.o. male admitted 11/4/2022 with respiratory failure    Hospital Course  77 y.o. male who presented 11/4/2022 with history of COPD, atrial fibrillation, diabetes type 2, history of congestive heart failure, EF of 45%,, initially presented to an outlying facility itself like [facility], complaining of worsening shortness of breath and fatigue.  The patient was diagnosed with COVID-19, started initially on BiPAP and then eventually required to be intubated, was found with an MYLA, creatinine of 8.0, potassium 6.6, the patient chronically anticoagulated for atrial fibrillation, history of complete heart block, history of bioprosthetic replacement of the mitral valve, ANALY, after presentation to [facility] he was admitted to the ICU, he continued to be treated in ICU with mechanical ventilation, required vasopressors including norepinephrine and vasopressin, placed on empiric antibiotics in form of ceftriaxone, nephrology consulted for MYLA, nonoliguric, had brief initiation of RRT but then picked up his urine output and required no further RRT as per nephrology.  He developed a right thigh/femoral hematoma related to a try Alysis catheter placement., anticoagulation was held, the patient required Precedex for sedation, fentanyl for pain control, his respiratory culture turned positive for MSSA and the patient is being treated for MSSA pneumonia in form of Ancef.  His COVID-19 pneumonia was treated with dexamethasone 6 mg for 10 days.  The patient eventually was extubated on 11/8, he was found significantly encephalopathic, a CT of the head was negative for acute changes, the patient unable to be evaluated by MRI secondary to the patient's pacemaker.  He progressed to some degree but is still encephalopathic with mental slowing, confusion, being awake/alert  and oriented x1, moving all 4  extremities fairly equally.  Palliative care was consulted to discuss goals of care with family and the decision was reached in light of the patient's condition and age to change his CODE STATUS to DNR/DNI.  Patient had evaluation with speech therapy, he passed a swallow evaluation, his p.o. intake remains fairly poor.  On 11/10 the patient is evaluated, he continues to be confused and delirious, he remains off pressors, currently on 2 L nasal can oxygen, saturating 94%, he has overall good urine output, his right groin hematoma remained stable, his anticoagulation was restarted in form of heparin drip and resuming Coumadin.  The patient had significant difficulty with hypernatremia and acute 8-hour sodium levels were checked, this had improved.    Interval Problem Update  11/11/2022:  Patient continues to be encephalopathic however is only on 2 L submental oxygen at present.  Continue present medical therapy.    11/12/2022:  Continue with therapeutic bridging repeat INR today 1.58.  Appreciate pharmacy recommendations in this regard patient high risk of bleeding so continue to bridge slowly.    11/13/2022:  Continue present medical management patient continues to be alert and oriented x1-2.  Continue with plans for placement.  Patient is eligible for SNF placement on 11/14/2022.  Presently we will establish alternative IV access discontinue central line.  INR is improved today goal range 2-3.  Patient undergoing heparin to warfarin bridge.  11/14/2022:  Patient finally within therapeutic range for INR.  Pharmacy support appreciated.  Patient discontinued from heparin drip.  Patient suitable now for discharge.  Place an appropriate facility when available.  Otherwise continue present medical management.  No acute events overnight.  I have discussed this patient's plan of care and discharge plan at IDT rounds today with Case Management, Nursing, Nursing leadership, and other members of the IDT team.  11/15: Patient  seen and examined, afebrile, no acute events overnight, no nausea or vomiting,  resting in bed, afebrile  Awaiting placement   11/16: Patient seen and examined, afebrile, resting in bed, no overnight events, awaiting for placement  11/17: Patient seen and examined, overnight patient requiring more oxygen, also his procalcitonin is elevated. He has been started on IV abx . Also since cxr is showing pulmonary edema will start IV lasix today    11/18: Patient seen and examined, hemoglobin dropping will transfuse one unit today will monitor   Cont on abx for possible pneumonia   Also on IV lasix for pulmonary edema    Consultants/Specialty  critical care    Code Status  Full Code    Disposition  Patient is medically cleared for discharge.   Anticipate discharge to to skilled nursing facility.  I have placed the appropriate orders for post-discharge needs.    Review of Systems  Review of Systems   Unable to perform ROS: Acuity of condition      Physical Exam  Temp:  [36.9 °C (98.5 °F)-37.3 °C (99.1 °F)] 37.1 °C (98.8 °F)  Pulse:  [67-81] 70  Resp:  [18-22] 20  BP: (112-120)/(50-97) 115/52  SpO2:  [91 %-94 %] 94 %    Physical Exam  Vitals and nursing note reviewed.   Constitutional:       Appearance: He is well-developed. He is ill-appearing.      Comments: Pt seen and examined.   HENT:      Head: Normocephalic and atraumatic.      Mouth/Throat:      Mouth: Mucous membranes are dry.   Eyes:      General: No scleral icterus.     Pupils: Pupils are equal, round, and reactive to light.   Cardiovascular:      Rate and Rhythm: Normal rate. Rhythm irregular.      Heart sounds: Normal heart sounds.   Pulmonary:      Effort: Pulmonary effort is normal.      Breath sounds: Rhonchi and rales present.   Abdominal:      General: Bowel sounds are normal.      Palpations: Abdomen is soft.      Tenderness: There is no abdominal tenderness. There is no guarding or rebound.   Musculoskeletal:         General: Normal range of motion.       Cervical back: Normal range of motion and neck supple.      Comments: Right groin hematoma   Skin:     General: Skin is warm and dry.   Neurological:      General: No focal deficit present.      Mental Status: He is alert and oriented to person, place, and time.   Psychiatric:         Behavior: Behavior normal.       Fluids    Intake/Output Summary (Last 24 hours) at 11/18/2022 1155  Last data filed at 11/17/2022 2100  Gross per 24 hour   Intake 480 ml   Output --   Net 480 ml       Laboratory  Recent Labs     11/16/22  0257 11/16/22  2227 11/18/22  0102 11/18/22  1030   WBC 9.8 12.8* 8.8  --    RBC 2.66* 2.66* 2.36*  --    HEMOGLOBIN 8.0* 8.0* 7.1* 7.0*   HEMATOCRIT 26.0* 25.8* 23.2* 23.0*   MCV 97.7 97.0 98.3*  --    MCH 30.1 30.1 30.1  --    MCHC 30.8* 31.0* 30.6*  --    RDW 51.4* 50.6* 51.5*  --    PLATELETCT 232 244 221  --    MPV 12.3 11.0 10.9  --      Recent Labs     11/16/22  0257 11/18/22  0102   SODIUM 135 137   POTASSIUM 4.3 4.5   CHLORIDE 106 108   CO2 19* 18*   GLUCOSE 188* 162*   BUN 42* 44*   CREATININE 1.43* 1.97*   CALCIUM 8.3* 7.9*     Recent Labs     11/16/22  0257 11/17/22  0027 11/18/22  0102   INR 2.28* 2.78* 3.40*               Imaging  US-EXTREMITY VENOUS UPPER UNILAT RIGHT         DX-WRIST-LIMITED 2- RIGHT   Final Result         1.   No radiographic evidence of acute injury.      2.  Old healed fracture right fifth metacarpal neck.      3.  Moderate osteoarthritic changes of the right first carpal metacarpal joint.      4.  Possible intra-articular loose bodies adjacent to the styloid processes of the radius and ulna.      DX-CHEST-PORTABLE (1 VIEW)   Final Result      1.  Increased pulmonary edema   2.  Bibasilar underinflation atelectasis which could obscure an additional process. This is unchanged.   3.  Persistently enlarged cardiac silhouette      DX-CHEST-PORTABLE (1 VIEW)   Final Result      Mildly improved aeration and stable to slightly improved vascular congestion.      Mildly  improved left basilar atelectasis.      DX-CHEST-PORTABLE (1 VIEW)   Final Result      1.  Interval extubation and removal of NG tube.   2.  Increased bibasilar airspace disease.   3.  Increased interstitial edema.   4.  Small left pleural effusion.      CT-HEAD W/O   Final Result      1. No CT evidence of acute infarct, hemorrhage or mass.   2. Moderate global parenchymal atrophy. Chronic small vessel ischemic changes.      DX-CHEST-PORTABLE (1 VIEW)   Final Result      Mildly improved left basilar opacity.      DX-ABDOMEN FOR TUBE PLACEMENT   Final Result      Enteric tube tip projects over the gastric antrum or proximal duodenum      DX-CHEST-PORTABLE (1 VIEW)   Final Result         1. Stable lines and tubes.   2. Worsening left basilar opacity. Small left pleural effusion. Mild right infrahilar atelectasis.         DX-CHEST-PORTABLE (1 VIEW)   Final Result      1.  Cardiomegaly with interstitial edema.      2.  Left lung base atelectasis and minimal left pleural effusion.      DX-CHEST-PORTABLE (1 VIEW)   Final Result      1.  Improved left pleural effusion with adjacent airspace disease.   2.  Improved interstitial infiltrates versus edema.      EC-ECHOCARDIOGRAM COMPLETE W/O CONT   Final Result      DX-CHEST-PORTABLE (1 VIEW)   Final Result      1.  Small left pleural effusion with adjacent airspace disease.   2.  Worsening interstitial infiltrates versus edema.   3.  Right basilar atelectasis.      DX-ABDOMEN FOR TUBE PLACEMENT   Final Result      Enteric tube tip projects over the proximal duodenum      DX-ABDOMEN FOR TUBE PLACEMENT   Final Result         1.  Nonspecific bowel gas pattern in the upper abdomen.   2.  Nasogastric tube, not visualized beyond the mid thorax, see ordered repeat abdominal x-ray for further characterization.   3.  Cardiomegaly   4.  Left lung base atelectasis or infiltrate.      OUTSIDE IMAGES-DX CHEST   Final Result      DX-CHEST-PORTABLE (1 VIEW)   Final Result         1.   Bibasilar atelectasis or early infiltrates.   2.  Trace bilateral pleural effusions   3.  Cardiomegaly             Assessment/Plan  * Acute hypoxemic respiratory failure due to COVID-19 ([facility])- (present on admission)  Assessment & Plan  In conjunction with MSSA pneumonia,  The patient is being treated for pneumonia,  Continue Decadron course 6 mg for 10 days  Pulmonary toilet  Respiratory therapy  Wean oxygen as tolerated  Incentive spirometry  Improving    Anemia  Assessment & Plan  Hemoglobin trending down  Will transfuse one unit today will monitor     Delirium- (present on admission)  Assessment & Plan  Likely in light of the patient's acute hospitalization, acute critical condition, disease  Standard precautions and out of delirium  Avoid offending medication  Avoid interruption of circadian rhythm  CT head negative for acute changes, unable to provide a MRI imaging secondary to patient's pacemaker, although today he has no evidence of focal findings    MSSA (methicillin susceptible Staphylococcus aureus) pneumonia ([facility])- (present on admission)  Assessment & Plan  Finished treatment     Goals of care, counseling/discussion- (present on admission)  Assessment & Plan  Palliative care assisting, family decision to DNR/DNI status  Will likely benefit from future documentation, POLST etc.    Hypernatremia- (present on admission)  Assessment & Plan  Likely a function of free water deficit,  Recheck, monitor and provide adequate free water supplementation    Septic shock with acute organ dysfunction due to methicillin susceptible Staphylococcus aureus (MSSA) ([facility])  Assessment & Plan  Present on admission, treated adequately    Hematoma- (present on admission)  Assessment & Plan  Right groin secondary to dialysis catheter on anticoagulation, stabilized, monitor  Stabilized    Acute on chronic systolic congestive heart failure (HCC)- (present on admission)  Assessment & Plan  Continue with medical therapy including  Coreg, diuresis  It appears his EF has been improving  Continue present medical therapy    CONCLUSIONS  The left ventricle is not well visualized due to body habitus.  Left ventricular systolic function is probably normal.  The ejection fraction is roughly estimated to be 55%.  A reliable estimation of diastolic function cannot be made due to   mitral valve disease.  Severely dilated right ventricle.  Reduced right ventricular systolic function.  Estimated right ventricular systolic pressure is 40 mmHg.  Severe biatrial dilation.  Mild mitral regurgitation.  Aortic valve sclerosis without significant stenosis.  Mild tricuspid regurgitation.  Normal inferior vena cava size and inspiratory collapse    Hyperkalemia- (present on admission)  Assessment & Plan  Initially on presentation present, treated, improved, monitor    MYLA (acute kidney injury) (HCC)- (present on admission)  Assessment & Plan  Acute initially, currently improved, the patient had a brief episode of RRT  Improving currently,  Avoid nephrotoxins    Restless leg syndrome- (present on admission)  Assessment & Plan  Resume Requip    Chronic anticoagulation- (present on admission)  Assessment & Plan  Resume heparin/Coumadin    Paroxysmal atrial fibrillation (HCC)- (present on admission)  Assessment & Plan  History of, ongoing anticoagulation with warfarin/heparin overlapping  Continue with heparin and warfarin bridging.  INR improving goal range between 2 and 3    Pacemaker- (present on admission)  Assessment & Plan  In situ, telemetry monitoring, history of complete heart block     Please note that this dictation was created using voice recognition software. I have made every reasonable attempt to correct obvious errors, but I expect that there are errors of grammar and possibly context that I did not discover before finalizing the note.    VTE prophylaxis: SCDs/TEDs and therapeutic anticoagulation with warfarin    I have performed a physical exam and  reviewed and updated ROS and Plan today (11/18/2022). In review of yesterday's note (11/17/2022), there are no changes except as documented above.

## 2022-11-18 NOTE — PROGRESS NOTES
Report received and assumed patient care. Pt A&O x 1 and on 4L NC, saturating 94 %. No signs of distress noted at this time. Pt has medical orders. Pt updated on plan of care for the day and has call light within reach. Fall precautions are in place.

## 2022-11-18 NOTE — DIETARY
Nutrition Services Brief Update:    Day 14 of admit.  Sean Kincaid is a 77 y.o. male with admitting DX of Acute hypoxemic respiratory failure due to COVID-19 ([facility]) [U07.1, J96.01]  MSSA (methicillin susceptible Staphylococcus aureus) pneumonia ([facility]) [J15.211]    Current Diet: L3: liquidized with mildly thick liquids     Problem: Nutritional:  Goal: Achieve adequate nutritional intake  Description: Patient will consume ~50% of meals  Outcome: Progressing    Per flowsheets, pt is drinking >% of all boost. Pt often refuses meals. Per MD note, pt is A/O x 1 at this time, on-going confusion may be contributing to poor intake of meals. RD to change supplements to Boost VHC TID to help optimize nutrition. It pt drinks 3 Boost VHC per day, this will meet ~80% of estimated nutritional needs.    RD following.

## 2022-11-19 NOTE — CARE PLAN
The patient is Stable - Low risk of patient condition declining or worsening    Shift Goals  Clinical Goals: Pain Management, Abx Therapy, Hemodynamic Stability  Patient Goals: Decrease pain with movement  Family Goals: ALLIE    Progress made toward(s) clinical / shift goals:        Problem: Fall Risk  Goal: Patient will remain free from falls  Outcome: Progressing     Problem: Discharge Barriers/Planning  Goal: Patient's continuum of care needs are met  Outcome: Progressing     Problem: Hemodynamics  Goal: Patient's hemodynamics, fluid balance and neurologic status will be stable or improve  Outcome: Progressing     Problem: Infection - Standard  Goal: Patient will remain free from infection  Outcome: Progressing       Patient is not progressing towards the following goals:      Problem: Pain - Standard  Goal: Alleviation of pain or a reduction in pain to the patient’s comfort goal  Outcome: Not Progressing     Problem: Communication  Goal: The ability to communicate needs accurately and effectively will improve  Outcome: Not Progressing
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
## 2022-11-19 NOTE — PALLIATIVE CARE
Palliative Care follow-up  1030: Met with pt at . Introduced self as PC RN, Stella's colleague. He does state he remembers meeting with her.  Pt awake, alert, pleasant and seemed happy to have a visit from PC. However, he is forgetful and was not able to tell PC RN today's date, the year (thought it was 2006) or how many kids he has. The conversation would easily get side-tracked and jump from one subject to another. Did not attempt to fill out AD at this time d/t pt's current mental capacity.    1130: BS RN voalted and asked PC to contact son, Theo, about pt's worsening condition today. Called and left a message.     Updated: BS RNAmina    Plan: Will continue to f/u and check-in to ascertain capacity to do AD while he is still hospitalized.     Thank you for allowing Palliative Care to support this patient and family. Contact [phone] for additional assistance, change in patient status, or with any questions/concerns.

## 2022-11-19 NOTE — PROGRESS NOTES
Hospital Medicine Daily Progress Note    Date of Service  11/19/2022    Chief Complaint  Sean Kincaid is a 77 y.o. male admitted 11/4/2022 with respiratory failure    Hospital Course  77 y.o. male who presented 11/4/2022 with history of COPD, atrial fibrillation, diabetes type 2, history of congestive heart failure, EF of 45%,, initially presented to an outlying facility itself like [facility], complaining of worsening shortness of breath and fatigue.  The patient was diagnosed with COVID-19, started initially on BiPAP and then eventually required to be intubated, was found with an MYLA, creatinine of 8.0, potassium 6.6, the patient chronically anticoagulated for atrial fibrillation, history of complete heart block, history of bioprosthetic replacement of the mitral valve, ANALY, after presentation to [facility] he was admitted to the ICU, he continued to be treated in ICU with mechanical ventilation, required vasopressors including norepinephrine and vasopressin, placed on empiric antibiotics in form of ceftriaxone, nephrology consulted for MYLA, nonoliguric, had brief initiation of RRT but then picked up his urine output and required no further RRT as per nephrology.  He developed a right thigh/femoral hematoma related to a try Alysis catheter placement., anticoagulation was held, the patient required Precedex for sedation, fentanyl for pain control, his respiratory culture turned positive for MSSA and the patient is being treated for MSSA pneumonia in form of Ancef.  His COVID-19 pneumonia was treated with dexamethasone 6 mg for 10 days.  The patient eventually was extubated on 11/8, he was found significantly encephalopathic, a CT of the head was negative for acute changes, the patient unable to be evaluated by MRI secondary to the patient's pacemaker.  He progressed to some degree but is still encephalopathic with mental slowing, confusion, being awake/alert  and oriented x1, moving all 4  extremities fairly equally.  Palliative care was consulted to discuss goals of care with family and the decision was reached in light of the patient's condition and age to change his CODE STATUS to DNR/DNI.  Patient had evaluation with speech therapy, he passed a swallow evaluation, his p.o. intake remains fairly poor.  On 11/10 the patient is evaluated, he continues to be confused and delirious, he remains off pressors, currently on 2 L nasal can oxygen, saturating 94%, he has overall good urine output, his right groin hematoma remained stable, his anticoagulation was restarted in form of heparin drip and resuming Coumadin.  The patient had significant difficulty with hypernatremia and acute 8-hour sodium levels were checked, this had improved.    Interval Problem Update  11/11/2022:  Patient continues to be encephalopathic however is only on 2 L submental oxygen at present.  Continue present medical therapy.    11/12/2022:  Continue with therapeutic bridging repeat INR today 1.58.  Appreciate pharmacy recommendations in this regard patient high risk of bleeding so continue to bridge slowly.    11/13/2022:  Continue present medical management patient continues to be alert and oriented x1-2.  Continue with plans for placement.  Patient is eligible for SNF placement on 11/14/2022.  Presently we will establish alternative IV access discontinue central line.  INR is improved today goal range 2-3.  Patient undergoing heparin to warfarin bridge.  11/14/2022:  Patient finally within therapeutic range for INR.  Pharmacy support appreciated.  Patient discontinued from heparin drip.  Patient suitable now for discharge.  Place an appropriate facility when available.  Otherwise continue present medical management.  No acute events overnight.  I have discussed this patient's plan of care and discharge plan at IDT rounds today with Case Management, Nursing, Nursing leadership, and other members of the IDT team.  11/15: Patient  seen and examined, afebrile, no acute events overnight, no nausea or vomiting,  resting in bed, afebrile  Awaiting placement   11/16: Patient seen and examined, afebrile, resting in bed, no overnight events, awaiting for placement  11/17: Patient seen and examined, overnight patient requiring more oxygen, also his procalcitonin is elevated. He has been started on IV abx . Also since cxr is showing pulmonary edema will start IV lasix today    11/18: Patient seen and examined, hemoglobin dropping will transfuse one unit today will monitor   Cont on abx for possible pneumonia   Also on IV lasix for pulmonary edema  11/19: Patient seen and examined, hemoglobin 6.7 this morning giving 1 unit of RBC. Ct abdomen shows Left retroperitoneal hematoma along the left iliopsoas/iliac is muscle measuring approximately 4.3 x 3.4 x 5.9 cm. Warfarin was held overnight, since INR elevated giving vitamine K and also a unit of FFP.  Also his kidney function is wosening so I have consulted nephrology appreciate rec.  I have also called and updated his son regarding patient condition, also discussed with son regarding his goals of care and also code status and as per son patient is Full code     Consultants/Specialty  critical care    Code Status  Full Code    Disposition  Patient is medically cleared for discharge.   Anticipate discharge to to skilled nursing facility.  I have placed the appropriate orders for post-discharge needs.    Review of Systems  Review of Systems   Unable to perform ROS: Acuity of condition      Physical Exam  Temp:  [36.6 °C (97.9 °F)-37.3 °C (99.1 °F)] 37 °C (98.6 °F)  Pulse:  [64-78] 65  Resp:  [18-22] 18  BP: ()/(50-71) 102/53  SpO2:  [91 %-95 %] 95 %    Physical Exam  Vitals and nursing note reviewed.   Constitutional:       Appearance: He is well-developed. He is ill-appearing.      Comments: Pt seen and examined.   HENT:      Head: Normocephalic and atraumatic.      Mouth/Throat:      Mouth: Mucous  membranes are dry.   Eyes:      General: No scleral icterus.     Pupils: Pupils are equal, round, and reactive to light.   Cardiovascular:      Rate and Rhythm: Normal rate. Rhythm irregular.      Heart sounds: Normal heart sounds.   Pulmonary:      Effort: Pulmonary effort is normal.      Breath sounds: Rhonchi and rales present.   Abdominal:      General: Bowel sounds are normal.      Palpations: Abdomen is soft.      Tenderness: There is no abdominal tenderness. There is no guarding or rebound.   Musculoskeletal:         General: Normal range of motion.      Cervical back: Normal range of motion and neck supple.      Comments: Right groin hematoma   Skin:     General: Skin is warm and dry.   Neurological:      General: No focal deficit present.      Mental Status: He is alert and oriented to person, place, and time.   Psychiatric:         Behavior: Behavior normal.       Fluids    Intake/Output Summary (Last 24 hours) at 11/19/2022 1121  Last data filed at 11/19/2022 1035  Gross per 24 hour   Intake 240 ml   Output 300 ml   Net -60 ml       Laboratory  Recent Labs     11/16/22 2227 11/18/22 0102 11/18/22  1030 11/18/22 2003 11/19/22  0150 11/19/22  1029   WBC 12.8* 8.8  --   --  8.3  --    RBC 2.66* 2.36*  --   --  2.22*  --    HEMOGLOBIN 8.0* 7.1*   < > 7.1* 6.7* 7.7*   HEMATOCRIT 25.8* 23.2*   < > 23.2* 21.7* 24.5*   MCV 97.0 98.3*  --   --  97.7  --    MCH 30.1 30.1  --   --  30.2  --    MCHC 31.0* 30.6*  --   --  30.9*  --    RDW 50.6* 51.5*  --   --  52.5*  --    PLATELETCT 244 221  --   --  233  --    MPV 11.0 10.9  --   --  11.1  --     < > = values in this interval not displayed.     Recent Labs     11/18/22 0102 11/19/22  0150   SODIUM 137 135   POTASSIUM 4.5 4.6   CHLORIDE 108 104   CO2 18* 18*   GLUCOSE 162* 146*   BUN 44* 47*   CREATININE 1.97* 2.47*   CALCIUM 7.9* 8.2*     Recent Labs     11/17/22  0027 11/18/22  0102 11/19/22  0150   INR 2.78* 3.40* 4.97*               Imaging  CT-ABDOMEN-PELVIS  W/O   Final Result      1.  Left retroperitoneal hematoma along the left iliopsoas/iliac is muscle measuring approximately 4.3 x 3.4 x 5.9 cm.      2.  Bilateral lower lobe consolidation could be due to pneumonia or atelectasis.      3.  Small layering stones versus sludge in the gallbladder.      4.  Diverticulosis without diverticulitis.      5.  Moderate amount of stool throughout the colon suggesting constipation.      US-EXTREMITY VENOUS UPPER UNILAT RIGHT   Final Result      DX-WRIST-LIMITED 2- RIGHT   Final Result         1.   No radiographic evidence of acute injury.      2.  Old healed fracture right fifth metacarpal neck.      3.  Moderate osteoarthritic changes of the right first carpal metacarpal joint.      4.  Possible intra-articular loose bodies adjacent to the styloid processes of the radius and ulna.      DX-CHEST-PORTABLE (1 VIEW)   Final Result      1.  Increased pulmonary edema   2.  Bibasilar underinflation atelectasis which could obscure an additional process. This is unchanged.   3.  Persistently enlarged cardiac silhouette      DX-CHEST-PORTABLE (1 VIEW)   Final Result      Mildly improved aeration and stable to slightly improved vascular congestion.      Mildly improved left basilar atelectasis.      DX-CHEST-PORTABLE (1 VIEW)   Final Result      1.  Interval extubation and removal of NG tube.   2.  Increased bibasilar airspace disease.   3.  Increased interstitial edema.   4.  Small left pleural effusion.      CT-HEAD W/O   Final Result      1. No CT evidence of acute infarct, hemorrhage or mass.   2. Moderate global parenchymal atrophy. Chronic small vessel ischemic changes.      DX-CHEST-PORTABLE (1 VIEW)   Final Result      Mildly improved left basilar opacity.      DX-ABDOMEN FOR TUBE PLACEMENT   Final Result      Enteric tube tip projects over the gastric antrum or proximal duodenum      DX-CHEST-PORTABLE (1 VIEW)   Final Result         1. Stable lines and tubes.   2. Worsening left  basilar opacity. Small left pleural effusion. Mild right infrahilar atelectasis.         DX-CHEST-PORTABLE (1 VIEW)   Final Result      1.  Cardiomegaly with interstitial edema.      2.  Left lung base atelectasis and minimal left pleural effusion.      DX-CHEST-PORTABLE (1 VIEW)   Final Result      1.  Improved left pleural effusion with adjacent airspace disease.   2.  Improved interstitial infiltrates versus edema.      EC-ECHOCARDIOGRAM COMPLETE W/O CONT   Final Result      DX-CHEST-PORTABLE (1 VIEW)   Final Result      1.  Small left pleural effusion with adjacent airspace disease.   2.  Worsening interstitial infiltrates versus edema.   3.  Right basilar atelectasis.      DX-ABDOMEN FOR TUBE PLACEMENT   Final Result      Enteric tube tip projects over the proximal duodenum      DX-ABDOMEN FOR TUBE PLACEMENT   Final Result         1.  Nonspecific bowel gas pattern in the upper abdomen.   2.  Nasogastric tube, not visualized beyond the mid thorax, see ordered repeat abdominal x-ray for further characterization.   3.  Cardiomegaly   4.  Left lung base atelectasis or infiltrate.      OUTSIDE IMAGES-DX CHEST   Final Result      DX-CHEST-PORTABLE (1 VIEW)   Final Result         1.  Bibasilar atelectasis or early infiltrates.   2.  Trace bilateral pleural effusions   3.  Cardiomegaly             Assessment/Plan  * Acute hypoxemic respiratory failure due to COVID-19 (HCC)- (present on admission)  Assessment & Plan  The patient is being treated for pneumonia,  On zosyn  Wean off O2 as tolerated     Anemia  Assessment & Plan  Hemoglobin trending down  Received a unit today   CT abdomen showing: Left retroperitoneal hematoma along the left iliopsoas/iliac is muscle measuring approximately 4.3 x 3.4 x 5.9 cm.  Reversing warfarin   received 1 unit of RBC   Close monitoring     Delirium- (present on admission)  Assessment & Plan  Likely in light of the patient's acute hospitalization, acute critical condition,  disease  Standard precautions and out of delirium  Avoid offending medication  Avoid interruption of circadian rhythm  CT head negative for acute changes, unable to provide a MRI imaging secondary to patient's pacemaker      MSSA (methicillin susceptible Staphylococcus aureus) pneumonia (HCC)- (present on admission)  Assessment & Plan  Finished treatment     Goals of care, counseling/discussion- (present on admission)  Assessment & Plan  Palliative care assisting,   I have called his son today 11/19/22 and updated on patient condition   Patient remains full code     Hypernatremia- (present on admission)  Assessment & Plan  Resolved     Septic shock with acute organ dysfunction due to methicillin susceptible Staphylococcus aureus (MSSA) (HCC)  Assessment & Plan  Present on admission, treated adequately    Hematoma- (present on admission)  Assessment & Plan  CT abdomen showing Left retroperitoneal hematoma along the left iliopsoas/iliac is muscle measuring approximately 4.3 x 3.4 x 5.9 cm.  Holding warfarin , giving FFP and vit K to reverse INR   Has received 1 unit of RBC   Close monitoring      Acute on chronic systolic congestive heart failure (HCC)- (present on admission)  Assessment & Plan  Continue with medical therapy including Coreg, holding on diuresis since BP on the low side   It appears his EF has been improving  Continue present medical therapy    CONCLUSIONS  The left ventricle is not well visualized due to body habitus.  Left ventricular systolic function is probably normal.  The ejection fraction is roughly estimated to be 55%.  A reliable estimation of diastolic function cannot be made due to   mitral valve disease.  Severely dilated right ventricle.  Reduced right ventricular systolic function.  Estimated right ventricular systolic pressure is 40 mmHg.  Severe biatrial dilation.  Mild mitral regurgitation.  Aortic valve sclerosis without significant stenosis.  Mild tricuspid regurgitation.  Normal  inferior vena cava size and inspiratory collapse    Hyperkalemia- (present on admission)  Assessment & Plan  Improved   Monitor     MYLA (acute kidney injury) (HCC)- (present on admission)  Assessment & Plan  initially was on dialysis and improved so dialsysi was stopped but now has worsening again so nephrology has been consulted   Appreciate rec.       Restless leg syndrome- (present on admission)  Assessment & Plan  Resume Requip    Chronic anticoagulation- (present on admission)  Assessment & Plan  Holding Coumadin     Paroxysmal atrial fibrillation (HCC)- (present on admission)  Assessment & Plan  History of, ongoing anticoagulation   Holding warfarin at this time and also giving VIt K and also FFP     Pacemaker- (present on admission)  Assessment & Plan  In situ, telemetry monitoring, history of complete heart block     Please note that this dictation was created using voice recognition software. I have made every reasonable attempt to correct obvious errors, but I expect that there are errors of grammar and possibly context that I did not discover before finalizing the note.    VTE prophylaxis: SCDs/TEDs and therapeutic anticoagulation with warfarin    I have performed a physical exam and reviewed and updated ROS and Plan today (11/19/2022). In review of yesterday's note (11/18/2022), there are no changes except as documented above.

## 2022-11-19 NOTE — THERAPY
"Occupational Therapy  Daily Treatment     Patient Name: Sean Kincaid  Age:  77 y.o., Sex:  male  Medical Record #: [id number]  Today's Date: 11/18/2022     Precautions: Fall Risk, Swallow Precautions ( See Comments)    Assessment    Pt seen for OT tx, received in bed asking for a sip of water. Pt independence in self-feeding is primarily limited by painful hands bilaterally. Pt reports inability to feed himself d/t pain however with hand over hand facilitation and guidance pt did bring his left hand to his mouth and drank thickened water with Gil. Pt required MaxA for bed mobility and sit>stand, will benefit from post-acute placement to regain functional independence.    Plan    Continue current treatment plan.    DC Equipment Recommendations: Unable to determine at this time  Discharge Recommendations: Recommend post-acute placement for additional occupational therapy services prior to discharge home    Subjective    \"I've been fighting with my hands a while now.\"     Objective     11/18/22 1713   Precautions   Precautions Fall Risk;Swallow Precautions ( See Comments)   Pain 0 - 10 Group   Therapist Pain Assessment During Activity;Nurse Notified;10  (very reactive to pain in his hands)   Cognition    Cognition / Consciousness X   Level of Consciousness Alert   Ability To Follow Commands 1 Step   Safety Awareness Impaired   New Learning Impaired   Attention Impaired   Sequencing Impaired   Initiation Impaired   Active ROM Upper Body   Comments pt refused to demonstrate AROM d/t BUE pain   Strength Upper Body   Comments pt refused to demonstrate AROM d/t BUE pain   Sensation Upper Body   Upper Extremity Sensation  X   Comments very reactive to touch on BUEs, hypersensitive, reports extreme hand pain bilaterally   Fine Motor / Dexterity    Fine Motor / Dexterity Yes   Fine Motor / Dexterity Interventions attempted to show pt ROM of fingers/hands, very resistant d/t pain   Balance   Sitting Balance (Static) Fair - "   Sitting Balance (Dynamic) Poor +   Standing Balance (Static) Trace +   Standing Balance (Dynamic) Dependent   Weight Shift Sitting Poor   Weight Shift Standing Absent   Skilled Intervention Verbal Cuing;Tactile Cuing;Sequencing;Postural Facilitation;Facilitation   Bed Mobility    Supine to Sit Total Assist   Sit to Supine Total Assist   Scooting Maximal Assist   Rolling Maximal Assist to Rt.   Skilled Intervention Verbal Cuing;Tactile Cuing;Sequencing;Facilitation   Activities of Daily Living   Eating Maximal Assist  (with encouragement pt did bring cup to mouth)   Grooming Maximal Assist   Upper Body Dressing Maximal Assist   Lower Body Dressing Total Assist   Toileting Maximal Assist   Skilled Intervention Verbal Cuing;Tactile Cuing;Sequencing;Facilitation   How much help from another person does the patient currently need...   Putting on and taking off regular lower body clothing? 1   Bathing (including washing, rinsing, and drying)? 2   Toileting, which includes using a toilet, bedpan, or urinal? 2   Putting on and taking off regular upper body clothing? 2   Taking care of personal grooming such as brushing teeth? 2   Eating meals? 2   6 Clicks Daily Activity Score 11   Functional Mobility   Sit to Stand Maximal Assist   Mobility sit>stand   Skilled Intervention Verbal Cuing;Tactile Cuing;Facilitation;Postural Facilitation   Activity Tolerance   Sitting in Chair NT   Sitting Edge of Bed 10min   Standing x2 partial stands with MaxAx2   Patient / Family Goals   Patient / Family Goal #1 none stated   Short Term Goals   Short Term Goal # 1 min A with UB dressing   Goal Outcome # 1 Goal not met   Short Term Goal # 2 mod A with LB dressing   Goal Outcome # 2 Goal not met   Short Term Goal # 3 mod A with ADL txfs   Goal Outcome # 3 Goal not met   Education Group   Education Provided Role of Occupational Therapist;Activities of Daily Living   Role of Occupational Therapist Patient Response  Patient;Acceptance;Explanation;Verbal Demonstration;Reinforcement Needed   ADL Patient Response Patient;Acceptance;Explanation;Verbal Demonstration;Reinforcement Needed;Action Demonstration   Anticipated Discharge Equipment and Recommendations   DC Equipment Recommendations Unable to determine at this time   Discharge Recommendations Recommend post-acute placement for additional occupational therapy services prior to discharge home

## 2022-11-19 NOTE — PROGRESS NOTES
[facility] -  PROGRESS NOTE               Author: Memo Sanon M.D. Date & Time: 11/19/2022  10:56 AM     HPI:  Patient known to nephrology service and was initially seen during his initial presentation to the hospital on 11/4 up to 11/9.  He required HD x1 on 11/5 for hyperkalemia, regained kidney functions but has now worsened again prompting nephrology consult.    This is a 77 y.o. male w/ h/o CHF, COPD, afib, DM who presented 11/4/2022 in transfer from [facility] where he presented with worsening shortness of breath and fatigue.  At [facility] he was found to be COVID-positive, was initially started on BiPAP and then intubated, in acute kidney injury with a creatinine of 8.0 and potassium of 6.6.  He was transferred to [facility] for further evaluation and treatment. Here his Scr was 7.6 with a K+ of 7.6. Nephrology was asked to consult for MYLA and critical hyperkalemia.     DAILY NEPHROLOGY SUMMARY:  11/5 - Scr down to 3.74. K+ is 5.0. Non-oliguric. Still on vent support.  11/6: Scr continues to trend down. Non-oliguric. ECHO: The left ventricle is not well visualized due to body habitus. Left ventricular systolic function is probably normal. The ejection fraction is roughly estimated to be 55%. A reliable estimation of diastolic function cannot be made due to mitral valve disease. Severely dilated right ventricle.Reduced right ventricular systolic function.  11/7: Remains on vent/ steroids  11/8: extubated, but remains confused  11/9: Passed swallow eval, but not taking signficant PO - signed off   -------------  11/19: Re consulted because of new MYLA and worsening renal functions; was found to have retroperitoneal hematoma because of hypercoagulability INR greater than 4.     REVIEW OF SYSTEMS:    Unable to assess given mentals tatus    PMH/PSH/SH/FH:   Reviewed and unchanged since admission note    CURRENT MEDICATIONS:   Reviewed from admission to  "present day    VS:  /53   Pulse 65   Temp 37 °C (98.6 °F) (Temporal)   Resp 18   Ht 1.88 m (6' 2\")   Wt 114 kg (250 lb 14.1 oz)   SpO2 95%   BMI 32.21 kg/m²     Constitutional:       Appearance: He is ill-appearing, agitated, planing of pain all over.   HENT:      Head: Normocephalic and atraumatic.      Nose: Nose normal.   Eyes:      Conjunctiva/sclera: Conjunctivae normal.   Cardiovascular:      Rate and Rhythm: Normal rate.      Pulses: Normal pulses.      Heart sounds: Normal heart sounds.   Pulmonary:      Effort: No respiratory distress.      Breath sounds: Rales present.   Abdominal:      General: Abdomen is flat. Bowel sounds are normal.      Palpations: Abdomen is soft.   Musculoskeletal:      Cervical back: Normal range of motion.      Right lower leg: Edema present.      Left lower leg: Edema present.   Skin:     General: Skin is warm.      Extensive bruising noted in bilateral flanks especially right groin area  Neurological:      Comments: Unable to assess   Psychiatric:      Comments: Unable to assess     Fluids:  In: 120 [P.O.:120]  Out: 300     LABS:  Recent Labs     11/18/22  0102 11/19/22  0150   SODIUM 137 135   POTASSIUM 4.5 4.6   CHLORIDE 108 104   CO2 18* 18*   GLUCOSE 162* 146*   BUN 44* 47*   CREATININE 1.97* 2.47*   CALCIUM 7.9* 8.2*       IMAGING:   All imaging reviewed from admission to present day    IMPRESSION:  # MYLA - etiology is not clear at this time likely secondary to prerenal azotemia  - required HD x1 on 11/5 for hyperkalemia earlier this admission then recovered  - acute blood loss and retroperitoneal hematoma [place] contributing to MYLA  - CT abd shows unremarkable kidneys without hydronephrosis 11/19/22  # Acute hypoxemic respiratory failure due to COVID-19  -Was intubated and on mechanical ventilation earlier this admission and then got extubated  # PAfib  # ? CHF  # Anemia of blood loss  #Retroperitoneal hematoma  #hypercoagulability  #Systolic CHF, LVEF 25%   "   PLAN:  -Patient seems very dry clinically   - resuscitate with IV colloids and crystalloids  -Receiving PRBC and FFP today per primary team to address coagulopathy and severe anemia  - start IV NS @ 75 cc/hr   - monitor H/H closely and Transfuse PRBC for Hb < 7   -Ball to gravity for monitoring urine output closely  -Continue rest of the current management  -Okay for midline as nursing staff having a lot of difficulty maintaining IV due to poor vasculature and need for multiple blood products and IV fluids for resuscitation    Prognosis is severely due to severe comorbidities, poor overall health, advanced age.   Was initially DNR/DNI but changed to full code per family.

## 2022-11-19 NOTE — CARE PLAN
The patient is Stable - Low risk of patient condition declining or worsening    Shift Goals  Clinical Goals: IV ABX, pain mamagement  Patient Goals: pain management  Family Goals: ALLIE    Progress made toward(s) clinical / shift goals:    Problem: Knowledge Deficit - Standard  Goal: Patient and family/care givers will demonstrate understanding of plan of care, disease process/condition, diagnostic tests and medications  Outcome: Progressing     Problem: Skin Integrity  Goal: Skin integrity is maintained or improved  Outcome: Progressing     Problem: Fall Risk  Goal: Patient will remain free from falls  Outcome: Progressing     Problem: Pain - Standard  Goal: Alleviation of pain or a reduction in pain to the patient’s comfort goal  Outcome: Progressing       Patient is not progressing towards the following goals:

## 2022-11-19 NOTE — PROGRESS NOTES
Received bedside report from RN, pt care assumed, pt assessment complete. Pt AAOx2-3, with 7/10 of pain at this time. Plan of care discussed with pt and verbalizes no questions. Bed locked/in lowest position, bed alarm on, pt educated on fall risk call light within reach, hourly rounding initiated.

## 2022-11-19 NOTE — PROGRESS NOTES
Inpatient Anticoagulation Service Note    Date: 11/19/2022  Reason for Anticoagulation: Bioprosthetic Valve Replacement, Atrial Fibrillation   Target INR: 2.0 to 3.0    [id number] VASc Score: 5  HAS-BLED Score: 3    Hemoglobin Value: (!) 7.7  Hematocrit Value: (!) 24.5  Lab Platelet Value: 233    INR from last 7 days       Date/Time INR Value    11/19/22 0150 4.97    11/18/22 0102 3.4    11/17/22 0027 2.78    11/16/22 0257 2.28    11/15/22 0345 2.45    11/14/22 0350 3.03    11/13/22 1625 2.26    11/13/22 0745 1.98          Dose from last 7 days       Date/Time Dose (mg)    11/19/22 1148 0    11/18/22 1156 0    11/17/22 1014 5    11/16/22 1126 6    11/15/22 1209 6    11/14/22 0816 5    11/13/22 1125 7.5    11/12/22 1419 7.5          Significant Interactions: Antibiotics  Bridge Therapy: completed  Reversal Agent Administered: FFP x1, IV vit K on 11/19    A/P:  - PTA warfarin for AF & bioprosthetic MVR  - INR jumped from 2.7 > 3.4 > 4.9 over previous 48 hrs  - Hgb drop w/ retroperitoneal hematoma noted on repeat CT, transfusing PRBCs  - INR reversed w/ agents noted above  - DDI w/ zosyn noted, potential to increase INR  - HOLDING warfarin  - subsequent INRs/CBCs ordered     Education Material Provided?: No (PTA warfarin)  Pharmacist suggested discharge dosing: TBD pending subsequent INR trends, regardless would recommend INR f/u w/in 48 hrs of discharge       Kenji Duff, PharmD

## 2022-11-20 NOTE — PALLIATIVE CARE
"Palliative Care follow-up  Received phone call from Miriam Del Toro RN that IDT team would like family meeting today with pt's sons Theo and Binh. Meeting planned for 1300.     1300: PC RN to pt's bedside. No family present. Phone call to Theo, he will be there soon. Miriam ALMAZAN to shanthi CHAUDHARI RN when family arrives.     1430: WATSON RN, Miriam ALMAZAN, and Dr. Herzog to pt's bedside. Theo and his wife, Yamile, (pt's daughter-in-law), Binh and his wife, Shelbie (pt's daughter-in-law). Md provided clinical update including worsening kidney function, retroperitoneal hematoma, and risk of stroke without anticoagulation. Theo states, \"my father has said multiple times that he wants everything done.\" MD provided education on full code measures including CPR, defibrillation, and intubation/mechanical ventilation. Further that, he is unlikely to endure invasive code measures well. Family verbalized understanding, but wish for Sean to remain a full code. Sean endorses wanting to be a full code.     Dr. Herzog briefly discussed potential need for dialysis, and what it entails. Theo believes that Sean would be ok with long term dialysis if needed.     Family in agreement with FULL CODE, intubation, dialysis and ICU if necessary. Family understands they can reach out to IDT and PC Team with any questions or concerns.     Plan: Provided therapeutic communication including open-ended questions, therapeutic silence, reflective listening, normalization of feelings, and empathic support throughout encounter.      Thank you for allowing Palliative Care to support this patient and family. Contact [phone] for additional assistance, change in patient status, or with any questions/concerns.    "

## 2022-11-20 NOTE — PROGRESS NOTES
Monitor Summary:     Rhythm: Paced  Rate: 64-77  Ectopy: NA  Measurements:            12 Hour Chart Check

## 2022-11-20 NOTE — PROGRESS NOTES
Received bedside report from Amina ALMAZAN, pt care assumed, pt assessment complete. Pt AAOx2-3, drowsy and fatigued. No signs of acute distress noted at this time. /51. Declines pain. Bed locked/in lowest position, bed alarm on, call light within reach, hourly rounding initiated. Family at bedside.

## 2022-11-20 NOTE — PROGRESS NOTES
Hospital Medicine Daily Progress Note    Date of Service  11/20/2022    Chief Complaint  Sean Kincaid is a 77 y.o. male admitted 11/4/2022 with respiratory failure    Hospital Course  77 y.o. male who presented 11/4/2022 with history of COPD, atrial fibrillation, diabetes type 2, history of congestive heart failure, EF of 45%,, initially presented to an outlying facility itself like [facility], complaining of worsening shortness of breath and fatigue.  The patient was diagnosed with COVID-19, started initially on BiPAP and then eventually required to be intubated, was found with an MYLA, creatinine of 8.0, potassium 6.6, the patient chronically anticoagulated for atrial fibrillation, history of complete heart block, history of bioprosthetic replacement of the mitral valve, ANALY, after presentation to [facility] he was admitted to the ICU, he continued to be treated in ICU with mechanical ventilation, required vasopressors including norepinephrine and vasopressin, placed on empiric antibiotics in form of ceftriaxone, nephrology consulted for MYLA, nonoliguric, had brief initiation of RRT but then picked up his urine output and required no further RRT as per nephrology.  He developed a right thigh/femoral hematoma related to a try Alysis catheter placement., anticoagulation was held, the patient required Precedex for sedation, fentanyl for pain control, his respiratory culture turned positive for MSSA and the patient is being treated for MSSA pneumonia in form of Ancef.  His COVID-19 pneumonia was treated with dexamethasone 6 mg for 10 days.  The patient eventually was extubated on 11/8, he was found significantly encephalopathic, a CT of the head was negative for acute changes, the patient unable to be evaluated by MRI secondary to the patient's pacemaker.  He progressed to some degree but is still encephalopathic with mental slowing, confusion, being awake/alert  and oriented x1, moving all 4  extremities fairly equally.  Palliative care was consulted to discuss goals of care with family and the decision was reached in light of the patient's condition and age to change his CODE STATUS to DNR/DNI.  Patient had evaluation with speech therapy, he passed a swallow evaluation, his p.o. intake remains fairly poor.  On 11/10 the patient is evaluated, he continues to be confused and delirious, he remains off pressors, currently on 2 L nasal can oxygen, saturating 94%, he has overall good urine output, his right groin hematoma remained stable, his anticoagulation was restarted in form of heparin drip and resuming Coumadin.  The patient had significant difficulty with hypernatremia and acute 8-hour sodium levels were checked, this had improved.    Interval Problem Update  11/11/2022:  Patient continues to be encephalopathic however is only on 2 L submental oxygen at present.  Continue present medical therapy.    11/12/2022:  Continue with therapeutic bridging repeat INR today 1.58.  Appreciate pharmacy recommendations in this regard patient high risk of bleeding so continue to bridge slowly.    11/13/2022:  Continue present medical management patient continues to be alert and oriented x1-2.  Continue with plans for placement.  Patient is eligible for SNF placement on 11/14/2022.  Presently we will establish alternative IV access discontinue central line.  INR is improved today goal range 2-3.  Patient undergoing heparin to warfarin bridge.  11/14/2022:  Patient finally within therapeutic range for INR.  Pharmacy support appreciated.  Patient discontinued from heparin drip.  Patient suitable now for discharge.  Place an appropriate facility when available.  Otherwise continue present medical management.  No acute events overnight.  I have discussed this patient's plan of care and discharge plan at IDT rounds today with Case Management, Nursing, Nursing leadership, and other members of the IDT team.  11/15: Patient  seen and examined, afebrile, no acute events overnight, no nausea or vomiting,  resting in bed, afebrile  Awaiting placement   11/16: Patient seen and examined, afebrile, resting in bed, no overnight events, awaiting for placement  11/17: Patient seen and examined, overnight patient requiring more oxygen, also his procalcitonin is elevated. He has been started on IV abx . Also since cxr is showing pulmonary edema will start IV lasix today    11/18: Patient seen and examined, hemoglobin dropping will transfuse one unit today will monitor   Cont on abx for possible pneumonia   Also on IV lasix for pulmonary edema  11/19: Patient seen and examined, hemoglobin 6.7 this morning giving 1 unit of RBC. Ct abdomen shows Left retroperitoneal hematoma along the left iliopsoas/iliac is muscle measuring approximately 4.3 x 3.4 x 5.9 cm. Warfarin was held overnight, since INR elevated giving vitamine K and also a unit of FFP.  Also his kidney function is wosening so I have consulted nephrology appreciate rec.    11/20: Patient seen and examined, continue to close monitor his hemoglobin for continues beelding. Has received 2 unit of RBC so faar. Also has received 1 unit of FFP and vityamine K to bring his INR dianna which is today 1.6  Cont on ZOsyn for possible pneumonai.  Kidney function continues to worsen.Nephrology following. Will continue on IVF as per nephrology rec.   I and palliative team  had another meeting with family today both sons were at [facility]. Updated family on his critical condition. Discussed that marguerite is now off anticoagulation and that is high risk of stroke, also that kidney function is deteriorating,   Family understands and would want everything done at this time. As per son patients wishes are to remain full code     I have also called and updated his son regarding patient condition, also discussed with son regarding his goals of care and also code status and as per son patient is Full code      Consultants/Specialty  critical care and nephrology    Code Status  Full Code    Disposition  Patient is medically cleared for discharge.   Anticipate discharge to to skilled nursing facility.  I have placed the appropriate orders for post-discharge needs.    Review of Systems  Review of Systems   Unable to perform ROS: Acuity of condition      Physical Exam  Temp:  [36.4 °C (97.5 °F)-37.2 °C (99 °F)] 37 °C (98.6 °F)  Pulse:  [58-73] 58  Resp:  [17-20] 17  BP: (105-124)/(53-65) 111/53  SpO2:  [91 %-95 %] 92 %    Physical Exam  Vitals and nursing note reviewed.   Constitutional:       Appearance: He is well-developed. He is ill-appearing.      Comments: Pt seen and examined.   HENT:      Head: Normocephalic and atraumatic.      Mouth/Throat:      Mouth: Mucous membranes are dry.   Eyes:      General: No scleral icterus.     Pupils: Pupils are equal, round, and reactive to light.   Cardiovascular:      Rate and Rhythm: Normal rate. Rhythm irregular.      Heart sounds: Normal heart sounds.   Pulmonary:      Effort: Pulmonary effort is normal.      Breath sounds: Rhonchi and rales present.   Abdominal:      General: Bowel sounds are normal.      Palpations: Abdomen is soft.      Tenderness: There is no abdominal tenderness. There is no guarding or rebound.   Musculoskeletal:         General: Normal range of motion.      Cervical back: Normal range of motion and neck supple.      Comments: Right groin hematoma   Skin:     General: Skin is warm and dry.   Neurological:      General: No focal deficit present.      Mental Status: He is alert and oriented to person, place, and time.   Psychiatric:         Behavior: Behavior normal.       Fluids    Intake/Output Summary (Last 24 hours) at 11/20/2022 1501  Last data filed at 11/20/2022 0631  Gross per 24 hour   Intake 1100 ml   Output 950 ml   Net 150 ml       Laboratory  Recent Labs     11/18/22  0102 11/18/22  1030 11/19/22  0150 11/19/22  1029 11/20/22  0020  11/20/22  0638 11/20/22  1414   WBC 8.8  --  8.3  --  7.0  --   --    RBC 2.36*  --  2.22*  --  2.58*  --   --    HEMOGLOBIN 7.1*   < > 6.7*   < > 7.7* 7.9* 8.2*   HEMATOCRIT 23.2*   < > 21.7*   < > 24.4* 25.1* 26.1*   MCV 98.3*  --  97.7  --  94.6  --   --    MCH 30.1  --  30.2  --  29.8  --   --    MCHC 30.6*  --  30.9*  --  31.6*  --   --    RDW 51.5*  --  52.5*  --  50.2*  --   --    PLATELETCT 221  --  233  --  207  --   --    MPV 10.9  --  11.1  --  10.8  --   --     < > = values in this interval not displayed.     Recent Labs     11/18/22  0102 11/19/22  0150 11/20/22  0816   SODIUM 137 135 135   POTASSIUM 4.5 4.6 4.8   CHLORIDE 108 104 107   CO2 18* 18* 17*   GLUCOSE 162* 146* 117*   BUN 44* 47* 53*   CREATININE 1.97* 2.47* 2.88*   CALCIUM 7.9* 8.2* 8.1*     Recent Labs     11/18/22  0102 11/19/22  0150 11/20/22  0020   INR 3.40* 4.97* 1.67*               Imaging  DX-CHEST-PORTABLE (1 VIEW)   Final Result      No acute cardiopulmonary abnormality identified.      CT-ABDOMEN-PELVIS W/O   Final Result      1.  Left retroperitoneal hematoma along the left iliopsoas/iliac is muscle measuring approximately 4.3 x 3.4 x 5.9 cm.      2.  Bilateral lower lobe consolidation could be due to pneumonia or atelectasis.      3.  Small layering stones versus sludge in the gallbladder.      4.  Diverticulosis without diverticulitis.      5.  Moderate amount of stool throughout the colon suggesting constipation.      US-EXTREMITY VENOUS UPPER UNILAT RIGHT   Final Result      DX-WRIST-LIMITED 2- RIGHT   Final Result         1.   No radiographic evidence of acute injury.      2.  Old healed fracture right fifth metacarpal neck.      3.  Moderate osteoarthritic changes of the right first carpal metacarpal joint.      4.  Possible intra-articular loose bodies adjacent to the styloid processes of the radius and ulna.      DX-CHEST-PORTABLE (1 VIEW)   Final Result      1.  Increased pulmonary edema   2.  Bibasilar underinflation  atelectasis which could obscure an additional process. This is unchanged.   3.  Persistently enlarged cardiac silhouette      DX-CHEST-PORTABLE (1 VIEW)   Final Result      Mildly improved aeration and stable to slightly improved vascular congestion.      Mildly improved left basilar atelectasis.      DX-CHEST-PORTABLE (1 VIEW)   Final Result      1.  Interval extubation and removal of NG tube.   2.  Increased bibasilar airspace disease.   3.  Increased interstitial edema.   4.  Small left pleural effusion.      CT-HEAD W/O   Final Result      1. No CT evidence of acute infarct, hemorrhage or mass.   2. Moderate global parenchymal atrophy. Chronic small vessel ischemic changes.      DX-CHEST-PORTABLE (1 VIEW)   Final Result      Mildly improved left basilar opacity.      DX-ABDOMEN FOR TUBE PLACEMENT   Final Result      Enteric tube tip projects over the gastric antrum or proximal duodenum      DX-CHEST-PORTABLE (1 VIEW)   Final Result         1. Stable lines and tubes.   2. Worsening left basilar opacity. Small left pleural effusion. Mild right infrahilar atelectasis.         DX-CHEST-PORTABLE (1 VIEW)   Final Result      1.  Cardiomegaly with interstitial edema.      2.  Left lung base atelectasis and minimal left pleural effusion.      DX-CHEST-PORTABLE (1 VIEW)   Final Result      1.  Improved left pleural effusion with adjacent airspace disease.   2.  Improved interstitial infiltrates versus edema.      EC-ECHOCARDIOGRAM COMPLETE W/O CONT   Final Result      DX-CHEST-PORTABLE (1 VIEW)   Final Result      1.  Small left pleural effusion with adjacent airspace disease.   2.  Worsening interstitial infiltrates versus edema.   3.  Right basilar atelectasis.      DX-ABDOMEN FOR TUBE PLACEMENT   Final Result      Enteric tube tip projects over the proximal duodenum      DX-ABDOMEN FOR TUBE PLACEMENT   Final Result         1.  Nonspecific bowel gas pattern in the upper abdomen.   2.  Nasogastric tube, not visualized  beyond the mid thorax, see ordered repeat abdominal x-ray for further characterization.   3.  Cardiomegaly   4.  Left lung base atelectasis or infiltrate.      OUTSIDE IMAGES-DX CHEST   Final Result      DX-CHEST-PORTABLE (1 VIEW)   Final Result         1.  Bibasilar atelectasis or early infiltrates.   2.  Trace bilateral pleural effusions   3.  Cardiomegaly             Assessment/Plan  * Acute hypoxemic respiratory failure due to COVID-19 (HCC)- (present on admission)  Assessment & Plan  The patient is being treated for pneumonia,  On zosyn  Wean off O2 as tolerated     Anemia  Assessment & Plan  Hemoglobin trending down  Received a unit today   CT abdomen showing: Left retroperitoneal hematoma along the left iliopsoas/iliac is muscle measuring approximately 4.3 x 3.4 x 5.9 cm.  Reversing warfarin   received 1 unit of RBC   Close monitoring     Delirium- (present on admission)  Assessment & Plan  Likely in light of the patient's acute hospitalization, acute critical condition, disease  Standard precautions and out of delirium  Avoid offending medication  Avoid interruption of circadian rhythm  CT head negative for acute changes, unable to provide a MRI imaging secondary to patient's pacemaker      MSSA (methicillin susceptible Staphylococcus aureus) pneumonia (HCC)- (present on admission)  Assessment & Plan  Finished treatment     Goals of care, counseling/discussion- (present on admission)  Assessment & Plan  Palliative care assisting,   I have called his son today 11/19/22 and updated on patient condition   Patient remains full code     Hypernatremia- (present on admission)  Assessment & Plan  Resolved     Septic shock with acute organ dysfunction due to methicillin susceptible Staphylococcus aureus (MSSA) ([facility])  Assessment & Plan  Present on admission, treated adequately    Hematoma- (present on admission)  Assessment & Plan  CT abdomen showing Left retroperitoneal hematoma along the left iliopsoas/iliac is  muscle measuring approximately 4.3 x 3.4 x 5.9 cm.  Holding warfarin , giving FFP and vit K to reverse INR   Has received 1 unit of RBC   Close monitoring      Acute on chronic systolic congestive heart failure (HCC)- (present on admission)  Assessment & Plan  Continue with medical therapy including Coreg, holding on diuresis since BP on the low side   It appears his EF has been improving  Continue present medical therapy    CONCLUSIONS  The left ventricle is not well visualized due to body habitus.  Left ventricular systolic function is probably normal.  The ejection fraction is roughly estimated to be 55%.  A reliable estimation of diastolic function cannot be made due to   mitral valve disease.  Severely dilated right ventricle.  Reduced right ventricular systolic function.  Estimated right ventricular systolic pressure is 40 mmHg.  Severe biatrial dilation.  Mild mitral regurgitation.  Aortic valve sclerosis without significant stenosis.  Mild tricuspid regurgitation.  Normal inferior vena cava size and inspiratory collapse    Hyperkalemia- (present on admission)  Assessment & Plan  Improved   Monitor     MYLA (acute kidney injury) (HCC)- (present on admission)  Assessment & Plan  initially was on dialysis and improved so dialsysi was stopped but now has worsening again so nephrology has been consulted   Appreciate rec.       Restless leg syndrome- (present on admission)  Assessment & Plan  Resume Requip    Chronic anticoagulation- (present on admission)  Assessment & Plan  Holding Coumadin     Paroxysmal atrial fibrillation (HCC)- (present on admission)  Assessment & Plan  History of, ongoing anticoagulation   Holding warfarin at this time and also giving VIt K and also FFP     Pacemaker- (present on admission)  Assessment & Plan  In situ, telemetry monitoring, history of complete heart block     Please note that this dictation was created using voice recognition software. I have made every reasonable attempt to  correct obvious errors, but I expect that there are errors of grammar and possibly context that I did not discover before finalizing the note.    VTE prophylaxis: SCDs/TEDs and therapeutic anticoagulation with warfarin    I have performed a physical exam and reviewed and updated ROS and Plan today (11/20/2022). In review of yesterday's note (11/19/2022), there are no changes except as documented above.

## 2022-11-20 NOTE — PROGRESS NOTES
[facility] -  PROGRESS NOTE               Author: Memo Sanon M.D. Date & Time: 11/20/2022  10:54 AM     HPI:  Patient known to nephrology service and was initially seen during his initial presentation to the hospital on 11/4 up to 11/9.  He required HD x1 on 11/5 for hyperkalemia, regained kidney functions but has now worsened again prompting nephrology consult.    This is a 77 y.o. male w/ h/o CHF, COPD, afib, DM who presented 11/4/2022 in transfer from [facility] where he presented with worsening shortness of breath and fatigue.  At [place] he was found to be COVID-positive, was initially started on BiPAP and then intubated, in acute kidney injury with a creatinine of 8.0 and potassium of 6.6.  He was transferred to [facility] for further evaluation and treatment. Here his Scr was 7.6 with a K+ of 7.6. Nephrology was asked to consult for MYLA and critical hyperkalemia.     DAILY NEPHROLOGY SUMMARY:  11/5 - Scr down to 3.74. K+ is 5.0. Non-oliguric. Still on vent support.  11/6: Scr continues to trend down. Non-oliguric. ECHO: The left ventricle is not well visualized due to body habitus. Left ventricular systolic function is probably normal. The ejection fraction is roughly estimated to be 55%. A reliable estimation of diastolic function cannot be made due to mitral valve disease. Severely dilated right ventricle.Reduced right ventricular systolic function.  11/7: Remains on vent/ steroids  11/8: extubated, but remains confused  11/9: Passed swallow eval, but not taking signficant PO - signed off   -------------  11/19: Re consulted because of new MYLA and worsening renal functions; was found to have retroperitoneal hematoma because of hypercoagulability INR greater than 4.   11/20: Sitting up in bed, appears more perked up, blood pressure now in normotensive range, made almost 1200 cc urine past 24 hours-Ball catheter was placed yesterday    REVIEW  "OF SYSTEMS:    Unable to assess given mentals arnold    PMH/PSH/SH/FH:   Reviewed and unchanged since admission note    CURRENT MEDICATIONS:   Reviewed from admission to present day    VS:  /64   Pulse 65   Temp 37 °C (98.6 °F) (Temporal)   Resp 18   Ht 1.88 m (6' 2\")   Wt 114 kg (250 lb 14.1 oz)   SpO2 94%   BMI 32.21 kg/m²     Constitutional:       Appearance: He is ill-appearing, agitated, planing of pain all over.   HENT:      Head: Normocephalic and atraumatic.      Nose: Nose normal.   Eyes:      Conjunctiva/sclera: Conjunctivae normal.   Cardiovascular:      Rate and Rhythm: Normal rate.      Pulses: Normal pulses.      Heart sounds: Normal heart sounds.   Pulmonary:      Effort: No respiratory distress.      Breath sounds: Rales present.   Abdominal:      General: Abdomen is flat. Bowel sounds are normal.      Palpations: Abdomen is soft.   Musculoskeletal:      Cervical back: Normal range of motion.      Right lower leg: Edema present.      Left lower leg: Edema present.   Skin:     General: Skin is warm.      Extensive bruising noted in bilateral flanks especially right groin area  Neurological:      Comments: Unable to assess   Psychiatric:      Comments: Unable to assess     Fluids:  In: 1465 [P.O.:840; Blood:625]  Out: 950     LABS:  Recent Labs     11/18/22  0102 11/19/22  0150 11/20/22  0816   SODIUM 137 135 135   POTASSIUM 4.5 4.6 4.8   CHLORIDE 108 104 107   CO2 18* 18* 17*   GLUCOSE 162* 146* 117*   BUN 44* 47* 53*   CREATININE 1.97* 2.47* 2.88*   CALCIUM 7.9* 8.2* 8.1*       IMAGING:   All imaging reviewed from admission to present day    IMPRESSION:  # MYLA - etiology is not clear at this time likely secondary to prerenal azotemia + ?  Some element of bladder outlet obstruction  - acute blood loss and retroperitoneal hematoma [place] contributing to MYLA  - required HD x1 on 11/5 for hyperkalemia earlier this admission then recovered function and creatinine returned back to normal before " worsening again  - CT abd shows unremarkable kidneys without hydronephrosis 11/19/22  # Acute hypoxemic respiratory failure due to COVID-19  -Was intubated and on mechanical ventilation earlier this admission and then got extubated  # PAfib  # ? CHF  # Anemia of blood loss  #Retroperitoneal hematoma  #hypercoagulability  #Systolic CHF, LVEF 25%     PLAN:  - Continue IV NS @ 75 cc/hr   -Urine lites not done yesterday, will reorder today  -Continue to monitor I's and O's closely, continue Ball to gravity  - monitor H/H closely and Transfuse PRBC for Hb < 7   -Continue rest of the current management  -Okay for midline as nursing staff having a lot of difficulty maintaining IV due to poor vasculature and need for multiple blood products and IV fluids for resuscitation    Prognosis is severely due to severe comorbidities, poor overall health, advanced age.   Was initially DNR/DNI but changed to full code per family.

## 2022-11-20 NOTE — CARE PLAN
The patient is Watcher - Medium risk of patient condition declining or worsening    Shift Goals  Clinical Goals: Hemodynamic Stability, Monitor Hgb  Patient Goals: Pain Management  Family Goals: updated on POC    Progress made toward(s) clinical / shift goals:        Problem: Skin Integrity  Goal: Skin integrity is maintained or improved  Outcome: Progressing     Problem: Fall Risk  Goal: Patient will remain free from falls  Outcome: Progressing     Problem: Hemodynamics  Goal: Patient's hemodynamics, fluid balance and neurologic status will be stable or improve  Outcome: Progressing     Problem: Respiratory  Goal: Patient will achieve/maintain optimum respiratory ventilation and gas exchange  Outcome: Progressing       Patient is not progressing towards the following goals:      Problem: Pain - Standard  Goal: Alleviation of pain or a reduction in pain to the patient’s comfort goal  Outcome: Not Progressing     Problem: Risk for Aspiration  Goal: Patient's risk for aspiration will be absent or decrease  Outcome: Not Progressing

## 2022-11-20 NOTE — CARE PLAN
The patient is Watcher - Medium risk of patient condition declining or worsening    Shift Goals  Clinical Goals: CT abd, monitor H, pain management, abx therapy  Patient Goals: pain management  Family Goals: updated on POC    Progress made toward(s) clinical / shift goals:    Problem: Knowledge Deficit - Standard  Goal: Patient and family/care givers will demonstrate understanding of plan of care, disease process/condition, diagnostic tests and medications  Outcome: Progressing     Problem: Fall Risk  Goal: Patient will remain free from falls  Outcome: Progressing     Problem: Pain - Standard  Goal: Alleviation of pain or a reduction in pain to the patient’s comfort goal  Outcome: Progressing     Problem: Care Map:  Admission Optimal Outcome for the Heart Failure Patient  Goal: Admission:  Optimal Care of the heart failure patient  Outcome: Progressing     Problem: Respiratory  Goal: Patient will achieve/maintain optimum respiratory ventilation and gas exchange  Outcome: Progressing     Problem: Risk for Aspiration  Goal: Patient's risk for aspiration will be absent or decrease  Outcome: Progressing     Problem: Hemodynamics - Pneumonia  Goal: Patient's hemodynamics, fluid balance and neurologic status will be stable or improve  Outcome: Progressing       Patient is not progressing towards the following goals:      Problem: Skin Integrity  Goal: Skin integrity is maintained or improved  Outcome: Not Progressing     Problem: Self Care  Goal: Patient will have the ability to perform ADLs independently or with assistance (bathe, groom, dress, toilet and feed)  Outcome: Not Progressing

## 2022-11-21 PROBLEM — K59.00 CONSTIPATION: Status: ACTIVE | Noted: 2022-01-01

## 2022-11-21 NOTE — CARE PLAN
The patient is Watcher - Medium risk of patient condition declining or worsening    Shift Goals  Clinical Goals: Pt will have comfort from pain, No SOb within shift  Patient Goals: Pt will have comfort from pain  Family Goals: family meeting    Progress made toward(s) clinical / shift goals:  pt had comfort from pain, no falls within shift    Patient is not progressing towards the following goals:

## 2022-11-21 NOTE — PROGRESS NOTES
[facility] -  PROGRESS NOTE               Author: Mandeep Francis M.D. Date & Time: 11/21/2022  8:38 AM     HPI:  Patient known to nephrology service and was initially seen during his initial presentation to the hospital on 11/4 up to 11/9.  He required HD x1 on 11/5 for hyperkalemia, regained kidney functions but has now worsened again prompting nephrology consult.    This is a 77 y.o. male w/ h/o CHF, COPD, afib, DM who presented 11/4/2022 in transfer from [facility] where he presented with worsening shortness of breath and fatigue.  At [place] he was found to be COVID-positive, was initially started on BiPAP and then intubated, in acute kidney injury with a creatinine of 8.0 and potassium of 6.6.  He was transferred to [facility] for further evaluation and treatment. Here his Scr was 7.6 with a K+ of 7.6. Nephrology was asked to consult for MYLA and critical hyperkalemia.     DAILY NEPHROLOGY SUMMARY:  11/5 - Scr down to 3.74. K+ is 5.0. Non-oliguric. Still on vent support.  11/6: Scr continues to trend down. Non-oliguric. ECHO: The left ventricle is not well visualized due to body habitus. Left ventricular systolic function is probably normal. The ejection fraction is roughly estimated to be 55%. A reliable estimation of diastolic function cannot be made due to mitral valve disease. Severely dilated right ventricle.Reduced right ventricular systolic function.  11/7: Remains on vent/ steroids  11/8: extubated, but remains confused  11/9: Passed swallow eval, but not taking signficant PO - signed off   -------------  11/19: Re consulted because of new MYLA and worsening renal functions; was found to have retroperitoneal hematoma because of hypercoagulability INR greater than 4.   11/20: Sitting up in bed, appears more perked up, blood pressure now in normotensive range, made almost 1200 cc urine past 24 hours-Ball catheter was placed yesterday  11/21:  "family meeting yesterday-remains full code with al life saving interventions, stable hemodynamics, 1.2L UOP, cr up-trending     REVIEW OF SYSTEMS:    Unable to assess given mental status    PMH/PSH/SH/FH:   Reviewed and unchanged since admission note    CURRENT MEDICATIONS:   Reviewed from admission to present day    VS:  /65   Pulse 65   Temp 36.5 °C (97.7 °F) (Temporal)   Resp 20   Ht 1.88 m (6' 2\")   Wt 114 kg (250 lb 14.1 oz)   SpO2 96%   BMI 32.21 kg/m²   GENERAL: Ill appearing  CV: RRR, + pedal edema  RESP: coarse breath sounds  GI: Soft  MSK: diffuse wekaness  SKIN: dry skin  NEURO: AOx1  PSYCH: Cooperative      Fluids:  In: -   Out: 1250     LABS:  Recent Labs     11/19/22  0150 11/20/22  0816 11/21/22  0017   SODIUM 135 135 136   POTASSIUM 4.6 4.8 4.6   CHLORIDE 104 107 106   CO2 18* 17* 19*   GLUCOSE 146* 117* 80   BUN 47* 53* 55*   CREATININE 2.47* 2.88* 3.12*   CALCIUM 8.2* 8.1* 8.1*         IMAGING:   All imaging reviewed from admission to present day    IMPRESSION:  # MYLA   - acute blood loss and retroperitoneal hematoma [place] contributing to MYLA  - required HD x1 on 11/5 for hyperkalemia earlier this admission then recovered function and creatinine returned back to normal before worsening again  - CT abd shows unremarkable kidneys without hydronephrosis 11/19/22  # Acute hypoxemic respiratory failure due to COVID-19  -Was intubated and on mechanical ventilation earlier this admission and then got extubated  # PAfib  # ? CHF  # Anemia of blood loss  # Retroperitoneal hematoma  # hypercoagulability  # Systolic CHF, LVEF 25%     PLAN:  - Stop IV fluids  - Strict IOs  - Transfuse PRBC for Hb < 7   - Okay for midline as nursing staff having a lot of difficulty maintaining IV due to poor vasculature and need for multiple blood products and IV fluids for resuscitation  - Ongoing GOC discussions    Thank you    "

## 2022-11-21 NOTE — CARE PLAN
The patient is Watcher - Medium risk of patient condition declining or worsening    Shift Goals  Clinical Goals: pain control, monitor H  Patient Goals: pain control  Family Goals: family meeting    Progress made toward(s) clinical / shift goals:    Problem: Skin Integrity  Goal: Skin integrity is maintained or improved  Outcome: Progressing     Problem: Fall Risk  Goal: Patient will remain free from falls  Outcome: Progressing     Problem: Pain - Standard  Goal: Alleviation of pain or a reduction in pain to the patient’s comfort goal  Outcome: Progressing     Problem: Respiratory  Goal: Patient will achieve/maintain optimum respiratory ventilation and gas exchange  Outcome: Progressing     Problem: Risk for Aspiration  Goal: Patient's risk for aspiration will be absent or decrease  Outcome: Progressing       Patient is not progressing towards the following goals:      Problem: Knowledge Deficit - Standard  Goal: Patient and family/care givers will demonstrate understanding of plan of care, disease process/condition, diagnostic tests and medications  Outcome: Not Progressing

## 2022-11-21 NOTE — PROGRESS NOTES
Pt experiencing breakthrough generalized pain moaning, calling out, restless. Pt repositioned with use of pillows, HOB 30 degrees, Oxycodone now q 4 hrs. Pt also reported pruritis Benadryl 25 mg one time dose administered with good effect, Pt sleeping with ventilation noted.

## 2022-11-22 PROBLEM — T82.9XXA PACEMAKER COMPLICATIONS: Status: ACTIVE | Noted: 2022-01-01

## 2022-11-22 NOTE — CARE PLAN
The patient is Stable - Low risk of patient condition declining or worsening    Shift Goals  Clinical Goals: Reorient pt, Abx  Patient Goals: Comfort  Family Goals: To improve    Progress made toward(s) clinical / shift goals: Q2 turns established and mcdowell care complete. Pt complains of pain during any movement, turns done with caution.      Problem: Psychosocial  Goal: Patient's level of anxiety will decrease  Outcome: Progressing  Goal: Patient's ability to verbalize feelings about condition will improve  Outcome: Progressing  Goal: Patient's ability to re-evaluate and adapt role responsibilities will improve  Outcome: Progressing  Goal: Patient and family will demonstrate ability to cope with life altering diagnosis and/or procedure  Outcome: Progressing  Goal: Spiritual and cultural needs incorporated into hospitalization  Outcome: Progressing     Problem: Hemodynamics  Goal: Patient's hemodynamics, fluid balance and neurologic status will be stable or improve  Outcome: Progressing     Problem: Respiratory  Goal: Patient will achieve/maintain optimum respiratory ventilation and gas exchange  Outcome: Progressing     Problem: Mechanical Ventilation  Goal: Safe management of artificial airway and ventilation  Outcome: Progressing  Goal: Successful weaning off mechanical ventilator, spontaneously maintains adequate gas exchange  Outcome: Progressing  Goal: Patient will be able to express needs and understand communication  Outcome: Progressing     Problem: Risk for Aspiration  Goal: Patient's risk for aspiration will be absent or decrease  Outcome: Progressing     Problem: Urinary - Renal Perfusion  Goal: Ability to achieve and maintain adequate renal perfusion and functioning will improve  Outcome: Progressing     Problem: Venous Thromboembolism (VTE) Prevention  Goal: The patient will remain free from venous thromboembolism (VTE)  Outcome: Progressing     Problem: Nutrition  Goal: Patient's nutritional and fluid  intake will be adequate or improve  Outcome: Progressing  Goal: Enteral nutrition will be maintained or improve  Outcome: Progressing  Goal: Enteral nutrition will be maintained or improve  Outcome: Progressing     Problem: Urinary Elimination  Goal: Establish and maintain regular urinary output  Outcome: Progressing     Problem: Bowel Elimination  Goal: Establish and maintain regular bowel function  Outcome: Progressing

## 2022-11-22 NOTE — PROGRESS NOTES
[facility] -  PROGRESS NOTE               Author: Mandeep Francis M.D. Date & Time: 11/22/2022  8:37 AM     HPI:  Patient known to nephrology service and was initially seen during his initial presentation to the hospital on 11/4 up to 11/9.  He required HD x1 on 11/5 for hyperkalemia, regained kidney functions but has now worsened again prompting nephrology consult.    This is a 77 y.o. male w/ h/o CHF, COPD, afib, DM who presented 11/4/2022 in transfer from [facility] where he presented with worsening shortness of breath and fatigue.  At [place] he was found to be COVID-positive, was initially started on BiPAP and then intubated, in acute kidney injury with a creatinine of 8.0 and potassium of 6.6.  He was transferred to [facility] for further evaluation and treatment. Here his Scr was 7.6 with a K+ of 7.6. Nephrology was asked to consult for MYLA and critical hyperkalemia.     DAILY NEPHROLOGY SUMMARY:  11/5 - Scr down to 3.74. K+ is 5.0. Non-oliguric. Still on vent support.  11/6: Scr continues to trend down. Non-oliguric. ECHO: The left ventricle is not well visualized due to body habitus. Left ventricular systolic function is probably normal. The ejection fraction is roughly estimated to be 55%. A reliable estimation of diastolic function cannot be made due to mitral valve disease. Severely dilated right ventricle.Reduced right ventricular systolic function.  11/7: Remains on vent/ steroids  11/8: extubated, but remains confused  11/9: Passed swallow eval, but not taking signficant PO - signed off   -------------  11/19: Re consulted because of new MYLA and worsening renal functions; was found to have retroperitoneal hematoma because of hypercoagulability INR greater than 4.   11/20: Sitting up in bed, appears more perked up, blood pressure now in normotensive range, made almost 1200 cc urine past 24 hours-Ball catheter was placed yesterday  11/21:  "family meeting yesterday-remains full code with al life saving interventions, stable hemodynamics, 1.2L UOP, cr up-trending   11/22: Cr stable, 1.1L UOP, denies physical complaints, paranoid    REVIEW OF SYSTEMS:    10 point ROS performed, negative other than stated above    PMH/PSH/SH/FH:   Reviewed and unchanged since admission note    CURRENT MEDICATIONS:   Reviewed from admission to present day    VS:  /69   Pulse 63   Temp 36.3 °C (97.4 °F) (Temporal)   Resp 20   Ht 1.88 m (6' 2\")   Wt 114 kg (251 lb 5.2 oz)   SpO2 94%   BMI 32.27 kg/m²   GENERAL: Ill appearing  CV: RRR, + pedal edema  RESP: coarse breath sounds  GI: Soft  MSK: diffuse wekaness  SKIN: dry skin  NEURO: AOx1  PSYCH: Cooperative      Fluids:  In: 300 [P.O.:300]  Out: 1100     LABS:  Recent Labs     11/20/22  0816 11/21/22  0017 11/22/22  0225   SODIUM 135 136 137   POTASSIUM 4.8 4.6 5.2   CHLORIDE 107 106 108   CO2 17* 19* 17*   GLUCOSE 117* 80 182*   BUN 53* 55* 54*   CREATININE 2.88* 3.12* 3.05*   CALCIUM 8.1* 8.1* 8.5         IMAGING:   All imaging reviewed from admission to present day    IMPRESSION:  # MYLA   - acute blood loss and retroperitoneal hematoma  contributing  - required HD x1 on 11/5 for hyperkalemia earlier this admission then recovered function and creatinine returned back to normal before worsening again  - CT abd shows unremarkable kidneys without hydronephrosis 11/19/22  # Acute hypoxemic respiratory failure due to COVID-19  -Was intubated and on mechanical ventilation earlier this admission and then got extubated  # PAfib  # ? CHF  # Anemia of blood loss  # Retroperitoneal hematoma  # hypercoagulability  # Systolic CHF, LVEF 25%     PLAN:  -no main fluids  -Daily albs  -Strict IOs  -Transfuse PRBC for Hb < 7   - Ongoing GOC discussions    Thank you    "

## 2022-11-22 NOTE — PROGRESS NOTES
Monitor summary:     Rhythm : SR-PACED  Rate : 63-65  Ectopy : PVC'S AND COUPLETS    RI=.-  QRS=.-  QT=.-        Per telemetry strip summary from monitor room.

## 2022-11-22 NOTE — ASSESSMENT & PLAN NOTE
RESOLVED  Was informed by telemetry patient's pacemaker is not pacing properly. Patient asymptomatic at bedside w/ ecg showing intermittent pacing discussed with electrocardio physiology NP embers and plan is to interrogate device, rep notified.  -Pacer performing correctly on interrogation  - DC telemetry

## 2022-11-22 NOTE — PROGRESS NOTES
[facility] Internal Medicine Daily Progress Note    Date of Service  11/21/2022    [facility] Team: R DILAN Perales Team   Attending: Therese Pedraza M.d.  Senior Resident: Dr. Vásquez  Intern:  Dr. Torres  Contact Number: [phone]    Chief Complaint  Sean Kincaid is a 77 y.o. male admitted 11/4/2022 with respiratory failure secondary to COVID    Hospital Course  77 y.o. male who presented 11/4/2022 with history of COPD, atrial fibrillation, diabetes type 2, history of congestive heart failure, EF of 45%,, [facility], complaining of worsening shortness of breath and fatigue.  The patient was diagnosed with COVID-19, started initially on BiPAP and then eventually required to be intubated, was found with an MYLA, creatinine of 8.0, potassium 6.6, the patient chronically anticoagulated for atrial fibrillation, history of complete heart block, history of bioprosthetic replacement of the mitral valve, ANALY, after presentation to [facility] he was admitted to the ICU, he continued to be treated in ICU with mechanical ventilation, required vasopressors including norepinephrine and vasopressin, placed on empiric antibiotics in form of ceftriaxone, nephrology consulted for MYLA, nonoliguric, had brief initiation of RRT but then picked up his urine output and required no further RRT as per nephrology.  He developed a right thigh/femoral hematoma related to a try Alysis catheter placement., anticoagulation was held, the patient required Precedex for sedation, fentanyl for pain control, his respiratory culture turned positive for MSSA and the patient is being treated for MSSA pneumonia in form of Ancef.  His COVID-19 pneumonia was treated with dexamethasone 6 mg for 10 days.  The patient eventually was extubated on 11/8, he was found significantly encephalopathic, a CT of the head was negative for acute changes, the patient unable to be evaluated by MRI secondary to the  patient's pacemaker.  He progressed to some degree but is still encephalopathic with mental slowing, confusion, being awake/alert  and oriented x1, moving all 4 extremities fairly equally.  Palliative care was consulted to discuss goals of care with family and the decision was reached in light of the patient's condition and age to change his CODE STATUS to DNR/DNI.  Patient had evaluation with speech therapy, he passed a swallow evaluation, his p.o. intake remains fairly poor.  On 11/10 the patient is evaluated, he continues to be confused and delirious, he remains off pressors, currently on 2 L nasal can oxygen, saturating 94%, he has overall good urine output, his right groin hematoma remained stable, his anticoagulation was restarted in form of heparin drip and resuming Coumadin.  The patient had significant difficulty with hypernatremia and acute 8-hour sodium levels were checked, this had improved.    Interval Problem Update  No acute events overnight. Patient currently on 2L of oxygen this morning without any difficulties breathing. He does endorse cough w/ sputum but says unchanged from past few days. Patient hasn't had a bowel movement for past few days and enema was ordered. In regards to patients hematoma conversation will need to be had in regards to when to restart anticoagulation. Will consider apixaban rather than patients home regimen of warfarin.    I have discussed this patient's plan of care and discharge plan at IDT rounds today with Case Management, Nursing, Nursing leadership, and other members of the IDT team.    Consultants/Specialty  nephrology    Code Status  Full Code    Disposition  Patient is not medically cleared for discharge.   Anticipate discharge to to skilled nursing facility.  I have placed the appropriate orders for post-discharge needs.    Review of Systems  Review of Systems   Constitutional:  Positive for malaise/fatigue.   HENT: Negative.     Eyes: Negative.    Respiratory:   Positive for sputum production.    Cardiovascular: Negative.    Gastrointestinal: Negative.    Genitourinary: Negative.    Musculoskeletal: Negative.    Skin: Negative.    Neurological:  Positive for focal weakness and weakness.   Endo/Heme/Allergies: Negative.    Psychiatric/Behavioral: Negative.        Physical Exam  Temp:  [36.1 °C (97 °F)-36.6 °C (97.9 °F)] 36.6 °C (97.9 °F)  Pulse:  [65-68] 67  Resp:  [18-20] 18  BP: (111-134)/(51-67) 134/67  SpO2:  [91 %-96 %] 92 %    Physical Exam  Constitutional:       Appearance: He is obese. He is ill-appearing.   HENT:      Head: Normocephalic and atraumatic.      Right Ear: Tympanic membrane, ear canal and external ear normal.      Left Ear: Tympanic membrane, ear canal and external ear normal.      Nose: Nose normal.      Mouth/Throat:      Mouth: Mucous membranes are moist.      Pharynx: Oropharynx is clear.   Eyes:      Extraocular Movements: Extraocular movements intact.      Conjunctiva/sclera: Conjunctivae normal.      Pupils: Pupils are equal, round, and reactive to light.   Cardiovascular:      Rate and Rhythm: Normal rate. Rhythm irregular.   Pulmonary:      Effort: Pulmonary effort is normal. No respiratory distress.   Chest:      Chest wall: No tenderness.   Abdominal:      General: Abdomen is flat. Bowel sounds are normal.      Palpations: Abdomen is soft.   Musculoskeletal:      Cervical back: Normal range of motion.      Right lower leg: No edema.      Left lower leg: No edema.   Skin:     General: Skin is warm.      Capillary Refill: Capillary refill takes less than 2 seconds.   Neurological:      Mental Status: He is alert and oriented to person, place, and time.      Motor: Weakness present.   Psychiatric:         Mood and Affect: Mood normal.         Behavior: Behavior normal.         Thought Content: Thought content normal.         Judgment: Judgment normal.       Fluids    Intake/Output Summary (Last 24 hours) at 11/21/2022 7515  Last data filed at  11/21/2022 0641  Gross per 24 hour   Intake --   Output 1250 ml   Net -1250 ml       Laboratory  Recent Labs     11/19/22  0150 11/19/22  1029 11/20/22  0020 11/20/22  0638 11/20/22  2103 11/21/22  0017 11/21/22  0549   WBC 8.3  --  7.0  --   --  5.6  --    RBC 2.22*  --  2.58*  --   --  2.66*  --    HEMOGLOBIN 6.7*   < > 7.7*   < > 7.9* 7.9* 8.3*   HEMATOCRIT 21.7*   < > 24.4*   < > 25.3* 25.5* 27.1*   MCV 97.7  --  94.6  --   --  95.9  --    MCH 30.2  --  29.8  --   --  29.7  --    MCHC 30.9*  --  31.6*  --   --  31.0*  --    RDW 52.5*  --  50.2*  --   --  50.6*  --    PLATELETCT 233  --  207  --   --  203  --    MPV 11.1  --  10.8  --   --  10.7  --     < > = values in this interval not displayed.     Recent Labs     11/19/22  0150 11/20/22  0816 11/21/22  0017   SODIUM 135 135 136   POTASSIUM 4.6 4.8 4.6   CHLORIDE 104 107 106   CO2 18* 17* 19*   GLUCOSE 146* 117* 80   BUN 47* 53* 55*   CREATININE 2.47* 2.88* 3.12*   CALCIUM 8.2* 8.1* 8.1*     Recent Labs     11/19/22  0150 11/20/22  0020 11/21/22  0017   INR 4.97* 1.67* 1.64*               Imaging  DX-CHEST-PORTABLE (1 VIEW)   Final Result      No acute cardiopulmonary abnormality identified.      CT-ABDOMEN-PELVIS W/O   Final Result      1.  Left retroperitoneal hematoma along the left iliopsoas/iliac is muscle measuring approximately 4.3 x 3.4 x 5.9 cm.      2.  Bilateral lower lobe consolidation could be due to pneumonia or atelectasis.      3.  Small layering stones versus sludge in the gallbladder.      4.  Diverticulosis without diverticulitis.      5.  Moderate amount of stool throughout the colon suggesting constipation.      US-EXTREMITY VENOUS UPPER UNILAT RIGHT   Final Result      DX-WRIST-LIMITED 2- RIGHT   Final Result         1.   No radiographic evidence of acute injury.      2.  Old healed fracture right fifth metacarpal neck.      3.  Moderate osteoarthritic changes of the right first carpal metacarpal joint.      4.  Possible intra-articular  loose bodies adjacent to the styloid processes of the radius and ulna.      DX-CHEST-PORTABLE (1 VIEW)   Final Result      1.  Increased pulmonary edema   2.  Bibasilar underinflation atelectasis which could obscure an additional process. This is unchanged.   3.  Persistently enlarged cardiac silhouette      DX-CHEST-PORTABLE (1 VIEW)   Final Result      Mildly improved aeration and stable to slightly improved vascular congestion.      Mildly improved left basilar atelectasis.      DX-CHEST-PORTABLE (1 VIEW)   Final Result      1.  Interval extubation and removal of NG tube.   2.  Increased bibasilar airspace disease.   3.  Increased interstitial edema.   4.  Small left pleural effusion.      CT-HEAD W/O   Final Result      1. No CT evidence of acute infarct, hemorrhage or mass.   2. Moderate global parenchymal atrophy. Chronic small vessel ischemic changes.      DX-CHEST-PORTABLE (1 VIEW)   Final Result      Mildly improved left basilar opacity.      DX-ABDOMEN FOR TUBE PLACEMENT   Final Result      Enteric tube tip projects over the gastric antrum or proximal duodenum      DX-CHEST-PORTABLE (1 VIEW)   Final Result         1. Stable lines and tubes.   2. Worsening left basilar opacity. Small left pleural effusion. Mild right infrahilar atelectasis.         DX-CHEST-PORTABLE (1 VIEW)   Final Result      1.  Cardiomegaly with interstitial edema.      2.  Left lung base atelectasis and minimal left pleural effusion.      DX-CHEST-PORTABLE (1 VIEW)   Final Result      1.  Improved left pleural effusion with adjacent airspace disease.   2.  Improved interstitial infiltrates versus edema.      EC-ECHOCARDIOGRAM COMPLETE W/O CONT   Final Result      DX-CHEST-PORTABLE (1 VIEW)   Final Result      1.  Small left pleural effusion with adjacent airspace disease.   2.  Worsening interstitial infiltrates versus edema.   3.  Right basilar atelectasis.      DX-ABDOMEN FOR TUBE PLACEMENT   Final Result      Enteric tube tip  projects over the proximal duodenum      DX-ABDOMEN FOR TUBE PLACEMENT   Final Result         1.  Nonspecific bowel gas pattern in the upper abdomen.   2.  Nasogastric tube, not visualized beyond the mid thorax, see ordered repeat abdominal x-ray for further characterization.   3.  Cardiomegaly   4.  Left lung base atelectasis or infiltrate.      OUTSIDE IMAGES-DX CHEST   Final Result      DX-CHEST-PORTABLE (1 VIEW)   Final Result         1.  Bibasilar atelectasis or early infiltrates.   2.  Trace bilateral pleural effusions   3.  Cardiomegaly           Assessment/Plan  Problem Representation:    * Acute hypoxemic respiratory failure due to COVID-19 (HCC)- (present on admission)  Assessment & Plan  The patient is being treated for pneumonia,  On zosyn last day 11/21  Wean off O2 as tolerated     Anemia  Assessment & Plan  Hemoglobin trending down  Received a unit today   CT abdomen showing: Left retroperitoneal hematoma along the left iliopsoas/iliac is muscle measuring approximately 4.3 x 3.4 x 5.9 cm.  Reversing warfarin   received 1 unit of RBC   Close monitoring     Hematoma- (present on admission)  Assessment & Plan  CT abdomen showing Left retroperitoneal hematoma along the left iliopsoas/iliac is muscle measuring approximately 4.3 x 3.4 x 5.9 cm.  Holding warfarin , giving FFP and vit K to reverse INR   Has received 1 unit of RBC   Close monitoring   -discuss need of when to restart anticoagulation. Likely 11/23/22    MYLA (acute kidney injury) (HCC)- (present on admission)  Assessment & Plan  initially was on dialysis and improved so dialsysi was stopped but now has worsening again so nephrology has been consulted     Nephrology recs:  - Stop IV fluids  - Strict IOs  - Transfuse PRBC for Hb < 7   - Okay for midline as nursing staff having a lot of difficulty maintaining IV due to poor vasculature and need for multiple blood products and IV fluids for resuscitation. Will reassess need for midline bedside  -  Ongoing [facility] discussions  -[facility]      Goals of care, counseling/discussion- (present on admission)  Assessment & Plan  Palliative care assisting,   I have called his son today 11/19/22 and updated on patient condition   Patient remains full code     Acute on chronic systolic congestive heart failure (HCC)- (present on admission)  Assessment & Plan  Continue with medical therapy including Coreg, holding on diuresis since BP on the low side   It appears his EF has been improving  Continue present medical therapy    CONCLUSIONS  The left ventricle is not well visualized due to body habitus.  Left ventricular systolic function is probably normal.  The ejection fraction is roughly estimated to be 55%.  A reliable estimation of diastolic function cannot be made due to   mitral valve disease.  Severely dilated right ventricle.  Reduced right ventricular systolic function.  Estimated right ventricular systolic pressure is 40 mmHg.  Severe biatrial dilation.  Mild mitral regurgitation.  Aortic valve sclerosis without significant stenosis.  Mild tricuspid regurgitation.  Normal inferior vena cava size and inspiratory collapse    Paroxysmal atrial fibrillation (HCC)- (present on admission)  Assessment & Plan  History of, ongoing anticoagulation   Holding warfarin at this time and also giving VIt K and also FFP     MSSA (methicillin susceptible Staphylococcus aureus) pneumonia (HCC)- (present on admission)  Assessment & Plan  Finished treatment     Hypernatremia- (present on admission)  Assessment & Plan  Resolved     Restless leg syndrome- (present on admission)  Assessment & Plan  Resume Requip    Chronic anticoagulation- (present on admission)  Assessment & Plan  Holding Coumadin. Will consider restarting with apixiban 5 days after Ct abd pelvis findings (11/19) of hematoma    Pacemaker- (present on admission)  Assessment & Plan  In situ, telemetry monitoring, history of complete heart block    Constipation  Assessment &  Plan  -S/p enema  -scheduled bowel regimen    Delirium- (present on admission)  Assessment & Plan  Likely in light of the patient's acute hospitalization, acute critical condition, disease  Standard precautions and out of delirium  Avoid offending medication  Avoid interruption of circadian rhythm  CT head negative for acute changes, unable to provide a MRI imaging secondary to patient's pacemaker.    PLAN  RESOLVED  Patient alert and oriented x 3 at bedside      Septic shock with acute organ dysfunction due to methicillin susceptible Staphylococcus aureus (MSSA) ([facility])  Assessment & Plan  Present on admission, treated adequately    Hyperkalemia- (present on admission)  Assessment & Plan  Improved   Monitor        VTE prophylaxis: SCDs/TEDs    I have performed a physical exam and reviewed and updated ROS and Plan today (11/21/2022). In review of yesterday's note (11/20/2022), there are no changes except as documented above.

## 2022-11-22 NOTE — PROGRESS NOTES
[facility] Internal Medicine Daily Progress Note    Date of Service  11/22/2022    [facility] Team: R DILAN Perales Team   Attending: Therese Pedraza M.d.  Senior Resident: Dr. Vásquez  Intern:  Dr. Torres  Contact Number: [phone]    Chief Complaint  Sean Kincaid is a 77 y.o. male admitted 11/4/2022 with respiratory failure secondary to COVID    Hospital Course  77 y.o. male who presented 11/4/2022 with history of COPD, atrial fibrillation, diabetes type 2, history of congestive heart failure, EF of 45%,, [facility], complaining of worsening shortness of breath and fatigue.  The patient was diagnosed with COVID-19, started initially on BiPAP and then eventually required to be intubated, was found with an MYLA, creatinine of 8.0, potassium 6.6, the patient chronically anticoagulated for atrial fibrillation, history of complete heart block, history of bioprosthetic replacement of the mitral valve, ANALY, after presentation to [facility] he was admitted to the ICU, he continued to be treated in ICU with mechanical ventilation, required vasopressors including norepinephrine and vasopressin, placed on empiric antibiotics in form of ceftriaxone, nephrology consulted for MYLA, nonoliguric, had brief initiation of RRT but then picked up his urine output and required no further RRT as per nephrology.  He developed a right thigh/femoral hematoma related to a try Alysis catheter placement., anticoagulation was held, the patient required Precedex for sedation, fentanyl for pain control, his respiratory culture turned positive for MSSA and the patient is being treated for MSSA pneumonia in form of Ancef.  His COVID-19 pneumonia was treated with dexamethasone 6 mg for 10 days.  The patient eventually was extubated on 11/8, he was found significantly encephalopathic, a CT of the head was negative for acute changes, the patient unable to be evaluated by MRI secondary to the  patient's pacemaker.  He progressed to some degree but is still encephalopathic with mental slowing, confusion, being awake/alert  and oriented x1, moving all 4 extremities fairly equally.  Palliative care was consulted to discuss goals of care with family and the decision was reached in light of the patient's condition and age to change his CODE STATUS to DNR/DNI.  Patient had evaluation with speech therapy, he passed a swallow evaluation, his p.o. intake remains fairly poor.  On 11/10 the patient is evaluated, he continues to be confused and delirious, he remains off pressors, currently on 2 L nasal can oxygen, saturating 94%, he has overall good urine output, his right groin hematoma remained stable, his anticoagulation was restarted in form of heparin drip and resuming Coumadin.  The patient had significant difficulty with hypernatremia and acute 8-hour sodium levels were checked, this had improved.    Interval Problem Update  No acute events overnight. Patient currently on 2L of oxygen this morning without any difficulties breathing. He does endorse cough w/ sputum but says unchanged from past few days. Patient was not given enema but bowel regimen was restarted and per patient he had one bowel movement. Patient was reportedly agitated overnight but back to baseline this morning.    In regards to patients hematoma conversation will need to be had in regards to when to restart anticoagulation. Will consider apixaban rather than patients home regimen of warfarin.    Was informed by telemetry patient's pacemaker is not pacing properly. Patient asymptomatic at bedside w/ ecg showing intermittent pacing discussed with electrocardio physiology NP embers and plan is to interrogate device, rep notified.    I have discussed this patient's plan of care and discharge plan at IDT rounds today with Case Management, Nursing, Nursing leadership, and other members of the IDT team.    Consultants/Specialty  nephrology    Code  Status  Full Code    Disposition  Patient is not medically cleared for discharge.   Anticipate discharge to to skilled nursing facility.  I have placed the appropriate orders for post-discharge needs.    Review of Systems  Review of Systems   Constitutional:  Positive for malaise/fatigue.   HENT: Negative.     Eyes: Negative.    Respiratory:  Positive for sputum production.    Cardiovascular: Negative.    Gastrointestinal: Negative.    Genitourinary: Negative.    Musculoskeletal: Negative.    Skin: Negative.    Neurological:  Positive for focal weakness and weakness.   Endo/Heme/Allergies: Negative.    Psychiatric/Behavioral: Negative.        Physical Exam  Temp:  [36.1 °C (96.9 °F)-36.8 °C (98.2 °F)] 36.1 °C (96.9 °F)  Pulse:  [63-80] 68  Resp:  [20] 20  BP: (115-159)/(63-72) 159/70  SpO2:  [91 %-94 %] 91 %    Physical Exam  Constitutional:       Appearance: He is obese. He is ill-appearing.   HENT:      Head: Normocephalic and atraumatic.      Right Ear: Tympanic membrane, ear canal and external ear normal.      Left Ear: Tympanic membrane, ear canal and external ear normal.      Nose: Nose normal.      Mouth/Throat:      Mouth: Mucous membranes are moist.      Pharynx: Oropharynx is clear.   Eyes:      Extraocular Movements: Extraocular movements intact.      Conjunctiva/sclera: Conjunctivae normal.      Pupils: Pupils are equal, round, and reactive to light.   Cardiovascular:      Rate and Rhythm: Normal rate. Rhythm irregular.   Pulmonary:      Effort: Pulmonary effort is normal. No respiratory distress.   Chest:      Chest wall: No tenderness.   Abdominal:      General: Abdomen is flat. Bowel sounds are normal.      Palpations: Abdomen is soft.   Musculoskeletal:      Cervical back: Normal range of motion.      Right lower leg: No edema.      Left lower leg: No edema.   Skin:     General: Skin is warm.      Capillary Refill: Capillary refill takes less than 2 seconds.   Neurological:      Mental Status: He is  alert and oriented to person, place, and time.      Motor: Weakness present.   Psychiatric:         Mood and Affect: Mood normal.         Behavior: Behavior normal.         Thought Content: Thought content normal.         Judgment: Judgment normal.       Fluids    Intake/Output Summary (Last 24 hours) at 11/22/2022 1554  Last data filed at 11/22/2022 0500  Gross per 24 hour   Intake 300 ml   Output 1100 ml   Net -800 ml       Laboratory  Recent Labs     11/21/22  0017 11/21/22  0549 11/21/22  1919 11/22/22 0225   WBC 5.6  --  6.3 6.6   RBC 2.66*  --  2.84* 2.86*   HEMOGLOBIN 7.9* 8.3* 8.5* 8.6*   HEMATOCRIT 25.5* 27.1* 27.0* 27.7*   MCV 95.9  --  95.1 96.9   MCH 29.7  --  29.9 30.1   MCHC 31.0*  --  31.5* 31.0*   RDW 50.6*  --  50.2* 51.5*   PLATELETCT 203  --  219 213   MPV 10.7  --  10.2 10.5     Recent Labs     11/20/22  0816 11/21/22  0017 11/22/22 0225   SODIUM 135 136 137   POTASSIUM 4.8 4.6 5.2   CHLORIDE 107 106 108   CO2 17* 19* 17*   GLUCOSE 117* 80 182*   BUN 53* 55* 54*   CREATININE 2.88* 3.12* 3.05*   CALCIUM 8.1* 8.1* 8.5     Recent Labs     11/20/22  0020 11/21/22  0017 11/22/22  0225   INR 1.67* 1.64* 1.75*               Imaging  DX-CHEST-PORTABLE (1 VIEW)   Final Result      No acute cardiopulmonary abnormality identified.      CT-ABDOMEN-PELVIS W/O   Final Result      1.  Left retroperitoneal hematoma along the left iliopsoas/iliac is muscle measuring approximately 4.3 x 3.4 x 5.9 cm.      2.  Bilateral lower lobe consolidation could be due to pneumonia or atelectasis.      3.  Small layering stones versus sludge in the gallbladder.      4.  Diverticulosis without diverticulitis.      5.  Moderate amount of stool throughout the colon suggesting constipation.      US-EXTREMITY VENOUS UPPER UNILAT RIGHT   Final Result      DX-WRIST-LIMITED 2- RIGHT   Final Result         1.   No radiographic evidence of acute injury.      2.  Old healed fracture right fifth metacarpal neck.      3.  Moderate  osteoarthritic changes of the right first carpal metacarpal joint.      4.  Possible intra-articular loose bodies adjacent to the styloid processes of the radius and ulna.      DX-CHEST-PORTABLE (1 VIEW)   Final Result      1.  Increased pulmonary edema   2.  Bibasilar underinflation atelectasis which could obscure an additional process. This is unchanged.   3.  Persistently enlarged cardiac silhouette      DX-CHEST-PORTABLE (1 VIEW)   Final Result      Mildly improved aeration and stable to slightly improved vascular congestion.      Mildly improved left basilar atelectasis.      DX-CHEST-PORTABLE (1 VIEW)   Final Result      1.  Interval extubation and removal of NG tube.   2.  Increased bibasilar airspace disease.   3.  Increased interstitial edema.   4.  Small left pleural effusion.      CT-HEAD W/O   Final Result      1. No CT evidence of acute infarct, hemorrhage or mass.   2. Moderate global parenchymal atrophy. Chronic small vessel ischemic changes.      DX-CHEST-PORTABLE (1 VIEW)   Final Result      Mildly improved left basilar opacity.      DX-ABDOMEN FOR TUBE PLACEMENT   Final Result      Enteric tube tip projects over the gastric antrum or proximal duodenum      DX-CHEST-PORTABLE (1 VIEW)   Final Result         1. Stable lines and tubes.   2. Worsening left basilar opacity. Small left pleural effusion. Mild right infrahilar atelectasis.         DX-CHEST-PORTABLE (1 VIEW)   Final Result      1.  Cardiomegaly with interstitial edema.      2.  Left lung base atelectasis and minimal left pleural effusion.      DX-CHEST-PORTABLE (1 VIEW)   Final Result      1.  Improved left pleural effusion with adjacent airspace disease.   2.  Improved interstitial infiltrates versus edema.      EC-ECHOCARDIOGRAM COMPLETE W/O CONT   Final Result      DX-CHEST-PORTABLE (1 VIEW)   Final Result      1.  Small left pleural effusion with adjacent airspace disease.   2.  Worsening interstitial infiltrates versus edema.   3.   Right basilar atelectasis.      DX-ABDOMEN FOR TUBE PLACEMENT   Final Result      Enteric tube tip projects over the proximal duodenum      DX-ABDOMEN FOR TUBE PLACEMENT   Final Result         1.  Nonspecific bowel gas pattern in the upper abdomen.   2.  Nasogastric tube, not visualized beyond the mid thorax, see ordered repeat abdominal x-ray for further characterization.   3.  Cardiomegaly   4.  Left lung base atelectasis or infiltrate.      OUTSIDE IMAGES-DX CHEST   Final Result      DX-CHEST-PORTABLE (1 VIEW)   Final Result         1.  Bibasilar atelectasis or early infiltrates.   2.  Trace bilateral pleural effusions   3.  Cardiomegaly           Assessment/Plan  Problem Representation:    * Anemia  Assessment & Plan  Hemoglobin trending down  Received a unit today   CT abdomen showing: Left retroperitoneal hematoma along the left iliopsoas/iliac is muscle measuring approximately 4.3 x 3.4 x 5.9 cm.  Reversing warfarin   received 1 unit of RBC    PLAN  Close monitoring    Hematoma- (present on admission)  Assessment & Plan  CT abdomen showing Left retroperitoneal hematoma along the left iliopsoas/iliac is muscle measuring approximately 4.3 x 3.4 x 5.9 cm.  Holding warfarin , giving FFP and vit K to reverse INR   Has received 1 unit of RBC   Close monitoring   -discuss need of when to restart anticoagulation. Likely 11/24/22    MYLA (acute kidney injury) (HCC)- (present on admission)  Assessment & Plan  initially was on dialysis and improved so dialysis was stopped but now has worsening again so nephrology has been consulted     Nephrology recs:  - Stop IV fluids  - Strict IOs  - Transfuse PRBC for Hb < 7   - Okay for midline as nursing staff having a lot of difficulty maintaining IV due to poor vasculature and need for multiple blood products and IV fluids for resuscitation. Discussed with IR nurse. Will hold off on midline for now as patient still has IV access.   - Ongoing GOC discussions   - CMP   - lasix  discontinued      Pacemaker complications  Assessment & Plan  Was informed by telemetry patient's pacemaker is not pacing properly. Patient asymptomatic at bedside w/ ecg showing intermittent pacing discussed with electrocardio physiology NP embers and plan is to interrogate device, rep notified.    Acute on chronic systolic congestive heart failure (HCC)- (present on admission)  Assessment & Plan  Continue with medical therapy including Coreg, holding on diuresis since BP on the low side   It appears his EF has been improving  Continue present medical therapy    CONCLUSIONS  The left ventricle is not well visualized due to body habitus.  Left ventricular systolic function is probably normal.  The ejection fraction is roughly estimated to be 55%.  A reliable estimation of diastolic function cannot be made due to   mitral valve disease.  Severely dilated right ventricle.  Reduced right ventricular systolic function.  Estimated right ventricular systolic pressure is 40 mmHg.  Severe biatrial dilation.  Mild mitral regurgitation.  Aortic valve sclerosis without significant stenosis.  Mild tricuspid regurgitation.  Normal inferior vena cava size and inspiratory collapse    Acute hypoxemic respiratory failure due to COVID-19 (HCC)- (present on admission)  Assessment & Plan  The patient is being treated for pneumonia,  Zosyn completed  Wean off O2 as tolerated     Paroxysmal atrial fibrillation (HCC)- (present on admission)  Assessment & Plan  History of, ongoing anticoagulation   Holding warfarin at this time and also giving VIt K and also FFP     MSSA (methicillin susceptible Staphylococcus aureus) pneumonia (HCC)- (present on admission)  Assessment & Plan  Finished treatment     Hypernatremia- (present on admission)  Assessment & Plan  Resolved     Restless leg syndrome- (present on admission)  Assessment & Plan  Requip discontinued    Chronic anticoagulation- (present on admission)  Assessment & Plan  Holding  Coumadin. Will consider restarting with apixiban 5 days after Ct abd pelvis findings (11/19) of hematoma    Pacemaker- (present on admission)  Assessment & Plan  In situ, telemetry monitoring, history of complete heart block    Constipation  Assessment & Plan  -enema not given  -scheduled bowel regimen    Delirium- (present on admission)  Assessment & Plan  Likely in light of the patient's acute hospitalization, acute critical condition, disease  Standard precautions and out of delirium  Avoid offending medication  Avoid interruption of circadian rhythm  CT head negative for acute changes, unable to provide a MRI imaging secondary to patient's pacemaker.    PLAN  D/c tele  No vitals overnight  [place] if disoriented  Patient alert and oriented x 3 at bedside      Goals of care, counseling/discussion- (present on admission)  Assessment & Plan  Palliative care assisting,   I have called his son today 11/19/22 and updated on patient condition   Patient remains full code     Septic shock with acute organ dysfunction due to methicillin susceptible Staphylococcus aureus (MSSA) ([facility])  Assessment & Plan  Present on admission, treated adequately    Hyperkalemia- (present on admission)  Assessment & Plan  Improved   Monitor        VTE prophylaxis: SCDs/TEDs    I have performed a physical exam and reviewed and updated ROS and Plan today (11/22/2022). In review of yesterday's note (11/21/2022), there are no changes except as documented above.

## 2022-11-22 NOTE — HEART FAILURE PROGRAM
Continues to need post acute placement per Dr. Torres's note yesterday.    Last discharge plan note on 11/18 indicates facilities declining because of non participation in therapy.    Will leave 12/8 f/u as is for now.

## 2022-11-22 NOTE — PROGRESS NOTES
The tele room had notified that the pts pacemaker is not firing at the correct time. Notified Dr. Vásquez of finding.

## 2022-11-22 NOTE — PROGRESS NOTES
Pt having some episodes of confusion, and inspiratory wheezing, called Resp. Therapist for breathing treatment. Pt refused fleet enema for he was in pain and states we violates his right. Will ask night shift rn to try to give it for constipation. Will cont. To monitor.

## 2022-11-22 NOTE — CARE PLAN
Problem: Psychosocial  Goal: Patient's level of anxiety will decrease  Outcome: Progressing  Goal: Patient's ability to verbalize feelings about condition will improve  Outcome: Progressing  Goal: Patient's ability to re-evaluate and adapt role responsibilities will improve  Outcome: Progressing  Goal: Patient and family will demonstrate ability to cope with life altering diagnosis and/or procedure  Outcome: Progressing  Goal: Spiritual and cultural needs incorporated into hospitalization  Outcome: Progressing     Problem: Hemodynamics  Goal: Patient's hemodynamics, fluid balance and neurologic status will be stable or improve  Outcome: Progressing     Problem: Respiratory  Goal: Patient will achieve/maintain optimum respiratory ventilation and gas exchange  Outcome: Progressing     Problem: Mechanical Ventilation  Goal: Safe management of artificial airway and ventilation  Outcome: Progressing  Goal: Successful weaning off mechanical ventilator, spontaneously maintains adequate gas exchange  Outcome: Progressing  Goal: Patient will be able to express needs and understand communication  Outcome: Progressing     Problem: Risk for Aspiration  Goal: Patient's risk for aspiration will be absent or decrease  Outcome: Progressing     Problem: Urinary - Renal Perfusion  Goal: Ability to achieve and maintain adequate renal perfusion and functioning will improve  Outcome: Progressing     Problem: Venous Thromboembolism (VTE) Prevention  Goal: The patient will remain free from venous thromboembolism (VTE)  Outcome: Progressing     Problem: Nutrition  Goal: Patient's nutritional and fluid intake will be adequate or improve  Outcome: Progressing  Goal: Enteral nutrition will be maintained or improve  Outcome: Progressing  Goal: Enteral nutrition will be maintained or improve  Outcome: Progressing     Problem: Urinary Elimination  Goal: Establish and maintain regular urinary output  Outcome: Progressing     Problem: Bowel  Elimination  Goal: Establish and maintain regular bowel function  Outcome: Progressing   The patient is Stable - Low risk of patient condition declining or worsening    Shift Goals  Clinical Goals: Safety  Patient Goals: Rest  Family Goals: No family at bedside    Progress made toward(s) clinical / shift goals:  Pt has all safety interventions in place such as the bed alarm and call light within reach, pt is resting well.    Patient is not progressing towards the following goals:

## 2022-11-23 NOTE — PROGRESS NOTES
Monitor summary    Rhythm: Paced  Rate: 65-82  Ectopy: (O) PVC, (R) BIGEM, (R) COUP, (R) TRIP, (R) TRIGEM,   Measurement: Paced

## 2022-11-23 NOTE — THERAPY
Missed Therapy     Patient Name: Sean Kincaid  Age:  77 y.o., Sex:  male  Medical Record #: [id number]  Today's Date: 11/22/2022    Discussed missed therapy with RN       11/22/22 0645   Interdisciplinary Plan of Care Collaboration   Collaboration Comments Patient seen for follow up PT treatment session and demos self-limiting behavior. He continues to be confused and adamantly refsuing to get to EOB despite education and encouragement. Patient c/o B LE pain but when this therapist removed B pressure relief boots from LEs not wincing or complaints. However, when attemtepd to move LEs to EOB patient actively resisting and not moving to EOB.  Patient c/o constipation and wanting to walk but then continues to refuse. Will continue to attempt to work with patient. Per departmental policy if there are 3 refusals in a row we will DC the PT orders.

## 2022-11-23 NOTE — CARE PLAN
Problem: Respiratory  Goal: Patient will achieve/maintain optimum respiratory ventilation and gas exchange  Outcome: Progressing     Problem: Communication  Goal: The ability to communicate needs accurately and effectively will improve  Outcome: Progressing   The patient is Stable - Low risk of patient condition declining or worsening    Shift Goals  Clinical Goals: pain control  Patient Goals: pain control  Family Goals: ALLIE    Progress made toward(s) clinical / shift goals:  prn pain medications in use    Patient is not progressing towards the following goals:

## 2022-11-23 NOTE — PROGRESS NOTES
[facility] -  PROGRESS NOTE               Author: Mandeep Francis M.D. Date & Time: 11/23/2022  1:06 PM     HPI:  Patient known to nephrology service and was initially seen during his initial presentation to the hospital on 11/4 up to 11/9.  He required HD x1 on 11/5 for hyperkalemia, regained kidney functions but has now worsened again prompting nephrology consult.    This is a 77 y.o. male w/ h/o CHF, COPD, afib, DM who presented 11/4/2022 in transfer from [facility] where he presented with worsening shortness of breath and fatigue.  At [place] he was found to be COVID-positive, was initially started on BiPAP and then intubated, in acute kidney injury with a creatinine of 8.0 and potassium of 6.6.  He was transferred to [facility] for further evaluation and treatment. Here his Scr was 7.6 with a K+ of 7.6. Nephrology was asked to consult for MYLA and critical hyperkalemia.     DAILY NEPHROLOGY SUMMARY:  11/5 - Scr down to 3.74. K+ is 5.0. Non-oliguric. Still on vent support.  11/6: Scr continues to trend down. Non-oliguric. ECHO: The left ventricle is not well visualized due to body habitus. Left ventricular systolic function is probably normal. The ejection fraction is roughly estimated to be 55%. A reliable estimation of diastolic function cannot be made due to mitral valve disease. Severely dilated right ventricle.Reduced right ventricular systolic function.  11/7: Remains on vent/ steroids  11/8: extubated, but remains confused  11/9: Passed swallow eval, but not taking signficant PO - signed off   -------------  11/19: Re consulted because of new MYLA and worsening renal functions; was found to have retroperitoneal hematoma because of hypercoagulability INR greater than 4.   11/20: Sitting up in bed, appears more perked up, blood pressure now in normotensive range, made almost 1200 cc urine past 24 hours-Ball catheter was placed yesterday  11/21:  "family meeting yesterday-remains full code with al life saving interventions, stable hemodynamics, 1.2L UOP, cr up-trending   11/22: Cr stable, 1.1L UOP, denies physical complaints, paranoid  11/23: NO AM labs, 1.7L UOP, denies complaints, no chest pain or SOB    REVIEW OF SYSTEMS:    10 point ROS performed, negative other than stated above    PMH/PSH/SH/FH:   Reviewed and unchanged since admission note    CURRENT MEDICATIONS:   Reviewed from admission to present day    VS:  /70   Pulse 64   Temp 36.9 °C (98.4 °F) (Temporal)   Resp 17   Ht 1.88 m (6' 2\")   Wt 114 kg (251 lb 5.2 oz)   SpO2 93%   BMI 32.27 kg/m²   GENERAL: Ill appearing  CV: RRR, + pedal edema  RESP: coarse breath sounds  GI: Soft  MSK: diffuse wekaness  SKIN: dry skin  NEURO: AOx1  PSYCH: Cooperative      Fluids:  In: 930 [P.O.:850; Other:80]  Out: 1725     LABS:  Recent Labs     11/21/22  0017 11/22/22  0225   SODIUM 136 137   POTASSIUM 4.6 5.2   CHLORIDE 106 108   CO2 19* 17*   GLUCOSE 80 182*   BUN 55* 54*   CREATININE 3.12* 3.05*   CALCIUM 8.1* 8.5         IMAGING:   All imaging reviewed from admission to present day    IMPRESSION:  # MYLA   - acute blood loss and retroperitoneal hematoma  contributing  - required HD x1 on 11/5 for hyperkalemia earlier this admission then recovered function and creatinine returned back to normal before worsening again  - CT abd shows unremarkable kidneys without hydronephrosis 11/19/22  # Acute hypoxemic respiratory failure due to COVID-19  -Was intubated and on mechanical ventilation earlier this admission and then got extubated  # PAfib  # ? CHF  # Anemia of blood loss  # Retroperitoneal hematoma  # hypercoagulability  # Systolic CHF, LVEF 25%  # Acidosis     PLAN:  -Renal panel in AM  -Strict IOs  -Transfuse PRBC for Hb < 7   - Ongoing GOC discussions    Thank you    "

## 2022-11-23 NOTE — PROGRESS NOTES
Received report and assumed care of patient. Patient alert and oriented x 2-3, forgetful, on 2L NC. Complaining for bilateral leg pain - worse with movement. BM x 1. Assessment completed, medicated per MAR. All fall precautions in place. Bed in lowest position. Call light and belongings are within reach. Educated on room and call light, patient verbalized understanding. Patient expressed no further needs at this time.

## 2022-11-23 NOTE — THERAPY
Speech Language Pathology  Daily Treatment     Patient Name: Sean Kincaid  Age:  77 y.o., Sex:  male  Medical Record #: [id number]  Today's Date: 11/23/2022     Precautions  Precautions: Fall Risk, Swallow Precautions (See Comments)    Assessment  Patient seen this date for dysphagia tx session. Per RN and CNA, patient appears to be tolerating LQ3/MT2 diet, but still requires 1:1 feeding d/t confusion, poor initiation, and gout of RUE. Patient awake, alert, and agreeable to PO trials and therapy objectives w/ education and encouragement. Patient consumed PO trials of MTL via tsp and cup sip, thins via tsp, cup sip, and straw, pudding, and 4 oz soft solids. Patient presented with adequate bolus acceptance and containment of all trials. No oral residue noted on any textures. On thin liquids via cup x1 out of 5 sips and x2 out of 3 straw sips, patient had coughing, which is concerning for penetration/aspiration. No other overt s/sx of aspiration noted on any other textures given.     Recommend patient upgrade to SB6/MT2 diet (soft solids w/ mildly thick liquids) w/ 1:1 feeding. Float meds whole in puree. No straws. SLP is following. Please hold PO intake and notify SLP w/ any difficulty or changes in status.     Plan  Continue current treatment plan.    Discharge Recommendations: Recommend post-acute placement for additional speech therapy services prior to discharge home    Objective     11/23/22 1220   Precautions   Precautions Fall Risk;Swallow Precautions ( See Comments)   Vitals   O2 (LPM) 1   O2 Delivery Device Silicone Nasal Cannula   Pain 0 - 10 Group   Therapist Pain Assessment Post Activity Pain Same as Prior to Activity;Nurse Notified;0   Dysphagia    Positioning / Behavior Modification Modulate Rate or Bite Size;Multiple Swallows;Self Monitoring   Diet / Liquid Recommendation Soft & Bite-Sized (6) - (Dysphagia III);Mildly Thick (2) - (Nectar Thick)   Nutritional Liquid Intake Rating Scale Thickened  "beverages (mildly thick unless otherwise specified)   Nutritional Food Intake Rating Scale Total oral diet with multiple consistencies but requiring special preparation or compensations   Nursing Communication Swallow Precaution Sign Posted at Head of Bed   Skilled Intervention Verbal Cueing;Compensatory Strategies   Recommended Route of Medication Administration   Medication Administration  Float Whole with Puree   Patient / Family Goals   Patient / Family Goal #1 \"Ow!\"   Goal #1 Outcome Progressing as expected   Short Term Goals   Short Term Goal # 2 Pt will consume liquidized textures with mildly thick liquids without any overt s/sx of aspiration   Goal Outcome # 2  Goal met, new goal aded   Short Term Goal # 2 B  NEW 11/23: Patient will consume SB6/MT2 diet with 1:1 feeding with no overt s/sx of aspiration.   Education Group   Education Provided Dysphagia   Dysphagia Patient Response Patient;Acceptance;Explanation;Verbal Demonstration;Reinforcement Needed   Anticipated Discharge Needs   Discharge Recommendations Recommend post-acute placement for additional speech therapy services prior to discharge home     "

## 2022-11-23 NOTE — DIETARY
Nutrition Services Brief Update:    Day 19 of admit.  Sean Kincaid is a 77 y.o. male with admitting DX of Acute hypoxemic respiratory failure due to COVID-19 ([facility]) [U07.1, J96.01]  MSSA (methicillin susceptible Staphylococcus aureus) pneumonia ([facility]) [J15.211]    Current Diet: L3: liquidized with MTL and 1:1 supervision     Problem: Nutritional:  Goal: Achieve adequate nutritional intake  Description: Patient will consume >50% of meals  Outcome: Met     Per flowsheets, pt is eating ~50% with Boost VHC. Per RN, pt ate 100% of breakfast this morning with 50% of Boost VHC. Additionally, pt was drinking boost at bedside at time of visit.     RD provided pt with cardiac nutrition education. Pt accepted the handouts but declined verbal education. RD encouraged pt to request RD follow up for education as needed.     RD monitoring per dept policy.

## 2022-11-23 NOTE — THERAPY
Missed Therapy     Patient Name: Sean Kincaid  Age:  77 y.o., Sex:  male  Medical Record #: [id number]  Today's Date: 11/22/2022    Discussed missed therapy with RN        11/22/22 1603   Treatment Variance   Reason For Missed Therapy Medical - Patient with Nursing   Interdisciplinary Plan of Care Collaboration   IDT Collaboration with  Nursing   Collaboration Comments This SLP attempted to see patient x2 today for dysphagia tx session. Patient initially with PT and then later getting cleaned up by RN and CNA. SLP will re-attempt tx as able/appropriate. Thank you.

## 2022-11-23 NOTE — CARE PLAN
The patient is Stable - Low risk of patient condition declining or worsening    Shift Goals  Clinical Goals: safety  Patient Goals: rest  Family Goals: ALLIE    Progress made toward(s) clinical / shift goals:  Patient forgetful and had episodes of confusion throughout the night. Complained of bilateral knee and leg pain - prn oxycodone given. Noticed R hand swollen and painful to touch - patient stated he had gout. PRN Voltaren given with positive effect. Pt had large loose stool overnight. Vitals stable, able to wean 02 to 1L. Care endorsed to dayshift RN

## 2022-11-24 NOTE — CARE PLAN
The patient is Stable - Low risk of patient condition declining or worsening    Shift Goals  Clinical Goals: pain control  Patient Goals: pain control  Family Goals: ALLIE    Progress made toward(s) clinical / shift goals:    Problem: Psychosocial  Goal: Patient's level of anxiety will decrease  Outcome: Progressing  Goal: Patient's ability to verbalize feelings about condition will improve  Outcome: Progressing     Problem: Hemodynamics  Goal: Patient's hemodynamics, fluid balance and neurologic status will be stable or improve  Outcome: Progressing     Problem: Respiratory  Goal: Patient will achieve/maintain optimum respiratory ventilation and gas exchange  Outcome: Progressing     Problem: Risk for Aspiration  Goal: Patient's risk for aspiration will be absent or decrease  Outcome: Progressing     Problem: Dysphagia  Goal: Dysphagia will improve  Outcome: Progressing     Problem: Mobility  Goal: Patient's capacity to carry out activities will improve  Outcome: Progressing     Problem: Self Care  Goal: Patient will have the ability to perform ADLs independently or with assistance (bathe, groom, dress, toilet and feed)  Outcome: Progressing     Problem: Infection - Standard  Goal: Patient will remain free from infection  Outcome: Progressing

## 2022-11-24 NOTE — PROGRESS NOTES
"[facility] Internal Medicine Daily Progress Note    Date of Service  11/23/2022    [facility] Team: R IM [facility] Team   Attending: Therese Pedraza M.d.  Senior Resident: Dr. Vásquez  Intern:  Dr. Torres  Contact Number: [phone]    Chief Complaint  Sean Kincaid is a 77 y.o. male admitted 11/4/2022 with respiratory failure secondary to COVID.     Hospital Course  77-year-old male with a past medical history of heart failure, COPD, A. fib, type 2 diabetes mellitus who presented on 11/4/2022 as a transfer from [facility] with worsening of shortness of breath and fatigue, started on BiPAP intubated, ICU stay, MYLA on acute renal failure, now with CKD, course complicated by hyperkalemia, hypernatremia, worsening renal function, ICU myopathy, likely stroke during hospitalization, bacterial pneumonia requiring IV antibiotics, now off all antibiotics, creatinine stably elevated with good urine output,    Interval Problem Update  No acute events overnight. Patient currently on 1L of oxygen this morning without any difficulties breathing.  Patient without acute complaints overnight, no chest pain, palpitations, lower extremity edema, shortness of breath, cough, abdominal pain, nausea/vomiting/diarrhea/constipation, dysuria, hematuria, new onset focal weakness, headaches or sensory changes.  Patient states that \"I would never realize that not having poop could make me hurt so bad all over, but I got a lot out yesterday and I am feeling so much better today, I will try and move around with physical therapy.\"  Patient was given patient was reportedly agitated overnight but back to baseline this morning.    In regards to patients hematoma conversation will need to be had in regards to when to restart anticoagulation. Will consider apixaban rather than patients home regimen of warfarin.    Was informed by telemetry patient's pacemaker is not pacing properly. Patient asymptomatic at bedside w/ ecg showing intermittent pacing " discussed with electrocardio physiology NP embers and plan is to interrogate device, rep notified.  11/22/2022.  Update on 11/23/2023 pacemaker interrogated by rep performing correctly, lead placement good on last chest x-ray, patient asymptomatic, without chest pain no signs of heart failure, 1 and half years of battery life left on pacemaker.    I have discussed this patient's plan of care and discharge plan at IDT rounds today with Case Management, Nursing, Nursing leadership, and other members of the IDT team.    Consultants/Specialty  nephrology    Code Status  Full Code    Disposition  Patient is not medically cleared for discharge.   Anticipate discharge to to skilled nursing facility.  I have placed the appropriate orders for post-discharge needs.    Review of Systems  Review of Systems   Constitutional:  Negative for malaise/fatigue.   HENT: Negative.     Eyes: Negative.    Respiratory:  Negative for sputum production.    Cardiovascular: Negative.    Gastrointestinal: Negative.    Genitourinary: Negative.    Musculoskeletal: Negative.    Skin: Negative.    Neurological:  Positive for focal weakness and weakness.   Endo/Heme/Allergies: Negative.    Psychiatric/Behavioral: Negative.        Physical Exam  Temp:  [35.9 °C (96.7 °F)-36.9 °C (98.4 °F)] 36.9 °C (98.4 °F)  Pulse:  [64-70] 64  Resp:  [17-20] 17  BP: (132-153)/(69-79) 136/70  SpO2:  [90 %-95 %] 93 %    Physical Exam  Constitutional:       Appearance: He is obese. He is not ill-appearing.   HENT:      Head: Normocephalic and atraumatic.      Right Ear: Tympanic membrane, ear canal and external ear normal.      Left Ear: Tympanic membrane, ear canal and external ear normal.      Nose: Nose normal.      Mouth/Throat:      Mouth: Mucous membranes are moist.      Pharynx: Oropharynx is clear.   Eyes:      Extraocular Movements: Extraocular movements intact.      Conjunctiva/sclera: Conjunctivae normal.      Pupils: Pupils are equal, round, and reactive to  light.   Cardiovascular:      Rate and Rhythm: Normal rate and regular rhythm.   Pulmonary:      Effort: Pulmonary effort is normal. No respiratory distress.   Chest:      Chest wall: No tenderness.   Abdominal:      General: Abdomen is flat. Bowel sounds are normal.      Palpations: Abdomen is soft.   Musculoskeletal:      Cervical back: Normal range of motion.      Right lower leg: No edema.      Left lower leg: No edema.   Skin:     General: Skin is warm.      Capillary Refill: Capillary refill takes less than 2 seconds.   Neurological:      Mental Status: He is alert and oriented to person, place, and time.      Motor: Weakness present.   Psychiatric:         Mood and Affect: Mood normal.         Behavior: Behavior normal.         Thought Content: Thought content normal.         Judgment: Judgment normal.       Fluids    Intake/Output Summary (Last 24 hours) at 11/23/2022 1653  Last data filed at 11/23/2022 0800  Gross per 24 hour   Intake 600 ml   Output 1725 ml   Net -1125 ml         Laboratory  Recent Labs     11/21/22  0017 11/21/22  0549 11/21/22  1919 11/22/22 0225   WBC 5.6  --  6.3 6.6   RBC 2.66*  --  2.84* 2.86*   HEMOGLOBIN 7.9* 8.3* 8.5* 8.6*   HEMATOCRIT 25.5* 27.1* 27.0* 27.7*   MCV 95.9  --  95.1 96.9   MCH 29.7  --  29.9 30.1   MCHC 31.0*  --  31.5* 31.0*   RDW 50.6*  --  50.2* 51.5*   PLATELETCT 203  --  219 213   MPV 10.7  --  10.2 10.5       Recent Labs     11/21/22 0017 11/22/22 0225   SODIUM 136 137   POTASSIUM 4.6 5.2   CHLORIDE 106 108   CO2 19* 17*   GLUCOSE 80 182*   BUN 55* 54*   CREATININE 3.12* 3.05*   CALCIUM 8.1* 8.5       Recent Labs     11/21/22 0017 11/22/22 0225 11/23/22  0437   INR 1.64* 1.75* 1.56*                 Imaging  DX-CHEST-PORTABLE (1 VIEW)   Final Result      No acute cardiopulmonary abnormality identified.      CT-ABDOMEN-PELVIS W/O   Final Result      1.  Left retroperitoneal hematoma along the left iliopsoas/iliac is muscle measuring approximately 4.3 x 3.4 x  5.9 cm.      2.  Bilateral lower lobe consolidation could be due to pneumonia or atelectasis.      3.  Small layering stones versus sludge in the gallbladder.      4.  Diverticulosis without diverticulitis.      5.  Moderate amount of stool throughout the colon suggesting constipation.      US-EXTREMITY VENOUS UPPER UNILAT RIGHT   Final Result      DX-WRIST-LIMITED 2- RIGHT   Final Result         1.   No radiographic evidence of acute injury.      2.  Old healed fracture right fifth metacarpal neck.      3.  Moderate osteoarthritic changes of the right first carpal metacarpal joint.      4.  Possible intra-articular loose bodies adjacent to the styloid processes of the radius and ulna.      DX-CHEST-PORTABLE (1 VIEW)   Final Result      1.  Increased pulmonary edema   2.  Bibasilar underinflation atelectasis which could obscure an additional process. This is unchanged.   3.  Persistently enlarged cardiac silhouette      DX-CHEST-PORTABLE (1 VIEW)   Final Result      Mildly improved aeration and stable to slightly improved vascular congestion.      Mildly improved left basilar atelectasis.      DX-CHEST-PORTABLE (1 VIEW)   Final Result      1.  Interval extubation and removal of NG tube.   2.  Increased bibasilar airspace disease.   3.  Increased interstitial edema.   4.  Small left pleural effusion.      CT-HEAD W/O   Final Result      1. No CT evidence of acute infarct, hemorrhage or mass.   2. Moderate global parenchymal atrophy. Chronic small vessel ischemic changes.      DX-CHEST-PORTABLE (1 VIEW)   Final Result      Mildly improved left basilar opacity.      DX-ABDOMEN FOR TUBE PLACEMENT   Final Result      Enteric tube tip projects over the gastric antrum or proximal duodenum      DX-CHEST-PORTABLE (1 VIEW)   Final Result         1. Stable lines and tubes.   2. Worsening left basilar opacity. Small left pleural effusion. Mild right infrahilar atelectasis.         DX-CHEST-PORTABLE (1 VIEW)   Final Result       1.  Cardiomegaly with interstitial edema.      2.  Left lung base atelectasis and minimal left pleural effusion.      DX-CHEST-PORTABLE (1 VIEW)   Final Result      1.  Improved left pleural effusion with adjacent airspace disease.   2.  Improved interstitial infiltrates versus edema.      EC-ECHOCARDIOGRAM COMPLETE W/O CONT   Final Result      DX-CHEST-PORTABLE (1 VIEW)   Final Result      1.  Small left pleural effusion with adjacent airspace disease.   2.  Worsening interstitial infiltrates versus edema.   3.  Right basilar atelectasis.      DX-ABDOMEN FOR TUBE PLACEMENT   Final Result      Enteric tube tip projects over the proximal duodenum      DX-ABDOMEN FOR TUBE PLACEMENT   Final Result         1.  Nonspecific bowel gas pattern in the upper abdomen.   2.  Nasogastric tube, not visualized beyond the mid thorax, see ordered repeat abdominal x-ray for further characterization.   3.  Cardiomegaly   4.  Left lung base atelectasis or infiltrate.      OUTSIDE IMAGES-DX CHEST   Final Result      DX-CHEST-PORTABLE (1 VIEW)   Final Result         1.  Bibasilar atelectasis or early infiltrates.   2.  Trace bilateral pleural effusions   3.  Cardiomegaly             Assessment/Plan  Problem Representation:    * Anemia  Assessment & Plan  Hemoglobin trending down  Received a unit today   CT abdomen showing: Left retroperitoneal hematoma along the left iliopsoas/iliac is muscle measuring approximately 4.3 x 3.4 x 5.9 cm.  Reversing warfarin   received 1 unit of RBC    PLAN  Close monitoring    Pacemaker complications  Assessment & Plan  Resolvede  Was informed by telemetry patient's pacemaker is not pacing properly. Patient asymptomatic at bedside w/ ecg showing intermittent pacing discussed with electrocardio physiology NP embers and plan is to interrogate device, rep notified.  -Pacer performing correctly on interrogation  - Patient asymptomatic  - DC telemetry  - Follow-up with cardiology as  outpatient    Constipation  Assessment & Plan  -enema not given  -scheduled bowel regimen    Delirium- (present on admission)  Assessment & Plan  Resolved, likely in light of the patient's acute hospitalization, acute critical condition, disease  Standard precautions and out of delirium  Avoid offending medication  Avoid interruption of circadian rhythm  CT head negative for acute changes, unable to provide a MRI imaging secondary to patient's pacemaker.    PLAN  D/c tele  No vitals overnight  Evans if disoriented  Patient alert and oriented x 3 at bedside      MSSA (methicillin susceptible Staphylococcus aureus) pneumonia ([facility])- (present on admission)  Assessment & Plan  Finished treatment     Goals of care, counseling/discussion- (present on admission)  Assessment & Plan  Palliative care assisting,   Patient continues to hope towards rehab, no wishes for palliative care at this time, continue to update family  Patient remains full code     Hypernatremia- (present on admission)  Assessment & Plan  Resolved     Septic shock with acute organ dysfunction due to methicillin susceptible Staphylococcus aureus (MSSA) ([facility])  Assessment & Plan  Present on admission, treated adequately    Hematoma- (present on admission)  Assessment & Plan  CT abdomen showing Left retroperitoneal hematoma along the left iliopsoas/iliac is muscle measuring approximately 4.3 x 3.4 x 5.9 cm.  Holding warfarin , giving FFP and vit K to reverse INR   Has received 2 unit of RBC   Close monitoring   -Restart anticoagulation tomorrow 11/24/2022 with DOAC, will monitor symptomatically for recurrence of bleeding and on physical exam, need approximately 5 days off as per current studies would suggest for retroperitoneal hematoma and restarting anticoagulation.    Acute on chronic systolic congestive heart failure (HCC)- (present on admission)  Assessment & Plan  Continue with medical therapy including Coreg, holding on diuresis since BP on the low  side   It appears his EF has been improving  Continue present medical therapy    CONCLUSIONS  The left ventricle is not well visualized due to body habitus.  Left ventricular systolic function is probably normal.  The ejection fraction is roughly estimated to be 55%.  A reliable estimation of diastolic function cannot be made due to   mitral valve disease.  Severely dilated right ventricle.  Reduced right ventricular systolic function.  Estimated right ventricular systolic pressure is 40 mmHg.  Severe biatrial dilation.  Mild mitral regurgitation.  Aortic valve sclerosis without significant stenosis.  Mild tricuspid regurgitation.  Normal inferior vena cava size and inspiratory collapse    Hyperkalemia- (present on admission)  Assessment & Plan  Improved   Monitor     MYLA (acute kidney injury) (HCC)- (present on admission)  Assessment & Plan  initially was on dialysis and improved so dialysis was stopped but now has worsening again so nephrology has been consulted     Nephrology recs:  - Stop IV fluids  - Strict IOs  - Transfuse PRBC for Hb < 7   - Okay for midline as nursing staff having a lot of difficulty maintaining IV due to poor vasculature and need for multiple blood products and IV fluids for resuscitation. Discussed with IR nurse. Will hold off on midline for now as patient still has IV access.   - Ongoing GOC discussions   - CMP   - lasix discontinued      Acute hypoxemic respiratory failure due to COVID-19 (HCC)- (present on admission)  Assessment & Plan  The patient is being treated for pneumonia,  Zosyn completed  Wean off O2 as tolerated     Restless leg syndrome- (present on admission)  Assessment & Plan  Requip discontinued    Chronic anticoagulation- (present on admission)  Assessment & Plan  Holding Coumadin. Will consider restarting with apixiban 5 days after Ct abd pelvis findings (11/19) of hematoma    Paroxysmal atrial fibrillation (HCC)- (present on admission)  Assessment & Plan  History of,  ongoing anticoagulation   Holding warfarin at this time and also giving VIt K and also FFP     Pacemaker- (present on admission)  Assessment & Plan  In situ, telemetry monitoring, history of complete heart block       VTE prophylaxis: SCDs/TEDs    I have performed a physical exam and reviewed and updated ROS and Plan today (11/23/2022). In review of yesterday's note (11/22/2022), there are no changes except as documented above.

## 2022-11-24 NOTE — PROGRESS NOTES
[facility] -  PROGRESS NOTE               Author: Mandeep Francis M.D. Date & Time: 11/24/2022  8:33 AM     HPI:  Patient known to nephrology service and was initially seen during his initial presentation to the hospital on 11/4 up to 11/9.  He required HD x1 on 11/5 for hyperkalemia, regained kidney functions but has now worsened again prompting nephrology consult.    This is a 77 y.o. male w/ h/o CHF, COPD, afib, DM who presented 11/4/2022 in transfer from [facility] where he presented with worsening shortness of breath and fatigue.  At [place] he was found to be COVID-positive, was initially started on BiPAP and then intubated, in acute kidney injury with a creatinine of 8.0 and potassium of 6.6.  He was transferred to [facility] for further evaluation and treatment. Here his Scr was 7.6 with a K+ of 7.6. Nephrology was asked to consult for MYLA and critical hyperkalemia.     DAILY NEPHROLOGY SUMMARY:  11/5 - Scr down to 3.74. K+ is 5.0. Non-oliguric. Still on vent support.  11/6: Scr continues to trend down. Non-oliguric. ECHO: The left ventricle is not well visualized due to body habitus. Left ventricular systolic function is probably normal. The ejection fraction is roughly estimated to be 55%. A reliable estimation of diastolic function cannot be made due to mitral valve disease. Severely dilated right ventricle.Reduced right ventricular systolic function.  11/7: Remains on vent/ steroids  11/8: extubated, but remains confused  11/9: Passed swallow eval, but not taking signficant PO - signed off   -------------  11/19: Re consulted because of new MYLA and worsening renal functions; was found to have retroperitoneal hematoma because of hypercoagulability INR greater than 4.   11/20: Sitting up in bed, appears more perked up, blood pressure now in normotensive range, made almost 1200 cc urine past 24 hours-Ball catheter was placed yesterday  11/21:  "family meeting yesterday-remains full code with al life saving interventions, stable hemodynamics, 1.2L UOP, cr up-trending   11/22: Cr stable, 1.1L UOP, denies physical complaints, paranoid  11/23: NO AM labs, 1.7L UOP, denies complaints, no chest pain or SOB  11/24: cr downt rending, 1.6L UOP, denies complaints     REVIEW OF SYSTEMS:    10 point ROS performed, negative other than stated above    PMH/PSH/SH/FH:   Reviewed and unchanged since admission note    CURRENT MEDICATIONS:   Reviewed from admission to present day    VS:  /68   Pulse 62   Temp 36.4 °C (97.5 °F)   Resp 17   Ht 1.88 m (6' 2\")   Wt 114 kg (251 lb 5.2 oz)   SpO2 96%   BMI 32.27 kg/m²   GENERAL: Ill appearing  CV: RRR, + pedal edema  RESP: coarse breath sounds  GI: Soft  MSK: diffuse wekaness  SKIN: dry skin  NEURO: AOx1  PSYCH: Cooperative      Fluids:  In: 1250 [P.O.:1250]  Out: 1610     LABS:  Recent Labs     11/22/22  0225 11/24/22  0245   SODIUM 137 132*   POTASSIUM 5.2 5.2   CHLORIDE 108 105   CO2 17* 17*   GLUCOSE 182* 139*   BUN 54* 56*   CREATININE 3.05* 2.62*   CALCIUM 8.5 8.7         IMAGING:   All imaging reviewed from admission to present day    IMPRESSION:  # MYLA   - acute blood loss and retroperitoneal hematoma  contributing  - required HD x1 on 11/5 for hyperkalemia earlier this admission then recovered function and creatinine returned back to normal before worsening again  - CT abd shows unremarkable kidneys without hydronephrosis 11/19/22  -Now improving  # Acute hypoxemic respiratory failure due to COVID-19  -Was intubated and on mechanical ventilation earlier this admission and then got extubated  # PAfib  # ? CHF  # Anemia of blood loss  # Retroperitoneal hematoma  # hypercoagulability  # Systolic CHF, LVEF 25%  # Acidosis     PLAN:  -qOD renal panel for now  -Encourage oral nutrition  -Strict IOs  -Transfuse PRBC for Hb < 7     Nephrology will sign-off for now. Please feel free to contact us going forward should " further questions arise    Thank you

## 2022-11-24 NOTE — THERAPY
Physical Therapy   Daily Treatment     Patient Name: Sean Kincaid  Age:  77 y.o., Sex:  male  Medical Record #: [id number]  Today's Date: 11/23/2022    Precautions: Fall Risk;Swallow Precautions    Assessment  Pt with some improvement today and tolerated incr activity. Pt still c/o significant pain to L wrist and hand. Pt participated in multiple STS with max A, unable to weight shift and attempt stepping. PT will follow.     Plan  Continue current treatment plan.  DC Equipment Recommendations: Unable to determine at this time  Discharge Recommendations: Recommend post-acute placement for additional physical therapy services prior to discharge home     11/23/22 1431   Cognition    Level of Consciousness Alert   Ability To Follow Commands 1 Step   Safety Awareness Impaired   New Learning Impaired   Attention Impaired   Sequencing Impaired   Initiation Impaired   Comments requires constant redirection and encouragement, pt appears more alert and engaged than previous sessions but still with confusion   Balance   Sitting Balance (Static) Fair   Sitting Balance (Dynamic) Fair -   Standing Balance (Static) Trace +   Standing Balance (Dynamic) Trace +   Weight Shift Sitting Poor   Weight Shift Standing Poor   Skilled Intervention Verbal Cuing;Postural Facilitation   Comments standing with HHA   Gait Analysis   Gait Level Of Assist Unable to Participate   Bed Mobility    Supine to Sit Maximal Assist   Scooting Maximal Assist   Skilled Intervention Verbal Cuing;Sequencing   Comments decr initiation, most limited by pain   Functional Mobility   Sit to Stand Maximal Assist   Skilled Intervention Verbal Cuing;Sequencing   Comments multiple STS with focus on upright posture, pt unable to weight shift to attempt stepping and even resists therapist facilitation for pivoting   Short Term Goals    Short Term Goal # 1 B  pt will be able to complete supine<>sitting from flat bed with SPV in 6tx in order to improve independence    Goal Outcome  # 1 B Goal not met   Short Term Goal # 2 Pt will perform STS with LRAD and min A within 6 visits to progress OOB mobility   Goal Outcome # 2 Goal not met   Short Term Goal # 3 B pt will be able to complete functional transfers with FWW and SPV in 6tx in order to decrease fall risk   Goal Outcome # 3 B Goal not met   Short Term Goal # 4 Pt will ambulate 20 ft with LRAD and mod A within 6 visits to initiate gait training   Goal Outcome # 4 Goal not met

## 2022-11-24 NOTE — PROGRESS NOTES
"[facility] Internal Medicine Daily Progress Note    Date of Service  11/24/2022    [facility] Team: R IM [facility] Team   Attending: Therese Pedraza M.d.  Senior Resident: Dr. Vásquez  Intern:  Dr. Torres  Contact Number: [phone]    Chief Complaint  Sean Kincaid is a 77 y.o. male admitted 11/4/2022 with respiratory failure secondary to COVID.     Hospital Course  77-year-old male with a past medical history of heart failure, COPD, A. fib, type 2 diabetes mellitus who presented on 11/4/2022 as a transfer from [facility] with worsening of shortness of breath and fatigue, started on BiPAP intubated, ICU stay, MYLA on acute renal failure, now with CKD, course complicated by hyperkalemia, hypernatremia, worsening renal function, ICU myopathy, likely stroke during hospitalization, bacterial pneumonia requiring IV antibiotics, now off all antibiotics, creatinine stably elevated with good urine output,    Interval Problem Update  No acute events overnight. Patient currently on 1L of oxygen this morning without any difficulties breathing.  Patient without acute complaints overnight, no chest pain, palpitations, lower extremity edema, shortness of breath, cough, abdominal pain, nausea/vomiting/diarrhea/constipation, dysuria, hematuria, new onset focal weakness, headaches or sensory changes.  Patient states that \"I would never realize that not having poop could make me hurt so bad all over, but I got a lot out yesterday and I am feeling so much better today, I will try and move around with physical therapy.\"  Patient was given patient was reportedly agitated overnight but back to baseline this morning.    In regards to patients hematoma conversation will need to be had in regards to when to restart anticoagulation. Will consider apixaban rather than patients home regimen of warfarin.    Was informed by telemetry patient's pacemaker is not pacing properly. Patient asymptomatic at bedside w/ ecg showing intermittent pacing " discussed with electrocardio physiology NP embers and plan is to interrogate device, rep notified.  11/22/2022.  Update on 11/23/2023 pacemaker interrogated by rep performing correctly, lead placement good on last chest x-ray, patient asymptomatic, without chest pain no signs of heart failure, 1 and half years of battery life left on pacemaker.    11/24  No acute events overnight. Patient has slightly increased mobility in upper extremities where he can raise elbows above the bed. Still limited mobility in left lower extremity. Apixaban to be started today in place of coumadin for treatment of afib.    I have discussed this patient's plan of care and discharge plan at IDT rounds today with Case Management, Nursing, Nursing leadership, and other members of the IDT team.    Consultants/Specialty  nephrology    Code Status  Full Code    Disposition  Patient is not medically cleared for discharge.   Anticipate discharge to to skilled nursing facility.  I have placed the appropriate orders for post-discharge needs.    Review of Systems  Review of Systems   Constitutional:  Negative for malaise/fatigue.   HENT: Negative.     Eyes: Negative.    Respiratory:  Negative for sputum production.    Cardiovascular: Negative.    Gastrointestinal: Negative.    Genitourinary: Negative.    Musculoskeletal: Negative.    Skin: Negative.    Neurological:  Positive for focal weakness and weakness.   Endo/Heme/Allergies: Negative.    Psychiatric/Behavioral: Negative.        Physical Exam  Temp:  [36.2 °C (97.1 °F)-37.1 °C (98.7 °F)] 36.2 °C (97.1 °F)  Pulse:  [62-76] 73  Resp:  [17-18] 17  BP: ()/(51-73) 116/58  SpO2:  [92 %-96 %] 93 %    Physical Exam  Constitutional:       Appearance: He is obese. He is not ill-appearing.   HENT:      Head: Normocephalic and atraumatic.      Right Ear: Tympanic membrane, ear canal and external ear normal.      Left Ear: Tympanic membrane, ear canal and external ear normal.      Nose: Nose normal.       Mouth/Throat:      Mouth: Mucous membranes are moist.      Pharynx: Oropharynx is clear.   Eyes:      Extraocular Movements: Extraocular movements intact.      Conjunctiva/sclera: Conjunctivae normal.      Pupils: Pupils are equal, round, and reactive to light.   Cardiovascular:      Rate and Rhythm: Normal rate and regular rhythm.   Pulmonary:      Effort: Pulmonary effort is normal. No respiratory distress.   Chest:      Chest wall: No tenderness.   Abdominal:      General: Abdomen is flat. Bowel sounds are normal.      Palpations: Abdomen is soft.   Musculoskeletal:      Cervical back: Normal range of motion.      Right lower leg: No edema.      Left lower leg: No edema.   Skin:     General: Skin is warm.      Capillary Refill: Capillary refill takes less than 2 seconds.   Neurological:      Mental Status: He is alert and oriented to person, place, and time.      Motor: Weakness present.   Psychiatric:         Mood and Affect: Mood normal.         Behavior: Behavior normal.         Thought Content: Thought content normal.         Judgment: Judgment normal.       Fluids    Intake/Output Summary (Last 24 hours) at 11/24/2022 1536  Last data filed at 11/24/2022 1320  Gross per 24 hour   Intake 1150 ml   Output 2085 ml   Net -935 ml       Laboratory  Recent Labs     11/21/22 1919 11/22/22 0225   WBC 6.3 6.6   RBC 2.84* 2.86*   HEMOGLOBIN 8.5* 8.6*   HEMATOCRIT 27.0* 27.7*   MCV 95.1 96.9   MCH 29.9 30.1   MCHC 31.5* 31.0*   RDW 50.2* 51.5*   PLATELETCT 219 213   MPV 10.2 10.5     Recent Labs     11/22/22 0225 11/24/22  0245   SODIUM 137 132*   POTASSIUM 5.2 5.2   CHLORIDE 108 105   CO2 17* 17*   GLUCOSE 182* 139*   BUN 54* 56*   CREATININE 3.05* 2.62*   CALCIUM 8.5 8.7     Recent Labs     11/22/22 0225 11/23/22  0437   INR 1.75* 1.56*               Imaging  DX-CHEST-PORTABLE (1 VIEW)   Final Result      No acute cardiopulmonary abnormality identified.      CT-ABDOMEN-PELVIS W/O   Final Result      1.  Left  retroperitoneal hematoma along the left iliopsoas/iliac is muscle measuring approximately 4.3 x 3.4 x 5.9 cm.      2.  Bilateral lower lobe consolidation could be due to pneumonia or atelectasis.      3.  Small layering stones versus sludge in the gallbladder.      4.  Diverticulosis without diverticulitis.      5.  Moderate amount of stool throughout the colon suggesting constipation.      US-EXTREMITY VENOUS UPPER UNILAT RIGHT   Final Result      DX-WRIST-LIMITED 2- RIGHT   Final Result         1.   No radiographic evidence of acute injury.      2.  Old healed fracture right fifth metacarpal neck.      3.  Moderate osteoarthritic changes of the right first carpal metacarpal joint.      4.  Possible intra-articular loose bodies adjacent to the styloid processes of the radius and ulna.      DX-CHEST-PORTABLE (1 VIEW)   Final Result      1.  Increased pulmonary edema   2.  Bibasilar underinflation atelectasis which could obscure an additional process. This is unchanged.   3.  Persistently enlarged cardiac silhouette      DX-CHEST-PORTABLE (1 VIEW)   Final Result      Mildly improved aeration and stable to slightly improved vascular congestion.      Mildly improved left basilar atelectasis.      DX-CHEST-PORTABLE (1 VIEW)   Final Result      1.  Interval extubation and removal of NG tube.   2.  Increased bibasilar airspace disease.   3.  Increased interstitial edema.   4.  Small left pleural effusion.      CT-HEAD W/O   Final Result      1. No CT evidence of acute infarct, hemorrhage or mass.   2. Moderate global parenchymal atrophy. Chronic small vessel ischemic changes.      DX-CHEST-PORTABLE (1 VIEW)   Final Result      Mildly improved left basilar opacity.      DX-ABDOMEN FOR TUBE PLACEMENT   Final Result      Enteric tube tip projects over the gastric antrum or proximal duodenum      DX-CHEST-PORTABLE (1 VIEW)   Final Result         1. Stable lines and tubes.   2. Worsening left basilar opacity. Small left  pleural effusion. Mild right infrahilar atelectasis.         DX-CHEST-PORTABLE (1 VIEW)   Final Result      1.  Cardiomegaly with interstitial edema.      2.  Left lung base atelectasis and minimal left pleural effusion.      DX-CHEST-PORTABLE (1 VIEW)   Final Result      1.  Improved left pleural effusion with adjacent airspace disease.   2.  Improved interstitial infiltrates versus edema.      EC-ECHOCARDIOGRAM COMPLETE W/O CONT   Final Result      DX-CHEST-PORTABLE (1 VIEW)   Final Result      1.  Small left pleural effusion with adjacent airspace disease.   2.  Worsening interstitial infiltrates versus edema.   3.  Right basilar atelectasis.      DX-ABDOMEN FOR TUBE PLACEMENT   Final Result      Enteric tube tip projects over the proximal duodenum      DX-ABDOMEN FOR TUBE PLACEMENT   Final Result         1.  Nonspecific bowel gas pattern in the upper abdomen.   2.  Nasogastric tube, not visualized beyond the mid thorax, see ordered repeat abdominal x-ray for further characterization.   3.  Cardiomegaly   4.  Left lung base atelectasis or infiltrate.      OUTSIDE IMAGES-DX CHEST   Final Result      DX-CHEST-PORTABLE (1 VIEW)   Final Result         1.  Bibasilar atelectasis or early infiltrates.   2.  Trace bilateral pleural effusions   3.  Cardiomegaly             Assessment/Plan  Problem Representation:    * Anemia  Assessment & Plan  Hemoglobin trending down  Received a unit today   CT abdomen showing: Left retroperitoneal hematoma along the left iliopsoas/iliac is muscle measuring approximately 4.3 x 3.4 x 5.9 cm.  Reversing warfarin   received 1 unit of RBC    PLAN  Close monitoring    Hematoma- (present on admission)  Assessment & Plan  CT abdomen showing Left retroperitoneal hematoma along the left iliopsoas/iliac is muscle measuring approximately 4.3 x 3.4 x 5.9 cm.  Holding warfarin , giving FFP and vit K to reverse INR   Has received 2 unit of RBC   Close monitoring   -start apixaban 11/24/2022, will  monitor symptomatically for recurrence of bleeding and on physical exam, need approximately 5 days off as per current studies would suggest for retroperitoneal hematoma and restarting anticoagulation.    MYLA (acute kidney injury) (HCC)- (present on admission)  Assessment & Plan  initially was on dialysis and improved so dialysis was stopped but now has worsening again so nephrology has been consulted     Nephrology recs:  - Stop IV fluids  - Strict IOs  - Transfuse PRBC for Hb < 7   - Okay for midline as nursing staff having a lot of difficulty maintaining IV due to poor vasculature and need for multiple blood products and IV fluids for resuscitation. Discussed with IR nurse. Will hold off on midline for now as patient still has IV access.   - Ongoing [facility] discussions   - CMP   - lasix discontinued      Acute on chronic systolic congestive heart failure (HCC)- (present on admission)  Assessment & Plan  holding Coreg, holding on diuresis since BP on the low side   It appears his EF has been improving  Continue present medical therapy    CONCLUSIONS  The left ventricle is not well visualized due to body habitus.  Left ventricular systolic function is probably normal.  The ejection fraction is roughly estimated to be 55%.  A reliable estimation of diastolic function cannot be made due to   mitral valve disease.  Severely dilated right ventricle.  Reduced right ventricular systolic function.  Estimated right ventricular systolic pressure is 40 mmHg.  Severe biatrial dilation.  Mild mitral regurgitation.  Aortic valve sclerosis without significant stenosis.  Mild tricuspid regurgitation.  Normal inferior vena cava size and inspiratory collapse    Acute hypoxemic respiratory failure due to COVID-19 (HCC)- (present on admission)  Assessment & Plan  The patient is being treated for pneumonia,  Zosyn completed  Wean off O2 as tolerated     Paroxysmal atrial fibrillation (HCC)- (present on admission)  Assessment &  Plan  -History of, ongoing anticoagulation   -Holding warfarin at this time and also s/p VIt K and also FFP  -starting apixaban 5 mg BID     MSSA (methicillin susceptible Staphylococcus aureus) pneumonia (HCC)- (present on admission)  Assessment & Plan  Finished treatment     Hypernatremia- (present on admission)  Assessment & Plan  Resolved     Restless leg syndrome- (present on admission)  Assessment & Plan  Requip discontinued    Chronic anticoagulation- (present on admission)  Assessment & Plan  Holding Coumadin. Will start apixiban 5mg BID    Pacemaker- (present on admission)  Assessment & Plan  In situ, telemetry monitoring, history of complete heart block    Pacemaker complications  Assessment & Plan  RESOLVED  Was informed by telemetry patient's pacemaker is not pacing properly. Patient asymptomatic at bedside w/ ecg showing intermittent pacing discussed with electrocardio physiology NP embers and plan is to interrogate device, rep notified.  -Pacer performing correctly on interrogation  - Patient asymptomatic  - DC telemetry  - Follow-up with cardiology as outpatient    Constipation  Assessment & Plan  -s/p enema  -scheduled bowel regimen    Delirium- (present on admission)  Assessment & Plan  Resolved, likely in light of the patient's acute hospitalization, acute critical condition, disease  Standard precautions and out of delirium  Avoid offending medication  Avoid interruption of circadian rhythm  CT head negative for acute changes, unable to provide a MRI imaging secondary to patient's pacemaker.    PLAN  D/c tele  No vitals overnight  [place] if disoriented  Patient alert and oriented x 3 at bedside      Goals of care, counseling/discussion- (present on admission)  Assessment & Plan  Palliative care assisting,   Patient continues to hope towards rehab, no wishes for palliative care at this time, continue to update family  Patient remains full code     Septic shock with acute organ dysfunction due to  methicillin susceptible Staphylococcus aureus (MSSA) ([facility])  Assessment & Plan  Present on admission, treated adequately    Hyperkalemia- (present on admission)  Assessment & Plan  Improved   Monitor        VTE prophylaxis: SCDs/TEDs    I have performed a physical exam and reviewed and updated ROS and Plan today (11/24/2022). In review of yesterday's note (11/23/2022), there are no changes except as documented above.

## 2022-11-25 NOTE — PROGRESS NOTES
"[facility] Internal Medicine Daily Progress Note    Date of Service  11/25/2022    [facility] Team: R IM [facility] Team   Attending: Therese Pedraza M.d.  Senior Resident: Dr. Vásquez  Intern:  Dr. Torres  Contact Number: [phone]    Chief Complaint  Sean Kincaid is a 77 y.o. male admitted 11/4/2022 with respiratory failure secondary to COVID.     Hospital Course  77-year-old male with a past medical history of heart failure, COPD, A. fib, type 2 diabetes mellitus who presented on 11/4/2022 as a transfer from [facility] with worsening of shortness of breath and fatigue, started on BiPAP intubated, ICU stay, MYLA on acute renal failure, now with CKD, course complicated by hyperkalemia, hypernatremia, worsening renal function, ICU myopathy, likely stroke during hospitalization, bacterial pneumonia requiring IV antibiotics, now off all antibiotics, creatinine stably elevated with good urine output,    Interval Problem Update  No acute events overnight. Patient currently on 1L of oxygen this morning without any difficulties breathing.  Patient without acute complaints overnight, no chest pain, palpitations, lower extremity edema, shortness of breath, cough, abdominal pain, nausea/vomiting/diarrhea/constipation, dysuria, hematuria, new onset focal weakness, headaches or sensory changes.  Patient states that \"I would never realize that not having poop could make me hurt so bad all over, but I got a lot out yesterday and I am feeling so much better today, I will try and move around with physical therapy.\"  Patient was given patient was reportedly agitated overnight but back to baseline this morning.    In regards to patients hematoma conversation will need to be had in regards to when to restart anticoagulation. Will consider apixaban rather than patients home regimen of warfarin.    Was informed by telemetry patient's pacemaker is not pacing properly. Patient asymptomatic at bedside w/ ecg showing intermittent pacing " discussed with electrocardio physiology NP dwayne and plan is to interrogate device, rep notified.  11/22/2022.  Update on 11/23/2023 pacemaker interrogated by rep performing correctly, lead placement good on last chest x-ray, patient asymptomatic, without chest pain no signs of heart failure, 1 and half years of battery life left on pacemaker.    11/24  No acute events overnight. Patient has slightly increased mobility in upper extremities where he can raise elbows above the bed. Still limited mobility in left lower extremity. Apixaban to be started today in place of coumadin for treatment of afib.    I have discussed this patient's plan of care and discharge plan at IDT rounds today with Case Management, Nursing, Nursing leadership, and other members of the IDT team.  11/25:  No acute events overnight. On bedside patient was on room air. He reports feeling down this morning at how long he's been in the hospital and how he wishes he would get better already. Offered words of encouragement. No acute rebleeding from hematoma after anticoagulation restarted. Patient coreg still held as heart rate still in the 60s. No labs needed on this patient.    Consultants/Specialty  nephrology    Code Status  Full Code    Disposition  Patient is not medically cleared for discharge.   Anticipate discharge to to skilled nursing facility.  I have placed the appropriate orders for post-discharge needs.    Review of Systems  Review of Systems   Constitutional:  Negative for malaise/fatigue.   HENT: Negative.     Eyes: Negative.    Respiratory:  Negative for sputum production.    Cardiovascular: Negative.    Gastrointestinal: Negative.    Genitourinary: Negative.    Musculoskeletal: Negative.    Skin: Negative.    Neurological:  Positive for focal weakness and weakness.   Endo/Heme/Allergies: Negative.    Psychiatric/Behavioral: Negative.        Physical Exam  Temp:  [36.2 °C (97.1 °F)-36.3 °C (97.3 °F)] 36.2 °C (97.2 °F)  Pulse:   [65-73] 65  Resp:  [17-18] 18  BP: (116-147)/(58-85) 138/67  SpO2:  [91 %-95 %] 95 %    Physical Exam  Constitutional:       Appearance: He is obese. He is not ill-appearing.   HENT:      Head: Normocephalic and atraumatic.      Right Ear: Tympanic membrane, ear canal and external ear normal.      Left Ear: Tympanic membrane, ear canal and external ear normal.      Nose: Nose normal.      Mouth/Throat:      Mouth: Mucous membranes are moist.      Pharynx: Oropharynx is clear.   Eyes:      Extraocular Movements: Extraocular movements intact.      Conjunctiva/sclera: Conjunctivae normal.      Pupils: Pupils are equal, round, and reactive to light.   Cardiovascular:      Rate and Rhythm: Normal rate and regular rhythm.   Pulmonary:      Effort: Pulmonary effort is normal. No respiratory distress.   Chest:      Chest wall: No tenderness.   Abdominal:      General: Abdomen is flat. Bowel sounds are normal.      Palpations: Abdomen is soft.   Musculoskeletal:      Cervical back: Normal range of motion.      Right lower leg: No edema.      Left lower leg: No edema.   Skin:     General: Skin is warm.      Capillary Refill: Capillary refill takes less than 2 seconds.   Neurological:      Mental Status: He is alert and oriented to person, place, and time.      Motor: Weakness present.   Psychiatric:         Mood and Affect: Mood normal.         Behavior: Behavior normal.         Thought Content: Thought content normal.         Judgment: Judgment normal.       Fluids    Intake/Output Summary (Last 24 hours) at 11/25/2022 1243  Last data filed at 11/25/2022 0800  Gross per 24 hour   Intake 520 ml   Output 2125 ml   Net -1605 ml         Laboratory  Recent Labs     11/25/22  0243   WBC 6.8   RBC 2.81*   HEMOGLOBIN 8.5*   HEMATOCRIT 27.1*   MCV 96.4   MCH 30.2   MCHC 31.4*   RDW 50.1*   PLATELETCT 280   MPV 10.3       Recent Labs     11/24/22  0245   SODIUM 132*   POTASSIUM 5.2   CHLORIDE 105   CO2 17*   GLUCOSE 139*   BUN 56*    CREATININE 2.62*   CALCIUM 8.7       Recent Labs     11/23/22  0437   INR 1.56*                 Imaging  DX-CHEST-PORTABLE (1 VIEW)   Final Result      No acute cardiopulmonary abnormality identified.      CT-ABDOMEN-PELVIS W/O   Final Result      1.  Left retroperitoneal hematoma along the left iliopsoas/iliac is muscle measuring approximately 4.3 x 3.4 x 5.9 cm.      2.  Bilateral lower lobe consolidation could be due to pneumonia or atelectasis.      3.  Small layering stones versus sludge in the gallbladder.      4.  Diverticulosis without diverticulitis.      5.  Moderate amount of stool throughout the colon suggesting constipation.      US-EXTREMITY VENOUS UPPER UNILAT RIGHT   Final Result      DX-WRIST-LIMITED 2- RIGHT   Final Result         1.   No radiographic evidence of acute injury.      2.  Old healed fracture right fifth metacarpal neck.      3.  Moderate osteoarthritic changes of the right first carpal metacarpal joint.      4.  Possible intra-articular loose bodies adjacent to the styloid processes of the radius and ulna.      DX-CHEST-PORTABLE (1 VIEW)   Final Result      1.  Increased pulmonary edema   2.  Bibasilar underinflation atelectasis which could obscure an additional process. This is unchanged.   3.  Persistently enlarged cardiac silhouette      DX-CHEST-PORTABLE (1 VIEW)   Final Result      Mildly improved aeration and stable to slightly improved vascular congestion.      Mildly improved left basilar atelectasis.      DX-CHEST-PORTABLE (1 VIEW)   Final Result      1.  Interval extubation and removal of NG tube.   2.  Increased bibasilar airspace disease.   3.  Increased interstitial edema.   4.  Small left pleural effusion.      CT-HEAD W/O   Final Result      1. No CT evidence of acute infarct, hemorrhage or mass.   2. Moderate global parenchymal atrophy. Chronic small vessel ischemic changes.      DX-CHEST-PORTABLE (1 VIEW)   Final Result      Mildly improved left basilar opacity.       DX-ABDOMEN FOR TUBE PLACEMENT   Final Result      Enteric tube tip projects over the gastric antrum or proximal duodenum      DX-CHEST-PORTABLE (1 VIEW)   Final Result         1. Stable lines and tubes.   2. Worsening left basilar opacity. Small left pleural effusion. Mild right infrahilar atelectasis.         DX-CHEST-PORTABLE (1 VIEW)   Final Result      1.  Cardiomegaly with interstitial edema.      2.  Left lung base atelectasis and minimal left pleural effusion.      DX-CHEST-PORTABLE (1 VIEW)   Final Result      1.  Improved left pleural effusion with adjacent airspace disease.   2.  Improved interstitial infiltrates versus edema.      EC-ECHOCARDIOGRAM COMPLETE W/O CONT   Final Result      DX-CHEST-PORTABLE (1 VIEW)   Final Result      1.  Small left pleural effusion with adjacent airspace disease.   2.  Worsening interstitial infiltrates versus edema.   3.  Right basilar atelectasis.      DX-ABDOMEN FOR TUBE PLACEMENT   Final Result      Enteric tube tip projects over the proximal duodenum      DX-ABDOMEN FOR TUBE PLACEMENT   Final Result         1.  Nonspecific bowel gas pattern in the upper abdomen.   2.  Nasogastric tube, not visualized beyond the mid thorax, see ordered repeat abdominal x-ray for further characterization.   3.  Cardiomegaly   4.  Left lung base atelectasis or infiltrate.      OUTSIDE IMAGES-DX CHEST   Final Result      DX-CHEST-PORTABLE (1 VIEW)   Final Result         1.  Bibasilar atelectasis or early infiltrates.   2.  Trace bilateral pleural effusions   3.  Cardiomegaly             Assessment/Plan  Problem Representation:    * Anemia  Assessment & Plan  Hemoglobin trending down  Received a unit today   CT abdomen showing: Left retroperitoneal hematoma along the left iliopsoas/iliac is muscle measuring approximately 4.3 x 3.4 x 5.9 cm.  Reversing warfarin   received 1 unit of RBC    PLAN  Close monitoring    Hematoma- (present on admission)  Assessment & Plan  CT abdomen showing Left  retroperitoneal hematoma along the left iliopsoas/iliac is muscle measuring approximately 4.3 x 3.4 x 5.9 cm.  Holding warfarin , giving FFP and vit K to reverse INR   Has received 2 unit of RBC   Close monitoring   -start apixaban 11/24/2022, will monitor symptomatically for recurrence of bleeding and on physical exam, need approximately 5 days off as per current studies would suggest for retroperitoneal hematoma and restarting anticoagulation.    MYLA (acute kidney injury) (HCC)- (present on admission)  Assessment & Plan  initially was on dialysis and improved so dialysis was stopped but now has worsening again so nephrology has been consulted     Nephrology recs:  - Stop IV fluids  - Strict IOs  - Transfuse PRBC for Hb < 7   - Okay for midline as nursing staff having a lot of difficulty maintaining IV due to poor vasculature and need for multiple blood products and IV fluids for resuscitation. Discussed with IR nurse. Will hold off on midline for now as patient still has IV access.   - Ongoing GOC discussions   - CMP   - lasix discontinued      Acute on chronic systolic congestive heart failure (HCC)- (present on admission)  Assessment & Plan  holding Coreg, holding on diuresis since BP on the low side   It appears his EF has been improving  Continue present medical therapy    CONCLUSIONS  The left ventricle is not well visualized due to body habitus.  Left ventricular systolic function is probably normal.  The ejection fraction is roughly estimated to be 55%.  A reliable estimation of diastolic function cannot be made due to   mitral valve disease.  Severely dilated right ventricle.  Reduced right ventricular systolic function.  Estimated right ventricular systolic pressure is 40 mmHg.  Severe biatrial dilation.  Mild mitral regurgitation.  Aortic valve sclerosis without significant stenosis.  Mild tricuspid regurgitation.  Normal inferior vena cava size and inspiratory collapse    Acute hypoxemic respiratory  failure due to COVID-19 (HCC)- (present on admission)  Assessment & Plan  The patient is being treated for pneumonia,  Zosyn completed  Wean off O2 as tolerated     Paroxysmal atrial fibrillation (HCC)- (present on admission)  Assessment & Plan  -History of, ongoing anticoagulation   -Holding warfarin at this time and also s/p VIt K and also FFP  -starting apixaban 5 mg BID     MSSA (methicillin susceptible Staphylococcus aureus) pneumonia (HCC)- (present on admission)  Assessment & Plan  Finished treatment     Hypernatremia- (present on admission)  Assessment & Plan  Resolved     Restless leg syndrome- (present on admission)  Assessment & Plan  Requip discontinued    Chronic anticoagulation- (present on admission)  Assessment & Plan  Holding Coumadin. Will start apixiban 5mg BID    Pacemaker- (present on admission)  Assessment & Plan  In situ, telemetry monitoring, history of complete heart block    Pacemaker complications  Assessment & Plan  RESOLVED  Was informed by telemetry patient's pacemaker is not pacing properly. Patient asymptomatic at bedside w/ ecg showing intermittent pacing discussed with electrocardio physiology NP embers and plan is to interrogate device, rep notified.  -Pacer performing correctly on interrogation  - Patient asymptomatic  - DC telemetry  - Follow-up with cardiology as outpatient    Constipation  Assessment & Plan  -s/p enema  -scheduled bowel regimen    Delirium- (present on admission)  Assessment & Plan  Resolved, likely in light of the patient's acute hospitalization, acute critical condition, disease  Standard precautions and out of delirium  Avoid offending medication  Avoid interruption of circadian rhythm  CT head negative for acute changes, unable to provide a MRI imaging secondary to patient's pacemaker.    PLAN  D/c tele  No vitals overnight  Stephens if disoriented  Patient alert and oriented x 3 at bedside      Goals of care, counseling/discussion- (present on  admission)  Assessment & Plan  Palliative care assisting,   Patient continues to hope towards rehab, no wishes for palliative care at this time, continue to update family  Patient remains full code     Septic shock with acute organ dysfunction due to methicillin susceptible Staphylococcus aureus (MSSA) ([facility])  Assessment & Plan  Present on admission, treated adequately    Hyperkalemia- (present on admission)  Assessment & Plan  Improved   Monitor          VTE prophylaxis: SCDs/TEDs    I have performed a physical exam and reviewed and updated ROS and Plan today (11/25/2022). In review of yesterday's note (11/24/2022), there are no changes except as documented above.

## 2022-11-25 NOTE — THERAPY
"Occupational Therapy  Daily Treatment     Patient Name: Sean Kincaid  Age:  77 y.o., Sex:  male  Medical Record #: [id number]  Today's Date: 11/25/2022     Precautions: Fall Risk, Swallow Precautions ( See Comments)    Assessment    Pt seen for OT tx to include: bed mobility, sitting balance, UB/LB dressing, seated grooming. Pt required mod A to achieve EOB, then able to maintain balance without assist. Endorses R wrist/digit pain and demos limited AROM. Instructed on ROM to inhibit joint stiffness. L wrist & digits grossly WNL. Weak grasp using L hand for oral care. Pt sat for ~18 minutes stating it felt good to be EOB. Attempted lateral seated scoot however unable to motor plan. Pt requires cues to sequence and attend to steps of ADL and mobility. He did demo improved participation this session. Slow progress towards OT goals. Will continue to follow.     Plan    Continue current treatment plan.    DC Equipment Recommendations: Unable to determine at this time  Discharge Recommendations: Recommend post-acute placement for additional occupational therapy services prior to discharge home    Subjective    \"Were you born here in the [place]?\"  \"I was going to sit up and advance that timer.\"     Objective       11/25/22 1502   Cognition    Level of Consciousness Alert   Ability To Follow Commands 1 Step   Safety Awareness Impaired;Impulsive   New Learning Impaired   Attention Impaired   Sequencing Impaired   Initiation Impaired   Comments Fair participation this session, but requires max cueing to attend and initiate; quite tangential   Active ROM Upper Body   Dominant Hand Ambidextrous   Comments Pain limits R wrist & hand ROM   Strength Upper Body   Comments R hand & wrist limited by pain   Sitting Upper Body Exercises   Comments Instructed on AROM to R wrist & digits; limited tolerance   Other Treatments   Other Treatments Provided Ongoing education on pathology of bed rest   Balance   Sitting Balance (Static) Fair "   Sitting Balance (Dynamic) Fair -   Weight Shift Sitting Poor   Comments unable to stand this session   Bed Mobility    Supine to Sit Moderate Assist   Sit to Supine Moderate Assist   Scooting Unable to Participate  (seated)   Rolling Moderate Assist to Rt.;Moderate Assist to Lt.   Activities of Daily Living   Grooming Maximal Assist;Seated  (hair combing, oral care)   Upper Body Dressing Maximal Assist  (gown change)   Lower Body Dressing Total Assist  (don B socks)   Toileting   (NT; Ball, no BM)   Functional Mobility   Sit to Stand Unable to Participate   Bed, Chair, Wheelchair Transfer Unable to Participate   Mobility Supine > < EOB, sitting balance   Activity Tolerance   Sitting in Chair NT, unable   Sitting Edge of Bed 18 min   Standing NT, unable   Short Term Goals   Short Term Goal # 1 min A with UB dressing   Goal Outcome # 1 Progressing slower than expected   Short Term Goal # 2 mod A with LB dressing   Goal Outcome # 2 Goal not met   Short Term Goal # 3 mod A with ADL txfs   Goal Outcome # 3 Progressing slower than expected

## 2022-11-25 NOTE — CARE PLAN
Problem: Psychosocial  Goal: Patient's level of anxiety will decrease  Outcome: Progressing  Goal: Patient's ability to verbalize feelings about condition will improve  Outcome: Progressing  Goal: Patient's ability to re-evaluate and adapt role responsibilities will improve  Outcome: Progressing  Goal: Patient and family will demonstrate ability to cope with life altering diagnosis and/or procedure  Outcome: Progressing  Goal: Spiritual and cultural needs incorporated into hospitalization  Outcome: Progressing     Problem: Hemodynamics  Goal: Patient's hemodynamics, fluid balance and neurologic status will be stable or improve  Outcome: Progressing     Problem: Respiratory  Goal: Patient will achieve/maintain optimum respiratory ventilation and gas exchange  Outcome: Progressing     Problem: Mechanical Ventilation  Goal: Safe management of artificial airway and ventilation  Outcome: Progressing  Goal: Successful weaning off mechanical ventilator, spontaneously maintains adequate gas exchange  Outcome: Progressing  Goal: Patient will be able to express needs and understand communication  Outcome: Progressing     Problem: Risk for Aspiration  Goal: Patient's risk for aspiration will be absent or decrease  Outcome: Progressing     Problem: Urinary - Renal Perfusion  Goal: Ability to achieve and maintain adequate renal perfusion and functioning will improve  Outcome: Progressing     Problem: Venous Thromboembolism (VTE) Prevention  Goal: The patient will remain free from venous thromboembolism (VTE)  Outcome: Progressing     Problem: Nutrition  Goal: Patient's nutritional and fluid intake will be adequate or improve  Outcome: Progressing  Goal: Enteral nutrition will be maintained or improve  Outcome: Progressing  Goal: Enteral nutrition will be maintained or improve  Outcome: Progressing     Problem: Urinary Elimination  Goal: Establish and maintain regular urinary output  Outcome: Progressing     Problem: Bowel  Elimination  Goal: Establish and maintain regular bowel function  Outcome: Progressing     Problem: Communication  Goal: The ability to communicate needs accurately and effectively will improve  Outcome: Progressing     Problem: Discharge Barriers/Planning  Goal: Patient's continuum of care needs are met  Outcome: Progressing     Problem: Chest Tube Management  Goal: Complications related to chest tube will be avoided or minimized  Outcome: Progressing     Problem: Fluid Volume  Goal: Fluid volume balance will be maintained  Outcome: Progressing     Problem: Dysphagia  Goal: Dysphagia will improve  Outcome: Progressing     Problem: Gastrointestinal Irritability  Goal: Nausea and vomiting will be absent or improve  Outcome: Progressing  Goal: Diarrhea will be absent or improved  Outcome: Progressing     Problem: Rectal Tube  Goal: Fecal output will be contained and skin will remain free from irritation  Outcome: Progressing     Problem: Mobility  Goal: Patient's capacity to carry out activities will improve  Outcome: Progressing     Problem: Self Care  Goal: Patient will have the ability to perform ADLs independently or with assistance (bathe, groom, dress, toilet and feed)  Outcome: Progressing     Problem: Infection - Standard  Goal: Patient will remain free from infection  Outcome: Progressing     Problem: Wound/ / Incision Healing  Goal: Patient's wound/surgical incision will decrease in size and heals properly  Outcome: Progressing   The patient is Stable - Low risk of patient condition declining or worsening    Shift Goals  Clinical Goals: pain control  Patient Goals: pain control  Family Goals: ALLIE    Progress made toward(s) clinical / shift goals:  Pain management throughout the night. Plan to D/C to SNF when creatinine is stable.     Patient is not progressing towards the following goals:

## 2022-11-25 NOTE — CARE PLAN
The patient is Watcher - Medium risk of patient condition declining or worsening    Shift Goals  Clinical Goals: pain control, mental status  Patient Goals: pain control  Family Goals: n/a    Progress made toward(s) clinical / shift goals:  Patient is only using NC1L. Patient still has dyspnea with exertion.    Patient is not progressing towards the following goals:Patients creatinine is still elevated at 2.62. Ball for retention still in place.     Problem: Hemodynamics  Goal: Patient's hemodynamics, fluid balance and neurologic status will be stable or improve  Outcome: Not Progressing     Problem: Urinary Elimination  Goal: Establish and maintain regular urinary output  Outcome: Not Progressing     Problem: Self Care  Goal: Patient will have the ability to perform ADLs independently or with assistance (bathe, groom, dress, toilet and feed)  Outcome: Not Progressing

## 2022-11-25 NOTE — CARE PLAN
Pt A&Ox2-3, to self and place and occasionally time, on 1L NC. Repositioning in bed q2h in bed. Received PRN oxyx2. Ball remains intact. VSS and will continue to monitor.

## 2022-11-26 NOTE — CARE PLAN
Problem: Hemodynamics  Goal: Patient's hemodynamics, fluid balance and neurologic status will be stable or improve  Outcome: Progressing     Problem: Respiratory  Goal: Patient will achieve/maintain optimum respiratory ventilation and gas exchange  Outcome: Progressing     Problem: Bowel Elimination  Goal: Establish and maintain regular bowel function  Outcome: Progressing     Problem: Mobility  Goal: Patient's capacity to carry out activities will improve  Outcome: Progressing   The patient is Stable - Low risk of patient condition declining or worsening    Shift Goals  Clinical Goals: safety,  Patient Goals: pain control  Family Goals: n/a    Progress made toward(s) clinical / shift goals:  Progressing    Patient is not progressing towards the following goals:

## 2022-11-26 NOTE — PROGRESS NOTES
Bedside report received from day RN, pt care assumed, assessment completed. Pt is A&O1, pain 0/10, medical. Updated on POC, questions answered. Bed in lowest, locked position, treaded socks on, call light and belongings within reach. Fall precautions in place.

## 2022-11-26 NOTE — PROGRESS NOTES
"[facility] Internal Medicine Daily Progress Note    Date of Service  11/26/2022    [facility] Team: R IM [facility] Team   Attending: Therese Pedraza M.d.  Senior Resident: Dr. Vásquez  Intern:  Dr. Torres  Contact Number: [phone]    Chief Complaint  Sean Kincaid is a 77 y.o. male admitted 11/4/2022 with respiratory failure secondary to COVID.     Hospital Course  77-year-old male with a past medical history of heart failure, COPD, A. fib, type 2 diabetes mellitus who presented on 11/4/2022 as a transfer from [facility] with worsening of shortness of breath and fatigue, started on BiPAP intubated, ICU stay, MYLA on acute renal failure, now with CKD, course complicated by hyperkalemia, hypernatremia, worsening renal function, ICU myopathy, likely stroke during hospitalization, bacterial pneumonia requiring IV antibiotics, now off all antibiotics, creatinine stably elevated with good urine output,    Interval Problem Update  No acute events overnight. Patient currently on 1L of oxygen this morning without any difficulties breathing.  Patient without acute complaints overnight, no chest pain, palpitations, lower extremity edema, shortness of breath, cough, abdominal pain, nausea/vomiting/diarrhea/constipation, dysuria, hematuria, new onset focal weakness, headaches or sensory changes.  Patient states that \"I would never realize that not having poop could make me hurt so bad all over, but I got a lot out yesterday and I am feeling so much better today, I will try and move around with physical therapy.\"  Patient was given patient was reportedly agitated overnight but back to baseline this morning.    In regards to patients hematoma conversation will need to be had in regards to when to restart anticoagulation. Will consider apixaban rather than patients home regimen of warfarin.    Was informed by telemetry patient's pacemaker is not pacing properly. Patient asymptomatic at bedside w/ ecg showing intermittent pacing " discussed with electrocardio physiology NP embers and plan is to interrogate device, rep notified.  11/22/2022.  Update on 11/23/2023 pacemaker interrogated by rep performing correctly, lead placement good on last chest x-ray, patient asymptomatic, without chest pain no signs of heart failure, 1 and half years of battery life left on pacemaker.    11/24  No acute events overnight. Patient has slightly increased mobility in upper extremities where he can raise elbows above the bed. Still limited mobility in left lower extremity. Apixaban to be started today in place of coumadin for treatment of afib.    I have discussed this patient's plan of care and discharge plan at IDT rounds today with Case Management, Nursing, Nursing leadership, and other members of the IDT team.  11/25:  No acute events overnight. On bedside patient was on room air. He reports feeling down this morning at how long he's been in the hospital and how he wishes he would get better already. Offered words of encouragement. No acute rebleeding from hematoma after anticoagulation restarted. Patient coreg still held as heart rate still in the 60s. No labs needed on this patient.    11/26:  No acute events overnight. Patient at bedside this morning had inc jerking movements on legs bilaterally and complained of an inability to stop the movements. Additionally, mentioned he felt itchiness/tingle ness. Will monitor for now and if symptoms persist or worsen will get CMP and Mg. Rate control with metoprolol also added for afib.    Consultants/Specialty  nephrology    Code Status  Full Code    Disposition  Patient is not medically cleared for discharge.   Anticipate discharge to to skilled nursing facility.  I have placed the appropriate orders for post-discharge needs.    Review of Systems  Review of Systems   Constitutional:  Negative for malaise/fatigue.   HENT: Negative.     Eyes: Negative.    Respiratory:  Negative for sputum production.     Cardiovascular: Negative.    Gastrointestinal: Negative.    Genitourinary: Negative.    Musculoskeletal: Negative.    Skin: Negative.    Neurological:  Positive for focal weakness and weakness.   Endo/Heme/Allergies: Negative.    Psychiatric/Behavioral: Negative.        Physical Exam  Temp:  [36.3 °C (97.3 °F)-36.7 °C (98 °F)] 36.7 °C (98 °F)  Pulse:  [52-85] 85  Resp:  [20] 20  BP: (112-184)/(56-94) 184/83  SpO2:  [93 %-96 %] 96 %    Physical Exam  Constitutional:       Appearance: He is obese. He is not ill-appearing.   HENT:      Head: Normocephalic and atraumatic.      Right Ear: Tympanic membrane, ear canal and external ear normal.      Left Ear: Tympanic membrane, ear canal and external ear normal.      Nose: Nose normal.      Mouth/Throat:      Mouth: Mucous membranes are moist.      Pharynx: Oropharynx is clear.   Eyes:      Extraocular Movements: Extraocular movements intact.      Conjunctiva/sclera: Conjunctivae normal.      Pupils: Pupils are equal, round, and reactive to light.   Cardiovascular:      Rate and Rhythm: Normal rate and regular rhythm.   Pulmonary:      Effort: Pulmonary effort is normal. No respiratory distress.   Chest:      Chest wall: No tenderness.   Abdominal:      General: Abdomen is flat. Bowel sounds are normal.      Palpations: Abdomen is soft.   Musculoskeletal:      Cervical back: Normal range of motion.      Right lower leg: No edema.      Left lower leg: No edema.   Skin:     General: Skin is warm.      Capillary Refill: Capillary refill takes less than 2 seconds.   Neurological:      Mental Status: He is alert and oriented to person, place, and time.      Motor: Weakness present.   Psychiatric:         Mood and Affect: Mood normal.         Behavior: Behavior normal.         Thought Content: Thought content normal.         Judgment: Judgment normal.       Fluids    Intake/Output Summary (Last 24 hours) at 11/26/2022 1219  Last data filed at 11/26/2022 0600  Gross per 24 hour    Intake 240 ml   Output 2700 ml   Net -2460 ml       Laboratory  Recent Labs     11/25/22  0243   WBC 6.8   RBC 2.81*   HEMOGLOBIN 8.5*   HEMATOCRIT 27.1*   MCV 96.4   MCH 30.2   MCHC 31.4*   RDW 50.1*   PLATELETCT 280   MPV 10.3     Recent Labs     11/24/22  0245   SODIUM 132*   POTASSIUM 5.2   CHLORIDE 105   CO2 17*   GLUCOSE 139*   BUN 56*   CREATININE 2.62*   CALCIUM 8.7                     Imaging  DX-CHEST-PORTABLE (1 VIEW)   Final Result      No acute cardiopulmonary abnormality identified.      CT-ABDOMEN-PELVIS W/O   Final Result      1.  Left retroperitoneal hematoma along the left iliopsoas/iliac is muscle measuring approximately 4.3 x 3.4 x 5.9 cm.      2.  Bilateral lower lobe consolidation could be due to pneumonia or atelectasis.      3.  Small layering stones versus sludge in the gallbladder.      4.  Diverticulosis without diverticulitis.      5.  Moderate amount of stool throughout the colon suggesting constipation.      US-EXTREMITY VENOUS UPPER UNILAT RIGHT   Final Result      DX-WRIST-LIMITED 2- RIGHT   Final Result         1.   No radiographic evidence of acute injury.      2.  Old healed fracture right fifth metacarpal neck.      3.  Moderate osteoarthritic changes of the right first carpal metacarpal joint.      4.  Possible intra-articular loose bodies adjacent to the styloid processes of the radius and ulna.      DX-CHEST-PORTABLE (1 VIEW)   Final Result      1.  Increased pulmonary edema   2.  Bibasilar underinflation atelectasis which could obscure an additional process. This is unchanged.   3.  Persistently enlarged cardiac silhouette      DX-CHEST-PORTABLE (1 VIEW)   Final Result      Mildly improved aeration and stable to slightly improved vascular congestion.      Mildly improved left basilar atelectasis.      DX-CHEST-PORTABLE (1 VIEW)   Final Result      1.  Interval extubation and removal of NG tube.   2.  Increased bibasilar airspace disease.   3.  Increased interstitial edema.    4.  Small left pleural effusion.      CT-HEAD W/O   Final Result      1. No CT evidence of acute infarct, hemorrhage or mass.   2. Moderate global parenchymal atrophy. Chronic small vessel ischemic changes.      DX-CHEST-PORTABLE (1 VIEW)   Final Result      Mildly improved left basilar opacity.      DX-ABDOMEN FOR TUBE PLACEMENT   Final Result      Enteric tube tip projects over the gastric antrum or proximal duodenum      DX-CHEST-PORTABLE (1 VIEW)   Final Result         1. Stable lines and tubes.   2. Worsening left basilar opacity. Small left pleural effusion. Mild right infrahilar atelectasis.         DX-CHEST-PORTABLE (1 VIEW)   Final Result      1.  Cardiomegaly with interstitial edema.      2.  Left lung base atelectasis and minimal left pleural effusion.      DX-CHEST-PORTABLE (1 VIEW)   Final Result      1.  Improved left pleural effusion with adjacent airspace disease.   2.  Improved interstitial infiltrates versus edema.      EC-ECHOCARDIOGRAM COMPLETE W/O CONT   Final Result      DX-CHEST-PORTABLE (1 VIEW)   Final Result      1.  Small left pleural effusion with adjacent airspace disease.   2.  Worsening interstitial infiltrates versus edema.   3.  Right basilar atelectasis.      DX-ABDOMEN FOR TUBE PLACEMENT   Final Result      Enteric tube tip projects over the proximal duodenum      DX-ABDOMEN FOR TUBE PLACEMENT   Final Result         1.  Nonspecific bowel gas pattern in the upper abdomen.   2.  Nasogastric tube, not visualized beyond the mid thorax, see ordered repeat abdominal x-ray for further characterization.   3.  Cardiomegaly   4.  Left lung base atelectasis or infiltrate.      OUTSIDE IMAGES-DX CHEST   Final Result      DX-CHEST-PORTABLE (1 VIEW)   Final Result         1.  Bibasilar atelectasis or early infiltrates.   2.  Trace bilateral pleural effusions   3.  Cardiomegaly             Assessment/Plan  Problem Representation:    * Anemia  Assessment & Plan  Hemoglobin trending  down  Received a unit today   CT abdomen showing: Left retroperitoneal hematoma along the left iliopsoas/iliac is muscle measuring approximately 4.3 x 3.4 x 5.9 cm.  Reversing warfarin   received 1 unit of RBC    PLAN  Close monitoring    Hematoma- (present on admission)  Assessment & Plan  CT abdomen showing Left retroperitoneal hematoma along the left iliopsoas/iliac is muscle measuring approximately 4.3 x 3.4 x 5.9 cm.  Holding warfarin , giving FFP and vit K to reverse INR   Has received 2 unit of RBC   Close monitoring   -start apixaban 11/24/2022, will monitor symptomatically for recurrence of bleeding and on physical exam, need approximately 5 days off as per current studies would suggest for retroperitoneal hematoma and restarting anticoagulation.    MYLA (acute kidney injury) (HCC)- (present on admission)  Assessment & Plan  initially was on dialysis and improved so dialysis was stopped but now has worsening again so nephrology has been consulted     Nephrology recs:  - Stop IV fluids  - Strict IOs  - Transfuse PRBC for Hb < 7   - Okay for midline as nursing staff having a lot of difficulty maintaining IV due to poor vasculature and need for multiple blood products and IV fluids for resuscitation. Discussed with IR nurse. Will hold off on midline for now as patient still has IV access.   - Ongoing GOC discussions   - CMP   - lasix discontinued      Acute on chronic systolic congestive heart failure (HCC)- (present on admission)  Assessment & Plan  Metoprolol tartrate 12.5 BID  It appears his EF has been improving  Continue present medical therapy    CONCLUSIONS  The left ventricle is not well visualized due to body habitus.  Left ventricular systolic function is probably normal.  The ejection fraction is roughly estimated to be 55%.  A reliable estimation of diastolic function cannot be made due to   mitral valve disease.  Severely dilated right ventricle.  Reduced right ventricular systolic  function.  Estimated right ventricular systolic pressure is 40 mmHg.  Severe biatrial dilation.  Mild mitral regurgitation.  Aortic valve sclerosis without significant stenosis.  Mild tricuspid regurgitation.  Normal inferior vena cava size and inspiratory collapse    Acute hypoxemic respiratory failure due to COVID-19 (HCC)- (present on admission)  Assessment & Plan  The patient is being treated for pneumonia,  Zosyn completed  Wean off O2 as tolerated     Paroxysmal atrial fibrillation (HCC)- (present on admission)  Assessment & Plan  -History of, ongoing anticoagulation   -Holding warfarin at this time and also s/p VIt K and also FFP  -starting apixaban 5 mg BID   -metoprolol tartrate 12.5 BID    MSSA (methicillin susceptible Staphylococcus aureus) pneumonia (HCC)- (present on admission)  Assessment & Plan  Finished treatment     Hypernatremia- (present on admission)  Assessment & Plan  Resolved     Restless leg syndrome- (present on admission)  Assessment & Plan  Requip discontinued    Chronic anticoagulation- (present on admission)  Assessment & Plan  Holding Coumadin. Will start apixiban 5mg BID    Pacemaker- (present on admission)  Assessment & Plan  In situ, telemetry monitoring, history of complete heart block    Pacemaker complications  Assessment & Plan  RESOLVED  Was informed by telemetry patient's pacemaker is not pacing properly. Patient asymptomatic at bedside w/ ecg showing intermittent pacing discussed with electrocardio physiology NP embers and plan is to interrogate device, rep notified.  -Pacer performing correctly on interrogation  - Patient asymptomatic  - DC telemetry  - Follow-up with cardiology as outpatient    Constipation  Assessment & Plan  -s/p enema  -scheduled bowel regimen    Delirium- (present on admission)  Assessment & Plan  Resolved, likely in light of the patient's acute hospitalization, acute critical condition, disease  Standard precautions and out of delirium  Avoid offending  medication  Avoid interruption of circadian rhythm  CT head negative for acute changes, unable to provide a MRI imaging secondary to patient's pacemaker.    PLAN  D/c tele  No vitals overnight  [place] if disoriented  Patient alert and oriented x 3 at bedside      Goals of care, counseling/discussion- (present on admission)  Assessment & Plan  Palliative care assisting,   Patient continues to hope towards rehab, no wishes for palliative care at this time, continue to update family  Patient remains full code     Septic shock with acute organ dysfunction due to methicillin susceptible Staphylococcus aureus (MSSA) ([facility])  Assessment & Plan  Present on admission, treated adequately    Hyperkalemia- (present on admission)  Assessment & Plan  Improved   Monitor        VTE prophylaxis: SCDs/TEDs    I have performed a physical exam and reviewed and updated ROS and Plan today (11/26/2022). In review of yesterday's note (11/25/2022), there are no changes except as documented above.

## 2022-11-26 NOTE — CARE PLAN
The patient is Stable - Low risk of patient condition declining or worsening    Shift Goals  Clinical Goals: Patient will stay in chair until finishes lunch  Patient Goals: D/c placement  Family Goals: n/a    Progress made toward(s) clinical / shift goals:  Patient got out of bed to chair for a few hours, however did not met goal of eating lunch in the chair.     Patient is not progressing towards the following goals:      Problem: Nutrition  Goal: Patient's nutritional and fluid intake will be adequate or improve  11/26/2022 1412 by Telma Ugarte R.N.  Outcome: Progressing  11/26/2022 1404 by Telma Ugarte R.N.  Outcome: Progressing     Problem: Communication  Goal: The ability to communicate needs accurately and effectively will improve  11/26/2022 1416 by Telma Ugarte R.N.  Outcome: Progressing  Note: Pt has been educated on importance for appropriately calling for assistance or needs with call light, any questions/concerns answered at this time. Call light close by, will check on patient hourly.   11/26/2022 1414 by Telma Ugarte R.N.  Outcome: Progressing     Problem: Mobility  Goal: Patient's capacity to carry out activities will improve  11/26/2022 1416 by Telma Ugarte R.N.  Outcome: Progressing  Note: Patient educated on benefits of mobilization, active and passive range of motion activities, and risks of activity intolerance. Discussed plan to mobilize to chair for meals and ambulate in room with rest periods as needed plus active range of motion with resistance,  Provided activities to do for the day such as getting up to the chair 2 hours and is encouraged to move around in bed, chair, and room. Transfer to chair and back to bed was done with RN.   11/26/2022 1414 by Telma Ugarte R.N.  Outcome: Progressing  11/26/2022 1412 by Telma Ugarte R.N.  Outcome: Progressing  11/26/2022 1404 by Telma Ugarte R.N.  Outcome: Progressing

## 2022-11-27 NOTE — CARE PLAN
The patient is Stable - Low risk of patient condition declining or worsening    Shift Goals  Clinical Goals: Rest this shift/pain at tolerable level  Patient Goals: Sleep  Family Goals: n/a    Progress made toward(s) clinical / shift goals:    Problem: Bowel Elimination  Goal: Establish and maintain regular bowel function  Outcome: Progressing     Problem: Communication  Goal: The ability to communicate needs accurately and effectively will improve  Outcome: Progressing       Patient is not progressing towards the following goals:

## 2022-11-27 NOTE — PROGRESS NOTES
"[facility] Internal Medicine Daily Progress Note    Date of Service  11/27/2022    [facility] Team: R IM [facility] Team   Attending: Therese Pedraza M.d.  Senior Resident: Dr. Vásquez  Intern:  Dr. Torres  Contact Number: [phone]    Chief Complaint  Sean Kincaid is a 77 y.o. male admitted 11/4/2022 with respiratory failure secondary to COVID.     Hospital Course  77-year-old male with a past medical history of heart failure, COPD, A. fib, type 2 diabetes mellitus who presented on 11/4/2022 as a transfer from [facility] with worsening of shortness of breath and fatigue, started on BiPAP intubated, ICU stay, MYLA on acute renal failure, now with CKD, course complicated by hyperkalemia, hypernatremia, worsening renal function, ICU myopathy, likely stroke during hospitalization, bacterial pneumonia requiring IV antibiotics, now off all antibiotics, creatinine stably elevated with good urine output,    Interval Problem Update  No acute events overnight. Patient currently on 1L of oxygen this morning without any difficulties breathing.  Patient without acute complaints overnight, no chest pain, palpitations, lower extremity edema, shortness of breath, cough, abdominal pain, nausea/vomiting/diarrhea/constipation, dysuria, hematuria, new onset focal weakness, headaches or sensory changes.  Patient states that \"I would never realize that not having poop could make me hurt so bad all over, but I got a lot out yesterday and I am feeling so much better today, I will try and move around with physical therapy.\"  Patient was given patient was reportedly agitated overnight but back to baseline this morning.    In regards to patients hematoma conversation will need to be had in regards to when to restart anticoagulation. Will consider apixaban rather than patients home regimen of warfarin.    Was informed by telemetry patient's pacemaker is not pacing properly. Patient asymptomatic at bedside w/ ecg showing intermittent pacing " discussed with electrocardio physiology NP dwayne and plan is to interrogate device, rep notified.  11/22/2022.  Update on 11/23/2023 pacemaker interrogated by rep performing correctly, lead placement good on last chest x-ray, patient asymptomatic, without chest pain no signs of heart failure, 1 and half years of battery life left on pacemaker.    11/24  No acute events overnight. Patient has slightly increased mobility in upper extremities where he can raise elbows above the bed. Still limited mobility in left lower extremity. Apixaban to be started today in place of coumadin for treatment of afib.    I have discussed this patient's plan of care and discharge plan at IDT rounds today with Case Management, Nursing, Nursing leadership, and other members of the IDT team.    11/25:  No acute events overnight. On bedside patient was on room air. He reports feeling down this morning at how long he's been in the hospital and how he wishes he would get better already. Offered words of encouragement. No acute rebleeding from hematoma after anticoagulation restarted. Patient coreg still held as heart rate still in the 60s. No labs needed on this patient.    11/26:  No acute events overnight. Patient at bedside this morning had inc jerking movements on legs bilaterally and complained of an inability to stop the movements. Additionally, mentioned he felt itchiness/tingle ness. Will monitor for now and if symptoms persist or worsen will get CMP and Mg. Rate control with metoprolol also added for afib.    11/27:  No acute events overnight. Patient alert and oriented to self and time but not place. Leg tingle ness/movement improved from previous day. Patient continues to have limited mobility in bilateral upper extremities and left lower extremity.    Consultants/Specialty  nephrology    Code Status  Full Code    Disposition  Patient is not medically cleared for discharge.   Anticipate discharge to to skilled nursing facility.  I  have placed the appropriate orders for post-discharge needs.    Review of Systems  Review of Systems   Constitutional:  Negative for malaise/fatigue.   HENT: Negative.     Eyes: Negative.    Respiratory:  Negative for sputum production.    Cardiovascular: Negative.    Gastrointestinal: Negative.    Genitourinary: Negative.    Musculoskeletal: Negative.    Skin: Negative.    Neurological:  Positive for focal weakness and weakness.   Endo/Heme/Allergies: Negative.    Psychiatric/Behavioral: Negative.        Physical Exam  Temp:  [36 °C (96.8 °F)-36.8 °C (98.3 °F)] 36.1 °C (96.9 °F)  Pulse:  [64-92] 71  Resp:  [17-18] 17  BP: (118-140)/(60-73) 140/65  SpO2:  [90 %-94 %] 94 %    Physical Exam  Constitutional:       Appearance: He is obese. He is not ill-appearing.   HENT:      Head: Normocephalic and atraumatic.      Right Ear: Tympanic membrane, ear canal and external ear normal.      Left Ear: Tympanic membrane, ear canal and external ear normal.      Nose: Nose normal.      Mouth/Throat:      Mouth: Mucous membranes are moist.      Pharynx: Oropharynx is clear.   Eyes:      Extraocular Movements: Extraocular movements intact.      Conjunctiva/sclera: Conjunctivae normal.      Pupils: Pupils are equal, round, and reactive to light.   Cardiovascular:      Rate and Rhythm: Normal rate and regular rhythm.   Pulmonary:      Effort: Pulmonary effort is normal. No respiratory distress.   Chest:      Chest wall: No tenderness.   Abdominal:      General: Abdomen is flat. Bowel sounds are normal.      Palpations: Abdomen is soft.   Musculoskeletal:      Cervical back: Normal range of motion.      Right lower leg: No edema.      Left lower leg: No edema.   Skin:     General: Skin is warm.      Capillary Refill: Capillary refill takes less than 2 seconds.   Neurological:      Mental Status: He is alert and oriented to person, place, and time.      Motor: Weakness present.   Psychiatric:         Mood and Affect: Mood normal.          Behavior: Behavior normal.         Thought Content: Thought content normal.         Judgment: Judgment normal.       Fluids    Intake/Output Summary (Last 24 hours) at 11/27/2022 1224  Last data filed at 11/27/2022 0800  Gross per 24 hour   Intake 600 ml   Output 2000 ml   Net -1400 ml       Laboratory  Recent Labs     11/25/22  0243 11/27/22  1042   WBC 6.8 7.2   RBC 2.81* 3.02*   HEMOGLOBIN 8.5* 8.9*   HEMATOCRIT 27.1* 29.5*   MCV 96.4 97.7   MCH 30.2 29.5   MCHC 31.4* 30.2*   RDW 50.1* 50.2*   PLATELETCT 280 325   MPV 10.3 10.1     Recent Labs     11/27/22  1042   SODIUM 136   POTASSIUM 6.0*   CHLORIDE 105   CO2 20   GLUCOSE 184*   BUN 58*   CREATININE 2.62*   CALCIUM 8.9                     Imaging  DX-CHEST-PORTABLE (1 VIEW)   Final Result      No acute cardiopulmonary abnormality identified.      CT-ABDOMEN-PELVIS W/O   Final Result      1.  Left retroperitoneal hematoma along the left iliopsoas/iliac is muscle measuring approximately 4.3 x 3.4 x 5.9 cm.      2.  Bilateral lower lobe consolidation could be due to pneumonia or atelectasis.      3.  Small layering stones versus sludge in the gallbladder.      4.  Diverticulosis without diverticulitis.      5.  Moderate amount of stool throughout the colon suggesting constipation.      US-EXTREMITY VENOUS UPPER UNILAT RIGHT   Final Result      DX-WRIST-LIMITED 2- RIGHT   Final Result         1.   No radiographic evidence of acute injury.      2.  Old healed fracture right fifth metacarpal neck.      3.  Moderate osteoarthritic changes of the right first carpal metacarpal joint.      4.  Possible intra-articular loose bodies adjacent to the styloid processes of the radius and ulna.      DX-CHEST-PORTABLE (1 VIEW)   Final Result      1.  Increased pulmonary edema   2.  Bibasilar underinflation atelectasis which could obscure an additional process. This is unchanged.   3.  Persistently enlarged cardiac silhouette      DX-CHEST-PORTABLE (1 VIEW)   Final Result       Mildly improved aeration and stable to slightly improved vascular congestion.      Mildly improved left basilar atelectasis.      DX-CHEST-PORTABLE (1 VIEW)   Final Result      1.  Interval extubation and removal of NG tube.   2.  Increased bibasilar airspace disease.   3.  Increased interstitial edema.   4.  Small left pleural effusion.      CT-HEAD W/O   Final Result      1. No CT evidence of acute infarct, hemorrhage or mass.   2. Moderate global parenchymal atrophy. Chronic small vessel ischemic changes.      DX-CHEST-PORTABLE (1 VIEW)   Final Result      Mildly improved left basilar opacity.      DX-ABDOMEN FOR TUBE PLACEMENT   Final Result      Enteric tube tip projects over the gastric antrum or proximal duodenum      DX-CHEST-PORTABLE (1 VIEW)   Final Result         1. Stable lines and tubes.   2. Worsening left basilar opacity. Small left pleural effusion. Mild right infrahilar atelectasis.         DX-CHEST-PORTABLE (1 VIEW)   Final Result      1.  Cardiomegaly with interstitial edema.      2.  Left lung base atelectasis and minimal left pleural effusion.      DX-CHEST-PORTABLE (1 VIEW)   Final Result      1.  Improved left pleural effusion with adjacent airspace disease.   2.  Improved interstitial infiltrates versus edema.      EC-ECHOCARDIOGRAM COMPLETE W/O CONT   Final Result      DX-CHEST-PORTABLE (1 VIEW)   Final Result      1.  Small left pleural effusion with adjacent airspace disease.   2.  Worsening interstitial infiltrates versus edema.   3.  Right basilar atelectasis.      DX-ABDOMEN FOR TUBE PLACEMENT   Final Result      Enteric tube tip projects over the proximal duodenum      DX-ABDOMEN FOR TUBE PLACEMENT   Final Result         1.  Nonspecific bowel gas pattern in the upper abdomen.   2.  Nasogastric tube, not visualized beyond the mid thorax, see ordered repeat abdominal x-ray for further characterization.   3.  Cardiomegaly   4.  Left lung base atelectasis or infiltrate.      OUTSIDE  IMAGES-DX CHEST   Final Result      DX-CHEST-PORTABLE (1 VIEW)   Final Result         1.  Bibasilar atelectasis or early infiltrates.   2.  Trace bilateral pleural effusions   3.  Cardiomegaly             Assessment/Plan  Problem Representation:    * Anemia  Assessment & Plan  Hemoglobin trending down  Received a unit today   CT abdomen showing: Left retroperitoneal hematoma along the left iliopsoas/iliac is muscle measuring approximately 4.3 x 3.4 x 5.9 cm.  Reversing warfarin   received 1 unit of RBC    PLAN  Close monitoring    Hematoma- (present on admission)  Assessment & Plan  CT abdomen showing Left retroperitoneal hematoma along the left iliopsoas/iliac is muscle measuring approximately 4.3 x 3.4 x 5.9 cm.  Holding warfarin , giving FFP and vit K to reverse INR   Has received 2 unit of RBC   Close monitoring   -start apixaban 11/24/2022, will monitor symptomatically for recurrence of bleeding and on physical exam, need approximately 5 days off as per current studies would suggest for retroperitoneal hematoma and restarting anticoagulation.    MYLA (acute kidney injury) (HCC)- (present on admission)  Assessment & Plan  initially was on dialysis and improved so dialysis was stopped but now has worsening again so nephrology has been consulted     Nephrology recs:  - Stop IV fluids  - Strict IOs  - Transfuse PRBC for Hb < 7   - Okay for midline as nursing staff having a lot of difficulty maintaining IV due to poor vasculature and need for multiple blood products and IV fluids for resuscitation. Discussed with IR nurse. Will hold off on midline for now as patient still has IV access.   - Ongoing GOC discussions   - CMP   - lasix discontinued      Acute on chronic systolic congestive heart failure (HCC)- (present on admission)  Assessment & Plan  Metoprolol tartrate 12.5 BID  It appears his EF has been improving  Continue present medical therapy    CONCLUSIONS  The left ventricle is not well visualized due to body  habitus.  Left ventricular systolic function is probably normal.  The ejection fraction is roughly estimated to be 55%.  A reliable estimation of diastolic function cannot be made due to   mitral valve disease.  Severely dilated right ventricle.  Reduced right ventricular systolic function.  Estimated right ventricular systolic pressure is 40 mmHg.  Severe biatrial dilation.  Mild mitral regurgitation.  Aortic valve sclerosis without significant stenosis.  Mild tricuspid regurgitation.  Normal inferior vena cava size and inspiratory collapse    Acute hypoxemic respiratory failure due to COVID-19 ([facility])- (present on admission)  Assessment & Plan  The patient is being treated for pneumonia,  Zosyn completed  Wean off O2 as tolerated     Paroxysmal atrial fibrillation (HCC)- (present on admission)  Assessment & Plan  -History of, ongoing anticoagulation   -Holding warfarin at this time and also s/p VIt K and also FFP  -starting apixaban 5 mg BID   -metoprolol tartrate 12.5 BID    MSSA (methicillin susceptible Staphylococcus aureus) pneumonia ([facility])- (present on admission)  Assessment & Plan  Finished treatment     Hypernatremia- (present on admission)  Assessment & Plan  Resolved     Restless leg syndrome- (present on admission)  Assessment & Plan  Requip discontinued    Chronic anticoagulation- (present on admission)  Assessment & Plan  Holding Coumadin. Will start apixiban 5mg BID    Pacemaker- (present on admission)  Assessment & Plan  In situ, telemetry monitoring, history of complete heart block    Pacemaker complications  Assessment & Plan  RESOLVED  Was informed by telemetry patient's pacemaker is not pacing properly. Patient asymptomatic at bedside w/ ecg showing intermittent pacing discussed with electrocardio physiology NP embers and plan is to interrogate device, rep notified.  -Pacer performing correctly on interrogation  - Patient asymptomatic  - DC telemetry  - Follow-up with cardiology as  outpatient    Constipation  Assessment & Plan  -s/p enema  -scheduled bowel regimen    Delirium- (present on admission)  Assessment & Plan  Resolved, likely in light of the patient's acute hospitalization, acute critical condition, disease  Standard precautions and out of delirium  Avoid offending medication  Avoid interruption of circadian rhythm  CT head negative for acute changes, unable to provide a MRI imaging secondary to patient's pacemaker.    PLAN  D/c tele  No vitals overnight  Carmel if disoriented  Patient alert and oriented x 3 at bedside      Goals of care, counseling/discussion- (present on admission)  Assessment & Plan  Palliative care assisting,   Patient continues to hope towards rehab, no wishes for palliative care at this time, continue to update family  Patient remains full code     Septic shock with acute organ dysfunction due to methicillin susceptible Staphylococcus aureus (MSSA) ([facility])  Assessment & Plan  Present on admission, treated adequately    Hyperkalemia- (present on admission)  Assessment & Plan  Improved   Monitor        VTE prophylaxis: SCDs/TEDs    I have performed a physical exam and reviewed and updated ROS and Plan today (11/27/2022). In review of yesterday's note (11/26/2022), there are no changes except as documented above.

## 2022-11-27 NOTE — PROGRESS NOTES
Pt needed the help of 2 nurses, and 1 CNA to stand pivot to the commode chair, so he could move his bowels. Pt was exteremly, weak, fearful, painful during transfers. Pt reported neuropathic pain in his feet. Once back in bed Pt fell asleep within minutes.

## 2022-11-28 NOTE — CARE PLAN
The patient is Stable - Low risk of patient condition declining or worsening    Problem: Risk for Aspiration  Goal: Patient's risk for aspiration will be absent or decrease  Outcome: Progressing     Problem: Psychosocial  Goal: Patient's level of anxiety will decrease  Outcome: Progressing     Shift Goals  Clinical Goals: Pain management  Patient Goals: Pain free rest  Family Goals: n/a        Patient is not progressing towards the following goals:

## 2022-11-28 NOTE — PROGRESS NOTES
Pt sleep disturbed by breakthrough pain. Administered 650 mg acetaminophen, and  Diclofenac sodium gel applied to Pt back. Pt sleeping at time of assessment.

## 2022-11-28 NOTE — PROGRESS NOTES
"[facility] Internal Medicine Daily Progress Note    Date of Service  11/28/2022    [facility] Team: R IM [facility] Team   Attending: Therese Pedraza M.d.  Senior Resident: Dr. Vásquez  Intern:  Dr. Torres  Contact Number: [phone]    Chief Complaint  Sean Kincaid is a 77 y.o. male admitted 11/4/2022 with respiratory failure secondary to COVID.     Hospital Course  77-year-old male with a past medical history of heart failure, COPD, A. fib, type 2 diabetes mellitus who presented on 11/4/2022 as a transfer from [facility] with worsening of shortness of breath and fatigue, started on BiPAP intubated, ICU stay, MYLA on acute renal failure, now with CKD, course complicated by hyperkalemia, hypernatremia, worsening renal function, ICU myopathy, likely stroke during hospitalization, bacterial pneumonia requiring IV antibiotics, now off all antibiotics, creatinine stably elevated with good urine output,    Interval Problem Update  No acute events overnight. Patient currently on 1L of oxygen this morning without any difficulties breathing.  Patient without acute complaints overnight, no chest pain, palpitations, lower extremity edema, shortness of breath, cough, abdominal pain, nausea/vomiting/diarrhea/constipation, dysuria, hematuria, new onset focal weakness, headaches or sensory changes.  Patient states that \"I would never realize that not having poop could make me hurt so bad all over, but I got a lot out yesterday and I am feeling so much better today, I will try and move around with physical therapy.\"  Patient was given patient was reportedly agitated overnight but back to baseline this morning.    In regards to patients hematoma conversation will need to be had in regards to when to restart anticoagulation. Will consider apixaban rather than patients home regimen of warfarin.    Was informed by telemetry patient's pacemaker is not pacing properly. Patient asymptomatic at bedside w/ ecg showing intermittent pacing " discussed with electrocardio physiology NP dwayne and plan is to interrogate device, rep notified.  11/22/2022.  Update on 11/23/2023 pacemaker interrogated by rep performing correctly, lead placement good on last chest x-ray, patient asymptomatic, without chest pain no signs of heart failure, 1 and half years of battery life left on pacemaker.    11/24  No acute events overnight. Patient has slightly increased mobility in upper extremities where he can raise elbows above the bed. Still limited mobility in left lower extremity. Apixaban to be started today in place of coumadin for treatment of afib.    I have discussed this patient's plan of care and discharge plan at IDT rounds today with Case Management, Nursing, Nursing leadership, and other members of the IDT team.    11/25:  No acute events overnight. On bedside patient was on room air. He reports feeling down this morning at how long he's been in the hospital and how he wishes he would get better already. Offered words of encouragement. No acute rebleeding from hematoma after anticoagulation restarted. Patient coreg still held as heart rate still in the 60s. No labs needed on this patient.    11/26:  No acute events overnight. Patient at bedside this morning had inc jerking movements on legs bilaterally and complained of an inability to stop the movements. Additionally, mentioned he felt itchiness/tingle ness. Will monitor for now and if symptoms persist or worsen will get CMP and Mg. Rate control with metoprolol also added for afib.    11/27:  No acute events overnight. Patient alert and oriented to self and time but not place. Leg tingle ness/movement improved from previous day. Patient continues to have limited mobility in bilateral upper extremities and left lower extremity.    11/28:  No acute events overnight. Patient still at baseline axo 2 but still not oriented to time. Potassium was mildly elevated at 6 so patient s/p kayexalate and insulin +  dextrose and r/p potassium now 5.1. Will continue to monitor. Otherwise, patient continues to deny any symptoms of sob/chest pain and has good o2 sats on 2L nc. Still pending placement for rehab. Creatinine and BUN were also mildly elevated from previous labs but not overly concerning as patients hospital course has creatinine stabilized within the 2.5-3 range.    Consultants/Specialty  nephrology    Code Status  Full Code    Disposition  Patient is not medically cleared for discharge.   Anticipate discharge to to skilled nursing facility.  I have placed the appropriate orders for post-discharge needs.    Review of Systems  Review of Systems   Constitutional:  Negative for malaise/fatigue.   HENT: Negative.     Eyes: Negative.    Respiratory:  Negative for sputum production.    Cardiovascular: Negative.    Gastrointestinal: Negative.    Genitourinary: Negative.    Musculoskeletal: Negative.    Skin: Negative.    Neurological:  Positive for focal weakness and weakness.   Endo/Heme/Allergies: Negative.    Psychiatric/Behavioral: Negative.        Physical Exam  Temp:  [36.8 °C (98.3 °F)-36.9 °C (98.4 °F)] 36.8 °C (98.3 °F)  Pulse:  [65-78] 65  Resp:  [18] 18  BP: (107-158)/(54-74) 115/74  SpO2:  [94 %-95 %] 94 %    Physical Exam  Constitutional:       Appearance: He is obese. He is not ill-appearing.   HENT:      Head: Normocephalic and atraumatic.      Right Ear: Tympanic membrane, ear canal and external ear normal.      Left Ear: Tympanic membrane, ear canal and external ear normal.      Nose: Nose normal.      Mouth/Throat:      Mouth: Mucous membranes are moist.      Pharynx: Oropharynx is clear.   Eyes:      Extraocular Movements: Extraocular movements intact.      Conjunctiva/sclera: Conjunctivae normal.      Pupils: Pupils are equal, round, and reactive to light.   Cardiovascular:      Rate and Rhythm: Normal rate and regular rhythm.   Pulmonary:      Effort: Pulmonary effort is normal. No respiratory distress.    Chest:      Chest wall: No tenderness.   Abdominal:      General: Abdomen is flat. Bowel sounds are normal.      Palpations: Abdomen is soft.   Musculoskeletal:      Cervical back: Normal range of motion.      Right lower leg: No edema.      Left lower leg: No edema.   Skin:     General: Skin is warm.      Capillary Refill: Capillary refill takes less than 2 seconds.   Neurological:      Mental Status: He is alert and oriented to person, place, and time.      Motor: Weakness present.   Psychiatric:         Mood and Affect: Mood normal.         Behavior: Behavior normal.         Thought Content: Thought content normal.         Judgment: Judgment normal.       Fluids    Intake/Output Summary (Last 24 hours) at 11/28/2022 1355  Last data filed at 11/28/2022 0600  Gross per 24 hour   Intake --   Output 1500 ml   Net -1500 ml       Laboratory  Recent Labs     11/27/22  1042   WBC 7.2   RBC 3.02*   HEMOGLOBIN 8.9*   HEMATOCRIT 29.5*   MCV 97.7   MCH 29.5   MCHC 30.2*   RDW 50.2*   PLATELETCT 325   MPV 10.1     Recent Labs     11/27/22  1750 11/27/22  2048 11/28/22  1054   SODIUM 135 135 136   POTASSIUM 5.9* 5.9* 5.1   CHLORIDE 103 104 104   CO2 21 21 21   GLUCOSE 159* 136* 195*   BUN 60* 59* 60*   CREATININE 2.97* 3.02* 2.98*   CALCIUM 8.9 8.8 8.6                     Imaging  DX-CHEST-PORTABLE (1 VIEW)   Final Result      No acute cardiopulmonary abnormality identified.      CT-ABDOMEN-PELVIS W/O   Final Result      1.  Left retroperitoneal hematoma along the left iliopsoas/iliac is muscle measuring approximately 4.3 x 3.4 x 5.9 cm.      2.  Bilateral lower lobe consolidation could be due to pneumonia or atelectasis.      3.  Small layering stones versus sludge in the gallbladder.      4.  Diverticulosis without diverticulitis.      5.  Moderate amount of stool throughout the colon suggesting constipation.      US-EXTREMITY VENOUS UPPER UNILAT RIGHT   Final Result      DX-WRIST-LIMITED 2- RIGHT   Final Result         1.    No radiographic evidence of acute injury.      2.  Old healed fracture right fifth metacarpal neck.      3.  Moderate osteoarthritic changes of the right first carpal metacarpal joint.      4.  Possible intra-articular loose bodies adjacent to the styloid processes of the radius and ulna.      DX-CHEST-PORTABLE (1 VIEW)   Final Result      1.  Increased pulmonary edema   2.  Bibasilar underinflation atelectasis which could obscure an additional process. This is unchanged.   3.  Persistently enlarged cardiac silhouette      DX-CHEST-PORTABLE (1 VIEW)   Final Result      Mildly improved aeration and stable to slightly improved vascular congestion.      Mildly improved left basilar atelectasis.      DX-CHEST-PORTABLE (1 VIEW)   Final Result      1.  Interval extubation and removal of NG tube.   2.  Increased bibasilar airspace disease.   3.  Increased interstitial edema.   4.  Small left pleural effusion.      CT-HEAD W/O   Final Result      1. No CT evidence of acute infarct, hemorrhage or mass.   2. Moderate global parenchymal atrophy. Chronic small vessel ischemic changes.      DX-CHEST-PORTABLE (1 VIEW)   Final Result      Mildly improved left basilar opacity.      DX-ABDOMEN FOR TUBE PLACEMENT   Final Result      Enteric tube tip projects over the gastric antrum or proximal duodenum      DX-CHEST-PORTABLE (1 VIEW)   Final Result         1. Stable lines and tubes.   2. Worsening left basilar opacity. Small left pleural effusion. Mild right infrahilar atelectasis.         DX-CHEST-PORTABLE (1 VIEW)   Final Result      1.  Cardiomegaly with interstitial edema.      2.  Left lung base atelectasis and minimal left pleural effusion.      DX-CHEST-PORTABLE (1 VIEW)   Final Result      1.  Improved left pleural effusion with adjacent airspace disease.   2.  Improved interstitial infiltrates versus edema.      EC-ECHOCARDIOGRAM COMPLETE W/O CONT   Final Result      DX-CHEST-PORTABLE (1 VIEW)   Final Result      1.  Small  left pleural effusion with adjacent airspace disease.   2.  Worsening interstitial infiltrates versus edema.   3.  Right basilar atelectasis.      DX-ABDOMEN FOR TUBE PLACEMENT   Final Result      Enteric tube tip projects over the proximal duodenum      DX-ABDOMEN FOR TUBE PLACEMENT   Final Result         1.  Nonspecific bowel gas pattern in the upper abdomen.   2.  Nasogastric tube, not visualized beyond the mid thorax, see ordered repeat abdominal x-ray for further characterization.   3.  Cardiomegaly   4.  Left lung base atelectasis or infiltrate.      OUTSIDE IMAGES-DX CHEST   Final Result      DX-CHEST-PORTABLE (1 VIEW)   Final Result         1.  Bibasilar atelectasis or early infiltrates.   2.  Trace bilateral pleural effusions   3.  Cardiomegaly             Assessment/Plan  Problem Representation:    * Anemia  Assessment & Plan  Hemoglobin trending down  Received a unit today   CT abdomen showing: Left retroperitoneal hematoma along the left iliopsoas/iliac is muscle measuring approximately 4.3 x 3.4 x 5.9 cm.  Reversing warfarin   received 1 unit of RBC    PLAN  Close monitoring    Hematoma- (present on admission)  Assessment & Plan  CT abdomen showing Left retroperitoneal hematoma along the left iliopsoas/iliac is muscle measuring approximately 4.3 x 3.4 x 5.9 cm.  Holding warfarin , giving FFP and vit K to reverse INR   Has received 2 unit of RBC   Close monitoring   -start apixaban 11/24/2022, will monitor symptomatically for recurrence of bleeding and on physical exam, need approximately 5 days off as per current studies would suggest for retroperitoneal hematoma and restarting anticoagulation.    Hyperkalemia- (present on admission)  Assessment & Plan  Improved s/p kayexalate and insulin + dextrose    PLAN  Continue kayexalate  Low potassium diet  R/p bmp  Monitor     Acute on chronic systolic congestive heart failure (HCC)- (present on admission)  Assessment & Plan  Metoprolol tartrate 12.5 BID  It  appears his EF has been improving  Continue present medical therapy    CONCLUSIONS  The left ventricle is not well visualized due to body habitus.  Left ventricular systolic function is probably normal.  The ejection fraction is roughly estimated to be 55%.  A reliable estimation of diastolic function cannot be made due to   mitral valve disease.  Severely dilated right ventricle.  Reduced right ventricular systolic function.  Estimated right ventricular systolic pressure is 40 mmHg.  Severe biatrial dilation.  Mild mitral regurgitation.  Aortic valve sclerosis without significant stenosis.  Mild tricuspid regurgitation.  Normal inferior vena cava size and inspiratory collapse    MYLA (acute kidney injury) (HCC)- (present on admission)  Assessment & Plan  initially was on dialysis and improved so dialysis was stopped but now has worsening again so nephrology has been consulted     Nephrology recs:  - Stop IV fluids  - Strict IOs  - Transfuse PRBC for Hb < 7   - Okay for midline as nursing staff having a lot of difficulty maintaining IV due to poor vasculature and need for multiple blood products and IV fluids for resuscitation. Discussed with IR nurse. Will hold off on midline for now as patient still has IV access.   - Ongoing GOC discussions   - CMP   - lasix discontinued      Acute hypoxemic respiratory failure due to COVID-19 ([facility])- (present on admission)  Assessment & Plan  The patient is being treated for pneumonia,  Zosyn completed  Wean off O2 as tolerated     Paroxysmal atrial fibrillation ([facility])- (present on admission)  Assessment & Plan  -History of, ongoing anticoagulation   -Holding warfarin at this time and also s/p VIt K and also FFP  -starting apixaban 5 mg BID   -metoprolol tartrate 12.5 BID    MSSA (methicillin susceptible Staphylococcus aureus) pneumonia ([facility])- (present on admission)  Assessment & Plan  Finished treatment     Hypernatremia- (present on admission)  Assessment & Plan  Resolved      Restless leg syndrome- (present on admission)  Assessment & Plan  Requip discontinued    Chronic anticoagulation- (present on admission)  Assessment & Plan  Holding Coumadin. Will start apixiban 5mg BID    Pacemaker- (present on admission)  Assessment & Plan  In situ, telemetry monitoring, history of complete heart block    Pacemaker complications  Assessment & Plan  RESOLVED  Was informed by telemetry patient's pacemaker is not pacing properly. Patient asymptomatic at bedside w/ ecg showing intermittent pacing discussed with electrocardio physiology NP embers and plan is to interrogate device, rep notified.  -Pacer performing correctly on interrogation  - Patient asymptomatic  - DC telemetry  - Follow-up with cardiology as outpatient    Constipation  Assessment & Plan  -s/p enema  -scheduled bowel regimen    Delirium- (present on admission)  Assessment & Plan  Resolved, likely in light of the patient's acute hospitalization, acute critical condition, disease  Standard precautions and out of delirium  Avoid offending medication  Avoid interruption of circadian rhythm  CT head negative for acute changes, unable to provide a MRI imaging secondary to patient's pacemaker.    PLAN  D/c tele  No vitals overnight  [place] if disoriented  Patient alert and oriented x 3 at bedside      Goals of care, counseling/discussion- (present on admission)  Assessment & Plan  Palliative care assisting,   Patient continues to hope towards rehab, no wishes for palliative care at this time, continue to update family  Patient remains full code     Septic shock with acute organ dysfunction due to methicillin susceptible Staphylococcus aureus (MSSA) ([facility])  Assessment & Plan  Present on admission, treated adequately       VTE prophylaxis: SCDs/TEDs    I have performed a physical exam and reviewed and updated ROS and Plan today (11/28/2022). In review of yesterday's note (11/27/2022), there are no changes except as documented  above.

## 2022-11-28 NOTE — PROGRESS NOTES
A/P    Sean Kincaid is a 77 y.o. male p/w hypoxic respitraoy failure due to covid s/p intubation course c/b HAP, aspiration PNA, R psoas hematoma, and L sided weakness from presumed R lacunar infarct has pacemaker that is not MRI compatible, and ATN. He is now s/p antibiotics with imporving Left sided weakness and has been transitioned back to apixiban. His crt has been stable ~ 3. Had hyperkalemia to 5.9 now improved. I have d/c nsaid given MYLA and hyperkalemia. He is medically cleared for discharge waiting for placement.

## 2022-11-29 NOTE — CARE PLAN
The patient is Stable - Low risk of patient condition declining or worsening    Shift Goals: Monitor glucose, pain mgmt  Clinical Goals: monitor glucose, pain mgmt  Patient Goals: pain mgmt  Family Goals: n/a    Progress made toward(s) clinical / shift goals:    Problem: Psychosocial  Goal: Patient's level of anxiety will decrease  Outcome: Progressing  Goal: Patient's ability to verbalize feelings about condition will improve  Outcome: Progressing  Goal: Patient's ability to re-evaluate and adapt role responsibilities will improve  Outcome: Progressing  Goal: Patient and family will demonstrate ability to cope with life altering diagnosis and/or procedure  Outcome: Progressing  Goal: Spiritual and cultural needs incorporated into hospitalization  Outcome: Progressing     Problem: Hemodynamics  Goal: Patient's hemodynamics, fluid balance and neurologic status will be stable or improve  Outcome: Progressing     Problem: Respiratory  Goal: Patient will achieve/maintain optimum respiratory ventilation and gas exchange  Outcome: Progressing     Problem: Risk for Aspiration  Goal: Patient's risk for aspiration will be absent or decrease  Outcome: Progressing     Problem: Bowel Elimination  Goal: Establish and maintain regular bowel function  Outcome: Progressing     Problem: Communication  Goal: The ability to communicate needs accurately and effectively will improve  Outcome: Progressing

## 2022-11-29 NOTE — CARE PLAN
The patient is Stable - Low risk of patient condition declining or worsening    Shift Goals  Clinical Goals: pain management  Patient Goals: no pain  Family Goals: n/a    Progress made toward(s) clinical / shift goals:    Problem: Respiratory  Goal: Patient will achieve/maintain optimum respiratory ventilation and gas exchange  Outcome: Progressing  Note: Education provided on how to use the IS and why. Return demonstration provided.      Problem: Communication  Goal: The ability to communicate needs accurately and effectively will improve  Outcome: Progressing  Note: Patient educated on medications, procedures and use of call light. Patient will continue to verbalize and improve on education and communication in the plan of care.          Patient is not progressing towards the following goals:

## 2022-11-29 NOTE — PROGRESS NOTES
"[facility] Internal Medicine Daily Progress Note    Date of Service  11/29/2022    [facility] Team: R IM [facility] Team   Attending: Mahad Kwong M.d.  Senior Resident: Dr. Vásquez  Intern:  Dr. Torres  Contact Number: [phone]    Chief Complaint  Sean Kincaid is a 77 y.o. male admitted 11/4/2022 with respiratory failure secondary to COVID.     Hospital Course  77-year-old male with a past medical history of heart failure, COPD, A. fib, type 2 diabetes mellitus who presented on 11/4/2022 as a transfer from [facility] with worsening of shortness of breath and fatigue, started on BiPAP intubated, ICU stay, MYLA on acute renal failure, now with CKD, course complicated by hyperkalemia, hypernatremia, worsening renal function, ICU myopathy, likely stroke during hospitalization, bacterial pneumonia requiring IV antibiotics, now off all antibiotics, creatinine stably elevated with good urine output,    Interval Problem Update  No acute events overnight. Patient currently on 1L of oxygen this morning without any difficulties breathing.  Patient without acute complaints overnight, no chest pain, palpitations, lower extremity edema, shortness of breath, cough, abdominal pain, nausea/vomiting/diarrhea/constipation, dysuria, hematuria, new onset focal weakness, headaches or sensory changes.  Patient states that \"I would never realize that not having poop could make me hurt so bad all over, but I got a lot out yesterday and I am feeling so much better today, I will try and move around with physical therapy.\"  Patient was given patient was reportedly agitated overnight but back to baseline this morning.    In regards to patients hematoma conversation will need to be had in regards to when to restart anticoagulation. Will consider apixaban rather than patients home regimen of warfarin.    Was informed by telemetry patient's pacemaker is not pacing properly. Patient asymptomatic at bedside w/ ecg showing intermittent pacing " discussed with electrocardio physiology NP dwayne and plan is to interrogate device, rep notified.  11/22/2022.  Update on 11/23/2023 pacemaker interrogated by rep performing correctly, lead placement good on last chest x-ray, patient asymptomatic, without chest pain no signs of heart failure, 1 and half years of battery life left on pacemaker.    11/24  No acute events overnight. Patient has slightly increased mobility in upper extremities where he can raise elbows above the bed. Still limited mobility in left lower extremity. Apixaban to be started today in place of coumadin for treatment of afib.    I have discussed this patient's plan of care and discharge plan at IDT rounds today with Case Management, Nursing, Nursing leadership, and other members of the IDT team.    11/25:  No acute events overnight. On bedside patient was on room air. He reports feeling down this morning at how long he's been in the hospital and how he wishes he would get better already. Offered words of encouragement. No acute rebleeding from hematoma after anticoagulation restarted. Patient coreg still held as heart rate still in the 60s. No labs needed on this patient.    11/26:  No acute events overnight. Patient at bedside this morning had inc jerking movements on legs bilaterally and complained of an inability to stop the movements. Additionally, mentioned he felt itchiness/tingle ness. Will monitor for now and if symptoms persist or worsen will get CMP and Mg. Rate control with metoprolol also added for afib.    11/27:  No acute events overnight. Patient alert and oriented to self and time but not place. Leg tingle ness/movement improved from previous day. Patient continues to have limited mobility in bilateral upper extremities and left lower extremity.    11/28:  No acute events overnight. Patient still at baseline axo 2 but still not oriented to time. Potassium was mildly elevated at 6 so patient s/p kayexalate and insulin +  dextrose and r/p potassium now 5.1. Will continue to monitor. Otherwise, patient continues to deny any symptoms of sob/chest pain and has good o2 sats on 2L nc. Still pending placement for rehab. Creatinine and BUN were also mildly elevated from previous labs but not overly concerning as patients hospital course has creatinine stabilized within the 2.5-3 range.    11/29:  No acute events overnight. Patient had hematuria coming out of mcdowell which is new from previous day. Holding apixaban and orderding cbc, cmp, u/a. u/a positive for blood so will consult urology. Additionally, once mcdowell is removed will attempt voiding trial with tamsulosin.    Consultants/Specialty  nephrology    Code Status  Full Code    Disposition  Patient is not medically cleared for discharge.   Anticipate discharge to to skilled nursing facility.  I have placed the appropriate orders for post-discharge needs.    Review of Systems  Review of Systems   Constitutional:  Negative for malaise/fatigue.   HENT: Negative.     Eyes: Negative.    Respiratory:  Negative for sputum production.    Cardiovascular: Negative.    Gastrointestinal: Negative.    Genitourinary: Negative.    Musculoskeletal: Negative.    Skin: Negative.    Neurological:  Positive for focal weakness and weakness.   Endo/Heme/Allergies: Negative.    Psychiatric/Behavioral: Negative.        Physical Exam  Temp:  [36.9 °C (98.4 °F)-37 °C (98.6 °F)] 37 °C (98.6 °F)  Pulse:  [72-92] 76  Resp:  [18] 18  BP: (114-138)/(52-82) 114/52  SpO2:  [92 %-94 %] 94 %    Physical Exam  Constitutional:       Appearance: He is obese. He is not ill-appearing.   HENT:      Head: Normocephalic and atraumatic.      Right Ear: Tympanic membrane, ear canal and external ear normal.      Left Ear: Tympanic membrane, ear canal and external ear normal.      Nose: Nose normal.      Mouth/Throat:      Mouth: Mucous membranes are moist.      Pharynx: Oropharynx is clear.   Eyes:      Extraocular Movements:  Extraocular movements intact.      Conjunctiva/sclera: Conjunctivae normal.      Pupils: Pupils are equal, round, and reactive to light.   Cardiovascular:      Rate and Rhythm: Normal rate and regular rhythm.   Pulmonary:      Effort: Pulmonary effort is normal. No respiratory distress.   Chest:      Chest wall: No tenderness.   Abdominal:      General: Abdomen is flat. Bowel sounds are normal.      Palpations: Abdomen is soft.   Musculoskeletal:      Cervical back: Normal range of motion.      Right lower leg: No edema.      Left lower leg: No edema.   Skin:     General: Skin is warm.      Capillary Refill: Capillary refill takes less than 2 seconds.   Neurological:      Mental Status: He is alert and oriented to person, place, and time.      Motor: Weakness present.   Psychiatric:         Mood and Affect: Mood normal.         Behavior: Behavior normal.         Thought Content: Thought content normal.         Judgment: Judgment normal.       Fluids    Intake/Output Summary (Last 24 hours) at 11/29/2022 1404  Last data filed at 11/29/2022 1200  Gross per 24 hour   Intake 480 ml   Output 2000 ml   Net -1520 ml       Laboratory  Recent Labs     11/27/22  1042   WBC 7.2   RBC 3.02*   HEMOGLOBIN 8.9*   HEMATOCRIT 29.5*   MCV 97.7   MCH 29.5   MCHC 30.2*   RDW 50.2*   PLATELETCT 325   MPV 10.1     Recent Labs     11/27/22  2048 11/28/22  1054 11/29/22  0147   SODIUM 135 136 136   POTASSIUM 5.9* 5.1 4.7   CHLORIDE 104 104 101   CO2 21 21 24   GLUCOSE 136* 195* 168*   BUN 59* 60* 60*   CREATININE 3.02* 2.98* 2.99*   CALCIUM 8.8 8.6 8.5                     Imaging  DX-CHEST-PORTABLE (1 VIEW)   Final Result      No acute cardiopulmonary abnormality identified.      CT-ABDOMEN-PELVIS W/O   Final Result      1.  Left retroperitoneal hematoma along the left iliopsoas/iliac is muscle measuring approximately 4.3 x 3.4 x 5.9 cm.      2.  Bilateral lower lobe consolidation could be due to pneumonia or atelectasis.      3.  Small  layering stones versus sludge in the gallbladder.      4.  Diverticulosis without diverticulitis.      5.  Moderate amount of stool throughout the colon suggesting constipation.      US-EXTREMITY VENOUS UPPER UNILAT RIGHT   Final Result      DX-WRIST-LIMITED 2- RIGHT   Final Result         1.   No radiographic evidence of acute injury.      2.  Old healed fracture right fifth metacarpal neck.      3.  Moderate osteoarthritic changes of the right first carpal metacarpal joint.      4.  Possible intra-articular loose bodies adjacent to the styloid processes of the radius and ulna.      DX-CHEST-PORTABLE (1 VIEW)   Final Result      1.  Increased pulmonary edema   2.  Bibasilar underinflation atelectasis which could obscure an additional process. This is unchanged.   3.  Persistently enlarged cardiac silhouette      DX-CHEST-PORTABLE (1 VIEW)   Final Result      Mildly improved aeration and stable to slightly improved vascular congestion.      Mildly improved left basilar atelectasis.      DX-CHEST-PORTABLE (1 VIEW)   Final Result      1.  Interval extubation and removal of NG tube.   2.  Increased bibasilar airspace disease.   3.  Increased interstitial edema.   4.  Small left pleural effusion.      CT-HEAD W/O   Final Result      1. No CT evidence of acute infarct, hemorrhage or mass.   2. Moderate global parenchymal atrophy. Chronic small vessel ischemic changes.      DX-CHEST-PORTABLE (1 VIEW)   Final Result      Mildly improved left basilar opacity.      DX-ABDOMEN FOR TUBE PLACEMENT   Final Result      Enteric tube tip projects over the gastric antrum or proximal duodenum      DX-CHEST-PORTABLE (1 VIEW)   Final Result         1. Stable lines and tubes.   2. Worsening left basilar opacity. Small left pleural effusion. Mild right infrahilar atelectasis.         DX-CHEST-PORTABLE (1 VIEW)   Final Result      1.  Cardiomegaly with interstitial edema.      2.  Left lung base atelectasis and minimal left pleural  effusion.      DX-CHEST-PORTABLE (1 VIEW)   Final Result      1.  Improved left pleural effusion with adjacent airspace disease.   2.  Improved interstitial infiltrates versus edema.      EC-ECHOCARDIOGRAM COMPLETE W/O CONT   Final Result      DX-CHEST-PORTABLE (1 VIEW)   Final Result      1.  Small left pleural effusion with adjacent airspace disease.   2.  Worsening interstitial infiltrates versus edema.   3.  Right basilar atelectasis.      DX-ABDOMEN FOR TUBE PLACEMENT   Final Result      Enteric tube tip projects over the proximal duodenum      DX-ABDOMEN FOR TUBE PLACEMENT   Final Result         1.  Nonspecific bowel gas pattern in the upper abdomen.   2.  Nasogastric tube, not visualized beyond the mid thorax, see ordered repeat abdominal x-ray for further characterization.   3.  Cardiomegaly   4.  Left lung base atelectasis or infiltrate.      OUTSIDE IMAGES-DX CHEST   Final Result      DX-CHEST-PORTABLE (1 VIEW)   Final Result         1.  Bibasilar atelectasis or early infiltrates.   2.  Trace bilateral pleural effusions   3.  Cardiomegaly             Assessment/Plan  Problem Representation:    * Anemia  Assessment & Plan  Hemoglobin trending down  Received a unit today   CT abdomen showing: Left retroperitoneal hematoma along the left iliopsoas/iliac is muscle measuring approximately 4.3 x 3.4 x 5.9 cm.  Reversing warfarin   received 1 unit of RBC    PLAN  Close monitoring    Hematoma- (present on admission)  Assessment & Plan  CT abdomen showing Left retroperitoneal hematoma along the left iliopsoas/iliac is muscle measuring approximately 4.3 x 3.4 x 5.9 cm.  Holding warfarin , giving FFP and vit K to reverse INR   Has received 2 unit of RBC   Close monitoring   -apixaban held due to new onset hematuria    Paroxysmal atrial fibrillation (HCC)- (present on admission)  Assessment & Plan  -History of, ongoing anticoagulation   -Holding warfarin at this time and also s/p VIt K and also FFP  -starting apixaban  5 mg BID   -metoprolol tartrate 12.5 BID    Hematuria  Assessment & Plan  -hematuria noticed from mcdowell this morning new finding compared to previous day    PLAN  -cbc, cmp, u/a  -apixaban held  -if u/a positive for blood consult urology    Acute on chronic systolic congestive heart failure (HCC)- (present on admission)  Assessment & Plan  Metoprolol tartrate 12.5 BID  It appears his EF has been improving  Continue present medical therapy    CONCLUSIONS  The left ventricle is not well visualized due to body habitus.  Left ventricular systolic function is probably normal.  The ejection fraction is roughly estimated to be 55%.  A reliable estimation of diastolic function cannot be made due to   mitral valve disease.  Severely dilated right ventricle.  Reduced right ventricular systolic function.  Estimated right ventricular systolic pressure is 40 mmHg.  Severe biatrial dilation.  Mild mitral regurgitation.  Aortic valve sclerosis without significant stenosis.  Mild tricuspid regurgitation.  Normal inferior vena cava size and inspiratory collapse    MYLA (acute kidney injury) ([facility])- (present on admission)  Assessment & Plan  -initially was on dialysis and improved so dialysis was stopped but now has worsening again so nephrology has been consulted   -unsure what baseline is at home. However on this admission creatinine seems to have stabilized within the 2.5-3.0 range    PLAN  -CTM  -Nephrology recs:  - Stop IV fluids  - Strict IOs  - Transfuse PRBC for Hb < 7   - Okay for midline as nursing staff having a lot of difficulty maintaining IV due to poor vasculature and need for multiple blood products and IV fluids for resuscitation. Discussed with IR nurse. Will hold off on midline for now as patient still has IV access.   - Ongoing GOC discussions   - CMP   - lasix discontinued      Acute hypoxemic respiratory failure due to COVID-19 ([facility])- (present on admission)  Assessment & Plan  The patient is being treated for  pneumonia,  Zosyn completed  Wean off O2 as tolerated     MSSA (methicillin susceptible Staphylococcus aureus) pneumonia (HCC)- (present on admission)  Assessment & Plan  Finished treatment     Hypernatremia- (present on admission)  Assessment & Plan  Resolved     Restless leg syndrome- (present on admission)  Assessment & Plan  Requip discontinued    Chronic anticoagulation- (present on admission)  Assessment & Plan  Holding Coumadin. Will start apixiban 5mg BID    Pacemaker- (present on admission)  Assessment & Plan  In situ, telemetry monitoring, history of complete heart block    Pacemaker complications  Assessment & Plan  RESOLVED  Was informed by telemetry patient's pacemaker is not pacing properly. Patient asymptomatic at bedside w/ ecg showing intermittent pacing discussed with electrocardio physiology NP embers and plan is to interrogate device, rep notified.  -Pacer performing correctly on interrogation  - Patient asymptomatic  - DC telemetry  - Follow-up with cardiology as outpatient    Constipation  Assessment & Plan  -s/p enema  -scheduled bowel regimen    Delirium- (present on admission)  Assessment & Plan  Resolved, likely in light of the patient's acute hospitalization, acute critical condition, disease  Standard precautions and out of delirium  Avoid offending medication  Avoid interruption of circadian rhythm  CT head negative for acute changes, unable to provide a MRI imaging secondary to patient's pacemaker.    PLAN  D/c tele  No vitals overnight  [place] if disoriented  Patient alert and oriented x 3 at bedside      Goals of care, counseling/discussion- (present on admission)  Assessment & Plan  Palliative care assisting,   Patient continues to hope towards rehab, no wishes for palliative care at this time, continue to update family  Patient remains full code     Septic shock with acute organ dysfunction due to methicillin susceptible Staphylococcus aureus (MSSA) (HCC)  Assessment & Plan  Present  on admission, treated adequately    Hyperkalemia- (present on admission)  Assessment & Plan  Improved s/p kayexalate and insulin + dextrose    PLAN    RESOLVED  Continue kayexalate  Low potassium diet  R/p bmp  Monitor        VTE prophylaxis: SCDs/TEDs    I have performed a physical exam and reviewed and updated ROS and Plan today (11/29/2022). In review of yesterday's note (11/28/2022), there are no changes except as documented above.

## 2022-11-29 NOTE — PROGRESS NOTES
Patient has been restless most of night shift complaining of pain in his legs. This RN gave oxy 5 mg approximately 3.5 hours ago and pt states that it did not work for him. I will escalate the dose to 10 mg as ordered PRN Q4

## 2022-11-29 NOTE — ASSESSMENT & PLAN NOTE
Hematuria still noticable from mcdowell, which was also noted yesterday  -apixaban held  -no growth on urine culture  -per urology:     - Recommend repeat CT a/p w/o due to decreased renal function will avoid contrast    - Hand irrigate mcdowell catheter PRN for clots/catheter not draining    - Monitor for worsening hematuria, consider 3-way mcdowell catheter placement if CBI is indicated    - Continue mcdowell catheter for now, hospital team planning for TOV prior to discharge on tamsulosin    -urology signed off

## 2022-11-30 NOTE — CONSULTS
"UROLOGY Consult Note:    Mahsa Hansen P.A.-C.  Date & Time note created:    11/30/2022   9:42 AM     Referring MD:  Dr. Torres    Patient ID:   Name:             Sean Kinciad   YOB: 1945  Age:                 77 y.o.  male   MRN:               [id number]                                                             Reason for Consult:      Hematuria with catheter in place    History of Present Illness:    Per my chart review, patient is a 77-year-old male with PMH including heart failure, COPD, A. fib, T2DM who presented on 11/4/2022 as a transfer from [facility] with worsening of shortness of breath and fatigue, started on BiPAP intubated, ICU stay, MYLA on acute renal failure, now with CKD, course complicated by hyperkalemia, hypernatremia, worsening renal function, ICU myopathy, likely stroke during hospitalization, bacterial pneumonia requiring IV antibiotics. He is now off all antibiotics, creatinine stable although elevated with good urine output, nephrology on board.    Urology was consulted for hematuria with mcdowell catheter in place, UA completed.    Patient sitting up in bed, currently eating breakfast.    Of note, patient refused most of my interview this morning stating \"I don't want to talk about my bladder right now.\" He did state he has had catheters in the past, does remember seeing blood in the urine yesterday, and denies any suprapubic pain or dysuria. Does report low back pain.    Spoke with NORAH Natarajan, confirmed catheter was last exchanged on 11/19/22. It appears the catheter was first placed at an outside facility prior to this.    Review of Systems:      Constitutional: Denies fevers, Denies weight changes  Cardiovascular: no chest pain, no palpitations   Respiratory: no shortness of breath , Denies cough  Gastrointestinal/Hepatic: Denies abdominal pain, nausea, vomiting, diarrhea, constipation or GI bleeding   Genitourinary: +hematuria, denies dysuria or " frequency  Musculoskeletal/Rheum: +back pain              Past Medical History:   Past Medical History:   Diagnosis Date    Acquired renal cyst 5/5/2020    Anticoagulation monitoring, INR range 2.5-3.5     Arthritis 2011    ASTHMA     Atrial fibrillation ([facility])     Back pain     Backpain     Blood transfusion, without reported diagnosis 08/07/2012    Bursitis of right hip 2/8/2013    Cardiomyopathy, nonischemic ([facility])     EF 45%    Chronic anticoagulation     Chronic systolic congestive heart failure ([facility]) 12/29/2015    Coronary artery disease involving native coronary artery of native heart without angina pectoris     Diabetes ([facility])     Dyslipidemia     Eosinophilia 9/4/2014    Essential hypertension     GERD (gastroesophageal reflux disease)     GERD (gastroesophageal reflux disease) 11/12/2014    H/O pneumococcal pneumonia     Headache(784.0)     Hematuria 9/4/2014    History of complete AV block     Hyperglycemia 9/4/2014    fasting serum glucose 125 (9/5/2014)    Hypertension     Insomnia     Iron deficiency anemia 2/8/2013    Nocturnal hypoxemia     Paroxysmal atrial fibrillation ([facility])     Restless leg syndrome     Right hip pain 2/8/2013    S/P cardiac pacemaker procedure     S/P mitral valve replacement with bioprosthetic valve     Seasonal allergies     Sleep apnea     Tinea cruris 2/8/2013    Type 2 diabetes mellitus ([facility]) 9/5/2014    Type 2 diabetes mellitus without complication ([facility]) 10/13/2016    Ventral hernia 7/2014     Active Hospital Problems    Diagnosis     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.9XXA]     Constipation [K59.00]     Anemia [D64.9]     MSSA (methicillin susceptible Staphylococcus aureus) pneumonia ([facility]) [J15.211]     Delirium [R41.0]     Goals of care, counseling/discussion [Z71.89]     Hypernatremia [E87.0]     Hematoma [T14.8XXA]     Septic shock with acute organ dysfunction due to methicillin susceptible Staphylococcus aureus (MSSA) ([facility]) [A41.01, R65.21]     MYLA (acute kidney injury) ([facility])  [N17.9]     Hyperkalemia [E87.5]     Acute on chronic systolic congestive heart failure (HCC) [I50.23]     Acute hypoxemic respiratory failure due to COVID-19 (HCC) [U07.1, J96.01]     Paroxysmal atrial fibrillation (HCC) [I48.0]     Chronic anticoagulation [Z79.01]     Restless leg syndrome [G25.81]     Hematuria [R31.9]     Pacemaker [Z95.0]        Past Surgical History:  Past Surgical History:   Procedure Laterality Date    COLONOSCOPY - ENDO  1/2/2017    Procedure: COLONOSCOPY - ENDO;  Surgeon: Joel Barney M.D.;  Location: SURGERY [facility];  Service:     STENT PLACEMENT  11/2015    coronary artery    MAZE PROCEDURE  8/7/2012    Performed by JEREMIE BRADLEY at SURGERY [facility]    MITRAL VALVE REPLACE  8/7/2012    Performed by JEREMIE BRADLEY at SURGERY [facility]    PACEMAKER INSERTION  August 2012    [employer] Altrua 60 S 606 implanted by Dr. Dennis Gray.    MITRAL VALVE REPLACEMENT  2012    bioprosthetic    INGUINAL HERNIA REPAIR  2007    Left side    OTHER ORTHOPEDIC SURGERY  2005    Right knee replacement    OTHER ORTHOPEDIC SURGERY  1965    Back surgery    APPENDECTOMY CHILD  1957    TONSILLECTOMY  1949    APPENDECTOMY      HERNIA REPAIR      KNEE ARTHROPLASTY TOTAL Right     MAZE PROCEDURE      PACEMAKER INSERTION      NY LAP UMBILICAL HERNIA REPAIR      NY REMOVAL OF TONSILS,<12 Y/O      TONSILLECTOMY         Hospital Medications:    Current Facility-Administered Medications:     tamsulosin (FLOMAX) capsule 0.4 mg, 0.4 mg, Oral, AFTER BREAKFAST, Johanna Torres M.D., 0.4 mg at 11/30/22 0924    metoprolol tartrate (LOPRESSOR) tablet 12.5 mg, 12.5 mg, Oral, TWICE DAILY, Therese Pedraza M.D., 12.5 mg at 11/30/22 0417    [Held by provider] apixaban (ELIQUIS) tablet 5 mg, 5 mg, Oral, BID, Johanna Torres M.D., 5 mg at 11/29/22 0501    insulin GLARGINE (Lantus,Semglee) injection, 12 Units, Subcutaneous, QAM INSULIN, Dash Vásquez M.D., 12 Units at 11/30/22 0433    senna-docusate  (PERICOLACE or SENOKOT S) 8.6-50 MG per tablet 2 Tablet, 2 Tablet, Oral, BID, 2 Tablet at 11/30/22 0416 **AND** polyethylene glycol/lytes (MIRALAX) PACKET 1 Packet, 1 Packet, Oral, DAILY, 1 Packet at 11/30/22 0417 **AND** [DISCONTINUED] magnesium hydroxide (MILK OF MAGNESIA) suspension 30 mL, 30 mL, Enteral Tube, QDAY PRN **AND** bisacodyl (DULCOLAX) suppository 10 mg, 10 mg, Rectal, QDAY PRN, Johanna Torres M.D.    oxyCODONE immediate-release (ROXICODONE) tablet 5-10 mg, 5-10 mg, Oral, Q4HRS PRN, Audrey Bull, A.P.R.N., 10 mg at 11/30/22 0416    omeprazole (PRILOSEC) capsule 20 mg, 20 mg, Oral, DAILY, Devyn Lin M.D., 20 mg at 11/30/22 0416    melatonin tablet 5 mg, 5 mg, Oral, Nightly, Shira Meyer D.O., 5 mg at 11/29/22 2109    acetaminophen (Tylenol) tablet 650 mg, 650 mg, Oral, Q6HRS PRN, Audrey Bull, A.P.R.N., 650 mg at 11/30/22 0017    atorvastatin (LIPITOR) tablet 40 mg, 40 mg, Oral, QDAY, Devyn Lin M.D., 40 mg at 11/29/22 1720    albuterol inhaler 2 Puff, 2 Puff, Inhalation, Q4HRS PRN, Harshil Rosales M.D., 2 Puff at 11/26/22 0003    insulin regular (HumuLIN R,NovoLIN R) injection, 3-14 Units, Subcutaneous, 4X/DAY ACHS, 3 Units at 11/29/22 2120 **AND** POC blood glucose manual result, , , Q AC AND BEDTIME(S) **AND** NOTIFY MD and PharmD, , , Once **AND** Administer 20 grams of glucose (approximately 8 ounces of fruit juice) every 15 minutes PRN FSBG less than 70 mg/dL, , , PRN **AND** dextrose 10 % BOLUS 25 g, 25 g, Intravenous, Q15 MIN PRN, Kalpesh Cox D.O.    Respiratory Therapy Consult, , Nebulization, Continuous RT, Earl Scott M.D.    ondansetron (ZOFRAN) syringe/vial injection 4 mg, 4 mg, Intravenous, Q4HRS PRN, Earl Scott M.D., 4 mg at 11/26/22 1148    Current Outpatient Medications:  Medications Prior to Admission   Medication Sig Dispense Refill Last Dose    warfarin (COUMADIN) 2.5 MG Tab Take 1 Tablet by mouth 2 times a day. 200 Tablet 0      furosemide (LASIX) 40 MG Tab Take 1 Tablet by mouth every day. 100 Tablet 0     ROPINIRole (REQUIP) 0.5 MG Tab Take 2 Tablets by mouth every day. 200 Tablet 2     carvedilol (COREG) 25 MG Tab Take 1 Tablet by mouth 2 times a day with meals. 200 Tablet 2     B-D ULTRAFINE III SHORT PEN USE AS DIRECTED WITH INSULIN  Each 1     omeprazole (PRILOSEC) 20 MG delayed-release capsule TAKE ONE CAPSULE BY MOUTH EVERY  Capsule 2     atorvastatin (LIPITOR) 40 MG Tab TAKE ONE TABLET BY MOUTH EVERY  Tablet 2     LANTUS SOLOSTAR 100 UNIT/ML Solution Pen-injector injection INJECT 25 UNITS UNDER THE SKIN EVERY EVENING 3 mL 11     spironolactone (ALDACTONE) 25 MG Tab TAKE ONE TABLET BY MOUTH EVERY DAY 90 Tablet 3     Continuous Blood Gluc  (FREESTYLE KENDRA 2 READER) Device Use with Freestyle Kendra sensors 1 Each 0     Continuous Blood Gluc Sensor (FREESTYLE KENDRA 2 SENSOR) Misc Use with Freestyle Kendra [place] to read blood sugars 2 Each 3     nystatin (MYCOSTATIN) powder APPLY TOPICALLY TWICE DAILY TO SKIN FOLDS UNDER ABDOMEN AND IN GROIN AREA (CONTINUE UNTIL SKIN NORMALIZED FOR 2 TO 3 DAYS) 15 g 0     Blood Glucose Test Strips Dispense strips of insurance preference. Use to test once daily. Dx E11.9 Pt not on insulin 100 Each 3     Lancets Dispense lancets of insurance preference. Use to test once daily. Dx E11.9 Pt not on insulin 100 Each 3     Blood Glucose Monitoring Suppl Device Meter: Dispense Device of Insurance Preference. Sig. Use as directed for blood sugar monitoring. #1. NR. 1 Each 0     ipratropium (ATROVENT) 0.06 % Solution Spray 2 Sprays in nose 4 times a day. 50 mL 6     cyclobenzaprine (FLEXERIL) 10 MG Tab Take 10 mg by mouth.       fluticasone (FLONASE) 50 MCG/ACT nasal spray Spray 1 Spray in nose every day. 16 g 5     albuterol 108 (90 Base) MCG/ACT Aero Soln inhalation aerosol Inhale 2 Puffs by mouth every 6 hours as needed for Shortness of Breath. 8.5 g 8     HYDROcodone/acetaminophen  "(NORCO)  MG Tab           Medication Allergy:  No Known Allergies    Family History:  Family History   Problem Relation Age of Onset    Heart Disease Mother         Valvular heart problems./valvular heart disease    Diabetes Father     Cancer Sister     Cancer Brother         lung cancer    Diabetes Brother     Other Brother         heart bypass       Social History:  Social History     Socioeconomic History    Marital status:      Spouse name: Not on file    Number of children: Not on file    Years of education: Not on file    Highest education level: Not on file   Occupational History    Not on file   Tobacco Use    Smoking status: Never    Smokeless tobacco: Never   Vaping Use    Vaping Use: Never used   Substance and Sexual Activity    Alcohol use: No    Drug use: No     Frequency: 7.0 times per week     Types: Marijuana, Inhaled     Comment: marijuana last smoked 8/4/12    Sexual activity: Yes     Partners: Female   Other Topics Concern    Not on file   Social History Narrative    ** Merged History Encounter **         Lives with his wife.  Does some work housecleaning.     Social Determinants of Health     Financial Resource Strain: Not on file   Food Insecurity: Not on file   Transportation Needs: Not on file   Physical Activity: Not on file   Stress: Not on file   Social Connections: Not on file   Intimate Partner Violence: Not on file   Housing Stability: Not on file         Physical Exam:  Vitals/ General Appearance:   Weight/BMI: Body mass index is 32.27 kg/m².  /65   Pulse 72   Temp 36.3 °C (97.4 °F) (Temporal)   Resp 20   Ht 1.88 m (6' 2\")   Wt 114 kg (251 lb 5.2 oz)   SpO2 90%   Vitals:    11/29/22 1602 11/29/22 2000 11/30/22 0400 11/30/22 0755   BP: 137/78 109/59 123/68 136/65   Pulse: 76 83 70 72   Resp: 20 20 18 20   Temp: 36.8 °C (98.2 °F) 36.2 °C (97.2 °F) 36.3 °C (97.3 °F) 36.3 °C (97.4 °F)   TempSrc: Temporal Temporal Temporal Temporal   SpO2: 96% 95% 93% 90%   Weight:   "     Height:         Oxygen Therapy:  Pulse Oximetry: 90 %, O2 (LPM): 0, O2 Delivery Device: None - Room Air    Constitutional: No acute distress  HENMT:  Normocephalic, Atraumatic  Eyes:  EOMI  Neck:  Normal range of motion  Abdomen: Soft, No tenderness  : Ball catheter in place draining red-tinged urine, no significant clots appreciated.  Skin: Warm, Dry, No erythema, No rash, no induration.  Neurologic: Alert & oriented x 3      MDM (Data Review):     Records reviewed and summarized in current documentation    Lab Data Review:  Recent Results (from the past 24 hour(s))   POCT glucose device results    Collection Time: 11/29/22 12:11 PM   Result Value Ref Range    POC Glucose, Blood 169 (H) 65 - 99 mg/dL   URINALYSIS    Collection Time: 11/29/22  1:00 PM    Specimen: Urine, Ball Cath   Result Value Ref Range    Color Red (A)     Character Turbid (A)     Specific Gravity 1.010 <1.035    Ph 7.0 5.0 - 8.0    Glucose Negative Negative mg/dL    Ketones Negative Negative mg/dL    Protein 100 (A) Negative mg/dL    Bilirubin Moderate (A) Negative    Urobilinogen, Urine 4.0 Negative    Nitrite Positive (A) Negative    Leukocyte Esterase Large (A) Negative    Occult Blood Large (A) Negative    Micro Urine Req Microscopic    URINE MICROSCOPIC (W/UA)    Collection Time: 11/29/22  1:00 PM   Result Value Ref Range    WBC  (A) /hpf    RBC >150 (A) /hpf    Bacteria Rare (A) None /hpf    Epithelial Cells Rare /hpf    Granular Casts 3-5 (A) /lpf   CBC WITH DIFFERENTIAL    Collection Time: 11/29/22  3:14 PM   Result Value Ref Range    WBC 9.2 4.8 - 10.8 K/uL    RBC 3.05 (L) 4.70 - 6.10 M/uL    Hemoglobin 8.9 (L) 14.0 - 18.0 g/dL    Hematocrit 29.1 (L) 42.0 - 52.0 %    MCV 95.4 81.4 - 97.8 fL    MCH 29.2 27.0 - 33.0 pg    MCHC 30.6 (L) 33.7 - 35.3 g/dL    RDW 49.0 35.9 - 50.0 fL    Platelet Count 353 164 - 446 K/uL    MPV 9.9 9.0 - 12.9 fL    Neutrophils-Polys 63.60 44.00 - 72.00 %    Lymphocytes 14.70 (L) 22.00 - 41.00 %     Monocytes 17.20 (H) 0.00 - 13.40 %    Eosinophils 3.60 0.00 - 6.90 %    Basophils 0.20 0.00 - 1.80 %    Immature Granulocytes 0.70 0.00 - 0.90 %    Nucleated RBC 0.00 /100 WBC    Neutrophils (Absolute) 5.82 1.82 - 7.42 K/uL    Lymphs (Absolute) 1.35 1.00 - 4.80 K/uL    Monos (Absolute) 1.58 (H) 0.00 - 0.85 K/uL    Eos (Absolute) 0.33 0.00 - 0.51 K/uL    Baso (Absolute) 0.02 0.00 - 0.12 K/uL    Immature Granulocytes (abs) 0.06 0.00 - 0.11 K/uL    NRBC (Absolute) 0.00 K/uL   Comp Metabolic Panel    Collection Time: 11/29/22  3:14 PM   Result Value Ref Range    Sodium 138 135 - 145 mmol/L    Potassium 4.3 3.6 - 5.5 mmol/L    Chloride 99 96 - 112 mmol/L    Co2 25 20 - 33 mmol/L    Anion Gap 14.0 7.0 - 16.0    Glucose 197 (H) 65 - 99 mg/dL    Bun 63 (H) 8 - 22 mg/dL    Creatinine 3.18 (H) 0.50 - 1.40 mg/dL    Calcium 8.6 8.5 - 10.5 mg/dL    AST(SGOT) 22 12 - 45 U/L    ALT(SGPT) 11 2 - 50 U/L    Alkaline Phosphatase 136 (H) 30 - 99 U/L    Total Bilirubin 1.0 0.1 - 1.5 mg/dL    Albumin 2.7 (L) 3.2 - 4.9 g/dL    Total Protein 7.2 6.0 - 8.2 g/dL    Globulin 4.5 (H) 1.9 - 3.5 g/dL    A-G Ratio 0.6 g/dL   MAGNESIUM    Collection Time: 11/29/22  3:14 PM   Result Value Ref Range    Magnesium 1.8 1.5 - 2.5 mg/dL   ESTIMATED GFR    Collection Time: 11/29/22  3:14 PM   Result Value Ref Range    GFR (CKD-EPI) 19 (A) >60 mL/min/1.73 m 2   POCT glucose device results    Collection Time: 11/29/22  5:01 PM   Result Value Ref Range    POC Glucose, Blood 171 (H) 65 - 99 mg/dL   POCT glucose device results    Collection Time: 11/29/22  9:07 PM   Result Value Ref Range    POC Glucose, Blood 168 (H) 65 - 99 mg/dL   Comp Metabolic Panel    Collection Time: 11/30/22  3:11 AM   Result Value Ref Range    Sodium 136 135 - 145 mmol/L    Potassium 4.2 3.6 - 5.5 mmol/L    Chloride 100 96 - 112 mmol/L    Co2 25 20 - 33 mmol/L    Anion Gap 11.0 7.0 - 16.0    Glucose 157 (H) 65 - 99 mg/dL    Bun 68 (H) 8 - 22 mg/dL    Creatinine 3.75 (H) 0.50 - 1.40  mg/dL    Calcium 8.5 8.5 - 10.5 mg/dL    AST(SGOT) 18 12 - 45 U/L    ALT(SGPT) 11 2 - 50 U/L    Alkaline Phosphatase 117 (H) 30 - 99 U/L    Total Bilirubin 1.0 0.1 - 1.5 mg/dL    Albumin 2.2 (L) 3.2 - 4.9 g/dL    Total Protein 6.8 6.0 - 8.2 g/dL    Globulin 4.6 (H) 1.9 - 3.5 g/dL    A-G Ratio 0.5 g/dL   CBC WITH DIFFERENTIAL    Collection Time: 11/30/22  3:11 AM   Result Value Ref Range    WBC 9.3 4.8 - 10.8 K/uL    RBC 2.77 (L) 4.70 - 6.10 M/uL    Hemoglobin 8.1 (L) 14.0 - 18.0 g/dL    Hematocrit 26.5 (L) 42.0 - 52.0 %    MCV 95.7 81.4 - 97.8 fL    MCH 29.2 27.0 - 33.0 pg    MCHC 30.6 (L) 33.7 - 35.3 g/dL    RDW 49.4 35.9 - 50.0 fL    Platelet Count 343 164 - 446 K/uL    MPV 10.1 9.0 - 12.9 fL    Neutrophils-Polys 60.00 44.00 - 72.00 %    Lymphocytes 16.80 (L) 22.00 - 41.00 %    Monocytes 18.10 (H) 0.00 - 13.40 %    Eosinophils 4.40 0.00 - 6.90 %    Basophils 0.40 0.00 - 1.80 %    Immature Granulocytes 0.30 0.00 - 0.90 %    Nucleated RBC 0.00 /100 WBC    Neutrophils (Absolute) 5.58 1.82 - 7.42 K/uL    Lymphs (Absolute) 1.56 1.00 - 4.80 K/uL    Monos (Absolute) 1.68 (H) 0.00 - 0.85 K/uL    Eos (Absolute) 0.41 0.00 - 0.51 K/uL    Baso (Absolute) 0.04 0.00 - 0.12 K/uL    Immature Granulocytes (abs) 0.03 0.00 - 0.11 K/uL    NRBC (Absolute) 0.00 K/uL   ESTIMATED GFR    Collection Time: 11/30/22  3:11 AM   Result Value Ref Range    GFR (CKD-EPI) 16 (A) >60 mL/min/1.73 m 2   POCT glucose device results    Collection Time: 11/30/22  4:30 AM   Result Value Ref Range    POC Glucose, Blood 131 (H) 65 - 99 mg/dL   POCT glucose device results    Collection Time: 11/30/22  7:52 AM   Result Value Ref Range    POC Glucose, Blood 130 (H) 65 - 99 mg/dL       Imaging/Procedures Review:    Reviewed    MDM (Assessment and Plan):     Active Hospital Problems    Diagnosis     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.9XXA]     Constipation [K59.00]     Anemia [D64.9]     MSSA (methicillin susceptible Staphylococcus aureus) pneumonia (HCC) [J15.211]      Delirium [R41.0]     Goals of care, counseling/discussion [Z71.89]     Hypernatremia [E87.0]     Hematoma [T14.8XXA]     Septic shock with acute organ dysfunction due to methicillin susceptible Staphylococcus aureus (MSSA) ([facility]) [A41.01, R65.21]     MYLA (acute kidney injury) ([facility]) [N17.9]     Hyperkalemia [E87.5]     Acute on chronic systolic congestive heart failure ([facility]) [I50.23]     Acute hypoxemic respiratory failure due to COVID-19 ([facility]) [U07.1, J96.01]     Paroxysmal atrial fibrillation ([facility]) [I48.0]     Chronic anticoagulation [Z79.01]     Restless leg syndrome [G25.81]     Hematuria [R31.9]     Pacemaker [Z95.0]      77yoM with new onset hematuria with catheter in place, recently came off of Zosyn for MSSA pneumonia, hospital team holding anticoagulation, patient with decreased renal function and has been seen by nephrology.    BUN 68, Creat 3.75  WBC 9.3  H/H 8.1/26.5    CT a/p w/o on 11/19/22 with the following findings:    IMPRESSION:     1.  Left retroperitoneal hematoma along the left iliopsoas/iliac is muscle measuring approximately 4.3 x 3.4 x 5.9 cm.     2.  Bilateral lower lobe consolidation could be due to pneumonia or atelectasis.     3.  Small layering stones versus sludge in the gallbladder.     4.  Diverticulosis without diverticulitis.     5.  Moderate amount of stool throughout the colon suggesting constipation.      Of note, catheter was last exchanged 11/19/22       PLAN:    - Obtain urine culture  - Recommend repeat CT a/p w/o due to decreased renal function will avoid contrast  - Hand irrigate mcdowell catheter PRN for clots/catheter not draining  - Monitor for worsening hematuria, consider 3-way mcdowell catheter placement if CBI is indicated  - Continue mcdowell catheter for now, hospital team planning for TOV prior to discharge on tamsulosin      Dr. Gautam is aware of this consultation and has directed the plan of care.    Mahsa Hansen PA-C  [facility] [place]

## 2022-11-30 NOTE — PROGRESS NOTES
"[facility] Internal Medicine Daily Progress Note    Date of Service  11/30/2022    [facility] Team: R IM [facility] Team   Attending: Mahad Kwong M.d.  Senior Resident: Dr. Vásquez  Intern:  Dr. Torres  Contact Number: [phone]    Chief Complaint  Sean Kincaid is a 77 y.o. male admitted 11/4/2022 with respiratory failure secondary to COVID.     Hospital Course  77-year-old male with a past medical history of heart failure, COPD, A. fib, type 2 diabetes mellitus who presented on 11/4/2022 as a transfer from [facility] with worsening of shortness of breath and fatigue, started on BiPAP intubated, ICU stay, MYLA on acute renal failure, now with CKD, course complicated by hyperkalemia, hypernatremia, worsening renal function, ICU myopathy, likely stroke during hospitalization, bacterial pneumonia requiring IV antibiotics, now off all antibiotics, creatinine stably elevated with good urine output,    Interval Problem Update  No acute events overnight. Patient currently on 1L of oxygen this morning without any difficulties breathing.  Patient without acute complaints overnight, no chest pain, palpitations, lower extremity edema, shortness of breath, cough, abdominal pain, nausea/vomiting/diarrhea/constipation, dysuria, hematuria, new onset focal weakness, headaches or sensory changes.  Patient states that \"I would never realize that not having poop could make me hurt so bad all over, but I got a lot out yesterday and I am feeling so much better today, I will try and move around with physical therapy.\"  Patient was given patient was reportedly agitated overnight but back to baseline this morning.    In regards to patients hematoma conversation will need to be had in regards to when to restart anticoagulation. Will consider apixaban rather than patients home regimen of warfarin.    Was informed by telemetry patient's pacemaker is not pacing properly. Patient asymptomatic at bedside w/ ecg showing intermittent pacing " discussed with electrocardio physiology NP dwayne and plan is to interrogate device, rep notified.  11/22/2022.  Update on 11/23/2023 pacemaker interrogated by rep performing correctly, lead placement good on last chest x-ray, patient asymptomatic, without chest pain no signs of heart failure, 1 and half years of battery life left on pacemaker.    11/24  No acute events overnight. Patient has slightly increased mobility in upper extremities where he can raise elbows above the bed. Still limited mobility in left lower extremity. Apixaban to be started today in place of coumadin for treatment of afib.    I have discussed this patient's plan of care and discharge plan at IDT rounds today with Case Management, Nursing, Nursing leadership, and other members of the IDT team.    11/25:  No acute events overnight. On bedside patient was on room air. He reports feeling down this morning at how long he's been in the hospital and how he wishes he would get better already. Offered words of encouragement. No acute rebleeding from hematoma after anticoagulation restarted. Patient coreg still held as heart rate still in the 60s. No labs needed on this patient.    11/26:  No acute events overnight. Patient at bedside this morning had inc jerking movements on legs bilaterally and complained of an inability to stop the movements. Additionally, mentioned he felt itchiness/tingle ness. Will monitor for now and if symptoms persist or worsen will get CMP and Mg. Rate control with metoprolol also added for afib.    11/27:  No acute events overnight. Patient alert and oriented to self and time but not place. Leg tingle ness/movement improved from previous day. Patient continues to have limited mobility in bilateral upper extremities and left lower extremity.    11/28:  No acute events overnight. Patient still at baseline axo 2 but still not oriented to time. Potassium was mildly elevated at 6 so patient s/p kayexalate and insulin +  dextrose and r/p potassium now 5.1. Will continue to monitor. Otherwise, patient continues to deny any symptoms of sob/chest pain and has good o2 sats on 2L nc. Still pending placement for rehab. Creatinine and BUN were also mildly elevated from previous labs but not overly concerning as patients hospital course has creatinine stabilized within the 2.5-3 range.    11/29:  No acute events overnight. Patient had hematuria coming out of mcdowell which is new from previous day. Holding apixaban and ordering cbc, cmp, u/a. u/a positive for blood so will consult urology. Additionally, once mcdowell is removed will attempt voiding trial with tamsulosin.    11/30:  No acute events overnight. Hematuria improved from mcdowell compared to yesterday after apixaban held. Hg downtrend but still stable at 8.1. Per urology, CT abd/pelv w/o contrast ordered in addition to u/c. IV fluids started for patient after worsening of kidney injury with up-trending creatinine. Patient's friend Javid contact info is [phone].    Consultants/Specialty  nephrology    Code Status  Full Code    Disposition  Patient is not medically cleared for discharge.   Anticipate discharge to to skilled nursing facility.  I have placed the appropriate orders for post-discharge needs.    Review of Systems  Review of Systems   Constitutional:  Negative for malaise/fatigue.   HENT: Negative.     Eyes: Negative.    Respiratory:  Negative for sputum production.    Cardiovascular: Negative.    Gastrointestinal: Negative.    Genitourinary: Negative.    Musculoskeletal: Negative.    Skin: Negative.    Neurological:  Positive for focal weakness and weakness.   Endo/Heme/Allergies: Negative.    Psychiatric/Behavioral: Negative.        Physical Exam  Temp:  [36.2 °C (97.2 °F)-36.8 °C (98.2 °F)] 36.3 °C (97.4 °F)  Pulse:  [70-83] 72  Resp:  [18-20] 20  BP: (109-137)/(59-78) 136/65  SpO2:  [90 %-96 %] 90 %    Physical Exam  Constitutional:       Appearance: He is obese. He is not  ill-appearing.   HENT:      Head: Normocephalic and atraumatic.      Right Ear: Tympanic membrane, ear canal and external ear normal.      Left Ear: Tympanic membrane, ear canal and external ear normal.      Nose: Nose normal.      Mouth/Throat:      Mouth: Mucous membranes are moist.      Pharynx: Oropharynx is clear.   Eyes:      Extraocular Movements: Extraocular movements intact.      Conjunctiva/sclera: Conjunctivae normal.      Pupils: Pupils are equal, round, and reactive to light.   Cardiovascular:      Rate and Rhythm: Normal rate and regular rhythm.   Pulmonary:      Effort: Pulmonary effort is normal. No respiratory distress.   Chest:      Chest wall: No tenderness.   Abdominal:      General: Abdomen is flat. Bowel sounds are normal.      Palpations: Abdomen is soft.   Musculoskeletal:      Cervical back: Normal range of motion.      Right lower leg: No edema.      Left lower leg: No edema.   Skin:     General: Skin is warm.      Capillary Refill: Capillary refill takes less than 2 seconds.   Neurological:      Mental Status: He is alert and oriented to person, place, and time.      Motor: Weakness present.   Psychiatric:         Mood and Affect: Mood normal.         Behavior: Behavior normal.         Thought Content: Thought content normal.         Judgment: Judgment normal.       Fluids    Intake/Output Summary (Last 24 hours) at 11/30/2022 1302  Last data filed at 11/30/2022 1100  Gross per 24 hour   Intake 660 ml   Output 900 ml   Net -240 ml       Laboratory  Recent Labs     11/29/22  1514 11/30/22  0311   WBC 9.2 9.3   RBC 3.05* 2.77*   HEMOGLOBIN 8.9* 8.1*   HEMATOCRIT 29.1* 26.5*   MCV 95.4 95.7   MCH 29.2 29.2   MCHC 30.6* 30.6*   RDW 49.0 49.4   PLATELETCT 353 343   MPV 9.9 10.1     Recent Labs     11/29/22  0147 11/29/22  1514 11/30/22  0311   SODIUM 136 138 136   POTASSIUM 4.7 4.3 4.2   CHLORIDE 101 99 100   CO2 24 25 25   GLUCOSE 168* 197* 157*   BUN 60* 63* 68*   CREATININE 2.99* 3.18* 3.75*    CALCIUM 8.5 8.6 8.5                     Imaging  DX-CHEST-PORTABLE (1 VIEW)   Final Result      No acute cardiopulmonary abnormality identified.      CT-ABDOMEN-PELVIS W/O   Final Result      1.  Left retroperitoneal hematoma along the left iliopsoas/iliac is muscle measuring approximately 4.3 x 3.4 x 5.9 cm.      2.  Bilateral lower lobe consolidation could be due to pneumonia or atelectasis.      3.  Small layering stones versus sludge in the gallbladder.      4.  Diverticulosis without diverticulitis.      5.  Moderate amount of stool throughout the colon suggesting constipation.      US-EXTREMITY VENOUS UPPER UNILAT RIGHT   Final Result      DX-WRIST-LIMITED 2- RIGHT   Final Result         1.   No radiographic evidence of acute injury.      2.  Old healed fracture right fifth metacarpal neck.      3.  Moderate osteoarthritic changes of the right first carpal metacarpal joint.      4.  Possible intra-articular loose bodies adjacent to the styloid processes of the radius and ulna.      DX-CHEST-PORTABLE (1 VIEW)   Final Result      1.  Increased pulmonary edema   2.  Bibasilar underinflation atelectasis which could obscure an additional process. This is unchanged.   3.  Persistently enlarged cardiac silhouette      DX-CHEST-PORTABLE (1 VIEW)   Final Result      Mildly improved aeration and stable to slightly improved vascular congestion.      Mildly improved left basilar atelectasis.      DX-CHEST-PORTABLE (1 VIEW)   Final Result      1.  Interval extubation and removal of NG tube.   2.  Increased bibasilar airspace disease.   3.  Increased interstitial edema.   4.  Small left pleural effusion.      CT-HEAD W/O   Final Result      1. No CT evidence of acute infarct, hemorrhage or mass.   2. Moderate global parenchymal atrophy. Chronic small vessel ischemic changes.      DX-CHEST-PORTABLE (1 VIEW)   Final Result      Mildly improved left basilar opacity.      DX-ABDOMEN FOR TUBE PLACEMENT   Final Result       Enteric tube tip projects over the gastric antrum or proximal duodenum      DX-CHEST-PORTABLE (1 VIEW)   Final Result         1. Stable lines and tubes.   2. Worsening left basilar opacity. Small left pleural effusion. Mild right infrahilar atelectasis.         DX-CHEST-PORTABLE (1 VIEW)   Final Result      1.  Cardiomegaly with interstitial edema.      2.  Left lung base atelectasis and minimal left pleural effusion.      DX-CHEST-PORTABLE (1 VIEW)   Final Result      1.  Improved left pleural effusion with adjacent airspace disease.   2.  Improved interstitial infiltrates versus edema.      EC-ECHOCARDIOGRAM COMPLETE W/O CONT   Final Result      DX-CHEST-PORTABLE (1 VIEW)   Final Result      1.  Small left pleural effusion with adjacent airspace disease.   2.  Worsening interstitial infiltrates versus edema.   3.  Right basilar atelectasis.      DX-ABDOMEN FOR TUBE PLACEMENT   Final Result      Enteric tube tip projects over the proximal duodenum      DX-ABDOMEN FOR TUBE PLACEMENT   Final Result         1.  Nonspecific bowel gas pattern in the upper abdomen.   2.  Nasogastric tube, not visualized beyond the mid thorax, see ordered repeat abdominal x-ray for further characterization.   3.  Cardiomegaly   4.  Left lung base atelectasis or infiltrate.      OUTSIDE IMAGES-DX CHEST   Final Result      DX-CHEST-PORTABLE (1 VIEW)   Final Result         1.  Bibasilar atelectasis or early infiltrates.   2.  Trace bilateral pleural effusions   3.  Cardiomegaly      CT-ABDOMEN-PELVIS W/O    (Results Pending)          Assessment/Plan  Problem Representation:    * Anemia  Assessment & Plan  Hemoglobin trending down  Received a unit today   CT abdomen showing: Left retroperitoneal hematoma along the left iliopsoas/iliac is muscle measuring approximately 4.3 x 3.4 x 5.9 cm.  Reversing warfarin   received 1 unit of RBC    PLAN  Close monitoring    Hematoma- (present on admission)  Assessment & Plan  CT abdomen showing Left  retroperitoneal hematoma along the left iliopsoas/iliac is muscle measuring approximately 4.3 x 3.4 x 5.9 cm.  Holding warfarin , giving FFP and vit K to reverse INR   Has received 2 unit of RBC   Close monitoring   -apixaban held due to new onset hematuria    Paroxysmal atrial fibrillation (HCC)- (present on admission)  Assessment & Plan  -History of, ongoing anticoagulation   -Holding warfarin at this time and also s/p VIt K and also FFP  -apixaban 5 mg BID held  -metoprolol tartrate 12.5 BID    Hematuria  Assessment & Plan  -hematuria noticed from mcdowell this morning new finding compared to previous day  -apixaban held    PLAN  -per urology:    - Obtain urine culture     - Recommend repeat CT a/p w/o due to decreased renal function will avoid contrast    - Hand irrigate mcdowell catheter PRN for clots/catheter not draining    - Monitor for worsening hematuria, consider 3-way mcdowell catheter placement if CBI is indicated    - Continue mcdowell catheter for now, hospital team planning for TOV prior to discharge on tamsulosin  -CBC trend    Acute on chronic systolic congestive heart failure (HCC)- (present on admission)  Assessment & Plan  Metoprolol tartrate 12.5 BID  It appears his EF has been improving  Continue present medical therapy    CONCLUSIONS  The left ventricle is not well visualized due to body habitus.  Left ventricular systolic function is probably normal.  The ejection fraction is roughly estimated to be 55%.  A reliable estimation of diastolic function cannot be made due to   mitral valve disease.  Severely dilated right ventricle.  Reduced right ventricular systolic function.  Estimated right ventricular systolic pressure is 40 mmHg.  Severe biatrial dilation.  Mild mitral regurgitation.  Aortic valve sclerosis without significant stenosis.  Mild tricuspid regurgitation.  Normal inferior vena cava size and inspiratory collapse    MYLA (acute kidney injury) (HCC)- (present on admission)  Assessment &  Plan  -initially was on dialysis and improved so dialysis was stopped but now has worsening again so nephrology has been consulted   -unsure what baseline is at home. However on this admission creatinine seems to have stabilized within the 2.5-3.0 range    PLAN  -CTM  -nephrology previously following  -IV fluids started      Acute hypoxemic respiratory failure due to COVID-19 (HCC)- (present on admission)  Assessment & Plan  The patient is being treated for pneumonia,  Zosyn completed  Wean off O2 as tolerated     MSSA (methicillin susceptible Staphylococcus aureus) pneumonia ([facility])- (present on admission)  Assessment & Plan  Finished treatment     Hypernatremia- (present on admission)  Assessment & Plan  Resolved     Restless leg syndrome- (present on admission)  Assessment & Plan  Requip discontinued    Chronic anticoagulation- (present on admission)  Assessment & Plan  Holding Coumadin. Originally replaced w/ apixiban 5mg BID but now held in setting of hematuria    Pacemaker- (present on admission)  Assessment & Plan  In situ, telemetry monitoring, history of complete heart block    Pacemaker complications  Assessment & Plan  RESOLVED  Was informed by telemetry patient's pacemaker is not pacing properly. Patient asymptomatic at bedside w/ ecg showing intermittent pacing discussed with electrocardio physiology NP embers and plan is to interrogate device, rep notified.  -Pacer performing correctly on interrogation  - Patient asymptomatic  - DC telemetry  - Follow-up with cardiology as outpatient    Constipation  Assessment & Plan  -s/p enema  -scheduled bowel regimen    Delirium- (present on admission)  Assessment & Plan  Resolved, likely in light of the patient's acute hospitalization, acute critical condition, disease  Standard precautions and out of delirium  Avoid offending medication  Avoid interruption of circadian rhythm  CT head negative for acute changes, unable to provide a MRI imaging secondary to  patient's pacemaker.    PLAN  D/c tele  No vitals overnight  Wisdom if disoriented  Patient alert and oriented x 3 at bedside      Goals of care, counseling/discussion- (present on admission)  Assessment & Plan  Palliative care assisting,   Patient continues to hope towards rehab, no wishes for palliative care at this time, continue to update family  Patient remains full code     Septic shock with acute organ dysfunction due to methicillin susceptible Staphylococcus aureus (MSSA) ([facility])  Assessment & Plan  Present on admission, treated adequately    Hyperkalemia- (present on admission)  Assessment & Plan  Improved s/p kayexalate and insulin + dextrose    PLAN    RESOLVED  Continue kayexalate  Low potassium diet  R/p bmp  Monitor        VTE prophylaxis: SCDs/TEDs    I have performed a physical exam and reviewed and updated ROS and Plan today (11/30/2022). In review of yesterday's note (11/29/2022), there are no changes except as documented above.

## 2022-11-30 NOTE — CARE PLAN
The patient is Stable - Low risk of patient condition declining or worsening    Shift Goals  Clinical Goals: monitor electrolytes  Patient Goals: pain management  Family Goals: n/a    Progress made toward(s) clinical / shift goals:    Problem: Mobility  Goal: Patient's capacity to carry out activities will improve  Outcome: Not Progressing  Note: Risk for activity intolerance will decrease. Patient will be assessed, monitored and educated for signs of activity intolerance. Provided rest periods during activity and encouraged patient to increase activity level in collaboration with interdisciplinary team.         Patient is not progressing towards the following goals:      Problem: Mobility  Goal: Patient's capacity to carry out activities will improve  Outcome: Not Progressing  Note: Risk for activity intolerance will decrease. Patient will be assessed, monitored and educated for signs of activity intolerance. Provided rest periods during activity and encouraged patient to increase activity level in collaboration with interdisciplinary team.

## 2022-11-30 NOTE — DISCHARGE PLANNING
Agency/Facility Name: [facility]  Spoke To: Simi  Outcome: DPA was notified Pt has currently been declined by SNF. Simi states if Pt starts to work with PT/OT and progresses they can reconsider Pt for acceptance. DPA to look into if Pt is currently on HD.     RN NAA notified.

## 2022-11-30 NOTE — HEART FAILURE PROGRAM
Notes still indicating that patient needs SNF. I've advised schedulers to cancel his appointment for now. This has been a long length of stay. And discharge is complicated.

## 2022-11-30 NOTE — CARE PLAN
The patient is Stable - Low risk of patient condition declining or worsening    Shift Goals  Clinical Goals: Monitor vitals/mentation/electrolytes, safety  Patient Goals: Get better, pain management  Family Goals: ALLIE    No adverse events overnight. Pt c/o achy chronic back and groin pain, managed with PRN meds.     Progress made toward(s) clinical / shift goals:    Problem: Hemodynamics  Goal: Patient's hemodynamics, fluid balance and neurologic status will be stable or improve  Outcome: Progressing  Note: Pt neurologic status unchanged and remains stable. Hgb 8.1 from morning labs. Will continue to monitor. Pt tolerating oral intake well.      Problem: Respiratory  Goal: Patient will achieve/maintain optimum respiratory ventilation and gas exchange  Outcome: Progressing  Note: Pt remains stable with room air while awake and 2LNC while sleeping. Pt has mild WOB and no respiratory issues noted during shift.

## 2022-11-30 NOTE — THERAPY
Physical Therapy   Daily Treatment     Patient Name: Sean Kincaid  Age:  77 y.o., Sex:  male  Medical Record #: [id number]  Today's Date: 11/29/2022     Precautions: Fall Risk;Swallow Precautions    Assessment  Pt not progressing with therapy, remains limited by pain and impaired cognition inhibiting meaningful participation and attempts at following cueing. Pt attempted STS today but unable to tolerate L knee extension and unable to achieve buttocks clearance despite cueing, facilitation, and two-person assist. Pt does appear to be developing L knee flexion contracture and also actively resists therapist assist at PROM to any extremity or upright posture. Will continue to follow at this time.     Plan  Continue current treatment plan.  DC Equipment Recommendations: Unable to determine at this time  Discharge Recommendations: Recommend post-acute placement for additional physical therapy services prior to discharge home     11/29/22 3624   Cognition    Level of Consciousness Alert   Ability To Follow Commands 1 Step   New Learning Impaired   Attention Impaired   Sequencing Impaired   Initiation Impaired   Comments poor initiation and decr participation and engagement today, still confused reagrding hospitalization and decr insight into current deficits and decr progress   Balance   Sitting Balance (Static) Fair -   Sitting Balance (Dynamic) Poor +   Weight Shift Sitting Poor   Skilled Intervention Verbal Cuing;Postural Facilitation   Comments BUE support sitting, attempted STS but pt barely able to clear buttocks even with 2 person assist   Gait Analysis   Gait Level Of Assist Unable to Participate   Bed Mobility    Supine to Sit Moderate Assist   Sit to Supine Maximal Assist   Scooting Maximal Assist   Rolling Maximal Assist to Rt.;Maximum Assist to Lt.   Skilled Intervention Verbal Cuing;Tactile Cuing;Sequencing   Comments decr initiation, most limited by pain   Functional Mobility   Sit to Stand Unable to  Participate   Bed, Chair, Wheelchair Transfer Unable to Participate   Comments attempted STS but pt unable to achieve buttocks clearance and even appears to be resisting therapist and tech   Short Term Goals    Short Term Goal # 1 B  pt will be able to complete supine<>sitting from flat bed with SPV in 6tx in order to improve independence   Goal Outcome  # 1 B Goal not met   Short Term Goal # 2 Pt will perform STS with LRAD and min A within 6 visits to progress OOB mobility   Goal Outcome # 2 Goal not met   Short Term Goal # 3 B pt will be able to complete functional transfers with FWW and SPV in 6tx in order to decrease fall risk   Goal Outcome # 3 B Goal not met   Short Term Goal # 4 Pt will ambulate 20 ft with LRAD and mod A within 6 visits to initiate gait training   Goal Outcome # 4 Goal not met

## 2022-12-01 NOTE — CARE PLAN
The patient is Stable - Low risk of patient condition declining or worsening    Shift Goals  Clinical Goals: Safety, monitor urine, pain management  Patient Goals: Control pain, get better  Family Goals: ALLIE    No adverse events overnight. See below.     Progress made toward(s) clinical / shift goals:    Problem: Hemodynamics  Goal: Patient's hemodynamics, fluid balance and neurologic status will be stable or improve  Outcome: Progressing  Note: Neuro status remains unchanged at A&O x 2-3. Pt has adequate urine output at this time. Urology and nephrology following. Hematuria appears to have improved. Hgb 8 from morning labs.      Patient is not progressing towards the following goals:  Problem: Mobility  Goal: Patient's capacity to carry out activities will improve  Outcome: Not Progressing  Note: Pt declining position changes due to c/o hip pain and has difficulty carrying out activities due to pain.

## 2022-12-01 NOTE — DISCHARGE PLANNING
Case Management Discharge Planning    Admission Date: 11/4/2022  GMLOS: 13.2  ALOS: 27    6-Clicks ADL Score: 9  6-Clicks Mobility Score: 6  PT and/or OT Eval ordered: Yes  Post-acute Referrals Ordered: Yes  Post-acute Choice Obtained: Yes  Has referral(s) been sent to post-acute provider:  Yes      Anticipated Discharge Dispo: Discharge Disposition: D/T to facility providing assisted/supportive care (04)    DME Needed: No    Action(s) Taken: Updated Provider/Nurse on Discharge Plan    Escalations Completed: None    Medically Clear: No    Next Steps: Patient discussed during morning IDT rounds with team. Patient is not medically cleared at this time. [facility] is willing to reconsider the patient if he starts making progress with PT/OT for a few days.  Patient is currently 1:1 for meals. SLP in to see pt today, see their notes for recommendations. CM to follow for dc planning and needs, no other needs at this time.     Barriers to Discharge: Medical clearance    Is the patient up for discharge tomorrow: No

## 2022-12-01 NOTE — CARE PLAN
The patient is Stable - Low risk of patient condition declining or worsening    Shift Goals  Clinical Goals: monitor urine, samples  Patient Goals: home, pain management  Family Goals: ALLIE    Progress made toward(s) clinical / shift goals:  LR started. Groin and abdominal folds red, interdry placed. Urology consult today. Ball present with active order, okay to keep in without 48 hr void trial.

## 2022-12-01 NOTE — THERAPY
"Speech Language Pathology  Daily Treatment     Patient Name: Sean Kincaid  Age:  77 y.o., Sex:  male  Medical Record #: [id number]  Today's Date: 12/1/2022     Precautions  Precautions: Fall Risk, Swallow Precautions ( See Comments)    Assessment    The patient was seen for reassessment of swallow function secondary to increased difficulty with PO intake as well as a new consolidation noted on abdominal CT performed 11/30. Per RN, patient made NPO this morning secondary to concerns. The patient was noted to have a regular diet meal at bedside (although held by nursing due to concerns). Patient with no overt s/s of aspiration on PO trials of mildly thick liquids, purees and soft solids which was previously recommended by SLP last session based off FEES results. The patient was given PO trials of thin liquids with noted s/s concerning for penetration/aspiration on thin liquid trials as evidenced by coughing, throat clearing and increased WOB. Patient noted to have mild oral holding with PO trials and was provided cues to \"swallow quick\" given results of FEES from 11/10. At this time, recommend resume previously recommended diet of SB6/MT2 with continued swallow strategies.     Plan    Recommendations:   1) Soft and bite size with Mildly thick liquids (SB6/MT2)   2) Direct supervision with PO intake, w/ 1:1 feeding if pt unable to follow swallow strategies.   3) Crush meds whole in applesauce.  4) No straws. SLP is following.   5) Please hold PO intake and notify SLP w/ any difficulty or changes in status, SLP will consider repeat diagnostic if difficulties persist on modified PO diet.       Continue current treatment plan.    Discharge Recommendations: Recommend post-acute placement for additional speech therapy services prior to discharge home    Objective     12/01/22 1410   Vitals   O2 (LPM) 1   O2 Delivery Device Silicone Nasal Cannula   Dysphagia    Positioning / Behavior Modification Modulate Rate or Bite " "Size;Multiple Swallows;Self Monitoring   Diet / Liquid Recommendation Soft & Bite-Sized (6) - (Dysphagia III);Mildly Thick (2) - (Nectar Thick)   Nutritional Liquid Intake Rating Scale Thickened beverages (mildly thick unless otherwise specified)   Nutritional Food Intake Rating Scale Total oral diet with multiple consistencies without special preparation but with specific food limitations   Nursing Communication Swallow Precaution Sign Posted at Head of Bed   Skilled Intervention Verbal Cueing;Compensatory Strategies   Recommended Route of Medication Administration   Medication Administration  Crush all Medications in Puree   Patient / Family Goals   Patient / Family Goal #1 \"Ow!\"   Goal #1 Outcome Progressing as expected   Short Term Goals   Short Term Goal # 1 Patient will complete swallowing exercises targeting BOT retraction, epiglottic inversion, and pharyngeal contraction x20.   Goal Outcome # 1   (pt not able to consistently complete)   Short Term Goal # 2 B  NEW 11/23: Patient will consume SB6/MT2 diet with 1:1 feeding with no overt s/sx of aspiration.   Goal Outcome  # 2 B Progressing as expected     "

## 2022-12-01 NOTE — THERAPY
"Physical Therapy   Daily Treatment     Patient Name: Sean Kincaid  Age:  77 y.o., Sex:  male  Medical Record #: [id number]  Today's Date: 12/1/2022     Precautions  Precautions: Fall Risk;Swallow Precautions ( See Comments)    Assessment    Pt received in bed and agreeable to PT session. Pt noted to be A&Ox1 and visually hallucinating during this session. Pt required max A for bed mobility and was unable to achieve standing on multiple attempts. Pt was able to sit with BUE support and tolerated EOB for ~15 min. Anticipate need for further rehab prior to return home. Will continue to follow for acute PT to progress as able.    Plan    Continue current treatment plan.    DC Equipment Recommendations: Unable to determine at this time  Discharge Recommendations: Recommend post-acute placement for additional physical therapy services prior to discharge home      Subjective    \"That dog next to you is really cute\"     Objective       12/01/22 1331   Precautions   Precautions Fall Risk;Swallow Precautions ( See Comments)   Vitals   Vitals Comments SpO2 92% with 1L via NC when wave form good.   Pain 0 - 10 Group   Therapist Pain Assessment Post Activity Pain Same as Prior to Activity;Nurse Notified  (c/o B foot sensitivity)   Cognition    Level of Consciousness Alert   Ability To Follow Commands 1 Step   Comments Pt oriented to person only, poor initiation, visual hallucinations   Balance   Sitting Balance (Static) Fair -   Sitting Balance (Dynamic) Poor +   Weight Shift Sitting Poor   Skilled Intervention Tactile Cuing;Verbal Cuing;Facilitation   Comments BUE support in sitting, attempted to stand without success   Gait Analysis   Gait Level Of Assist Unable to Participate   Bed Mobility    Supine to Sit Maximal Assist   Sit to Supine Maximal Assist   Scooting Moderate Assist   Skilled Intervention Verbal Cuing;Sequencing;Tactile Cuing   Comments HOB elevated for sup>sit. Poor initiation   Functional Mobility   Sit to " Stand Unable to Participate   Comments STS attempted x2, unable to achieve clearance. Questionable effort versus LE weakness   How much difficulty does the patient currently have...   Turning over in bed (including adjusting bedclothes, sheets and blankets)? 1   Sitting down on and standing up from a chair with arms (e.g., wheelchair, bedside commode, etc.) 1   Moving from lying on back to sitting on the side of the bed? 1   How much help from another person does the patient currently need...   Moving to and from a bed to a chair (including a wheelchair)? 1   Need to walk in a hospital room? 1   Climbing 3-5 steps with a railing? 1   6 clicks Mobility Score 6   Short Term Goals    Short Term Goal # 1 B  pt will be able to complete supine<>sitting from flat bed with SPV in 6tx in order to improve independence   Goal Outcome  # 1 B Goal not met   Short Term Goal # 2 Pt will perform STS with LRAD and min A within 6 visits to progress OOB mobility   Goal Outcome # 2 Goal not met   Short Term Goal # 3 B pt will be able to complete functional transfers with FWW and SPV in 6tx in order to decrease fall risk   Goal Outcome # 3 B Goal not met   Short Term Goal # 4 Pt will ambulate 20 ft with LRAD and mod A within 6 visits to initiate gait training   Goal Outcome # 4 Goal not met   Anticipated Discharge Equipment and Recommendations   DC Equipment Recommendations Unable to determine at this time   Discharge Recommendations Recommend post-acute placement for additional physical therapy services prior to discharge home   Interdisciplinary Plan of Care Collaboration   IDT Collaboration with  Nursing   Patient Position at End of Therapy In Bed;Call Light within Reach;Tray Table within Reach;Phone within Reach   Collaboration Comments RN updated   Session Information   Date / Session Number  12/1-7(1/3, 12/6)

## 2022-12-02 NOTE — THERAPY
"Occupational Therapy  Daily Treatment     Patient Name: Sean Kincaid  Age:  77 y.o., Sex:  male  Medical Record #: [id number]  Today's Date: 12/2/2022     Precautions  Precautions: Fall Risk, Swallow Precautions ( See Comments)    Assessment    Pt seen for OT treatment. Pt limited by poor initiation. He verbalized a desire to participate but had difficulty initiating functional tasks and movements. Pt was also experiencing visual hallucinations at the start of the session. Pt sat EOB and attempted to stand twice without success.    Plan    Continue current treatment plan.    DC Equipment Recommendations: Unable to determine at this time  Discharge Recommendations: Recommend post-acute placement for additional occupational therapy services prior to discharge home    Subjective    \"I don't know why I have aluminum in my hair.\"      Objective       12/01/22 1339   Vitals   O2 Delivery Device Silicone Nasal Cannula   Pain 0 - 10 Group   Therapist Pain Assessment During Activity;Nurse Notified  (reports bilateral foot pain)   Cognition    Ability To Follow Commands 1 Step   Comments Poor initiation, visual hallucinations, confused   Strength Upper Body   Comments weakness in BUEs, weakness worse in R shoulder   Balance   Sitting Balance (Static) Fair -   Sitting Balance (Dynamic) Poor +   Weight Shift Sitting Poor   Skilled Intervention Verbal Cuing;Tactile Cuing;Postural Facilitation   Comments used BUE support in sitting, attempted to stand but unable to achieve a full stand   Bed Mobility    Supine to Sit Maximal Assist  (HOB elevated)   Sit to Supine Maximal Assist   Scooting Moderate Assist   Skilled Intervention Verbal Cuing;Facilitation;Tactile Cuing   Activities of Daily Living   Grooming Maximal Assist  (hair)   Lower Body Dressing Total Assist   Toileting   (mcdowell)   Skilled Intervention Verbal Cuing;Tactile Cuing;Facilitation   6 Clicks Daily Activity Score 9   Functional Mobility   Sit to Stand Unable to " "Participate   Mobility attempted sit to stand X 2 but unable to achieve a full stand, reports pain in feet but also demonstrates poor initiation   Skilled Intervention Verbal Cuing;Tactile Cuing;Postural Facilitation   Activity Tolerance   Sitting Edge of Bed 15 min   Patient / Family Goals   Patient / Family Goal #1 \"To get home to my cats.\"   Short Term Goals   Short Term Goal # 1 min A with UB dressing   Goal Outcome # 1 Progressing slower than expected   Short Term Goal # 2 mod A with LB dressing   Goal Outcome # 2 Progressing slower than expected   Short Term Goal # 3 mod A with ADL txfs   Goal Outcome # 3 Progressing slower than expected   Education Group   Role of Occupational Therapist Patient Response Patient;Acceptance     "

## 2022-12-02 NOTE — PROGRESS NOTES
Note to reader: this note follows the APSO format rather than the historical SOAP format. Assessment and plan located at the top of the note for ease of use.    Chief Complaint  77 y.o. year old male here with No chief complaint on file.      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]     Constipation [K59.00]     Anemia [D64.9]     MSSA (methicillin susceptible Staphylococcus aureus) pneumonia (HCC) [J15.211]     Delirium [R41.0]     Goals of care, counseling/discussion [Z71.89]     Hypernatremia [E87.0]     Hematoma [T14.8XXA]     Septic shock with acute organ dysfunction due to methicillin susceptible Staphylococcus aureus (MSSA) (HCC) [A41.01, R65.21]     MYLA (acute kidney injury) ([facility]) [N17.9]     Hyperkalemia [E87.5]     Acute on chronic systolic congestive heart failure (HCC) [I50.23]     Acute hypoxemic respiratory failure due to COVID-19 (HCC) [U07.1, J96.01]     Paroxysmal atrial fibrillation (HCC) [I48.0]     Chronic anticoagulation [Z79.01]     Restless leg syndrome [G25.81]     Hematuria [R31.9]     Pacemaker [Z95.0]      August 2012: [employer] Altural 60, model S606, Serial #[id number], implanted by Dr. Dennis Gray.       12/2: Seen and examined, confused but able to answer questions. Denies flank pain, no CVAT when palpated. Urine clear and levi colored in mcdowell. Cr 4.44, BUN 74. Hb improved to 8.5. CT 11/30 revealed no hydronephrosis.    Plan:  - GH resolved, H/H stable, no indication for urologic intervention while inpatient  - Urology will contact patient upon discharge to arrange for outpatient cystoscopy to further workup GH  - continue cares per primary team, nephrology  - Urology signing off, please call with questions.     Plan of care discussed with and directed by Dr Soto.    Review of Systems  Physical Exam   Review of Systems   Constitutional:  Negative for chills and fever.   Respiratory:  Negative for cough.     Cardiovascular:  Negative for chest pain.   Gastrointestinal:  Positive for abdominal pain.   Genitourinary:  Negative for dysuria, flank pain and hematuria.   All other systems reviewed and are negative.  Vitals:    12/01/22 2015 12/01/22 2123 12/02/22 0549 12/02/22 0751   BP: (!) 140/71  (!) 152/75 (!) 150/78   Pulse: 79  98 88   Resp: 18  (!) 22 20   Temp: 36.3 °C (97.4 °F)   37 °C (98.6 °F)   TempSrc: Temporal  Temporal Temporal   SpO2: 91% (!) 87% 93% 96%   Weight:       Height:         Physical Exam  Vitals reviewed.   Constitutional:       General: He is not in acute distress.     Appearance: Normal appearance.   HENT:      Head: Normocephalic.      Mouth/Throat:      Pharynx: Oropharynx is clear.   Eyes:      Extraocular Movements: Extraocular movements intact.      Conjunctiva/sclera: Conjunctivae normal.   Pulmonary:      Effort: Pulmonary effort is normal.   Abdominal:      General: There is no distension.      Tenderness: There is no right CVA tenderness or left CVA tenderness.   Genitourinary:     Comments: Ball in place draining levi colored urine   Musculoskeletal:         General: Normal range of motion.      Cervical back: Normal range of motion.   Skin:     General: Skin is warm.   Neurological:      Comments: confused   Psychiatric:         Mood and Affect: Mood normal.        Hematology Chemistry   Lab Results   Component Value Date/Time    WBC 9.1 12/02/2022 12:52 AM    HEMOGLOBIN 8.5 (L) 12/02/2022 12:52 AM    HEMATOCRIT 27.1 (L) 12/02/2022 12:52 AM    PLATELETCT 379 12/02/2022 12:52 AM     Lab Results   Component Value Date/Time    SODIUM 135 12/02/2022 12:52 AM    POTASSIUM 5.3 12/02/2022 12:52 AM    CHLORIDE 98 12/02/2022 12:52 AM    CO2 22 12/02/2022 12:52 AM    GLUCOSE 123 (H) 12/02/2022 12:52 AM    BUN 74 (H) 12/02/2022 12:52 AM    CREATININE 4.44 (HH) 12/02/2022 12:52 AM         Labs not explicitly included in this progress note were reviewed by the author.   Radiology/imaging not  explicitly included in this progress note was reviewed by the author.     Radiology images reviewed, Labs reviewed and Medications reviewed

## 2022-12-02 NOTE — CARE PLAN
Problem: Psychosocial  Goal: Patient's level of anxiety will decrease  Outcome: Not Progressing   The patient is Watcher - Medium risk of patient condition declining or worsening    Shift Goals  Clinical Goals: monitor for hematuria, BM, out of bed  Patient Goals: Manage pain  Family Goals: ALLIE    Progress made toward(s) clinical / shift goals: Pt having adequate urine output.     Patient is not progressing towards the following goals: Pt is Alert to person and pulling of his oxygen. Re-orientated and unable to follow instructions at this time. Continue to educate on importance of safety.   Problem: Psychosocial  Goal: Patient's level of anxiety will decrease  Outcome: Not Progressing     Problem: Respiratory  Goal: Patient will achieve/maintain optimum respiratory ventilation and gas exchange  Outcome: Progressing     Problem: Risk for Aspiration  Goal: Patient's risk for aspiration will be absent or decrease  Outcome: Progressing     Problem: Urinary Elimination  Goal: Establish and maintain regular urinary output  Outcome: Progressing     Problem: Dysphagia  Goal: Dysphagia will improve  Outcome: Progressing         Problem: Psychosocial  Goal: Patient's level of anxiety will decrease  Outcome: Not Progressing

## 2022-12-02 NOTE — PROGRESS NOTES
"[facility] Internal Medicine Daily Progress Note    Date of Service  12/1/2022    [facility] Team: [facility] IM [facility] Team   Attending: Mahad Kwong M.d.  Senior Resident: Dr. Vásquez  Intern:  Dr. Torres  Contact Number: [phone]    Chief Complaint  Sean Kincaid is a 77 y.o. male admitted 11/4/2022 with respiratory failure secondary to COVID.     Hospital Course  77-year-old male with a past medical history of heart failure, COPD, A. fib, type 2 diabetes mellitus who presented on 11/4/2022 as a transfer from [facility] with worsening of shortness of breath and fatigue, started on BiPAP intubated, ICU stay, MYLA on acute renal failure, now with CKD, course complicated by hyperkalemia, hypernatremia, worsening renal function, ICU myopathy, likely stroke during hospitalization, bacterial pneumonia requiring IV antibiotics, now off all antibiotics, creatinine stably elevated with good urine output,    Interval Problem Update  No acute events overnight. Patient currently on 1L of oxygen this morning without any difficulties breathing.  Patient without acute complaints overnight, no chest pain, palpitations, lower extremity edema, shortness of breath, cough, abdominal pain, nausea/vomiting/diarrhea/constipation, dysuria, hematuria, new onset focal weakness, headaches or sensory changes.  Patient states that \"I would never realize that not having poop could make me hurt so bad all over, but I got a lot out yesterday and I am feeling so much better today, I will try and move around with physical therapy.\"  Patient was given patient was reportedly agitated overnight but back to baseline this morning.    In regards to patients hematoma conversation will need to be had in regards to when to restart anticoagulation. Will consider apixaban rather than patients home regimen of warfarin.    Was informed by telemetry patient's pacemaker is not pacing properly. Patient asymptomatic at bedside w/ ecg showing intermittent pacing " discussed with electrocardio physiology NP dwayne and plan is to interrogate device, rep notified.  11/22/2022.  Update on 11/23/2023 pacemaker interrogated by rep performing correctly, lead placement good on last chest x-ray, patient asymptomatic, without chest pain no signs of heart failure, 1 and half years of battery life left on pacemaker.    11/24  No acute events overnight. Patient has slightly increased mobility in upper extremities where he can raise elbows above the bed. Still limited mobility in left lower extremity. Apixaban to be started today in place of coumadin for treatment of afib.    I have discussed this patient's plan of care and discharge plan at IDT rounds today with Case Management, Nursing, Nursing leadership, and other members of the IDT team.    11/25:  No acute events overnight. On bedside patient was on room air. He reports feeling down this morning at how long he's been in the hospital and how he wishes he would get better already. Offered words of encouragement. No acute rebleeding from hematoma after anticoagulation restarted. Patient coreg still held as heart rate still in the 60s. No labs needed on this patient.    11/26:  No acute events overnight. Patient at bedside this morning had inc jerking movements on legs bilaterally and complained of an inability to stop the movements. Additionally, mentioned he felt itchiness/tingle ness. Will monitor for now and if symptoms persist or worsen will get CMP and Mg. Rate control with metoprolol also added for afib.    11/27:  No acute events overnight. Patient alert and oriented to self and time but not place. Leg tingle ness/movement improved from previous day. Patient continues to have limited mobility in bilateral upper extremities and left lower extremity.    11/28:  No acute events overnight. Patient still at baseline axo 2 but still not oriented to time. Potassium was mildly elevated at 6 so patient s/p kayexalate and insulin +  dextrose and r/p potassium now 5.1. Will continue to monitor. Otherwise, patient continues to deny any symptoms of sob/chest pain and has good o2 sats on 2L nc. Still pending placement for rehab. Creatinine and BUN were also mildly elevated from previous labs but not overly concerning as patients hospital course has creatinine stabilized within the 2.5-3 range.    11/29:  No acute events overnight. Patient had hematuria coming out of mcdowell which is new from previous day. Holding apixaban and ordering cbc, cmp, u/a. u/a positive for blood so will consult urology. Additionally, once mcdowell is removed will attempt voiding trial with tamsulosin.    11/30:  No acute events overnight. Hematuria improved from mcdowell compared to yesterday after apixaban held. Hg downtrend but still stable at 8.1. Per urology, CT abd/pelv w/o contrast ordered in addition to u/c. IV fluids started for patient after worsening of kidney injury with up-trending creatinine. Patient's friend Javid contact info is [phone].    12/1:  No acute events overnight, hematuria baseline, CT abdomen and pelvis with stool burden, bowel movement today, hemoglobin stable at 8, urology following, patient without acute complaints.  Updated patient's family.    Consultants/Specialty  nephrology    Code Status  Full Code    Disposition  Patient is not medically cleared for discharge.   Anticipate discharge to to skilled nursing facility.  I have placed the appropriate orders for post-discharge needs.    Review of Systems  Review of Systems   Constitutional:  Negative for malaise/fatigue.   HENT: Negative.     Eyes: Negative.    Respiratory:  Negative for sputum production.    Cardiovascular: Negative.    Gastrointestinal: Negative.    Genitourinary: Negative.    Musculoskeletal: Negative.    Skin: Negative.    Neurological:  Positive for focal weakness and weakness.   Endo/Heme/Allergies: Negative.    Psychiatric/Behavioral: Negative.        Physical Exam  Temp:   [36.3 °C (97.3 °F)-36.8 °C (98.2 °F)] 36.3 °C (97.4 °F)  Pulse:  [72-84] 84  Resp:  [16-18] 18  BP: (115-147)/(62-79) 147/79  SpO2:  [89 %-95 %] 92 %    Physical Exam  Constitutional:       Appearance: He is obese. He is not ill-appearing.   HENT:      Head: Normocephalic and atraumatic.      Right Ear: Tympanic membrane, ear canal and external ear normal.      Left Ear: Tympanic membrane, ear canal and external ear normal.      Nose: Nose normal.      Mouth/Throat:      Mouth: Mucous membranes are moist.      Pharynx: Oropharynx is clear.   Eyes:      Extraocular Movements: Extraocular movements intact.      Conjunctiva/sclera: Conjunctivae normal.      Pupils: Pupils are equal, round, and reactive to light.   Cardiovascular:      Rate and Rhythm: Normal rate and regular rhythm.   Pulmonary:      Effort: Pulmonary effort is normal. No respiratory distress.   Chest:      Chest wall: No tenderness.   Abdominal:      General: Abdomen is flat. Bowel sounds are normal.      Palpations: Abdomen is soft.   Musculoskeletal:      Cervical back: Normal range of motion.      Right lower leg: No edema.      Left lower leg: No edema.   Skin:     General: Skin is warm.      Capillary Refill: Capillary refill takes less than 2 seconds.   Neurological:      Mental Status: He is alert and oriented to person, place, and time.      Motor: Weakness present.   Psychiatric:         Mood and Affect: Mood normal.         Behavior: Behavior normal.         Thought Content: Thought content normal.         Judgment: Judgment normal.       Fluids    Intake/Output Summary (Last 24 hours) at 12/1/2022 1716  Last data filed at 12/1/2022 1700  Gross per 24 hour   Intake 1168.84 ml   Output 1700 ml   Net -531.16 ml         Laboratory  Recent Labs     11/29/22  1514 11/30/22  0311 12/01/22  0317   WBC 9.2 9.3 8.8   RBC 3.05* 2.77* 2.64*   HEMOGLOBIN 8.9* 8.1* 8.0*   HEMATOCRIT 29.1* 26.5* 25.1*   MCV 95.4 95.7 95.1   MCH 29.2 29.2 30.3   MCHC 30.6*  30.6* 31.9*   RDW 49.0 49.4 48.8   PLATELETCT 353 343 349   MPV 9.9 10.1 10.1       Recent Labs     11/29/22  1514 11/30/22  0311 12/01/22 0317   SODIUM 138 136 135   POTASSIUM 4.3 4.2 4.7   CHLORIDE 99 100 99   CO2 25 25 23   GLUCOSE 197* 157* 183*   BUN 63* 68* 75*   CREATININE 3.18* 3.75* 3.87*   CALCIUM 8.6 8.5 8.4*       Recent Labs     12/01/22 0317   INR 1.51*                 Imaging  CT-ABDOMEN-PELVIS W/O   Final Result      1.  No hydronephrosis or obstructing renal or ureteral stone.   2.  Tiny right upper pole calcification could represent a nonobstructing renal stone or cortical calcification.   3.  Decreased size of a left iliacus hematoma, measuring 3.9 x 2.7 cm, previously 4.3 x 3.4 cm.   4.  Right greater than left lower lobe consolidations favoring pneumonia.   5.  Large rectal stool burden.   6.  Gallbladder sludge versus layering stones.   7.  Cardiomegaly.   8.  Fat-containing ventral hernia with a 2.3 cm neck.   9.  Atherosclerotic changes.      DX-CHEST-PORTABLE (1 VIEW)   Final Result      No acute cardiopulmonary abnormality identified.      CT-ABDOMEN-PELVIS W/O   Final Result      1.  Left retroperitoneal hematoma along the left iliopsoas/iliac is muscle measuring approximately 4.3 x 3.4 x 5.9 cm.      2.  Bilateral lower lobe consolidation could be due to pneumonia or atelectasis.      3.  Small layering stones versus sludge in the gallbladder.      4.  Diverticulosis without diverticulitis.      5.  Moderate amount of stool throughout the colon suggesting constipation.      US-EXTREMITY VENOUS UPPER UNILAT RIGHT   Final Result      DX-WRIST-LIMITED 2- RIGHT   Final Result         1.   No radiographic evidence of acute injury.      2.  Old healed fracture right fifth metacarpal neck.      3.  Moderate osteoarthritic changes of the right first carpal metacarpal joint.      4.  Possible intra-articular loose bodies adjacent to the styloid processes of the radius and ulna.       DX-CHEST-PORTABLE (1 VIEW)   Final Result      1.  Increased pulmonary edema   2.  Bibasilar underinflation atelectasis which could obscure an additional process. This is unchanged.   3.  Persistently enlarged cardiac silhouette      DX-CHEST-PORTABLE (1 VIEW)   Final Result      Mildly improved aeration and stable to slightly improved vascular congestion.      Mildly improved left basilar atelectasis.      DX-CHEST-PORTABLE (1 VIEW)   Final Result      1.  Interval extubation and removal of NG tube.   2.  Increased bibasilar airspace disease.   3.  Increased interstitial edema.   4.  Small left pleural effusion.      CT-HEAD W/O   Final Result      1. No CT evidence of acute infarct, hemorrhage or mass.   2. Moderate global parenchymal atrophy. Chronic small vessel ischemic changes.      DX-CHEST-PORTABLE (1 VIEW)   Final Result      Mildly improved left basilar opacity.      DX-ABDOMEN FOR TUBE PLACEMENT   Final Result      Enteric tube tip projects over the gastric antrum or proximal duodenum      DX-CHEST-PORTABLE (1 VIEW)   Final Result         1. Stable lines and tubes.   2. Worsening left basilar opacity. Small left pleural effusion. Mild right infrahilar atelectasis.         DX-CHEST-PORTABLE (1 VIEW)   Final Result      1.  Cardiomegaly with interstitial edema.      2.  Left lung base atelectasis and minimal left pleural effusion.      DX-CHEST-PORTABLE (1 VIEW)   Final Result      1.  Improved left pleural effusion with adjacent airspace disease.   2.  Improved interstitial infiltrates versus edema.      EC-ECHOCARDIOGRAM COMPLETE W/O CONT   Final Result      DX-CHEST-PORTABLE (1 VIEW)   Final Result      1.  Small left pleural effusion with adjacent airspace disease.   2.  Worsening interstitial infiltrates versus edema.   3.  Right basilar atelectasis.      DX-ABDOMEN FOR TUBE PLACEMENT   Final Result      Enteric tube tip projects over the proximal duodenum      DX-ABDOMEN FOR TUBE PLACEMENT   Final  Result         1.  Nonspecific bowel gas pattern in the upper abdomen.   2.  Nasogastric tube, not visualized beyond the mid thorax, see ordered repeat abdominal x-ray for further characterization.   3.  Cardiomegaly   4.  Left lung base atelectasis or infiltrate.      OUTSIDE IMAGES-DX CHEST   Final Result      DX-CHEST-PORTABLE (1 VIEW)   Final Result         1.  Bibasilar atelectasis or early infiltrates.   2.  Trace bilateral pleural effusions   3.  Cardiomegaly             Assessment/Plan  Problem Representation:    * Anemia  Assessment & Plan  Hemoglobin trending down  Received a unit today   CT abdomen showing: Left retroperitoneal hematoma along the left iliopsoas/iliac is muscle measuring approximately 4.3 x 3.4 x 5.9 cm.  Reversing warfarin   received 1 unit of RBC    PLAN  Close monitoring    Pacemaker complications  Assessment & Plan  RESOLVED  Was informed by telemetry patient's pacemaker is not pacing properly. Patient asymptomatic at bedside w/ ecg showing intermittent pacing discussed with electrocardio physiology NP embers and plan is to interrogate device, rep notified.  -Pacer performing correctly on interrogation  - Patient asymptomatic  - DC telemetry  - Follow-up with cardiology as outpatient    Constipation  Assessment & Plan  -s/p enema  -scheduled bowel regimen    Delirium- (present on admission)  Assessment & Plan  Resolved, likely in light of the patient's acute hospitalization, acute critical condition, disease  Standard precautions and out of delirium  Avoid offending medication  Avoid interruption of circadian rhythm  CT head negative for acute changes, unable to provide a MRI imaging secondary to patient's pacemaker.    PLAN  D/c tele  No vitals overnight  [place] if disoriented  Patient alert and oriented x 3 at bedside      MSSA (methicillin susceptible Staphylococcus aureus) pneumonia (HCC)- (present on admission)  Assessment & Plan  Finished treatment     Goals of care,  counseling/discussion- (present on admission)  Assessment & Plan  Palliative care assisting,   Patient continues to hope towards rehab, no wishes for palliative care at this time, continue to update family  Patient remains full code     Hypernatremia- (present on admission)  Assessment & Plan  Resolved     Septic shock with acute organ dysfunction due to methicillin susceptible Staphylococcus aureus (MSSA) ([facility])  Assessment & Plan  Present on admission, treated adequately    Hematoma- (present on admission)  Assessment & Plan  CT abdomen showing Left retroperitoneal hematoma along the left iliopsoas/iliac is muscle measuring approximately 4.3 x 3.4 x 5.9 cm.  Holding warfarin , giving FFP and vit K to reverse INR   Has received 2 unit of RBC   Close monitoring   -apixaban held due to new onset hematuria    Acute on chronic systolic congestive heart failure (HCC)- (present on admission)  Assessment & Plan  Metoprolol tartrate 12.5 BID  It appears his EF has been improving  Continue present medical therapy    CONCLUSIONS  The left ventricle is not well visualized due to body habitus.  Left ventricular systolic function is probably normal.  The ejection fraction is roughly estimated to be 55%.  A reliable estimation of diastolic function cannot be made due to   mitral valve disease.  Severely dilated right ventricle.  Reduced right ventricular systolic function.  Estimated right ventricular systolic pressure is 40 mmHg.  Severe biatrial dilation.  Mild mitral regurgitation.  Aortic valve sclerosis without significant stenosis.  Mild tricuspid regurgitation.  Normal inferior vena cava size and inspiratory collapse    Hyperkalemia- (present on admission)  Assessment & Plan  Improved s/p kayexalate and insulin + dextrose    PLAN    RESOLVED  Continue kayexalate  Low potassium diet  R/p bmp  Monitor     MYLA (acute kidney injury) ([facility])- (present on admission)  Assessment & Plan  -initially was on dialysis and improved so  dialysis was stopped but now has worsening again so nephrology has been consulted   -unsure what baseline is at home. However on this admission creatinine seems to have stabilized within the 2.5-3.0 range    PLAN  -CTM  -nephrology previously following, still oliguric making greater than 1500 mL of urine a day  -IV fluids continued      Acute hypoxemic respiratory failure due to COVID-19 (HCC)- (present on admission)  Assessment & Plan  Patient previously treated for pneumonia course of Zosyn for hospital-acquired pneumonia, weaned off of oxygen, however concerned on CT chest CT for possible aspiration pneumonia, will hold n.p.o. until speech-language pathology sees  - Speech-language pathology consult.    Restless leg syndrome- (present on admission)  Assessment & Plan  Requip discontinued    Chronic anticoagulation- (present on admission)  Assessment & Plan  Holding Coumadin. Originally replaced w/ apixiban 5mg BID but now held in setting of hematuria    Paroxysmal atrial fibrillation (HCC)- (present on admission)  Assessment & Plan  -History of, ongoing anticoagulation   -Holding warfarin at this time and also s/p VIt K and also FFP  -apixaban 5 mg BID held given continued at high risk for bleeding, discussed with patient and son who are agreeable to plan of care  -metoprolol tartrate 12.5 BID    Hematuria  Assessment & Plan  -hematuria noticed from mcdowell this morning new finding compared to previous day  -apixaban held    PLAN  -per urology:    - Obtain urine culture     - Recommend repeat CT a/p w/o due to decreased renal function will avoid contrast    - Hand irrigate mcdowell catheter PRN for clots/catheter not draining    - Monitor for worsening hematuria, consider 3-way mcdowell catheter placement if CBI is indicated    - Continue mcdowell catheter for now, hospital team planning for TOV prior to discharge on tamsulosin  -CBC trend    Pacemaker- (present on admission)  Assessment & Plan  In situ, telemetry  monitoring, history of complete heart block       VTE prophylaxis: SCDs/TEDs    I have performed a physical exam and reviewed and updated ROS and Plan today (12/1/2022). In review of yesterday's note (11/30/2022), there are no changes except as documented above.

## 2022-12-02 NOTE — PROGRESS NOTES
Paged ASHKANR DILAN [facility] team regarding patient's oxygen status. Patient is only alert to person at this time and taking his oxygen off and refusing to keep it on. Pt is oxygen saturation is 86% at this time on Room Air. Paged resident and waiting for call back. Azucena ALMAZAN

## 2022-12-02 NOTE — PROGRESS NOTES
UROLOGY Consult Note:    Mahsa Hansen P.A.-C.  Date & Time note created:    12/1/2022   5:23 PM     Referring MD:  Dr. Torres    Patient ID:   Name:             Sean Kincaid   YOB: 1945  Age:                 77 y.o.  male   MRN:               [id number]                                                             Reason for Consult:      Hematuria with catheter in place    History of Present Illness:    Per my chart review, patient is a 77-year-old male with PMH including heart failure, COPD, A. fib, T2DM who presented on 11/4/2022 as a transfer from [facility] with worsening of shortness of breath and fatigue, started on BiPAP intubated, ICU stay, MYLA on acute renal failure, now with CKD, course complicated by hyperkalemia, hypernatremia, worsening renal function, ICU myopathy, likely stroke during hospitalization, bacterial pneumonia requiring IV antibiotics. He is now off all antibiotics, creatinine stable although elevated with good urine output, nephrology on board.    Urology was consulted for hematuria with mcdowell catheter in place, UA completed.    Patient sitting in bed. Only complaint is low back pain, he reports this is chronic.     Patient is poor historian, likely secondary to dementia.     Spoke with NORAH Natarajan, confirmed catheter was last exchanged on 11/19/22. It appears the catheter was first placed at an outside facility prior to this.    Review of Systems:      Genitourinary: +hematuria  Musculoskeletal/Rheum: +back pain              Past Medical History:   Past Medical History:   Diagnosis Date    Acquired renal cyst 5/5/2020    Anticoagulation monitoring, INR range 2.5-3.5     Arthritis 2011    ASTHMA     Atrial fibrillation (HCC)     Back pain     Backpain     Blood transfusion, without reported diagnosis 08/07/2012    Bursitis of right hip 2/8/2013    Cardiomyopathy, nonischemic (HCC)     EF 45%    Chronic anticoagulation     Chronic systolic congestive heart  failure (HCC) 12/29/2015    Coronary artery disease involving native coronary artery of native heart without angina pectoris     Diabetes ([facility])     Dyslipidemia     Eosinophilia 9/4/2014    Essential hypertension     GERD (gastroesophageal reflux disease)     GERD (gastroesophageal reflux disease) 11/12/2014    H/O pneumococcal pneumonia     Headache(784.0)     Hematuria 9/4/2014    History of complete AV block     Hyperglycemia 9/4/2014    fasting serum glucose 125 (9/5/2014)    Hypertension     Insomnia     Iron deficiency anemia 2/8/2013    Nocturnal hypoxemia     Paroxysmal atrial fibrillation ([facility])     Restless leg syndrome     Right hip pain 2/8/2013    S/P cardiac pacemaker procedure     S/P mitral valve replacement with bioprosthetic valve     Seasonal allergies     Sleep apnea     Tinea cruris 2/8/2013    Type 2 diabetes mellitus ([facility]) 9/5/2014    Type 2 diabetes mellitus without complication ([facility]) 10/13/2016    Ventral hernia 7/2014     Active Hospital Problems    Diagnosis     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.9XXA]     Constipation [K59.00]     Anemia [D64.9]     MSSA (methicillin susceptible Staphylococcus aureus) pneumonia ([facility]) [J15.211]     Delirium [R41.0]     Goals of care, counseling/discussion [Z71.89]     Hypernatremia [E87.0]     Hematoma [T14.8XXA]     Septic shock with acute organ dysfunction due to methicillin susceptible Staphylococcus aureus (MSSA) ([facility]) [A41.01, R65.21]     MYLA (acute kidney injury) ([facility]) [N17.9]     Hyperkalemia [E87.5]     Acute on chronic systolic congestive heart failure ([facility]) [I50.23]     Acute hypoxemic respiratory failure due to COVID-19 ([facility]) [U07.1, J96.01]     Paroxysmal atrial fibrillation ([facility]) [I48.0]     Chronic anticoagulation [Z79.01]     Restless leg syndrome [G25.81]     Hematuria [R31.9]     Pacemaker [Z95.0]        Past Surgical History:  Past Surgical History:   Procedure Laterality Date    COLONOSCOPY - ENDO  1/2/2017    Procedure: COLONOSCOPY - ENDO;   Surgeon: Joel Barney M.D.;  Location: SURGERY [facility];  Service:     STENT PLACEMENT  11/2015    coronary artery    MAZE PROCEDURE  8/7/2012    Performed by JEREMIE BRADLEY at SURGERY [facility]    MITRAL VALVE REPLACE  8/7/2012    Performed by JEREMIE BRADLEY at SURGERY [facility]    PACEMAKER INSERTION  August 2012    [employer] Altrua 60 S 606 implanted by Dr. Dennis Gray.    MITRAL VALVE REPLACEMENT  2012    bioprosthetic    INGUINAL HERNIA REPAIR  2007    Left side    OTHER ORTHOPEDIC SURGERY  2005    Right knee replacement    OTHER ORTHOPEDIC SURGERY  1965    Back surgery    APPENDECTOMY CHILD  1957    TONSILLECTOMY  1949    APPENDECTOMY      HERNIA REPAIR      KNEE ARTHROPLASTY TOTAL Right     MAZE PROCEDURE      PACEMAKER INSERTION      OH LAP UMBILICAL HERNIA REPAIR      OH REMOVAL OF TONSILS,<12 Y/O      TONSILLECTOMY         Hospital Medications:    Current Facility-Administered Medications:     lactated ringers infusion, , Intravenous, Continuous, Dash Vásquez M.D., Last Rate: 100 mL/hr at 12/01/22 1341, New Bag at 12/01/22 1341    senna-docusate (PERICOLACE or SENOKOT S) 8.6-50 MG per tablet 2 Tablet, 2 Tablet, Oral, BID **AND** polyethylene glycol/lytes (MIRALAX) PACKET 1 Packet, 1 Packet, Oral, BID **AND** [DISCONTINUED] magnesium hydroxide (MILK OF MAGNESIA) suspension 30 mL, 30 mL, Enteral Tube, QDAY PRN **AND** bisacodyl (DULCOLAX) suppository 10 mg, 10 mg, Rectal, QDAY PRN, Dash Vásquez M.D.    tamsulosin (FLOMAX) capsule 0.4 mg, 0.4 mg, Oral, AFTER BREAKFAST, Johanna Torres M.D., 0.4 mg at 12/01/22 0747    metoprolol tartrate (LOPRESSOR) tablet 12.5 mg, 12.5 mg, Oral, TWICE DAILY, Therese Pedraza M.D., 12.5 mg at 12/01/22 0435    [Held by provider] apixaban (ELIQUIS) tablet 5 mg, 5 mg, Oral, BID, Johanna Torres M.D., 5 mg at 11/29/22 0501    insulin GLARGINE (Lantus,Semglee) injection, 12 Units, Subcutaneous, QAM INSULIN, Dash Vásquez M.D., 12 Units at  12/01/22 0440    oxyCODONE immediate-release (ROXICODONE) tablet 5-10 mg, 5-10 mg, Oral, Q4HRS PRN, Audrey Bull A.P.R.N., 10 mg at 12/01/22 1641    omeprazole (PRILOSEC) capsule 20 mg, 20 mg, Oral, DAILY, Devyn Lin M.D., 20 mg at 12/01/22 0435    melatonin tablet 5 mg, 5 mg, Oral, Nightly, Shira Meyer D.O., 5 mg at 11/30/22 2127    acetaminophen (Tylenol) tablet 650 mg, 650 mg, Oral, Q6HRS PRN, Audrey Bull A.P.R.N., 650 mg at 11/30/22 0017    atorvastatin (LIPITOR) tablet 40 mg, 40 mg, Oral, QDAY, Devyn Lin M.D., 40 mg at 11/30/22 1706    albuterol inhaler 2 Puff, 2 Puff, Inhalation, Q4HRS PRN, Harshil Rosales M.D., 2 Puff at 11/26/22 0003    insulin regular (HumuLIN R,NovoLIN R) injection, 3-14 Units, Subcutaneous, 4X/DAY ACHS, 3 Units at 11/30/22 1247 **AND** POC blood glucose manual result, , , Q AC AND BEDTIME(S) **AND** NOTIFY MD and PharmD, , , Once **AND** Administer 20 grams of glucose (approximately 8 ounces of fruit juice) every 15 minutes PRN FSBG less than 70 mg/dL, , , PRN **AND** dextrose 10 % BOLUS 25 g, 25 g, Intravenous, Q15 MIN PRN, Kalpesh Cox D.O.    Respiratory Therapy Consult, , Nebulization, Continuous RT, Earl Scott M.D.    ondansetron (ZOFRAN) syringe/vial injection 4 mg, 4 mg, Intravenous, Q4HRS PRN, Earl Scott M.D., 4 mg at 11/26/22 1148    Current Outpatient Medications:  Medications Prior to Admission   Medication Sig Dispense Refill Last Dose    warfarin (COUMADIN) 2.5 MG Tab Take 1 Tablet by mouth 2 times a day. 200 Tablet 0     furosemide (LASIX) 40 MG Tab Take 1 Tablet by mouth every day. 100 Tablet 0     ROPINIRole (REQUIP) 0.5 MG Tab Take 2 Tablets by mouth every day. 200 Tablet 2     carvedilol (COREG) 25 MG Tab Take 1 Tablet by mouth 2 times a day with meals. 200 Tablet 2     B-D ULTRAFINE III SHORT PEN USE AS DIRECTED WITH INSULIN  Each 1     omeprazole (PRILOSEC) 20 MG delayed-release capsule TAKE ONE CAPSULE BY  MOUTH EVERY  Capsule 2     atorvastatin (LIPITOR) 40 MG Tab TAKE ONE TABLET BY MOUTH EVERY  Tablet 2     LANTUS SOLOSTAR 100 UNIT/ML Solution Pen-injector injection INJECT 25 UNITS UNDER THE SKIN EVERY EVENING 3 mL 11     spironolactone (ALDACTONE) 25 MG Tab TAKE ONE TABLET BY MOUTH EVERY DAY 90 Tablet 3     Continuous Blood Gluc  (FREESTYLE KENDRA 2 READER) Device Use with Freestyle Kendra sensors 1 Each 0     Continuous Blood Gluc Sensor (FREESTYLE KENDRA 2 SENSOR) [facility] Use with Freestyle Kendra Horton to read blood sugars 2 Each 3     nystatin (MYCOSTATIN) powder APPLY TOPICALLY TWICE DAILY TO SKIN FOLDS UNDER ABDOMEN AND IN GROIN AREA (CONTINUE UNTIL SKIN NORMALIZED FOR 2 TO 3 DAYS) 15 g 0     Blood Glucose Test Strips Dispense strips of insurance preference. Use to test once daily. Dx E11.9 Pt not on insulin 100 Each 3     Lancets Dispense lancets of insurance preference. Use to test once daily. Dx E11.9 Pt not on insulin 100 Each 3     Blood Glucose Monitoring Suppl Device Meter: Dispense Device of Insurance Preference. Sig. Use as directed for blood sugar monitoring. #1. NR. 1 Each 0     ipratropium (ATROVENT) 0.06 % Solution Spray 2 Sprays in nose 4 times a day. 50 mL 6     cyclobenzaprine (FLEXERIL) 10 MG Tab Take 10 mg by mouth.       fluticasone (FLONASE) 50 MCG/ACT nasal spray Spray 1 Spray in nose every day. 16 g 5     albuterol 108 (90 Base) MCG/ACT Aero Soln inhalation aerosol Inhale 2 Puffs by mouth every 6 hours as needed for Shortness of Breath. 8.5 g 8     HYDROcodone/acetaminophen (NORCO)  MG Tab           Medication Allergy:  No Known Allergies    Family History:  Family History   Problem Relation Age of Onset    Heart Disease Mother         Valvular heart problems./valvular heart disease    Diabetes Father     Cancer Sister     Cancer Brother         lung cancer    Diabetes Brother     Other Brother         heart bypass       Social History:  Social History  "    Socioeconomic History    Marital status:      Spouse name: Not on file    Number of children: Not on file    Years of education: Not on file    Highest education level: Not on file   Occupational History    Not on file   Tobacco Use    Smoking status: Never    Smokeless tobacco: Never   Vaping Use    Vaping Use: Never used   Substance and Sexual Activity    Alcohol use: No    Drug use: No     Frequency: 7.0 times per week     Types: Marijuana, Inhaled     Comment: marijuana last smoked 8/4/12    Sexual activity: Yes     Partners: Female   Other Topics Concern    Not on file   Social History Narrative    ** Merged History Encounter **         Lives with his wife.  Does some work [employer].     Social Determinants of Health     Financial Resource Strain: Not on file   Food Insecurity: Not on file   Transportation Needs: Not on file   Physical Activity: Not on file   Stress: Not on file   Social Connections: Not on file   Intimate Partner Violence: Not on file   Housing Stability: Not on file         Physical Exam:  Vitals/ General Appearance:   Weight/BMI: Body mass index is 32.27 kg/m².  BP (!) 147/79   Pulse 84   Temp 36.3 °C (97.4 °F) (Temporal)   Resp 18   Ht 1.88 m (6' 2\")   Wt 114 kg (251 lb 5.2 oz)   SpO2 92%   Vitals:    12/01/22 0300 12/01/22 0419 12/01/22 0756 12/01/22 1553   BP:  115/65 133/62 (!) 147/79   Pulse:  81 75 84   Resp:  18 16 18   Temp:  36.3 °C (97.3 °F) 36.3 °C (97.4 °F) 36.3 °C (97.4 °F)   TempSrc:  Temporal Temporal Temporal   SpO2: 93% 95% 89% 92%   Weight:       Height:         Oxygen Therapy:  Pulse Oximetry: 92 %, O2 (LPM): 1, O2 Delivery Device: Silicone Nasal Cannula    Constitutional: No acute distress  HENMT:  Normocephalic, Atraumatic  Eyes:  EOMI  Neck:  Normal range of motion  Abdomen: Soft, No tenderness  : Ball catheter in place draining red-tinged urine, no significant clots appreciated.  Skin: Warm, Dry, No erythema, No rash, no induration.  Neurologic: " Alert & oriented x 3      MDM (Data Review):     Records reviewed and summarized in current documentation    Lab Data Review:  Recent Results (from the past 24 hour(s))   POCT glucose device results    Collection Time: 11/30/22  9:09 PM   Result Value Ref Range    POC Glucose, Blood 105 (H) 65 - 99 mg/dL   CBC WITH DIFFERENTIAL    Collection Time: 12/01/22  3:17 AM   Result Value Ref Range    WBC 8.8 4.8 - 10.8 K/uL    RBC 2.64 (L) 4.70 - 6.10 M/uL    Hemoglobin 8.0 (L) 14.0 - 18.0 g/dL    Hematocrit 25.1 (L) 42.0 - 52.0 %    MCV 95.1 81.4 - 97.8 fL    MCH 30.3 27.0 - 33.0 pg    MCHC 31.9 (L) 33.7 - 35.3 g/dL    RDW 48.8 35.9 - 50.0 fL    Platelet Count 349 164 - 446 K/uL    MPV 10.1 9.0 - 12.9 fL    Neutrophils-Polys 65.30 44.00 - 72.00 %    Lymphocytes 16.80 (L) 22.00 - 41.00 %    Monocytes 12.20 0.00 - 13.40 %    Eosinophils 4.90 0.00 - 6.90 %    Basophils 0.30 0.00 - 1.80 %    Immature Granulocytes 0.50 0.00 - 0.90 %    Nucleated RBC 0.00 /100 WBC    Neutrophils (Absolute) 5.75 1.82 - 7.42 K/uL    Lymphs (Absolute) 1.48 1.00 - 4.80 K/uL    Monos (Absolute) 1.07 (H) 0.00 - 0.85 K/uL    Eos (Absolute) 0.43 0.00 - 0.51 K/uL    Baso (Absolute) 0.03 0.00 - 0.12 K/uL    Immature Granulocytes (abs) 0.04 0.00 - 0.11 K/uL    NRBC (Absolute) 0.00 K/uL   Prothrombin Time    Collection Time: 12/01/22  3:17 AM   Result Value Ref Range    PT 17.9 (H) 12.0 - 14.6 sec    INR 1.51 (H) 0.87 - 1.13   Comp Metabolic Panel    Collection Time: 12/01/22  3:17 AM   Result Value Ref Range    Sodium 135 135 - 145 mmol/L    Potassium 4.7 3.6 - 5.5 mmol/L    Chloride 99 96 - 112 mmol/L    Co2 23 20 - 33 mmol/L    Anion Gap 13.0 7.0 - 16.0    Glucose 183 (H) 65 - 99 mg/dL    Bun 75 (H) 8 - 22 mg/dL    Creatinine 3.87 (H) 0.50 - 1.40 mg/dL    Calcium 8.4 (L) 8.5 - 10.5 mg/dL    AST(SGOT) 26 12 - 45 U/L    ALT(SGPT) 12 2 - 50 U/L    Alkaline Phosphatase 125 (H) 30 - 99 U/L    Total Bilirubin 0.9 0.1 - 1.5 mg/dL    Albumin 2.2 (L) 3.2 - 4.9  g/dL    Total Protein 6.8 6.0 - 8.2 g/dL    Globulin 4.6 (H) 1.9 - 3.5 g/dL    A-G Ratio 0.5 g/dL   PHOSPHORUS    Collection Time: 12/01/22  3:17 AM   Result Value Ref Range    Phosphorus 6.1 (H) 2.5 - 4.5 mg/dL   PROCALCITONIN    Collection Time: 12/01/22  3:17 AM   Result Value Ref Range    Procalcitonin 0.54 (H) <0.25 ng/mL   ESTIMATED GFR    Collection Time: 12/01/22  3:17 AM   Result Value Ref Range    GFR (CKD-EPI) 15 (A) >60 mL/min/1.73 m 2   POCT glucose device results    Collection Time: 12/01/22  4:39 AM   Result Value Ref Range    POC Glucose, Blood 172 (H) 65 - 99 mg/dL   POCT glucose device results    Collection Time: 12/01/22  7:51 AM   Result Value Ref Range    POC Glucose, Blood 116 (H) 65 - 99 mg/dL   POCT glucose device results    Collection Time: 12/01/22 12:19 PM   Result Value Ref Range    POC Glucose, Blood 100 (H) 65 - 99 mg/dL       Imaging/Procedures Review:    Reviewed    MDM (Assessment and Plan):     Active Hospital Problems    Diagnosis     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.9XXA]     Constipation [K59.00]     Anemia [D64.9]     MSSA (methicillin susceptible Staphylococcus aureus) pneumonia ([facility]) [J15.211]     Delirium [R41.0]     Goals of care, counseling/discussion [Z71.89]     Hypernatremia [E87.0]     Hematoma [T14.8XXA]     Septic shock with acute organ dysfunction due to methicillin susceptible Staphylococcus aureus (MSSA) ([facility]) [A41.01, R65.21]     MYLA (acute kidney injury) ([facility]) [N17.9]     Hyperkalemia [E87.5]     Acute on chronic systolic congestive heart failure ([facility]) [I50.23]     Acute hypoxemic respiratory failure due to COVID-19 ([facility]) [U07.1, J96.01]     Paroxysmal atrial fibrillation ([facility]) [I48.0]     Chronic anticoagulation [Z79.01]     Restless leg syndrome [G25.81]     Hematuria [R31.9]     Pacemaker [Z95.0]      77yoM with new onset hematuria with catheter in place, recently came off of Zosyn for MSSA pneumonia, hospital team holding anticoagulation, patient with decreased  renal function and has been seen by nephrology.      CT a/p w/o on 11/19/22 with the following findings:    IMPRESSION:     1.  Left retroperitoneal hematoma along the left iliopsoas/iliac is muscle measuring approximately 4.3 x 3.4 x 5.9 cm.     2.  Bilateral lower lobe consolidation could be due to pneumonia or atelectasis.     3.  Small layering stones versus sludge in the gallbladder.     4.  Diverticulosis without diverticulitis.     5.  Moderate amount of stool throughout the colon suggesting constipation.      Of note, catheter was last exchanged 11/19/22      Repeat CT A/P 11/30/2022:    IMPRESSION:     1.  No hydronephrosis or obstructing renal or ureteral stone.  2.  Tiny right upper pole calcification could represent a nonobstructing renal stone or cortical calcification.  3.  Decreased size of a left iliacus hematoma, measuring 3.9 x 2.7 cm, previously 4.3 x 3.4 cm.  4.  Right greater than left lower lobe consolidations favoring pneumonia.  5.  Large rectal stool burden.  6.  Gallbladder sludge versus layering stones.  7.  Cardiomegaly.  8.  Fat-containing ventral hernia with a 2.3 cm neck.  9.  Atherosclerotic changes.       PLAN:    - Pending urine culture   - Hand irrigate mcdowell catheter PRN for clots/catheter not draining  - Monitor for worsening hematuria, consider 3-way mcdowell catheter placement if CBI is indicated or if hemodynamically declines   - Continue mcdowell catheter for now, hospital team planning for TOV prior to discharge on tamsulosin      Dr. Gautam is aware of this consultation and has directed the plan of care.    Francine Coles PA-C  [facility] [place]

## 2022-12-02 NOTE — CARE PLAN
The patient is Stable - Low risk of patient condition declining or worsening    Shift Goals  Clinical Goals: monitor for hematuria, BM, out of bed  Patient Goals: Manage pain  Family Goals: ALLIE    Progress made toward(s) clinical / shift goals:  Pt oriented to person and place this shift. Pt reporting back and leg pain, PRN oxycodone given. Ball still with hematuria. SLP to bedside, diet reordered.       Problem: Psychosocial  Goal: Patient's level of anxiety will decrease  Outcome: Progressing  Goal: Patient's ability to verbalize feelings about condition will improve  Outcome: Progressing  Goal: Patient's ability to re-evaluate and adapt role responsibilities will improve  Outcome: Progressing  Goal: Patient and family will demonstrate ability to cope with life altering diagnosis and/or procedure  Outcome: Progressing  Goal: Spiritual and cultural needs incorporated into hospitalization  Outcome: Progressing     Problem: Hemodynamics  Goal: Patient's hemodynamics, fluid balance and neurologic status will be stable or improve  Outcome: Progressing     Problem: Respiratory  Goal: Patient will achieve/maintain optimum respiratory ventilation and gas exchange  Outcome: Progressing     Problem: Risk for Aspiration  Goal: Patient's risk for aspiration will be absent or decrease  Outcome: Progressing     Problem: Urinary - Renal Perfusion  Goal: Ability to achieve and maintain adequate renal perfusion and functioning will improve  Outcome: Progressing     Problem: Venous Thromboembolism (VTE) Prevention  Goal: The patient will remain free from venous thromboembolism (VTE)  Outcome: Progressing     Problem: Urinary Elimination  Goal: Establish and maintain regular urinary output  Outcome: Progressing     Problem: Bowel Elimination  Goal: Establish and maintain regular bowel function  Outcome: Progressing     Problem: Communication  Goal: The ability to communicate needs accurately and effectively will improve  Outcome:  Progressing     Problem: Discharge Barriers/Planning  Goal: Patient's continuum of care needs are met  Outcome: Progressing     Problem: Fluid Volume  Goal: Fluid volume balance will be maintained  Outcome: Progressing     Problem: Dysphagia  Goal: Dysphagia will improve  Outcome: Progressing     Problem: Mobility  Goal: Patient's capacity to carry out activities will improve  Outcome: Progressing     Problem: Self Care  Goal: Patient will have the ability to perform ADLs independently or with assistance (bathe, groom, dress, toilet and feed)  Outcome: Progressing     Problem: Infection - Standard  Goal: Patient will remain free from infection  Outcome: Progressing     Problem: Wound/ / Incision Healing  Goal: Patient's wound/surgical incision will decrease in size and heals properly  Outcome: Progressing

## 2022-12-02 NOTE — PROGRESS NOTES
"[facility] Internal Medicine Daily Progress Note    Date of Service  12/2/2022    [facility] Team: R IM [facility] Team   Attending: Mahad Kwong M.d.  Senior Resident: Dr. Vásquez  Intern:  Dr. Torres  Contact Number: [phone]    Chief Complaint  Sean Kincaid is a 77 y.o. male admitted 11/4/2022 with respiratory failure secondary to COVID.     Hospital Course  77-year-old male with a past medical history of heart failure, COPD, A. fib, type 2 diabetes mellitus who presented on 11/4/2022 as a transfer from [facility] with worsening of shortness of breath and fatigue, started on BiPAP intubated, ICU stay, MYLA on acute renal failure, now with CKD, course complicated by hyperkalemia, hypernatremia, worsening renal function, ICU myopathy, likely stroke during hospitalization, bacterial pneumonia requiring IV antibiotics, now off all antibiotics, creatinine stably elevated with good urine output,    Interval Problem Update  No acute events overnight. Patient currently on 1L of oxygen this morning without any difficulties breathing.  Patient without acute complaints overnight, no chest pain, palpitations, lower extremity edema, shortness of breath, cough, abdominal pain, nausea/vomiting/diarrhea/constipation, dysuria, hematuria, new onset focal weakness, headaches or sensory changes.  Patient states that \"I would never realize that not having poop could make me hurt so bad all over, but I got a lot out yesterday and I am feeling so much better today, I will try and move around with physical therapy.\"  Patient was given patient was reportedly agitated overnight but back to baseline this morning.    In regards to patients hematoma conversation will need to be had in regards to when to restart anticoagulation. Will consider apixaban rather than patients home regimen of warfarin.    Was informed by telemetry patient's pacemaker is not pacing properly. Patient asymptomatic at bedside w/ ecg showing intermittent pacing " discussed with electrocardio physiology NP dwayne and plan is to interrogate device, rep notified.  11/22/2022.  Update on 11/23/2023 pacemaker interrogated by rep performing correctly, lead placement good on last chest x-ray, patient asymptomatic, without chest pain no signs of heart failure, 1 and half years of battery life left on pacemaker.    11/24  No acute events overnight. Patient has slightly increased mobility in upper extremities where he can raise elbows above the bed. Still limited mobility in left lower extremity. Apixaban to be started today in place of coumadin for treatment of afib.    I have discussed this patient's plan of care and discharge plan at IDT rounds today with Case Management, Nursing, Nursing leadership, and other members of the IDT team.    11/25:  No acute events overnight. On bedside patient was on room air. He reports feeling down this morning at how long he's been in the hospital and how he wishes he would get better already. Offered words of encouragement. No acute rebleeding from hematoma after anticoagulation restarted. Patient coreg still held as heart rate still in the 60s. No labs needed on this patient.    11/26:  No acute events overnight. Patient at bedside this morning had inc jerking movements on legs bilaterally and complained of an inability to stop the movements. Additionally, mentioned he felt itchiness/tingle ness. Will monitor for now and if symptoms persist or worsen will get CMP and Mg. Rate control with metoprolol also added for afib.    11/27:  No acute events overnight. Patient alert and oriented to self and time but not place. Leg tingle ness/movement improved from previous day. Patient continues to have limited mobility in bilateral upper extremities and left lower extremity.    11/28:  No acute events overnight. Patient still at baseline axo 2 but still not oriented to time. Potassium was mildly elevated at 6 so patient s/p kayexalate and insulin +  dextrose and r/p potassium now 5.1. Will continue to monitor. Otherwise, patient continues to deny any symptoms of sob/chest pain and has good o2 sats on 2L nc. Still pending placement for rehab. Creatinine and BUN were also mildly elevated from previous labs but not overly concerning as patients hospital course has creatinine stabilized within the 2.5-3 range.    11/29:  No acute events overnight. Patient had hematuria coming out of mcdowell which is new from previous day. Holding apixaban and ordering cbc, cmp, u/a. u/a positive for blood so will consult urology. Additionally, once mcdowell is removed will attempt voiding trial with tamsulosin.    11/30:  No acute events overnight. Hematuria improved from mcdowell compared to yesterday after apixaban held. Hg downtrend but still stable at 8.1. Per urology, CT abd/pelv w/o contrast ordered in addition to u/c. IV fluids started for patient after worsening of kidney injury with up-trending creatinine. Patient's friend Javid contact info is [phone].    12/1:  No acute events overnight, hematuria baseline, CT abdomen and pelvis with stool burden, bowel movement today, hemoglobin stable at 8, urology following, patient without acute complaints.  Updated patient's family.    12/2:  No acute events overnight.  Patient was reportedly more agitated per team, but could be due to effects of oxycodone that was provided.  Patient in terms of hematuria seems to be improving with improved coloration and good urine output.  However, in regards to this patient's kidney functions kidney injury seems to be worsening with increased creatinine as well as BUN despite being on IV fluids which was increased to 150 cc/hr.  Will consult nephrology today with repeat urinalysis.  Additionally we will continue to monitor patient's hyperkalemia as potassium is uptrending however still within normal limits.  For his pain Tylenol was scheduled.  Constipation seems to have improved addition of a bowel  regimen, there seems to be no overflow.  Additionally palliative was consulted for goals of care discussion.    Consultants/Specialty  nephrology    Code Status  Full Code    Disposition  Patient is not medically cleared for discharge.   Anticipate discharge to to skilled nursing facility.  I have placed the appropriate orders for post-discharge needs.    Review of Systems  Review of Systems   Constitutional:  Negative for malaise/fatigue.   HENT: Negative.     Eyes: Negative.    Respiratory:  Negative for sputum production.    Cardiovascular: Negative.    Gastrointestinal: Negative.    Genitourinary: Negative.    Musculoskeletal: Negative.    Skin: Negative.    Neurological:  Positive for focal weakness and weakness.   Endo/Heme/Allergies: Negative.    Psychiatric/Behavioral: Negative.        Physical Exam  Temp:  [36.3 °C (97.4 °F)-37 °C (98.6 °F)] 37 °C (98.6 °F)  Pulse:  [79-98] 88  Resp:  [18-22] 20  BP: (140-152)/(71-79) 150/78  SpO2:  [87 %-96 %] 96 %    Physical Exam  Constitutional:       Appearance: He is obese. He is not ill-appearing.   HENT:      Head: Normocephalic and atraumatic.      Right Ear: Tympanic membrane, ear canal and external ear normal.      Left Ear: Tympanic membrane, ear canal and external ear normal.      Nose: Nose normal.      Mouth/Throat:      Mouth: Mucous membranes are moist.      Pharynx: Oropharynx is clear.   Eyes:      Extraocular Movements: Extraocular movements intact.      Conjunctiva/sclera: Conjunctivae normal.      Pupils: Pupils are equal, round, and reactive to light.   Cardiovascular:      Rate and Rhythm: Normal rate and regular rhythm.   Pulmonary:      Effort: Pulmonary effort is normal. No respiratory distress.   Chest:      Chest wall: No tenderness.   Abdominal:      General: Abdomen is flat. Bowel sounds are normal.      Palpations: Abdomen is soft.   Musculoskeletal:      Cervical back: Normal range of motion.      Right lower leg: No edema.      Left lower  leg: No edema.   Skin:     General: Skin is warm.      Capillary Refill: Capillary refill takes less than 2 seconds.   Neurological:      Mental Status: He is alert and oriented to person, place, and time.      Motor: Weakness present.   Psychiatric:         Mood and Affect: Mood normal.         Behavior: Behavior normal.         Thought Content: Thought content normal.         Judgment: Judgment normal.       Fluids    Intake/Output Summary (Last 24 hours) at 12/2/2022 1510  Last data filed at 12/2/2022 0600  Gross per 24 hour   Intake --   Output 1600 ml   Net -1600 ml       Laboratory  Recent Labs     11/30/22 0311 12/01/22 0317 12/02/22  0052   WBC 9.3 8.8 9.1   RBC 2.77* 2.64* 2.86*   HEMOGLOBIN 8.1* 8.0* 8.5*   HEMATOCRIT 26.5* 25.1* 27.1*   MCV 95.7 95.1 94.8   MCH 29.2 30.3 29.7   MCHC 30.6* 31.9* 31.4*   RDW 49.4 48.8 48.4   PLATELETCT 343 349 379   MPV 10.1 10.1 10.5     Recent Labs     11/30/22 0311 12/01/22 0317 12/02/22  0052   SODIUM 136 135 135   POTASSIUM 4.2 4.7 5.3   CHLORIDE 100 99 98   CO2 25 23 22   GLUCOSE 157* 183* 123*   BUN 68* 75* 74*   CREATININE 3.75* 3.87* 4.44*   CALCIUM 8.5 8.4* 8.8     Recent Labs     12/01/22 0317   INR 1.51*                 Imaging  CT-ABDOMEN-PELVIS W/O   Final Result      1.  No hydronephrosis or obstructing renal or ureteral stone.   2.  Tiny right upper pole calcification could represent a nonobstructing renal stone or cortical calcification.   3.  Decreased size of a left iliacus hematoma, measuring 3.9 x 2.7 cm, previously 4.3 x 3.4 cm.   4.  Right greater than left lower lobe consolidations favoring pneumonia.   5.  Large rectal stool burden.   6.  Gallbladder sludge versus layering stones.   7.  Cardiomegaly.   8.  Fat-containing ventral hernia with a 2.3 cm neck.   9.  Atherosclerotic changes.      DX-CHEST-PORTABLE (1 VIEW)   Final Result      No acute cardiopulmonary abnormality identified.      CT-ABDOMEN-PELVIS W/O   Final Result      1.  Left  retroperitoneal hematoma along the left iliopsoas/iliac is muscle measuring approximately 4.3 x 3.4 x 5.9 cm.      2.  Bilateral lower lobe consolidation could be due to pneumonia or atelectasis.      3.  Small layering stones versus sludge in the gallbladder.      4.  Diverticulosis without diverticulitis.      5.  Moderate amount of stool throughout the colon suggesting constipation.      US-EXTREMITY VENOUS UPPER UNILAT RIGHT   Final Result      DX-WRIST-LIMITED 2- RIGHT   Final Result         1.   No radiographic evidence of acute injury.      2.  Old healed fracture right fifth metacarpal neck.      3.  Moderate osteoarthritic changes of the right first carpal metacarpal joint.      4.  Possible intra-articular loose bodies adjacent to the styloid processes of the radius and ulna.      DX-CHEST-PORTABLE (1 VIEW)   Final Result      1.  Increased pulmonary edema   2.  Bibasilar underinflation atelectasis which could obscure an additional process. This is unchanged.   3.  Persistently enlarged cardiac silhouette      DX-CHEST-PORTABLE (1 VIEW)   Final Result      Mildly improved aeration and stable to slightly improved vascular congestion.      Mildly improved left basilar atelectasis.      DX-CHEST-PORTABLE (1 VIEW)   Final Result      1.  Interval extubation and removal of NG tube.   2.  Increased bibasilar airspace disease.   3.  Increased interstitial edema.   4.  Small left pleural effusion.      CT-HEAD W/O   Final Result      1. No CT evidence of acute infarct, hemorrhage or mass.   2. Moderate global parenchymal atrophy. Chronic small vessel ischemic changes.      DX-CHEST-PORTABLE (1 VIEW)   Final Result      Mildly improved left basilar opacity.      DX-ABDOMEN FOR TUBE PLACEMENT   Final Result      Enteric tube tip projects over the gastric antrum or proximal duodenum      DX-CHEST-PORTABLE (1 VIEW)   Final Result         1. Stable lines and tubes.   2. Worsening left basilar opacity. Small left  pleural effusion. Mild right infrahilar atelectasis.         DX-CHEST-PORTABLE (1 VIEW)   Final Result      1.  Cardiomegaly with interstitial edema.      2.  Left lung base atelectasis and minimal left pleural effusion.      DX-CHEST-PORTABLE (1 VIEW)   Final Result      1.  Improved left pleural effusion with adjacent airspace disease.   2.  Improved interstitial infiltrates versus edema.      EC-ECHOCARDIOGRAM COMPLETE W/O CONT   Final Result      DX-CHEST-PORTABLE (1 VIEW)   Final Result      1.  Small left pleural effusion with adjacent airspace disease.   2.  Worsening interstitial infiltrates versus edema.   3.  Right basilar atelectasis.      DX-ABDOMEN FOR TUBE PLACEMENT   Final Result      Enteric tube tip projects over the proximal duodenum      DX-ABDOMEN FOR TUBE PLACEMENT   Final Result         1.  Nonspecific bowel gas pattern in the upper abdomen.   2.  Nasogastric tube, not visualized beyond the mid thorax, see ordered repeat abdominal x-ray for further characterization.   3.  Cardiomegaly   4.  Left lung base atelectasis or infiltrate.      OUTSIDE IMAGES-DX CHEST   Final Result      DX-CHEST-PORTABLE (1 VIEW)   Final Result         1.  Bibasilar atelectasis or early infiltrates.   2.  Trace bilateral pleural effusions   3.  Cardiomegaly             Assessment/Plan  Problem Representation:    * Anemia  Assessment & Plan  Hemoglobin trending down  Received a unit today   CT abdomen showing: Left retroperitoneal hematoma along the left iliopsoas/iliac is muscle measuring approximately 4.3 x 3.4 x 5.9 cm.  Reversing warfarin   received 1 unit of RBC    PLAN  Close monitoring    Hematoma- (present on admission)  Assessment & Plan  CT abdomen showing Left retroperitoneal hematoma along the left iliopsoas/iliac is muscle measuring approximately 4.3 x 3.4 x 5.9 cm.  Holding warfarin , giving FFP and vit K to reverse INR   Has received 2 unit of RBC   Close monitoring   -apixaban held due to new onset  hematuria    MYLA (acute kidney injury) (HCC)- (present on admission)  Assessment & Plan  -initially was on dialysis and improved so dialysis was stopped but now has worsening again so nephrology has been consulted   -unsure what baseline is at home. However on this admission creatinine seems to have stabilized within the 2.5-3.0 range    PLAN  -CTM  -nephrology previously following, still oliguric making greater than 1500 mL of urine a day  -IV fluids continued 150 cc/hr  -nephrology re-consulted      Hematuria  Assessment & Plan  -hematuria noticed from mcdowell this morning new finding compared to previous day  -apixaban held  -no growth on urine culture    PLAN  -per urology:     - Recommend repeat CT a/p w/o due to decreased renal function will avoid contrast    - Hand irrigate mcdowell catheter PRN for clots/catheter not draining    - Monitor for worsening hematuria, consider 3-way mcdowell catheter placement if CBI is indicated    - Continue mcdowell catheter for now, hospital team planning for TOV prior to discharge on tamsulosin  -CBC trend  -r/p u/a    Constipation  Assessment & Plan  -s/p enema  -scheduled bowel regimen    Acute on chronic systolic congestive heart failure (HCC)- (present on admission)  Assessment & Plan  Metoprolol tartrate 12.5 BID  It appears his EF has been improving  Continue present medical therapy    CONCLUSIONS  The left ventricle is not well visualized due to body habitus.  Left ventricular systolic function is probably normal.  The ejection fraction is roughly estimated to be 55%.  A reliable estimation of diastolic function cannot be made due to   mitral valve disease.  Severely dilated right ventricle.  Reduced right ventricular systolic function.  Estimated right ventricular systolic pressure is 40 mmHg.  Severe biatrial dilation.  Mild mitral regurgitation.  Aortic valve sclerosis without significant stenosis.  Mild tricuspid regurgitation.  Normal inferior vena cava size and inspiratory  collapse    Acute hypoxemic respiratory failure due to COVID-19 (HCC)- (present on admission)  Assessment & Plan  Patient previously treated for pneumonia course of Zosyn for hospital-acquired pneumonia, weaned off of oxygen, however concerned on CT chest CT for possible aspiration pneumonia, will hold n.p.o. until speech-language pathology sees  - Speech-language pathology consult.    Paroxysmal atrial fibrillation (HCC)- (present on admission)  Assessment & Plan  -History of, ongoing anticoagulation   -Holding warfarin at this time and also s/p VIt K and also FFP  -apixaban 5 mg BID held given continued at high risk for bleeding, discussed with patient and son who are agreeable to plan of care  -metoprolol tartrate 12.5 BID    MSSA (methicillin susceptible Staphylococcus aureus) pneumonia (HCC)- (present on admission)  Assessment & Plan  Finished treatment     Hypernatremia- (present on admission)  Assessment & Plan  Resolved     Restless leg syndrome- (present on admission)  Assessment & Plan  Requip discontinued    Chronic anticoagulation- (present on admission)  Assessment & Plan  Holding Coumadin. Originally replaced w/ apixiban 5mg BID but now held in setting of hematuria    Pacemaker- (present on admission)  Assessment & Plan  In situ, telemetry monitoring, history of complete heart block    Pacemaker complications  Assessment & Plan  RESOLVED  Was informed by telemetry patient's pacemaker is not pacing properly. Patient asymptomatic at bedside w/ ecg showing intermittent pacing discussed with electrocardio physiology NP embers and plan is to interrogate device, rep notified.  -Pacer performing correctly on interrogation  - Patient asymptomatic  - DC telemetry  - Follow-up with cardiology as outpatient    Delirium- (present on admission)  Assessment & Plan  Resolved, likely in light of the patient's acute hospitalization, acute critical condition, disease  Standard precautions and out of delirium  Avoid  offending medication  Avoid interruption of circadian rhythm  CT head negative for acute changes, unable to provide a MRI imaging secondary to patient's pacemaker.    PLAN  D/c tele  No vitals overnight  [place] if disoriented  Patient alert and oriented x 3 at bedside      Goals of care, counseling/discussion- (present on admission)  Assessment & Plan  Palliative care assisting,   Patient continues to hope towards rehab, no wishes for palliative care at this time, continue to update family  Patient remains full code     PLAN  Palliative reconsulted    Septic shock with acute organ dysfunction due to methicillin susceptible Staphylococcus aureus (MSSA) ([facility])  Assessment & Plan  Present on admission, treated adequately    Hyperkalemia- (present on admission)  Assessment & Plan  Improved s/p kayexalate and insulin + dextrose    PLAN    RESOLVED  Continue kayexalate  Low potassium diet  R/p bmp  Monitor        VTE prophylaxis: SCDs/TEDs    I have performed a physical exam and reviewed and updated ROS and Plan today (12/2/2022). In review of yesterday's note (12/1/2022), there are no changes except as documented above.

## 2022-12-03 NOTE — PROGRESS NOTES
"[facility] Internal Medicine Daily Progress Note    Date of Service  12/3/2022    [facility] Team: R IM [place] Team   Attending: Mahad Kwong M.d.  Senior Resident: Dr. Vásquez  Intern:  Dr. Torres  Contact Number: [phone]    Chief Complaint  Sean Kincaid is a 77 y.o. male admitted 11/4/2022 with respiratory failure secondary to COVID.     Hospital Course  77-year-old male with a past medical history of heart failure, COPD, A. fib, type 2 diabetes mellitus who presented on 11/4/2022 as a transfer from [facility] with worsening of shortness of breath and fatigue, started on BiPAP intubated, ICU stay, MYLA on acute renal failure, now with CKD, course complicated by hyperkalemia, hypernatremia, worsening renal function, ICU myopathy, likely stroke during hospitalization, bacterial pneumonia requiring IV antibiotics, now off all antibiotics, creatinine stably elevated with good urine output,    Interval Problem Update  No acute events overnight. Patient currently on 1L of oxygen this morning without any difficulties breathing.  Patient without acute complaints overnight, no chest pain, palpitations, lower extremity edema, shortness of breath, cough, abdominal pain, nausea/vomiting/diarrhea/constipation, dysuria, hematuria, new onset focal weakness, headaches or sensory changes.  Patient states that \"I would never realize that not having poop could make me hurt so bad all over, but I got a lot out yesterday and I am feeling so much better today, I will try and move around with physical therapy.\"  Patient was given patient was reportedly agitated overnight but back to baseline this morning.    In regards to patients hematoma conversation will need to be had in regards to when to restart anticoagulation. Will consider apixaban rather than patients home regimen of warfarin.    Was informed by telemetry patient's pacemaker is not pacing properly. Patient asymptomatic at bedside w/ ecg showing intermittent pacing " discussed with electrocardio physiology NP dwayne and plan is to interrogate device, rep notified.  11/22/2022.  Update on 11/23/2023 pacemaker interrogated by rep performing correctly, lead placement good on last chest x-ray, patient asymptomatic, without chest pain no signs of heart failure, 1 and half years of battery life left on pacemaker.    11/24  No acute events overnight. Patient has slightly increased mobility in upper extremities where he can raise elbows above the bed. Still limited mobility in left lower extremity. Apixaban to be started today in place of coumadin for treatment of afib.    I have discussed this patient's plan of care and discharge plan at IDT rounds today with Case Management, Nursing, Nursing leadership, and other members of the IDT team.    11/25:  No acute events overnight. On bedside patient was on room air. He reports feeling down this morning at how long he's been in the hospital and how he wishes he would get better already. Offered words of encouragement. No acute rebleeding from hematoma after anticoagulation restarted. Patient coreg still held as heart rate still in the 60s. No labs needed on this patient.    11/26:  No acute events overnight. Patient at bedside this morning had inc jerking movements on legs bilaterally and complained of an inability to stop the movements. Additionally, mentioned he felt itchiness/tingle ness. Will monitor for now and if symptoms persist or worsen will get CMP and Mg. Rate control with metoprolol also added for afib.    11/27:  No acute events overnight. Patient alert and oriented to self and time but not place. Leg tingle ness/movement improved from previous day. Patient continues to have limited mobility in bilateral upper extremities and left lower extremity.    11/28:  No acute events overnight. Patient still at baseline axo 2 but still not oriented to time. Potassium was mildly elevated at 6 so patient s/p kayexalate and insulin +  dextrose and r/p potassium now 5.1. Will continue to monitor. Otherwise, patient continues to deny any symptoms of sob/chest pain and has good o2 sats on 2L nc. Still pending placement for rehab. Creatinine and BUN were also mildly elevated from previous labs but not overly concerning as patients hospital course has creatinine stabilized within the 2.5-3 range.    11/29:  No acute events overnight. Patient had hematuria coming out of mcdowell which is new from previous day. Holding apixaban and ordering cbc, cmp, u/a. u/a positive for blood so will consult urology. Additionally, once mcdowell is removed will attempt voiding trial with tamsulosin.    11/30:  No acute events overnight. Hematuria improved from mcdowell compared to yesterday after apixaban held. Hg downtrend but still stable at 8.1. Per urology, CT abd/pelv w/o contrast ordered in addition to u/c. IV fluids started for patient after worsening of kidney injury with up-trending creatinine. Patient's friend Javid contact info is [phone].    12/1:  No acute events overnight, hematuria baseline, CT abdomen and pelvis with stool burden, bowel movement today, hemoglobin stable at 8, urology following, patient without acute complaints.  Updated patient's family.    12/2:  No acute events overnight.  Patient was reportedly more agitated per team, but could be due to effects of oxycodone that was provided.  Patient in terms of hematuria seems to be improving with improved coloration and good urine output.  However, in regards to this patient's kidney functions kidney injury seems to be worsening with increased creatinine as well as BUN despite being on IV fluids which was increased to 150 cc/hr.  Will consult nephrology today with repeat urinalysis.  Additionally we will continue to monitor patient's hyperkalemia as potassium is uptrending however still within normal limits.  For his pain Tylenol was scheduled.  Constipation seems to have improved addition of a bowel  regimen, there seems to be no overflow.  Additionally palliative was consulted for goals of care discussion.    12/3:  Patient was agitated overnight and was removing his nasal cannula.  He ended up desat into the low 80s and as a result required physical restraints.  Had bedside this morning had good O2 sats on 2 L of oxygen.  Patient was alert and oriented x1 at bedside this morning but on later on rounds was back to baseline.  Hematuria from [place] continues to improve with good urine output so urology signed off.  Unfortunately, kidney injury continues to worsen and despite increasing IV fluids to 150 cc/h there was no improvement.  Additionally patient's low O2 sats partially contributed to pulmonary edema so he was given IV Lasix 40 once due to fluid overload; currently held. Nephrology currently following this patient.  Additionally, spoke with son regarding overall status of this patient and need for possible palliative consult again. Ok for physical restraints for continued agitation.    Consultants/Specialty  nephrology    Code Status  Full Code    Disposition  Patient is not medically cleared for discharge.   Anticipate discharge to to skilled nursing facility.  I have placed the appropriate orders for post-discharge needs.    Review of Systems  Review of Systems   Constitutional:  Negative for malaise/fatigue.   HENT: Negative.     Eyes: Negative.    Respiratory:  Negative for sputum production.    Cardiovascular: Negative.    Gastrointestinal: Negative.    Genitourinary: Negative.    Musculoskeletal: Negative.    Skin: Negative.    Neurological:  Positive for focal weakness and weakness.   Endo/Heme/Allergies: Negative.    Psychiatric/Behavioral: Negative.        Physical Exam  Temp:  [36.2 °C (97.1 °F)-36.6 °C (97.8 °F)] 36.4 °C (97.6 °F)  Pulse:  [] 84  Resp:  [18-42] 22  BP: (125-155)/(60-79) 132/76  SpO2:  [89 %-100 %] 93 %    Physical Exam  Constitutional:       Appearance: He is obese. He is  not ill-appearing.   HENT:      Head: Normocephalic and atraumatic.      Right Ear: Tympanic membrane, ear canal and external ear normal.      Left Ear: Tympanic membrane, ear canal and external ear normal.      Nose: Nose normal.      Mouth/Throat:      Mouth: Mucous membranes are moist.      Pharynx: Oropharynx is clear.   Eyes:      Extraocular Movements: Extraocular movements intact.      Conjunctiva/sclera: Conjunctivae normal.      Pupils: Pupils are equal, round, and reactive to light.   Cardiovascular:      Rate and Rhythm: Normal rate and regular rhythm.   Pulmonary:      Effort: Pulmonary effort is normal. No respiratory distress.   Chest:      Chest wall: No tenderness.   Abdominal:      General: Abdomen is flat. Bowel sounds are normal.      Palpations: Abdomen is soft.   Musculoskeletal:      Cervical back: Normal range of motion.      Right lower leg: No edema.      Left lower leg: No edema.   Skin:     General: Skin is warm.      Capillary Refill: Capillary refill takes less than 2 seconds.   Neurological:      Mental Status: He is alert and oriented to person, place, and time.      Motor: Weakness present.   Psychiatric:         Mood and Affect: Mood normal.         Behavior: Behavior normal.         Thought Content: Thought content normal.         Judgment: Judgment normal.       Fluids    Intake/Output Summary (Last 24 hours) at 12/3/2022 1235  Last data filed at 12/3/2022 0430  Gross per 24 hour   Intake 150 ml   Output 775 ml   Net -625 ml       Laboratory  Recent Labs     12/01/22  0317 12/02/22  0052 12/03/22  0442   WBC 8.8 9.1 9.6   RBC 2.64* 2.86* 2.76*   HEMOGLOBIN 8.0* 8.5* 8.0*   HEMATOCRIT 25.1* 27.1* 26.0*   MCV 95.1 94.8 94.2   MCH 30.3 29.7 29.0   MCHC 31.9* 31.4* 30.8*   RDW 48.8 48.4 48.4   PLATELETCT 349 379 376   MPV 10.1 10.5 10.1     Recent Labs     12/02/22  1541 12/03/22  0442 12/03/22  0651   SODIUM 138 138 139   POTASSIUM 4.9 4.9 5.3   CHLORIDE 101 102 103   CO2 22 22 23    GLUCOSE 163* 151* 130*   BUN 78* 77* 77*   CREATININE 4.30* 4.41* 4.28*   CALCIUM 8.4* 8.4* 8.5     Recent Labs     12/01/22  0317   INR 1.51*                 Imaging  DX-CHEST-PORTABLE (1 VIEW)   Final Result         1.  Pulmonary edema and/or infiltrates are identified, which appear somewhat increased since the prior exam.   2.  Cardiomegaly   3.  Atherosclerosis      CT-ABDOMEN-PELVIS W/O   Final Result      1.  No hydronephrosis or obstructing renal or ureteral stone.   2.  Tiny right upper pole calcification could represent a nonobstructing renal stone or cortical calcification.   3.  Decreased size of a left iliacus hematoma, measuring 3.9 x 2.7 cm, previously 4.3 x 3.4 cm.   4.  Right greater than left lower lobe consolidations favoring pneumonia.   5.  Large rectal stool burden.   6.  Gallbladder sludge versus layering stones.   7.  Cardiomegaly.   8.  Fat-containing ventral hernia with a 2.3 cm neck.   9.  Atherosclerotic changes.      DX-CHEST-PORTABLE (1 VIEW)   Final Result      No acute cardiopulmonary abnormality identified.      CT-ABDOMEN-PELVIS W/O   Final Result      1.  Left retroperitoneal hematoma along the left iliopsoas/iliac is muscle measuring approximately 4.3 x 3.4 x 5.9 cm.      2.  Bilateral lower lobe consolidation could be due to pneumonia or atelectasis.      3.  Small layering stones versus sludge in the gallbladder.      4.  Diverticulosis without diverticulitis.      5.  Moderate amount of stool throughout the colon suggesting constipation.      US-EXTREMITY VENOUS UPPER UNILAT RIGHT   Final Result      DX-WRIST-LIMITED 2- RIGHT   Final Result         1.   No radiographic evidence of acute injury.      2.  Old healed fracture right fifth metacarpal neck.      3.  Moderate osteoarthritic changes of the right first carpal metacarpal joint.      4.  Possible intra-articular loose bodies adjacent to the styloid processes of the radius and ulna.      DX-CHEST-PORTABLE (1 VIEW)   Final  Result      1.  Increased pulmonary edema   2.  Bibasilar underinflation atelectasis which could obscure an additional process. This is unchanged.   3.  Persistently enlarged cardiac silhouette      DX-CHEST-PORTABLE (1 VIEW)   Final Result      Mildly improved aeration and stable to slightly improved vascular congestion.      Mildly improved left basilar atelectasis.      DX-CHEST-PORTABLE (1 VIEW)   Final Result      1.  Interval extubation and removal of NG tube.   2.  Increased bibasilar airspace disease.   3.  Increased interstitial edema.   4.  Small left pleural effusion.      CT-HEAD W/O   Final Result      1. No CT evidence of acute infarct, hemorrhage or mass.   2. Moderate global parenchymal atrophy. Chronic small vessel ischemic changes.      DX-CHEST-PORTABLE (1 VIEW)   Final Result      Mildly improved left basilar opacity.      DX-ABDOMEN FOR TUBE PLACEMENT   Final Result      Enteric tube tip projects over the gastric antrum or proximal duodenum      DX-CHEST-PORTABLE (1 VIEW)   Final Result         1. Stable lines and tubes.   2. Worsening left basilar opacity. Small left pleural effusion. Mild right infrahilar atelectasis.         DX-CHEST-PORTABLE (1 VIEW)   Final Result      1.  Cardiomegaly with interstitial edema.      2.  Left lung base atelectasis and minimal left pleural effusion.      DX-CHEST-PORTABLE (1 VIEW)   Final Result      1.  Improved left pleural effusion with adjacent airspace disease.   2.  Improved interstitial infiltrates versus edema.      EC-ECHOCARDIOGRAM COMPLETE W/O CONT   Final Result      DX-CHEST-PORTABLE (1 VIEW)   Final Result      1.  Small left pleural effusion with adjacent airspace disease.   2.  Worsening interstitial infiltrates versus edema.   3.  Right basilar atelectasis.      DX-ABDOMEN FOR TUBE PLACEMENT   Final Result      Enteric tube tip projects over the proximal duodenum      DX-ABDOMEN FOR TUBE PLACEMENT   Final Result         1.  Nonspecific bowel  gas pattern in the upper abdomen.   2.  Nasogastric tube, not visualized beyond the mid thorax, see ordered repeat abdominal x-ray for further characterization.   3.  Cardiomegaly   4.  Left lung base atelectasis or infiltrate.      OUTSIDE IMAGES-DX CHEST   Final Result      DX-CHEST-PORTABLE (1 VIEW)   Final Result         1.  Bibasilar atelectasis or early infiltrates.   2.  Trace bilateral pleural effusions   3.  Cardiomegaly             Assessment/Plan  Problem Representation:    * MYLA (acute kidney injury) (HCC)- (present on admission)  Assessment & Plan  -initially was on dialysis and improved so dialysis was stopped but now has worsening again so nephrology has been consulted   -unsure what baseline is at home. However on this admission creatinine seems to have stabilized within the 2.5-3.0 range    PLAN  -CTM  -nephrology previously following, still oliguric making greater than 1500 mL of urine a day  -IV fluids continued 150 cc/hr held due to pulmonary edema. S/p IV lasix 40 once  -nephrology re-consulted      Anemia  Assessment & Plan  Hemoglobin trending down  Received a unit today   CT abdomen showing: Left retroperitoneal hematoma along the left iliopsoas/iliac is muscle measuring approximately 4.3 x 3.4 x 5.9 cm.  Reversing warfarin   received 1 unit of RBC    PLAN  Close monitoring    Delirium- (present on admission)  Assessment & Plan  Resolved, likely in light of the patient's acute hospitalization, acute critical condition, disease  Standard precautions and out of delirium  Avoid offending medication  Avoid interruption of circadian rhythm  CT head negative for acute changes, unable to provide a MRI imaging secondary to patient's pacemaker.    PLAN  D/c tele  No vitals overnight  [place] if disoriented  Patient alert and oriented x 3 at bedside  Ok for physical restraints for increased agitation      Hematoma- (present on admission)  Assessment & Plan  CT abdomen showing Left retroperitoneal  hematoma along the left iliopsoas/iliac is muscle measuring approximately 4.3 x 3.4 x 5.9 cm.  Holding warfarin , giving FFP and vit K to reverse INR   Has received 2 unit of RBC   Close monitoring   -apixaban held due to new onset hematuria    Hematuria  Assessment & Plan  -hematuria noticed from mcdowell this morning new finding compared to previous day  -apixaban held  -no growth on urine culture  -r/p u/a    PLAN  -per urology:     - Recommend repeat CT a/p w/o due to decreased renal function will avoid contrast    - Hand irrigate mcdowell catheter PRN for clots/catheter not draining    - Monitor for worsening hematuria, consider 3-way mcdowell catheter placement if CBI is indicated    - Continue mcdowell catheter for now, hospital team planning for TOV prior to discharge on tamsulosin    -urology signed off  -CBC trend    Constipation  Assessment & Plan  -s/p enema  -scheduled bowel regimen    Acute on chronic systolic congestive heart failure (HCC)- (present on admission)  Assessment & Plan  Metoprolol tartrate 12.5 BID  It appears his EF has been improving  Continue present medical therapy    CONCLUSIONS  The left ventricle is not well visualized due to body habitus.  Left ventricular systolic function is probably normal.  The ejection fraction is roughly estimated to be 55%.  A reliable estimation of diastolic function cannot be made due to   mitral valve disease.  Severely dilated right ventricle.  Reduced right ventricular systolic function.  Estimated right ventricular systolic pressure is 40 mmHg.  Severe biatrial dilation.  Mild mitral regurgitation.  Aortic valve sclerosis without significant stenosis.  Mild tricuspid regurgitation.  Normal inferior vena cava size and inspiratory collapse    Acute hypoxemic respiratory failure due to COVID-19 (HCC)- (present on admission)  Assessment & Plan  Patient previously treated for pneumonia course of Zosyn for hospital-acquired pneumonia, weaned off of oxygen, however  concerned on CT chest CT for possible aspiration pneumonia, will hold n.p.o. until speech-language pathology sees  - Speech-language pathology recommendations    Paroxysmal atrial fibrillation (HCC)- (present on admission)  Assessment & Plan  -History of, ongoing anticoagulation   -Holding warfarin at this time and also s/p VIt K and also FFP  -apixaban 5 mg BID held given continued at high risk for bleeding, discussed with patient and son who are agreeable to plan of care  -metoprolol tartrate 12.5 BID    MSSA (methicillin susceptible Staphylococcus aureus) pneumonia (HCC)- (present on admission)  Assessment & Plan  Finished treatment     Hypernatremia- (present on admission)  Assessment & Plan  Resolved     Restless leg syndrome- (present on admission)  Assessment & Plan  Requip discontinued    Chronic anticoagulation- (present on admission)  Assessment & Plan  Holding Coumadin. Originally replaced w/ apixiban 5mg BID but now held in setting of hematuria    Pacemaker- (present on admission)  Assessment & Plan  In situ, telemetry monitoring, history of complete heart block    Pacemaker complications  Assessment & Plan  RESOLVED  Was informed by telemetry patient's pacemaker is not pacing properly. Patient asymptomatic at bedside w/ ecg showing intermittent pacing discussed with electrocardio physiology NP embers and plan is to interrogate device, rep notified.  -Pacer performing correctly on interrogation  - Patient asymptomatic  - DC telemetry  - Follow-up with cardiology as outpatient    Goals of care, counseling/discussion- (present on admission)  Assessment & Plan  Palliative care assisting,   Patient continues to hope towards rehab, no wishes for palliative care at this time, continue to update family  Patient remains full code     PLAN  Palliative reconsulted    Septic shock with acute organ dysfunction due to methicillin susceptible Staphylococcus aureus (MSSA) (HCC)  Assessment & Plan  Present on admission,  treated adequately    Hyperkalemia- (present on admission)  Assessment & Plan  Improved s/p kayexalate and insulin + dextrose    PLAN    RESOLVED  Continue kayexalate  Low potassium diet  R/p bmp  Monitor        VTE prophylaxis: SCDs/TEDs    I have performed a physical exam and reviewed and updated ROS and Plan today (12/3/2022). In review of yesterday's note (12/2/2022), there are no changes except as documented above.

## 2022-12-03 NOTE — PROGRESS NOTES
[facility] -  PROGRESS NOTE               Author: Jose Roberto Acevedo M.D. Date & Time: 12/3/2022  12:30 PM     HPI:  Mr. Kincaid is a pleasant 77-year-old gentleman known to us from previous evaluations during his hospital stays.  We initially consulted him on 11/4/2022 with acute kidney injury and hyperkalemia..  He had initially presented with worsening shortness of breath and fatigue.  He was found to be COVID-positive.  He was initially started on BiPAP then intubated.  He was found to have a creatinine of 8.0 with a potassium of 6.6 at that time.  He required hemodialysis treatment x1 to correct his electrolytes but then demonstrated improvement of his renal function.  Nephrology had signed off the case on around 11/24/2022 with a BUN of 56 a creatinine of 2.62 and a potassium of 5.2.  It was thought that his renal failure was secondary to acute blood loss and retroperitoneal hematoma.  Subsequently however her serum creatinine has been rising to a level of 74 and 4.44 today.  Does not appear that is received any contrast exposure.  Recent urinalysis finds a red hazy specimen with specific gravity 1.015 and a pH of 7.0 there is large blood with 100+ protein which is consistent with previous evaluations.  Urology is following him for his hematuria he is received certain amount of IV fluids.  This is not well documented.  He is made 1.6 L of urine output over the course of the past 24 hours he is approximately 14 L negative since admission.  We have been asked to see him regarding his further rising creatinine   No F/C/N/V/CP/SOB.  No melena, hematochezia, hematemesis.  No HA, visual changes, or abdominal pain. UOP improved since IV lasix. CXR 12/3 pulmonary edema and/or infiltrates are identified, which appear somewhat increased since the prior exam.      DAILY NEPHROLOGY SUMMARY:  12/2 : Consult done   12/3: Overnight he was hypoxic, IVF stopped, received IV lasix 40 mg once,he is  "lethargic, sitter at bedside     REVIEW OF SYSTEMS:    10 point ROS reviewed and is as per HPI/daily summary or otherwise negative    PMH/PSH/SH/FH:Reviewed and unchanged since admission note    CURRENT MEDICATIONS:Reviewed from admission to present day  Scheduled Medications   Medication Dose Frequency    [Held by provider] furosemide  40 mg Q DAY    acetaminophen  650 mg 4XDAY    senna-docusate  2 Tablet BID    And    polyethylene glycol/lytes  1 Packet BID    tamsulosin  0.4 mg AFTER BREAKFAST    metoprolol tartrate  12.5 mg TWICE DAILY    [Held by provider] apixaban  5 mg BID    omeprazole  20 mg DAILY    melatonin  5 mg Nightly    atorvastatin  40 mg QDAY    insulin regular  3-14 Units 4X/DAY ACHS        VS:  /76   Pulse 84   Temp 36.4 °C (97.6 °F) (Temporal)   Resp (!) 22   Ht 1.88 m (6' 2\")   Wt 114 kg (251 lb 5.2 oz)   SpO2 93%   BMI 32.27 kg/m²   Physical Exam  CONSTITUTIONAL: No acute distress, well -appearing  Nursing note reviewed.   Constitutional:       Appearance: Normal appearance. He is obese.   HENT:      Head: Normocephalic and atraumatic.      Nose: Nose normal.      Mouth/Throat:      Mouth: Mucous membranes are dry.   Eyes:      General: No scleral icterus.     Extraocular Movements: Extraocular movements intact.      Pupils: Pupils are equal, round, and reactive to light.   Cardiovascular:      Rate and Rhythm: Normal rate and regular rhythm.      Pulses: Normal pulses.      Heart sounds: Normal heart sounds.   Pulmonary:      Effort: Pulmonary effort is normal.      Breath sounds: No wheezing or rales.   Abdominal:      General: Abdomen is flat. There is no distension.      Palpations: Abdomen is soft.      Tenderness: There is no abdominal tenderness.   Musculoskeletal:         General: No swelling or deformity.      Cervical back: Normal range of motion. No tenderness.   Skin:     General: Skin is warm and dry.      Findings: No rash.   Neurological:      General: No focal " deficit present.      Mental Status: He is disoriented and confused.      Motor: Weakness present.   Psychiatric:         Behavior: Behavior normal.        Fluids:    Intake/Output Summary (Last 24 hours) at 12/3/2022 1230  Last data filed at 12/3/2022 0430  Gross per 24 hour   Intake 150 ml   Output 775 ml   Net -625 ml       LABS:  Labs reviewed, pertinent labs below.    Recent Labs     12/01/22  0317 12/02/22  0052 12/03/22  0442   RBC 2.64* 2.86* 2.76*   HEMOGLOBIN 8.0* 8.5* 8.0*   HEMATOCRIT 25.1* 27.1* 26.0*   PLATELETCT 349 379 376   PROTHROMBTM 17.9*  --   --    INR 1.51*  --   --    IRON  --   --  33*   FERRITIN  --   --  1216.0*   TOTIRONBC  --   --  149*       Recent Labs     12/02/22  1541 12/03/22  0442 12/03/22  0651   SODIUM 138 138 139   POTASSIUM 4.9 4.9 5.3   CHLORIDE 101 102 103   CO2 22 22 23   GLUCOSE 163* 151* 130*   BUN 78* 77* 77*   CREATININE 4.30* 4.41* 4.28*   CALCIUM 8.4* 8.4* 8.5        IMAGING:  All imaging reviewed from admission to present day      DISCUSSION  Complex Case.  Appears to have significant right-sided heart failure which is leading to left-sided heart failure.  Patients with this degree of right-sided heart failure typically require substantial preload to maintain their cardiac output. Treatment is to maintain preload, usually with IV fluids but this can worsen their pulmonary performance.     ASSESSMENTS:  # Acute kidney injury, nonoliguric  Etiology not entirely clear  Likely related to altered renal perfusion with cardiorenal syndrome  Urinalysis is difficult to interpret given his gross hematuria  No emergent need for dialysis  And he would be a poor long-term dialysis cannot the date given his   chronic medical conditions  Continue efforts to augment renal perfusion  # Retroperitoneal hematoma  Slight decrease in size based on CAT scan of 11/30/2022  Gross hematuria as a result  Continues with Ball catheter  # Gross hematuria  Urine culture negative on 11/30/2022  #  Recent acute kidney injury with hyperkalemia  Required hemodialysis x1  # Chronic kidney disease  Baseline creatinine unknown  # Dementia  # Low serum albumin  # Hypocalcemia  # Anemia, iron stores adequate  # Diabetes mellitus, type II  # Congestive heart failure with LV dysfunction  Most recent echocardiogram finds an ejection fraction of 53%   # Right-sided heart failure  Severely dilated right ventricle with reduced right ventricular systolic function,  Normal inferior vena cava size and   inspiratory collapse.  # Hyperphosphetemia P 5.6      SUGGESTIONS:  No emergent need for dialysis  Poor long-term dialysis candidate and given his chronic medical conditions  Hold IVF  IV lasix 40 mg as needed. ( Decreased PO intake , appears dry on exam)   Transfuse PRBC PRN  Discuss GOC   Check renal US               Follow labs, further recommendations to follow

## 2022-12-03 NOTE — CARE PLAN
The patient is Watcher - Medium risk of patient condition declining or worsening    Shift Goals  Clinical Goals: Monitor SpO2, IS, monitor I&O  Patient Goals: Manage Pain  Family Goals: ALLIE    Progress made toward(s) clinical / shift goals:  Pt only oriented to person this shift. Trialed wrist restraints off in AM, pt continually removing oxygen. Pt SpO2=85-89% on RA. Messaged Dr. Vásquez if he was ok with that SpO2 or if we should reapply restraints, orders place to reapply restraints. Pt educated on restraints and discontinuation criteria. Pt on 1-3L nasal cannula or oxymask this shift. Ball still with hematuria. Wound team and ultrasound to bedside.     Patient is not progressing towards the following goals:      Problem: Safety - Medical Restraint  Goal: Free from restraint(s) (Restraint for Interference with Medical Device)  Outcome: Not Progressing     Problem: Psychosocial  Goal: Patient's level of anxiety will decrease  Outcome: Not Progressing  Goal: Patient's ability to re-evaluate and adapt role responsibilities will improve  Outcome: Not Progressing     Problem: Hemodynamics  Goal: Patient's hemodynamics, fluid balance and neurologic status will be stable or improve  Outcome: Not Progressing     Problem: Mobility  Goal: Patient's capacity to carry out activities will improve  Outcome: Not Progressing     Problem: Self Care  Goal: Patient will have the ability to perform ADLs independently or with assistance (bathe, groom, dress, toilet and feed)  Outcome: Not Progressing

## 2022-12-03 NOTE — PROGRESS NOTES
Called Pts son Theo Kincaid to discuss his fathers care, rapid response overnight for hypoxia, delirium, back to baseline O2 and mental status now, discussed poor prognosis as he has not been working well with physical therapy and worsening kidney disease.  Discussed goals of care with patient stating that his father would want everything done and has his previously expressed that, patient is full code at this time and dialysis would fit in line with his goals of care, however explained that patient's recovery could be very limited at this time, and that we will continue to work-up in conjunction with nephrology his renal failure, however no clear etiology at this time through current work-up.  All questions answered from family.  -Palliative consult: recs appreciated

## 2022-12-03 NOTE — PROGRESS NOTES
Writer called rapid response due to patient's increased confusion, decreased oxygen saturation to 74% with oxygen applied and increased demand to respiratory function. Pt was incontinent and throughout the whole night pulling off his oxygen. With this increased confusion, pt was unable to maintain the parameters set to keep oxygen above 86%.    Rapid Response team at bedside, as well as . At this time stat labs, chest xray and bilateral wrist restraints were ordered for patient safety. Continuous fluids were discontinued and lasix given. Oxy mask applied to patient. At this time this patient's blood sugar is 131. Results of CXR show signes of increased pulmonary edema.     Message left on son's phone to call back regarding RR and notify of initiation of restraints.     Azucena ALMAZAN

## 2022-12-03 NOTE — PROGRESS NOTES
Contacted [facility] team regarding pts current Respiratory Status. RT at bedside to evaluate and patient was alert to self and yelling at RT. Pt is non-compliant with medication regiment and not following commands. Denies pain, but states he is going to die and unable to breathe. Refusing to wear continuous pulse ox at this time to monitor oxygen status, refusing to turn or follow commands.     RT called to beside to evaluate patient. No new treatments or orders at this time.   Azucena ALMAZAN

## 2022-12-03 NOTE — PROGRESS NOTES
Writer called and left a message with son Theo regarding RR and restraint application. Waiting for call back. Azucena ALMAZAN

## 2022-12-03 NOTE — PROGRESS NOTES
Dr. Vásquez at bedside. Ok to discontinue insulin glargine and cover with sliding scale insulin.     0915: Pt sleeping and following commands when awake. Restraints removed.    1135: Pt continuously removing oxygen. SpO2=85-89% on RA. Dr. Saravia notified. Bilateral wrist restraints reordered.

## 2022-12-03 NOTE — CARE PLAN
The patient is Watcher - Medium risk of patient condition declining or worsening    Shift Goals  Clinical Goals: UA, wound care  Patient Goals: Manage pain  Family Goals: ALLIE    Progress made toward(s) clinical / shift goals:  Pt oriented to person and place most of shift. Pt still confused in conversation and intermittently verbally abusive to staff. Pt reporting chronic back/leg pain, oxycodone PRN given as available. Pt with one medium BM today. Interdry replaced in groin for moisture. Updated wound photos taken this shift and wound team consulted. Pt refusing most turns. Renal function improving slightly.      Patient is not progressing towards the following goals:      Problem: Psychosocial  Goal: Patient's level of anxiety will decrease  Outcome: Not Progressing  Goal: Patient and family will demonstrate ability to cope with life altering diagnosis and/or procedure  Outcome: Not Progressing     Problem: Urinary Elimination  Goal: Establish and maintain regular urinary output  Outcome: Not Progressing

## 2022-12-03 NOTE — CODE DOCUMENTATION
RRT called for hypoxia. Patient took off oxygen and desated to 70% and was not able to recover quickly. Primary RN placed patient on 15L nonrebreather. Patient was 100% spO2 on RRT arrival, weaned O2 to 3 L oxymask. Order received for restraints due to ongoing confusion and removing O2. Suction set up and patient assisted with clearing airway

## 2022-12-04 NOTE — WOUND TEAM
[facility]  Inpatient Services  Initial Wound and Skin Care Evaluation    Admission Date: 11/4/2022     Last order of IP CONSULT TO WOUND CARE was found on 12/2/2022 from Hospital Encounter on 11/3/2022     HPI, PMH, SH: Reviewed    Past Surgical History:   Procedure Laterality Date    COLONOSCOPY - ENDO  1/2/2017    Procedure: COLONOSCOPY - ENDO;  Surgeon: Joel Barney M.D.;  Location: SURGERY [facility];  Service:     STENT PLACEMENT  11/2015    coronary artery    MAZE PROCEDURE  8/7/2012    Performed by JEREMIE BRADLEY at SURGERY [facility] ORS    MITRAL VALVE REPLACE  8/7/2012    Performed by JEREMIE BRADLEY at SURGERY [facility] ORS    PACEMAKER INSERTION  August 2012    [employer] Altrua 60 S 606 implanted by Dr. Dennis Gray.    MITRAL VALVE REPLACEMENT  2012    bioprosthetic    INGUINAL HERNIA REPAIR  2007    Left side    OTHER ORTHOPEDIC SURGERY  2005    Right knee replacement    OTHER ORTHOPEDIC SURGERY  1965    Back surgery    APPENDECTOMY CHILD  1957    TONSILLECTOMY  1949    APPENDECTOMY      HERNIA REPAIR      KNEE ARTHROPLASTY TOTAL Right     MAZE PROCEDURE      PACEMAKER INSERTION      CT LAP UMBILICAL HERNIA REPAIR      CT REMOVAL OF TONSILS,<12 Y/O      TONSILLECTOMY       Social History     Tobacco Use    Smoking status: Never    Smokeless tobacco: Never   Substance Use Topics    Alcohol use: No     No chief complaint on file.    Diagnosis: Acute hypoxemic respiratory failure due to COVID-19 ([facility]) [U07.1, J96.01]  MSSA (methicillin susceptible Staphylococcus aureus) pneumonia ([facility]) [J15.211]    Unit where seen by Wound Team: S163/00     WOUND CONSULT/FOLLOW UP RELATED TO:  R hip, sacrum, pannus    WOUND HISTORY:  Patient incontinent of stool.    WOUND ASSESSMENT/LDA       Wound 12/02/22 Partial Thickness Wound Thigh Proximal Right (Active)   Wound Image   12/03/22 1300   Site Assessment Pink;Red 12/03/22 1300   Periwound Assessment Clean;Dry;Intact 12/03/22 1300    Margins Attached edges 12/03/22 1300   Closure Open to air 12/03/22 1300   Drainage Amount Scant 12/03/22 1300   Drainage Description Serosanguineous 12/03/22 1300   Treatments Cleansed;Site care;Zinc - oxide paste 12/03/22 1300   Wound Cleansing Foam Cleanser/Washcloth 12/03/22 1300   Periwound Protectant Barrier Paste 12/03/22 1300   Dressing Change/Treatment Frequency Daily, and As Needed 12/03/22 1300   NEXT Dressing Change/Treatment Date 12/04/22 12/03/22 1300   NEXT Weekly Photo (Inpatient Only) 12/10/22 12/03/22 1300   Non-staged Wound Description Partial thickness 12/03/22 1300   Shape Irregular 12/03/22 1300     Moisture Associated Skin Damage 12/03/22 Sacrum;Groin;Panus (Active)   Wound Image      12/03/22 1300   NEXT Weekly Photo (Inpatient Only) 12/10/22 12/03/22 1300   Drainage Amount Small 12/03/22 1300   Drainage Description Serosanguineous 12/03/22 1300   Periwound Assessment Excoriated;Blanchable erythema 12/03/22 1300   IAD Cleansing Foam Cleanser/Washcloth 12/03/22 1300   Periwound Protectant Barrier Paste;Antifungal Therapy 12/03/22 1300   IAD Containment Device Interdry Cloth 12/03/22 1300   WOUND NURSE ONLY - Time Spent with Patient (mins) 30 12/03/22 1300        Vascular:    CHRIST:   No results found.    Lab Values:    Lab Results   Component Value Date/Time    WBC 9.6 12/03/2022 04:42 AM    RBC 2.76 (L) 12/03/2022 04:42 AM    HEMOGLOBIN 8.0 (L) 12/03/2022 04:42 AM    HEMATOCRIT 26.0 (L) 12/03/2022 04:42 AM    CREACTPROT 17.61 (H) 12/03/2022 10:07 AM    HBA1C 7.2 (A) 03/07/2022 08:49 AM        Culture Results show:  No results found for this or any previous visit (from the past 720 hour(s)).    Pain Level/Medicated:  Some discomfort while turning     INTERVENTIONS BY WOUND TEAM:  Chart and images reviewed. Discussed with bedside RN. All areas of concern (based on picture review, LDA review and discussion with bedside RN) have been thoroughly assessed. Documentation of areas based on  significant findings. This RN in to assess patient. Performed standard wound care which includes appropriate positioning, dressing removal and non-selective debridement. Pictures and measurements obtained weekly if/when required.  Sacrum -  Preparation for Dressing removal: NA  Non-selectively Debrided with: Foam soap and moist washcloth  Sharp debridement: NA  Alexia wound: Cleansed with foam soap and moist washcloth, barrier paste  Primary Dressing: STEW  Secondary (Outer) Dressing: STEW    Pannus - interdry applied, nystatin powder ordered  R hip - STEW  Interdisciplinary consultation: Patient, Bedside RN (Saira),     EVALUATION / RATIONALE FOR TREATMENT:  Most Recent Date:  12/3/22: Patient sacrum with MASD. Sacral area with excoriation and blanchable erythema. Barrier paste applied for protective layer. Pannus and groin moist with redness and yeast-like rash. Interdry applied and antifungal therapy ordered. R hip with small blanching partial thickness wound, left STEW. Expected to resolve on its own, may apply mepilex or adhesive foam to protect.     Goals: Steady decrease in wound area and depth weekly.    WOUND TEAM PLAN OF CARE ([X] for frequency of wound follow up,):   Nursing to follow dressing orders written for wound care. Contact wound team if area fails to progress, deteriorates or with any questions/concerns if something comes up before next scheduled follow up (See below as to whether wound is following and frequency of wound follow up)  Dressing changes by wound team:                   Follow up 3 times weekly:                NPWT change 3 times weekly:     Follow up 1-2 times weekly:      Follow up Bi-Monthly:           Follow up Monthly (High Risk):    X             sacrum       Follow up as needed:     Other (explain):     NURSING PLAN OF CARE ORDERS (X):  Dressing changes: See Dressing Care orders:   Skin care: See Skin Care orders: X  RN Prevention Protocol: X  Rectal tube care: See Rectal Tube Care  orders:   Other orders:    RSKIN:   CURRENTLY IN PLACE (X), APPLIED THIS VISIT (A), ORDERED (O):   Q shift Amari:  X  Q shift pressure point assessments:  X    Surface/Positioning   Pressure redistribution mattress            Low Airloss          ICU Low Airloss   Bariatric CHRIS     Waffle cushion        Waffle Overlay       X   Reposition q 2 hours    X  TAPs Turning system     Z Antolin Pillow     Offloading/Redistribution   Sacral Mepilex (Silicone dressing)     Heel Mepilex (Silicone dressing)         Heel float boots (Prevalon boot)             Float Heels off Bed with Pillows       X    Respiratory nasal cannula  Silicone O2 tubing         Gray Foam Ear protectors     Cannula fixation Device (Tender )          High flow offloading Clip    Elastic head band offloading device      Anchorfast                                                         Trach with Optifoam split foam             Containment/Moisture Prevention   Rectal tube or BMS    Purwick/Condom Cath        Ball Catheter  X  Barrier wipes           Barrier paste     X  Antifungal tx    O  Interdry    X    Mobilization not assessed  Up to chair        Ambulate      PT/OT      Nutrition     Dietician        Diabetes Education      PO  X   TF     TPN     NPO   # days     Other        Anticipated discharge plans: TBD  LTACH:        SNF/Rehab:                  Home Health Care:           Outpatient Wound Center:            Self/Family Care:        Other:                  Vac Discharge Needs:   Not Applicable Pt not on a wound vac:     X  Regular Vac while inpatient, alternative dressing at DC:        Regular Vac in use and continued at DC:            Reg. Vac w/ Skin Sub/Biologic in use. Will need to be changed 2x wkly:      Veraflo Vac while inpatient, ok to transition to Regular Vac on Discharge:           Veraflo Vac while inpatient, will need to remain on Veraflo Vac upon discharge:

## 2022-12-04 NOTE — CARE PLAN
Problem: Psychosocial  Goal: Patient's level of anxiety will decrease  Outcome: Not Progressing     Problem: Mobility  Goal: Patient's capacity to carry out activities will improve  Outcome: Not Progressing     Problem: Safety - Medical Restraint  Goal: Remains free of injury from restraints (Restraint for Interference with Medical Device)  Outcome: Progressing  Goal: Free from restraint(s) (Restraint for Interference with Medical Device)  Outcome: Progressing     Problem: Risk for Aspiration  Goal: Patient's risk for aspiration will be absent or decrease  Outcome: Progressing     Problem: Bowel Elimination  Goal: Establish and maintain regular bowel function  Outcome: Progressing     Problem: Dysphagia  Goal: Dysphagia will improve  Outcome: Progressing   The patient is Watcher - Medium risk of patient condition declining or worsening    Shift Goals  Clinical Goals: Monitor Vitals/I&O/Manage Pain/Turns  Patient Goals: Comfort & Pain Management  Family Goals: ALLIE    Progress made toward(s) clinical / shift goals: Pt maintained oxygen saturations per orders.     Patient is not progressing towards the following goals: Pt confused and attempts to pull off oxygen while ROM being performed. Pt not meeting goal of understand why he needs oxygen and restraints are continued.       Problem: Psychosocial  Goal: Patient's level of anxiety will decrease  Outcome: Not Progressing     Problem: Mobility  Goal: Patient's capacity to carry out activities will improve  Outcome: Not Progressing

## 2022-12-04 NOTE — PROGRESS NOTES
Writer called [facility] Residents to notify them of Hgb level of 7.5    No new orders at this time. Azucena ALMAZAN

## 2022-12-04 NOTE — PROGRESS NOTES
[facility] -  PROGRESS NOTE               Author: Jose Roberto Acevedo M.D. Date & Time: 12/4/2022  1:46 PM     HPI:  Mr. Kincaid is a pleasant 77-year-old gentleman known to us from previous evaluations during his hospital stays.  We initially consulted him on 11/4/2022 with acute kidney injury and hyperkalemia..  He had initially presented with worsening shortness of breath and fatigue.  He was found to be COVID-positive.  He was initially started on BiPAP then intubated.  He was found to have a creatinine of 8.0 with a potassium of 6.6 at that time.  He required hemodialysis treatment x1 to correct his electrolytes but then demonstrated improvement of his renal function.  Nephrology had signed off the case on around 11/24/2022 with a BUN of 56 a creatinine of 2.62 and a potassium of 5.2.  It was thought that his renal failure was secondary to acute blood loss and retroperitoneal hematoma.  Subsequently however her serum creatinine has been rising to a level of 74 and 4.44 today.  Does not appear that is received any contrast exposure.  Recent urinalysis finds a red hazy specimen with specific gravity 1.015 and a pH of 7.0 there is large blood with 100+ protein which is consistent with previous evaluations.  Urology is following him for his hematuria he is received certain amount of IV fluids.  This is not well documented.  He is made 1.6 L of urine output over the course of the past 24 hours he is approximately 14 L negative since admission.  We have been asked to see him regarding his further rising creatinine   No F/C/N/V/CP/SOB.  No melena, hematochezia, hematemesis.  No HA, visual changes, or abdominal pain. UOP improved since IV lasix. CXR 12/3 pulmonary edema and/or infiltrates are identified, which appear somewhat increased since the prior exam.      DAILY NEPHROLOGY SUMMARY:  12/2 : Consult done   12/3: Overnight he was hypoxic, IVF stopped, received IV lasix 40 mg once,he is  "lethargic, sitter at bedside   12/4 More alert today. Oriented to person only No c/o, UOP 1100 cc/ past 24 hrs,     REVIEW OF SYSTEMS:    10 point ROS reviewed and is as per HPI/daily summary or otherwise negative    PMH/PSH/SH/FH:Reviewed and unchanged since admission note    CURRENT MEDICATIONS:Reviewed from admission to present day  Scheduled Medications   Medication Dose Frequency    [Held by provider] furosemide  40 mg Q DAY    nystatin   TID    acetaminophen  650 mg 4XDAY    senna-docusate  2 Tablet BID    And    polyethylene glycol/lytes  1 Packet BID    tamsulosin  0.4 mg AFTER BREAKFAST    metoprolol tartrate  12.5 mg TWICE DAILY    [Held by provider] apixaban  5 mg BID    omeprazole  20 mg DAILY    melatonin  5 mg Nightly    atorvastatin  40 mg QDAY    insulin regular  3-14 Units 4X/DAY ACHS        VS:  /88   Pulse 71   Temp 36.3 °C (97.3 °F) (Temporal)   Resp 20   Ht 1.88 m (6' 2\")   Wt 114 kg (251 lb 5.2 oz)   SpO2 94%   BMI 32.27 kg/m²   Physical Exam  CONSTITUTIONAL: No acute distress, well -appearing  Nursing note reviewed.   Constitutional:       Appearance: ill appearing   HENT:      Head: Normocephalic and atraumatic.      Nose: Nose normal.      Mouth/Throat:      Mouth: Mucous membranes are moist   Eyes:      General: No scleral icterus.     Extraocular Movements: Extraocular movements intact.      Pupils: Pupils are equal, round, and reactive to light.   Cardiovascular:      Rate and Rhythm: Normal rate and regular rhythm.      Pulses: Normal pulses.      Heart sounds: Normal heart sounds.   Pulmonary:      Effort: Pulmonary effort is normal.      Breath sounds: No wheezing or rales.   Abdominal:      General: Abdomen is flat. There is no distension.      Palpations: Abdomen is soft.      Tenderness: There is no abdominal tenderness.   Musculoskeletal:         General: No swelling or deformity.      Cervical back: Normal range of motion. No tenderness.   Skin:     General: Skin is " warm and dry.      Findings: No rash.   Neurological:      General: No focal deficit present.      Mental Status: He is disoriented and confused.      Motor: Weakness present.   Psychiatric:         Behavior: Behavior normal.        Fluids:    Intake/Output Summary (Last 24 hours) at 12/4/2022 1346  Last data filed at 12/4/2022 0508  Gross per 24 hour   Intake 180 ml   Output 1100 ml   Net -920 ml         LABS:  Labs reviewed, pertinent labs below.    Recent Labs     12/02/22  0052 12/03/22  0442 12/04/22  0241   RBC 2.86* 2.76* 2.58*   HEMOGLOBIN 8.5* 8.0* 7.5*   HEMATOCRIT 27.1* 26.0* 24.3*   PLATELETCT 379 376 360   IRON  --  33*  --    FERRITIN  --  1216.0*  --    TOTIRONBC  --  149*  --          Recent Labs     12/03/22  0442 12/03/22  0651 12/04/22  0241   SODIUM 138 139 138   POTASSIUM 4.9 5.3 5.2   CHLORIDE 102 103 102   CO2 22 23 23   GLUCOSE 151* 130* 121*   BUN 77* 77* 84*   CREATININE 4.41* 4.28* 4.69*   CALCIUM 8.4* 8.5 8.4*          IMAGING:  All imaging reviewed from admission to present day      DISCUSSION  Complex Case.  Appears to have significant right-sided heart failure which is leading to left-sided heart failure.  Patients with this degree of right-sided heart failure typically require substantial preload to maintain their cardiac output. Treatment is to maintain preload, usually with IV fluids but this can worsen their pulmonary performance.     ASSESSMENTS:  # Acute kidney injury, nonoliguric  Likely related to altered renal perfusion with cardiorenal syndrome, R psoas hematoma and acute blood loss  Urinalysis is difficult to interpret given his gross hematuria  No emergent need for dialysis  And he would be a poor long-term dialysis cannot the date given his   chronic medical conditions               Cr continue to rise, renal US 12/3 unremarkable   Continue efforts to augment renal perfusion  # Retroperitoneal hematoma  Slight decrease in size based on CAT scan of 11/30/2022  Gross  hematuria as a result  Continues with Ball catheter  # Gross hematuria  Urine culture negative on 11/30/2022  # Recent acute kidney injury with hyperkalemia  Required hemodialysis x1  # Chronic kidney disease  Baseline creatinine unknown  # Dementia  # Low serum albumin  # Hypocalcemia  # Anemia, iron stores adequate  # Diabetes mellitus, type II  # Congestive heart failure with LV dysfunction  Most recent echocardiogram finds an ejection fraction of 53%   # Right-sided heart failure  Severely dilated right ventricle with reduced right ventricular systolic function,  Normal inferior vena cava size and   inspiratory collapse.  # Hyperphosphetemia P 5.6      SUGGESTIONS:  No emergent need for dialysis  Poor long-term dialysis candidate and given his chronic medical conditions  IV lasix 40 mg as needed. ( Decreased PO intake , appears dry on exam)   Transfuse PRBC PRN  Discuss GOC   Renal diet                Follow labs, further recommendations to follow

## 2022-12-04 NOTE — PROGRESS NOTES
"[facility] Internal Medicine Daily Progress Note    Date of Service  12/4/2022    [facility] Team: [facility] DILAN [facility] Team   Attending: Mahad Kwong M.d.  Senior Resident: Dr. Vásquez  Intern:  Dr. Torres  Contact Number: [phone]    Chief Complaint  Sean Kincaid is a 77 y.o. male admitted 11/4/2022 with respiratory failure secondary to COVID.     Hospital Course  77-year-old male with a past medical history of heart failure, COPD, A. fib, type 2 diabetes mellitus who presented on 11/4/2022 as a transfer from [facility] with worsening of shortness of breath and fatigue, started on BiPAP intubated, ICU stay, MYLA on acute renal failure, now with CKD, course complicated by hyperkalemia, hypernatremia, worsening renal function, ICU myopathy, likely stroke during hospitalization, bacterial pneumonia requiring IV antibiotics, now off all antibiotics, creatinine stably elevated with good urine output,    Interval Problem Update  No acute events overnight. Patient currently on 1L of oxygen this morning without any difficulties breathing.  Patient without acute complaints overnight, no chest pain, palpitations, lower extremity edema, shortness of breath, cough, abdominal pain, nausea/vomiting/diarrhea/constipation, dysuria, hematuria, new onset focal weakness, headaches or sensory changes.  Patient states that \"I would never realize that not having poop could make me hurt so bad all over, but I got a lot out yesterday and I am feeling so much better today, I will try and move around with physical therapy.\"  Patient was given patient was reportedly agitated overnight but back to baseline this morning.    In regards to patients hematoma conversation will need to be had in regards to when to restart anticoagulation. Will consider apixaban rather than patients home regimen of warfarin.    Was informed by telemetry patient's pacemaker is not pacing properly. Patient asymptomatic at bedside w/ ecg showing intermittent pacing " discussed with electrocardio physiology NP dwayne and plan is to interrogate device, rep notified.  11/22/2022.  Update on 11/23/2023 pacemaker interrogated by rep performing correctly, lead placement good on last chest x-ray, patient asymptomatic, without chest pain no signs of heart failure, 1 and half years of battery life left on pacemaker.    11/24  No acute events overnight. Patient has slightly increased mobility in upper extremities where he can raise elbows above the bed. Still limited mobility in left lower extremity. Apixaban to be started today in place of coumadin for treatment of afib.    I have discussed this patient's plan of care and discharge plan at IDT rounds today with Case Management, Nursing, Nursing leadership, and other members of the IDT team.    11/25:  No acute events overnight. On bedside patient was on room air. He reports feeling down this morning at how long he's been in the hospital and how he wishes he would get better already. Offered words of encouragement. No acute rebleeding from hematoma after anticoagulation restarted. Patient coreg still held as heart rate still in the 60s. No labs needed on this patient.    11/26:  No acute events overnight. Patient at bedside this morning had inc jerking movements on legs bilaterally and complained of an inability to stop the movements. Additionally, mentioned he felt itchiness/tingle ness. Will monitor for now and if symptoms persist or worsen will get CMP and Mg. Rate control with metoprolol also added for afib.    11/27:  No acute events overnight. Patient alert and oriented to self and time but not place. Leg tingle ness/movement improved from previous day. Patient continues to have limited mobility in bilateral upper extremities and left lower extremity.    11/28:  No acute events overnight. Patient still at baseline axo 2 but still not oriented to time. Potassium was mildly elevated at 6 so patient s/p kayexalate and insulin +  dextrose and r/p potassium now 5.1. Will continue to monitor. Otherwise, patient continues to deny any symptoms of sob/chest pain and has good o2 sats on 2L nc. Still pending placement for rehab. Creatinine and BUN were also mildly elevated from previous labs but not overly concerning as patients hospital course has creatinine stabilized within the 2.5-3 range.    11/29:  No acute events overnight. Patient had hematuria coming out of mcdowell which is new from previous day. Holding apixaban and ordering cbc, cmp, u/a. u/a positive for blood so will consult urology. Additionally, once mcdowell is removed will attempt voiding trial with tamsulosin.    11/30:  No acute events overnight. Hematuria improved from mcdowell compared to yesterday after apixaban held. Hg downtrend but still stable at 8.1. Per urology, CT abd/pelv w/o contrast ordered in addition to u/c. IV fluids started for patient after worsening of kidney injury with up-trending creatinine. Patient's friend Javid contact info is [phone].    12/1:  No acute events overnight, hematuria baseline, CT abdomen and pelvis with stool burden, bowel movement today, hemoglobin stable at 8, urology following, patient without acute complaints.  Updated patient's family.    12/2:  No acute events overnight.  Patient was reportedly more agitated per team, but could be due to effects of oxycodone that was provided.  Patient in terms of hematuria seems to be improving with improved coloration and good urine output.  However, in regards to this patient's kidney functions kidney injury seems to be worsening with increased creatinine as well as BUN despite being on IV fluids which was increased to 150 cc/hr.  Will consult nephrology today with repeat urinalysis.  Additionally we will continue to monitor patient's hyperkalemia as potassium is uptrending however still within normal limits.  For his pain Tylenol was scheduled.  Constipation seems to have improved addition of a bowel  regimen, there seems to be no overflow.  Additionally palliative was consulted for goals of care discussion.    12/3:  Patient was agitated overnight and was removing his nasal cannula.  He ended up desat into the low 80s and as a result required physical restraints.  Had bedside this morning had good O2 sats on 2 L of oxygen.  Patient was alert and oriented x1 at bedside this morning but on later on rounds was back to baseline.  Hematuria from Ball continues to improve with good urine output so urology signed off.  Unfortunately, kidney injury continues to worsen and despite increasing IV fluids to 150 cc/h there was no improvement.  Additionally patient's low O2 sats partially contributed to pulmonary edema so he was given IV Lasix 40 once due to fluid overload; currently held. Nephrology currently following this patient.  Additionally, spoke with son regarding overall status of this patient and need for possible palliative consult again. Ok for physical restraints for continued agitation.  12/4:  No acute overnights.  Patient surprisingly not agitated overnight and did not require any restrictive physical restraints.  Regards to hematuria remarkably improved with coloration improving back to normal.  However due to difficult situation regarding risk of pulmonary edema versus worsening MYLA pros and cons conversation resulted in holding IV fluids for now.  Creatinine continues to uptrend but to maintain good O2 sats we will continue to hold fluids.  Nephrology currently still following.  Lasix given one-time but will be held.  Additionally, CBC hemoglobin hematocrit downtrend with hemoglobin 7.5.  Unclear, as to source of bleed as Ball and hematoma are both stable.  Patient is also on empiric PPI regimen.  We will order coag studies for further evaluation.  Iron studies were already ordered and revealed anemia of chronic disease with ferritin greater than 1000.  Patient continues to have regular bowel movements  that are nonbloody.  Potassium is under control status post Kayexalate with low potassium diet now discontinued. We will continue to monitor as needed.    Consultants/Specialty  nephrology    Code Status  Full Code    Disposition  Patient is not medically cleared for discharge.   Anticipate discharge to to skilled nursing facility.  I have placed the appropriate orders for post-discharge needs.    Review of Systems  Review of Systems   Constitutional:  Negative for malaise/fatigue.   HENT: Negative.     Eyes: Negative.    Respiratory:  Negative for sputum production.    Cardiovascular: Negative.    Gastrointestinal: Negative.    Genitourinary: Negative.    Musculoskeletal: Negative.    Skin: Negative.    Neurological:  Positive for focal weakness and weakness.   Endo/Heme/Allergies: Negative.    Psychiatric/Behavioral: Negative.        Physical Exam  Temp:  [35.9 °C (96.6 °F)-36.3 °C (97.3 °F)] 36.3 °C (97.3 °F)  Pulse:  [66-89] 71  Resp:  [20] 20  BP: (126-143)/(61-88) 128/88  SpO2:  [93 %-95 %] 94 %    Physical Exam  Constitutional:       Appearance: He is obese. He is not ill-appearing.   HENT:      Head: Normocephalic and atraumatic.      Right Ear: Tympanic membrane, ear canal and external ear normal.      Left Ear: Tympanic membrane, ear canal and external ear normal.      Nose: Nose normal.      Mouth/Throat:      Mouth: Mucous membranes are moist.      Pharynx: Oropharynx is clear.   Eyes:      Extraocular Movements: Extraocular movements intact.      Conjunctiva/sclera: Conjunctivae normal.      Pupils: Pupils are equal, round, and reactive to light.   Cardiovascular:      Rate and Rhythm: Normal rate and regular rhythm.   Pulmonary:      Effort: Pulmonary effort is normal. No respiratory distress.   Chest:      Chest wall: No tenderness.   Abdominal:      General: Abdomen is flat. Bowel sounds are normal.      Palpations: Abdomen is soft.   Musculoskeletal:      Cervical back: Normal range of motion.       Right lower leg: No edema.      Left lower leg: No edema.   Skin:     General: Skin is warm.      Capillary Refill: Capillary refill takes less than 2 seconds.   Neurological:      Mental Status: He is alert and oriented to person, place, and time.      Motor: Weakness present.   Psychiatric:         Mood and Affect: Mood normal.         Behavior: Behavior normal.         Thought Content: Thought content normal.         Judgment: Judgment normal.       Fluids    Intake/Output Summary (Last 24 hours) at 12/4/2022 1429  Last data filed at 12/4/2022 0508  Gross per 24 hour   Intake 180 ml   Output 1100 ml   Net -920 ml         Laboratory  Recent Labs     12/02/22  0052 12/03/22  0442 12/04/22  0241   WBC 9.1 9.6 8.3   RBC 2.86* 2.76* 2.58*   HEMOGLOBIN 8.5* 8.0* 7.5*   HEMATOCRIT 27.1* 26.0* 24.3*   MCV 94.8 94.2 94.2   MCH 29.7 29.0 29.1   MCHC 31.4* 30.8* 30.9*   RDW 48.4 48.4 48.4   PLATELETCT 379 376 360   MPV 10.5 10.1 10.1       Recent Labs     12/03/22  0442 12/03/22  0651 12/04/22  0241   SODIUM 138 139 138   POTASSIUM 4.9 5.3 5.2   CHLORIDE 102 103 102   CO2 22 23 23   GLUCOSE 151* 130* 121*   BUN 77* 77* 84*   CREATININE 4.41* 4.28* 4.69*   CALCIUM 8.4* 8.5 8.4*                         Imaging  US-RENAL   Final Result      1.  No evidence of solid renal mass or hydronephrosis.      2.  Ball catheter within the urinary bladder.      DX-CHEST-PORTABLE (1 VIEW)   Final Result         1.  Pulmonary edema and/or infiltrates are identified, which appear somewhat increased since the prior exam.   2.  Cardiomegaly   3.  Atherosclerosis      CT-ABDOMEN-PELVIS W/O   Final Result      1.  No hydronephrosis or obstructing renal or ureteral stone.   2.  Tiny right upper pole calcification could represent a nonobstructing renal stone or cortical calcification.   3.  Decreased size of a left iliacus hematoma, measuring 3.9 x 2.7 cm, previously 4.3 x 3.4 cm.   4.  Right greater than left lower lobe consolidations favoring  pneumonia.   5.  Large rectal stool burden.   6.  Gallbladder sludge versus layering stones.   7.  Cardiomegaly.   8.  Fat-containing ventral hernia with a 2.3 cm neck.   9.  Atherosclerotic changes.      DX-CHEST-PORTABLE (1 VIEW)   Final Result      No acute cardiopulmonary abnormality identified.      CT-ABDOMEN-PELVIS W/O   Final Result      1.  Left retroperitoneal hematoma along the left iliopsoas/iliac is muscle measuring approximately 4.3 x 3.4 x 5.9 cm.      2.  Bilateral lower lobe consolidation could be due to pneumonia or atelectasis.      3.  Small layering stones versus sludge in the gallbladder.      4.  Diverticulosis without diverticulitis.      5.  Moderate amount of stool throughout the colon suggesting constipation.      US-EXTREMITY VENOUS UPPER UNILAT RIGHT   Final Result      DX-WRIST-LIMITED 2- RIGHT   Final Result         1.   No radiographic evidence of acute injury.      2.  Old healed fracture right fifth metacarpal neck.      3.  Moderate osteoarthritic changes of the right first carpal metacarpal joint.      4.  Possible intra-articular loose bodies adjacent to the styloid processes of the radius and ulna.      DX-CHEST-PORTABLE (1 VIEW)   Final Result      1.  Increased pulmonary edema   2.  Bibasilar underinflation atelectasis which could obscure an additional process. This is unchanged.   3.  Persistently enlarged cardiac silhouette      DX-CHEST-PORTABLE (1 VIEW)   Final Result      Mildly improved aeration and stable to slightly improved vascular congestion.      Mildly improved left basilar atelectasis.      DX-CHEST-PORTABLE (1 VIEW)   Final Result      1.  Interval extubation and removal of NG tube.   2.  Increased bibasilar airspace disease.   3.  Increased interstitial edema.   4.  Small left pleural effusion.      CT-HEAD W/O   Final Result      1. No CT evidence of acute infarct, hemorrhage or mass.   2. Moderate global parenchymal atrophy. Chronic small vessel ischemic  changes.      DX-CHEST-PORTABLE (1 VIEW)   Final Result      Mildly improved left basilar opacity.      DX-ABDOMEN FOR TUBE PLACEMENT   Final Result      Enteric tube tip projects over the gastric antrum or proximal duodenum      DX-CHEST-PORTABLE (1 VIEW)   Final Result         1. Stable lines and tubes.   2. Worsening left basilar opacity. Small left pleural effusion. Mild right infrahilar atelectasis.         DX-CHEST-PORTABLE (1 VIEW)   Final Result      1.  Cardiomegaly with interstitial edema.      2.  Left lung base atelectasis and minimal left pleural effusion.      DX-CHEST-PORTABLE (1 VIEW)   Final Result      1.  Improved left pleural effusion with adjacent airspace disease.   2.  Improved interstitial infiltrates versus edema.      EC-ECHOCARDIOGRAM COMPLETE W/O CONT   Final Result      DX-CHEST-PORTABLE (1 VIEW)   Final Result      1.  Small left pleural effusion with adjacent airspace disease.   2.  Worsening interstitial infiltrates versus edema.   3.  Right basilar atelectasis.      DX-ABDOMEN FOR TUBE PLACEMENT   Final Result      Enteric tube tip projects over the proximal duodenum      DX-ABDOMEN FOR TUBE PLACEMENT   Final Result         1.  Nonspecific bowel gas pattern in the upper abdomen.   2.  Nasogastric tube, not visualized beyond the mid thorax, see ordered repeat abdominal x-ray for further characterization.   3.  Cardiomegaly   4.  Left lung base atelectasis or infiltrate.      OUTSIDE IMAGES-DX CHEST   Final Result      DX-CHEST-PORTABLE (1 VIEW)   Final Result         1.  Bibasilar atelectasis or early infiltrates.   2.  Trace bilateral pleural effusions   3.  Cardiomegaly             Assessment/Plan  Problem Representation:    * MYLA (acute kidney injury) (HCC)- (present on admission)  Assessment & Plan  -initially was on dialysis and improved so dialysis was stopped but now has worsening again so nephrology has been consulted   -unsure what baseline is at home. However on this  admission creatinine seems to have stabilized within the 2.5-3.0 range    PLAN  -CTM  -nephrology previously following, still oliguric making greater than 1500 mL of urine a day  -IV fluids continued 150 cc/hr held due to pulmonary edema. S/p IV lasix 40 once  -nephrology re-consulted      Anemia  Assessment & Plan  Hemoglobin trending down  Received a unit today   CT abdomen showing: Left retroperitoneal hematoma along the left iliopsoas/iliac is muscle measuring approximately 4.3 x 3.4 x 5.9 cm.  Reversing warfarin   received 1 unit of RBC    PLAN  Close monitoring    Delirium- (present on admission)  Assessment & Plan  Resolved, likely in light of the patient's acute hospitalization, acute critical condition, disease  Standard precautions and out of delirium  Avoid offending medication  Avoid interruption of circadian rhythm  CT head negative for acute changes, unable to provide a MRI imaging secondary to patient's pacemaker.    PLAN  D/c tele  No vitals overnight  [place] if disoriented  Patient alert and oriented x 3 at bedside  Ok for physical restraints for increased agitation      Hematoma- (present on admission)  Assessment & Plan  CT abdomen showing Left retroperitoneal hematoma along the left iliopsoas/iliac is muscle measuring approximately 4.3 x 3.4 x 5.9 cm.  Holding warfarin , giving FFP and vit K to reverse INR   Has received 2 unit of RBC   Close monitoring   -apixaban held due to new onset hematuria    Hematuria  Assessment & Plan  -hematuria noticed from mcdowell this morning new finding compared to previous day  -apixaban held  -no growth on urine culture  -r/p u/a    PLAN  -per urology:     - Recommend repeat CT a/p w/o due to decreased renal function will avoid contrast    - Hand irrigate mcdowell catheter PRN for clots/catheter not draining    - Monitor for worsening hematuria, consider 3-way mcdowell catheter placement if CBI is indicated    - Continue mcdowell catheter for now, hospital team planning for  TOV prior to discharge on tamsulosin    -urology signed off  -CBC trend    Constipation  Assessment & Plan  -s/p enema  -scheduled bowel regimen    Acute on chronic systolic congestive heart failure (HCC)- (present on admission)  Assessment & Plan  Metoprolol tartrate 12.5 BID  It appears his EF has been improving  Continue present medical therapy    CONCLUSIONS  The left ventricle is not well visualized due to body habitus.  Left ventricular systolic function is probably normal.  The ejection fraction is roughly estimated to be 55%.  A reliable estimation of diastolic function cannot be made due to   mitral valve disease.  Severely dilated right ventricle.  Reduced right ventricular systolic function.  Estimated right ventricular systolic pressure is 40 mmHg.  Severe biatrial dilation.  Mild mitral regurgitation.  Aortic valve sclerosis without significant stenosis.  Mild tricuspid regurgitation.  Normal inferior vena cava size and inspiratory collapse    Acute hypoxemic respiratory failure due to COVID-19 (HCC)- (present on admission)  Assessment & Plan  Patient previously treated for pneumonia course of Zosyn for hospital-acquired pneumonia, weaned off of oxygen, however concerned on CT chest CT for possible aspiration pneumonia, will hold n.p.o. until speech-language pathology sees  - Speech-language pathology recommendations    Paroxysmal atrial fibrillation (HCC)- (present on admission)  Assessment & Plan  -History of, ongoing anticoagulation   -Holding warfarin at this time and also s/p VIt K and also FFP  -apixaban 5 mg BID held given continued at high risk for bleeding, discussed with patient and son who are agreeable to plan of care  -metoprolol tartrate 12.5 BID    MSSA (methicillin susceptible Staphylococcus aureus) pneumonia (HCC)- (present on admission)  Assessment & Plan  Finished treatment     Hypernatremia- (present on admission)  Assessment & Plan  Resolved     Restless leg syndrome- (present on  admission)  Assessment & Plan  Requip discontinued    Chronic anticoagulation- (present on admission)  Assessment & Plan  Holding Coumadin. Originally replaced w/ apixiban 5mg BID but now held in setting of hematuria    Pacemaker- (present on admission)  Assessment & Plan  In situ, telemetry monitoring, history of complete heart block    Pacemaker complications  Assessment & Plan  RESOLVED  Was informed by telemetry patient's pacemaker is not pacing properly. Patient asymptomatic at bedside w/ ecg showing intermittent pacing discussed with electrocardio physiology NP embers and plan is to interrogate device, rep notified.  -Pacer performing correctly on interrogation  - Patient asymptomatic  - DC telemetry  - Follow-up with cardiology as outpatient    Goals of care, counseling/discussion- (present on admission)  Assessment & Plan  Palliative care assisting,   Patient continues to hope towards rehab, no wishes for palliative care at this time, continue to update family  Patient remains full code     PLAN  Palliative reconsulted    Septic shock with acute organ dysfunction due to methicillin susceptible Staphylococcus aureus (MSSA) ([facility])  Assessment & Plan  Present on admission, treated adequately    Hyperkalemia- (present on admission)  Assessment & Plan  Improved s/p kayexalate and insulin + dextrose    PLAN    RESOLVED  Continue kayexalate  Low potassium diet  R/p bmp  Monitor          VTE prophylaxis: SCDs/TEDs    I have performed a physical exam and reviewed and updated ROS and Plan today (12/4/2022). In review of yesterday's note (12/3/2022), there are no changes except as documented above.

## 2022-12-05 NOTE — PROGRESS NOTES
"0400: Patient started to be confused again and attempted to get out of bed stating \"I need to go get something and I'll be right back\" For the patient's safety, soft restraints were re-placed. Will continue to monitor assess Q2 hrs per order.  "

## 2022-12-05 NOTE — THERAPY
"Speech Language Pathology  Daily Treatment     Patient Name: Sean Kincaid  Age:  77 y.o., Sex:  male  Medical Record #: [id number]  Today's Date: 12/5/2022     Precautions  Precautions: (P) Fall Risk, Swallow Precautions ( See Comments)    CXR 12/3-IMPRESSION:  1.  Pulmonary edema and/or infiltrates are identified, which appear somewhat increased since the prior exam.  2.  Cardiomegaly  3.  Atherosclerosis    Assessment     The patient was seen for skilled dysphagia tx.  Per RN, patient coughed x2 during breakfast and took his pills with boost without difficulty. Patient with wet vocal quality and progressing cough throughout po trials of mildly thick liquids 3 oz.  Patient had one prolonged cough with face turning red and took time to recover.  He was able to spit out what appeared to be the mildly thickened juice.  Patient tolerated soft bite sized food trials without overt s/s of aspiration.  Patient provided cues to \"swallow quick\" which were followed well, but no change noted in coughing.  I would recommend that he have a repeat FEES exam to reassess the pharyngeal phase of swallow given possible increased infiltrates and new s/s of possible penetration/aspiration with mildly thick liquids.   Recommend NPO until reassessed with instrumental assessment.     Plan     Recommendations:   1) NPO until FEES reassessment.  2) Crush meds or small whole in applesauce.    Plan    Continue current treatment plan.    Discharge Recommendations: (P) Recommend post-acute placement for additional speech therapy services prior to discharge home    Subjective    RN cleared for skilled dysphagia tx.  Patient is confused, but pleasant.  He is reporting seeing dogs in the room. Patient participated well with therapist.     Objective       12/05/22 1203   Precautions   Precautions Fall Risk;Swallow Precautions ( See Comments)   Vitals   O2 (LPM) 2.5   O2 Delivery Device Nasal Cannula   Pain 0 - 10 Group   Therapist Pain " "Assessment Post Activity Pain Same as Prior to Activity;0   Dysphagia    Nutritional Liquid Intake Rating Scale Thickened beverages (mildly thick unless otherwise specified)   Nutritional Food Intake Rating Scale Total oral diet with multiple consistencies but requiring special preparation or compensations   Recommended Route of Medication Administration   Medication Administration  Crush all Medications in Puree   Patient / Family Goals   Patient / Family Goal #1 \"Ow!\"   Goal #1 Outcome Progressing as expected   Short Term Goals   Short Term Goal # 1 Patient will complete swallowing exercises targeting BOT retraction, epiglottic inversion, and pharyngeal contraction x20.   Goal Outcome # 1 Progressing slower than expected  (Pt not able to consistently complete.)   Short Term Goal # 2 B  NEW 11/23: Patient will consume SB6/MT2 diet with 1:1 feeding with no overt s/sx of aspiration.   Goal Outcome  # 2 B Progressing as expected   Education Group   Education Provided Dysphagia   Dysphagia Patient Response Patient;Acceptance;Explanation;Reinforcement Needed;No Learning Evidence   Role of SLP Patient Response Patient;Acceptance;Explanation;Reinforcement Needed;No Learning Evidence   Anticipated Discharge Needs   Discharge Recommendations Recommend post-acute placement for additional speech therapy services prior to discharge home   Therapy Recommendations Upon DC Dysphagia Training;Patient / Family / Caregiver Education   Interdisciplinary Plan of Care Collaboration   IDT Collaboration with  Nursing;Physician   Patient Position at End of Therapy In Bed;Bed Alarm On;Call Light within Reach;Tray Table within Reach   Collaboration Comments session results and recommendations     "

## 2022-12-05 NOTE — CARE PLAN
The patient is Stable - Low risk of patient condition declining or worsening    Shift Goals  Clinical Goals: Agitation  Patient Goals: comfort  Family Goals: ALLIE    Progress made toward(s) clinical / shift goals:  Pt only oriented to self throughout shift. Pt taken out of restraints for ROM but could not comply or follow commands when off so they remained on this shift. MD aware. Pt became more confused throughout the day. Ball draining adequate amounts of slightly blood tinged urine. This RN offered to assist in calling [facility]. Pt declined several times. Pt BP slightly elevated this shift. Assisted with meals. Repositioned as needed. Will continued to monitor.

## 2022-12-05 NOTE — PROGRESS NOTES
0300: This RN took patient off of his soft wrist restraints. Patient has been calm and trying to go to sleep. Will continue and closely monitor and assess patient's further need for restraints.

## 2022-12-05 NOTE — CARE PLAN
The patient is Watcher - Medium risk of patient condition declining or worsening    Shift Goals: monitor patients safety and anxiety while in restraints and monitor O2 sauration  Clinical Goals: monitor hgb, monitor restraints, monitor O2  Patient Goals: leave hospital  Family Goals: ALLIE    Progress made toward(s) clinical / shift goals:    Problem: Safety - Medical Restraint  Goal: Remains free of injury from restraints (Restraint for Interference with Medical Device)  Outcome: Progressing. Restraints have been monitored and documented Q2 hours during this shift  Goal: Free from restraint(s) (Restraint for Interference with Medical Device)  Outcome: patient has not been compliant when restraints are removed     Problem: Respiratory  Goal: Patient will achieve/maintain optimum respiratory ventilation and gas exchange  Outcome: Progressing due to patient being in restraints     Problem: Urinary Elimination  Goal: Establish and maintain regular urinary output  Outcome: Progressing. Adequate output via patient's mcdowell during this shift       Patient is not progressing towards the following goals:      Problem: Psychosocial  Goal: Patient's level of anxiety will decrease  Outcome: Not Progressing, during this shift restraints have been periodically removed to see if patient would be compliant with leaving his nasal cannula in place and the trial was unsuccessful

## 2022-12-05 NOTE — PROGRESS NOTES
[facility] Internal Medicine Daily Progress Note    Date of Service  12/5/2022    R Team: R IM Green Team   Attending: Mahad Kwong M.d.  Senior Resident: Dr. Stockton  Intern:  Dr. Haynes  Contact Number: [phone]    Chief Complaint  Sean Kincaid is a 77 y.o. male admitted 11/4/2022 as transfer from [facility] for respiratory failure secondary to COVID    ID:  Mr. Sean Kincaid is a 77 y.o. male with history of COPD, atrial fibrillation (was on anticoagulation), diabetes type 2, history of congestive heart failure and complete heart block, bioprosthetic replacement of the mitral valve, ANALY, EF of 45% who presented 11/4/2022 as a transfer from [facility], complaining of worsening shortness of breath and fatigue found to be COVID-19 positive and is now COVID recovered with persistent MYLA and hematuria.     Hospital Course  Mr. Sean Kincaid is a 77 y.o. male with history of COPD, atrial fibrillation (was on anticoagulation), diabetes type 2, history of congestive heart failure and complete heart block, bioprosthetic replacement of the mitral valve, ANALY, EF of 45% who presented 11/4/2022 as a transfer from [facility], complaining of worsening shortness of breath and fatigue. He was diagnosed with COVID-19, started initially on BiPAP and then eventually required intubation. He was also found with an MYLA with creatinine of 8.0 and potassium 6.6.  Admitted to the ICU at [facility] requiring mechanical ventilation, vasopressors including norepinephrine and vasopressin, and placed on empiric antibiotics of ceftriaxone. Nephrology was consulted for MYLA, nonoliguric, had brief initiation of RRT with improvement in urine output and no longer needed RRT as per nephrology.  He developed a right thigh/femoral hematoma related to a try Alysis catheter placement., anticoagulation was held. He required Precedex for sedation, fentanyl for pain control, his respiratory  culture turned positive for MSSA and the patient was treated with Ancef.  His COVID-19 pneumonia was treated with dexamethasone 6 mg for 10 days.  Extubated on 11/8 and found to be significantly encephalopathic. CT of the head was negative for acute changes, the patient was unable to be evaluated by MRI due pacemaker incompatibility.  Mentation improved but still had mental slowing, confusion, A&O x1, moving all 4 extremities fairly equally.  Palliative care was consulted to discuss goals of care with family and the decision was reached in light of the patient's condition and age to change his CODE STATUS to DNR/DNI.  Also noted to have had hypernatremia which resolved.  Patient was evaluated by speech therapy, he passed a swallow evaluation although his  p.o. intake remains fairly poor. With his right groin hematoma stabilizing he was restarted in form of heparin drip and resuming Coumadin with eventual transition to apixaban, hemoglobin trended down and concern for GI bleed in addition to his hematuria so anticoagulation has been held.     Planning for palliative care discussion tomorrow 1130 with his two sons concerning goals of care.    #MYLA  Creatinine remains elevated with nephrology following with recs that dialysis is not needed at this time.   Will continue to avoid nephrotoxins. Urine still slightly blood tinged with small clots. Fluids cannot be given due to risk of worsening his pulmonary edema.     #Encephalopathy  Patient remains Aox2 in good spiritis but agitated overnight requiring soft restraints.     #Hypoxic respiratory failure due to covid s/p intubation c/b HAP, aspiration pneumonia  He completed decadron course and now at 2-3 LNC, weaned down to 2.5 LNC. Covid isolation has been lifted.     #Constipation  Repeat KUB without colonic stool burden, will continue with bowel regimen.    #Right psoas hematoma  Has stabilized    Interval Problem Update  Overnight was agitation due to restless legs and  leg pain, was given ropinirole 0.5mg and oxy 2.5mg   This am patient alert A&Ox2 to self and names of his two sons. Did not know year or location. Expressed discomfort with soft restraints but was in good spirits. Denies chest pain, shortness of breath, abdominal pain. Bmx2. Urine with red tinge/small clots.   -Discussion plan tomorrow with palliatiave care and 2 sons on the phone at 11:30  -restart on ropinirole 0.5mg, discussed with pharmacy that with his MYLA mass dose can be 3mg/day  -holding off of opioids due to constipation hx  -KUB today did not show sig colonic stool burden    I have discussed this patient's plan of care and discharge plan at IDT rounds today with Case Management, Nursing, Nursing leadership, and other members of the IDT team.    Consultants/Specialty  nephrology Adele Quintero APRN    Code Status  Full Code    Disposition  Patient is not medically cleared for discharge.   Anticipate discharge to to skilled nursing facility.  I have placed the appropriate orders for post-discharge needs.    Review of Systems  Review of Systems   Constitutional:  Negative for chills, fever and malaise/fatigue.   Respiratory:  Negative for cough and shortness of breath.    Cardiovascular:  Negative for chest pain.   Gastrointestinal:  Negative for abdominal pain, constipation, nausea and vomiting.   Genitourinary:  Negative for dysuria.   Musculoskeletal:  Negative for myalgias.   Neurological:  Negative for dizziness and headaches.      Physical Exam  Temp:  [36 °C (96.8 °F)-36.5 °C (97.7 °F)] 36 °C (96.8 °F)  Pulse:  [78-92] 82  Resp:  [18] 18  BP: (132-160)/(68-98) 132/74  SpO2:  [90 %-95 %] 91 %    Physical Exam  Constitutional:       General: He is not in acute distress.     Appearance: Normal appearance. He is obese. He is ill-appearing.   HENT:      Head: Normocephalic and atraumatic.      Right Ear: External ear normal.      Left Ear: External ear normal.      Nose: Nose normal.      Mouth/Throat:       Mouth: Mucous membranes are moist.   Eyes:      General: No scleral icterus.        Right eye: No discharge.         Left eye: No discharge.      Extraocular Movements: Extraocular movements intact.      Conjunctiva/sclera: Conjunctivae normal.      Pupils: Pupils are equal, round, and reactive to light.   Cardiovascular:      Rate and Rhythm: Normal rate and regular rhythm.      Pulses: Normal pulses.      Heart sounds: Normal heart sounds.   Pulmonary:      Effort: Pulmonary effort is normal. No respiratory distress.      Breath sounds: Normal breath sounds. No wheezing or rales.   Abdominal:      General: Abdomen is flat. Bowel sounds are normal. There is no distension.      Palpations: Abdomen is soft.      Tenderness: There is no abdominal tenderness. There is no right CVA tenderness, left CVA tenderness or guarding.      Comments: Expressed discomfort with abdominal exam of bilateral upper quadrants but not grimacing   Musculoskeletal:         General: No swelling or tenderness. Normal range of motion.      Cervical back: Normal range of motion.      Right lower leg: No edema.      Left lower leg: No edema.      Comments: Bilateral venous stasis skin changes to bilateral lower extremities   Skin:     General: Skin is warm and dry.      Coloration: Skin is not jaundiced.   Neurological:      General: No focal deficit present.      Mental Status: He is alert and oriented to person, place, and time.   Psychiatric:         Mood and Affect: Mood normal.         Behavior: Behavior normal.         Thought Content: Thought content normal.         Judgment: Judgment normal.       Fluids    Intake/Output Summary (Last 24 hours) at 12/5/2022 1841  Last data filed at 12/5/2022 1700  Gross per 24 hour   Intake 120 ml   Output 2200 ml   Net -2080 ml       Laboratory  Recent Labs     12/04/22  0241 12/04/22 2239 12/05/22  0524   WBC 8.3 9.8 9.4   RBC 2.58* 2.82* 2.90*   HEMOGLOBIN 7.5* 8.1* 8.4*   HEMATOCRIT 24.3* 26.6*  27.1*   MCV 94.2 94.3 93.4   MCH 29.1 28.7 29.0   MCHC 30.9* 30.5* 31.0*   RDW 48.4 48.5 48.3   PLATELETCT 360 436 445   MPV 10.1 9.9 9.9     Recent Labs     12/03/22  0651 12/04/22  0241 12/05/22  0524   SODIUM 139 138 141   POTASSIUM 5.3 5.2 5.0   CHLORIDE 103 102 104   CO2 23 23 22   GLUCOSE 130* 121* 110*   BUN 77* 84* 80*   CREATININE 4.28* 4.69* 4.98*   CALCIUM 8.5 8.4* 8.9     Recent Labs     12/04/22  1318   APTT 39.2*   INR 1.49*               Imaging  [id number] VIEW   Final Result      1.  No evidence of bowel obstruction.   2.  No significant colonic stool demonstrated.      US-RENAL   Final Result      1.  No evidence of solid renal mass or hydronephrosis.      2.  Ball catheter within the urinary bladder.      DX-CHEST-PORTABLE (1 VIEW)   Final Result         1.  Pulmonary edema and/or infiltrates are identified, which appear somewhat increased since the prior exam.   2.  Cardiomegaly   3.  Atherosclerosis      CT-ABDOMEN-PELVIS W/O   Final Result      1.  No hydronephrosis or obstructing renal or ureteral stone.   2.  Tiny right upper pole calcification could represent a nonobstructing renal stone or cortical calcification.   3.  Decreased size of a left iliacus hematoma, measuring 3.9 x 2.7 cm, previously 4.3 x 3.4 cm.   4.  Right greater than left lower lobe consolidations favoring pneumonia.   5.  Large rectal stool burden.   6.  Gallbladder sludge versus layering stones.   7.  Cardiomegaly.   8.  Fat-containing ventral hernia with a 2.3 cm neck.   9.  Atherosclerotic changes.      DX-CHEST-PORTABLE (1 VIEW)   Final Result      No acute cardiopulmonary abnormality identified.      CT-ABDOMEN-PELVIS W/O   Final Result      1.  Left retroperitoneal hematoma along the left iliopsoas/iliac is muscle measuring approximately 4.3 x 3.4 x 5.9 cm.      2.  Bilateral lower lobe consolidation could be due to pneumonia or atelectasis.      3.  Small layering stones versus sludge in the gallbladder.      4.   Diverticulosis without diverticulitis.      5.  Moderate amount of stool throughout the colon suggesting constipation.      US-EXTREMITY VENOUS UPPER UNILAT RIGHT   Final Result      DX-WRIST-LIMITED 2- RIGHT   Final Result         1.   No radiographic evidence of acute injury.      2.  Old healed fracture right fifth metacarpal neck.      3.  Moderate osteoarthritic changes of the right first carpal metacarpal joint.      4.  Possible intra-articular loose bodies adjacent to the styloid processes of the radius and ulna.      DX-CHEST-PORTABLE (1 VIEW)   Final Result      1.  Increased pulmonary edema   2.  Bibasilar underinflation atelectasis which could obscure an additional process. This is unchanged.   3.  Persistently enlarged cardiac silhouette      DX-CHEST-PORTABLE (1 VIEW)   Final Result      Mildly improved aeration and stable to slightly improved vascular congestion.      Mildly improved left basilar atelectasis.      DX-CHEST-PORTABLE (1 VIEW)   Final Result      1.  Interval extubation and removal of NG tube.   2.  Increased bibasilar airspace disease.   3.  Increased interstitial edema.   4.  Small left pleural effusion.      CT-HEAD W/O   Final Result      1. No CT evidence of acute infarct, hemorrhage or mass.   2. Moderate global parenchymal atrophy. Chronic small vessel ischemic changes.      DX-CHEST-PORTABLE (1 VIEW)   Final Result      Mildly improved left basilar opacity.      DX-ABDOMEN FOR TUBE PLACEMENT   Final Result      Enteric tube tip projects over the gastric antrum or proximal duodenum      DX-CHEST-PORTABLE (1 VIEW)   Final Result         1. Stable lines and tubes.   2. Worsening left basilar opacity. Small left pleural effusion. Mild right infrahilar atelectasis.         DX-CHEST-PORTABLE (1 VIEW)   Final Result      1.  Cardiomegaly with interstitial edema.      2.  Left lung base atelectasis and minimal left pleural effusion.      DX-CHEST-PORTABLE (1 VIEW)   Final Result      1.   Improved left pleural effusion with adjacent airspace disease.   2.  Improved interstitial infiltrates versus edema.      EC-ECHOCARDIOGRAM COMPLETE W/O CONT   Final Result      DX-CHEST-PORTABLE (1 VIEW)   Final Result      1.  Small left pleural effusion with adjacent airspace disease.   2.  Worsening interstitial infiltrates versus edema.   3.  Right basilar atelectasis.      DX-ABDOMEN FOR TUBE PLACEMENT   Final Result      Enteric tube tip projects over the proximal duodenum      DX-ABDOMEN FOR TUBE PLACEMENT   Final Result         1.  Nonspecific bowel gas pattern in the upper abdomen.   2.  Nasogastric tube, not visualized beyond the mid thorax, see ordered repeat abdominal x-ray for further characterization.   3.  Cardiomegaly   4.  Left lung base atelectasis or infiltrate.      OUTSIDE IMAGES-DX CHEST   Final Result      DX-CHEST-PORTABLE (1 VIEW)   Final Result         1.  Bibasilar atelectasis or early infiltrates.   2.  Trace bilateral pleural effusions   3.  Cardiomegaly      US-EXTREMITY VENOUS LOWER BILAT    (Results Pending)        Assessment/Plan  Problem Representation:    Mr. Sean Kincaid is a 77 y.o. male with history of COPD, atrial fibrillation (was on anticoagulation), diabetes type 2, history of congestive heart failure and complete heart block, bioprosthetic replacement of the mitral valve, ANALY, EF of 45% who presented 11/4/2022 as a transfer from [facility], complaining of worsening shortness of breath and fatigue found to be COVID-19 positive and is now COVID recovered with persistent MYLA and hematuria.     * MYLA (acute kidney injury) (HCC)- (present on admission)  Assessment & Plan  Initially improved with dialysis and now nephrology has been reconsulted and following  Unsure of baseline,  this admission creatinine seems to have stabilized within the 2.5-3.0 range  -CTM  -nephrology following w/no need for dialysis at this time, still oliguric making greater than 1500  mL of urine a day  -IV fluids continued 150 cc/hr held due to pulmonary edema. S/p IV lasix 40 once        Anemia  Assessment & Plan  Hgb trended up from 7.5 to 8.4  CT abdomen showing: Left retroperitoneal hematoma along the left iliopsoas/iliac is muscle measuring approximately 4.3 x 3.4 x 5.9 cm.  Received 2 units pRBC so far.  -monitor H/H daily transfuse if Hgb<7    Goals of care, counseling/discussion- (present on admission)  Assessment & Plan  Palliative care consulted with family discussion plan for tomorrow 12/06, Pt is full code    Hematuria  Assessment & Plan  Hematuria still noticable from mcdowell, which was also noted yesterday  -apixaban held  -no growth on urine culture  -per urology:     - Recommend repeat CT a/p w/o due to decreased renal function will avoid contrast    - Hand irrigate mcdowell catheter PRN for clots/catheter not draining    - Monitor for worsening hematuria, consider 3-way mcdowell catheter placement if CBI is indicated    - Continue mcdowell catheter for now, hospital team planning for TOV prior to discharge on tamsulosin    -urology signed off  -Trend CBC and transfuse as necessary for Hgb<7    Constipation  Assessment & Plan  s/p enema, repeat KUB no sig stool burden still have x2 bms/daily  -scheduled bowel regimen    Paroxysmal atrial fibrillation (HCC)- (present on admission)  Assessment & Plan  Hx of anticoagulation. Warfarin was held and pt received VIt K and also FFP  -switch to apixaban 5 mg BID which was held given continued at high risk for bleeding, Discussion by previous team with patient and son who are agreeable to plan of care  -metoprolol tartrate 12.5 BID    Delirium- (present on admission)  Assessment & Plan  Waxing and waning, requiring soft restraints at this time  Standard precautions and out of delirium  Avoid offending medication  Avoid interruption of circadian rhythm  CT head negative for acute changes, unable to provide a MRI imaging secondary to patient's  pacemaker.  -D/c tele  -No vitals overnight  -[place] if disoriented  -Patient alert and oriented x 3 at bedside  -Ok for physical restraints for increased agitation        MSSA (methicillin susceptible Staphylococcus aureus) pneumonia ([facility])- (present on admission)  Assessment & Plan  Finished treatment     Hypernatremia- (present on admission)  Assessment & Plan  Resolved     Septic shock with acute organ dysfunction due to methicillin susceptible Staphylococcus aureus (MSSA) ([facility])  Assessment & Plan  Present on admission, treated adequately    Hematoma- (present on admission)  Assessment & Plan  CT abdomen showing Left retroperitoneal hematoma along the left iliopsoas/iliac is muscle measuring approximately 4.3 x 3.4 x 5.9 cm.  Was on warfarin which was held and was given FFP and vit K to reverse INR   Also received 2 unit of RBC, Warfarin then switched to apixaban  -apixaban held due to hematuria    Acute on chronic systolic congestive heart failure (HCC)- (present on admission)  Assessment & Plan  Metoprolol tartrate 12.5 BID, EF has been improving  Continue present medical therapy    CONCLUSIONS  The left ventricle is not well visualized due to body habitus.  Left ventricular systolic function is probably normal.  The ejection fraction is roughly estimated to be 55%.  A reliable estimation of diastolic function cannot be made due to   mitral valve disease.  Severely dilated right ventricle.  Reduced right ventricular systolic function.  Estimated right ventricular systolic pressure is 40 mmHg.  Severe biatrial dilation.  Mild mitral regurgitation.  Aortic valve sclerosis without significant stenosis.  Mild tricuspid regurgitation.  Normal inferior vena cava size and inspiratory collapse    Hyperkalemia- (present on admission)  Assessment & Plan  Improved s/p kayexalate and insulin + dextrose  RESOLVED  -Continue kayexalate  -Low potassium diet  -R/p bmp  -Monitor     Acute hypoxemic respiratory failure due to  COVID-19 (HCC)- (present on admission)  Assessment & Plan  Patient previously treated for pneumonia course of Zosyn for hospital-acquired pneumonia, weaned off of oxygen, however concerned on CT chest CT for possible aspiration pneumonia, will hold n.p.o. until speech-language pathology sees  - Speech-language pathology recommendations    Restless leg syndrome- (present on admission)  Assessment & Plan  -low dose requip 0.5mg as may be leading to agitation at night due to discomfort    Chronic anticoagulation- (present on admission)  Assessment & Plan  Holding Coumadin. Originally replaced w/ apixiban 5mg BID but now held in setting of hematuria    Pacemaker- (present on admission)  Assessment & Plan  History of complete heart block, pacemaker site no signs of infection  -telemetry    Pacemaker complications  Assessment & Plan  RESOLVED  Was informed by telemetry patient's pacemaker is not pacing properly. Patient asymptomatic at bedside w/ ecg showing intermittent pacing discussed with electrocardio physiology NP embers and plan is to interrogate device, rep notified.  -Pacer performing correctly on interrogation  - Patient asymptomatic  - DC telemetry  - Follow-up with cardiology as outpatient       VTE prophylaxis: SCDs/TEDs    I have performed a physical exam and reviewed and updated ROS and Plan today (12/5/2022). In review of yesterday's note (12/4/2022), there are no changes except as documented above.

## 2022-12-05 NOTE — PROGRESS NOTES
[facility] -  PROGRESS NOTE               Author: SARAY Pitts Date & Time: 12/5/2022  2:25 PM     HPI:  Mr. Kincaid is a pleasant 77-year-old gentleman known to us from previous evaluations during his hospital stays.  We initially consulted him on 11/4/2022 with acute kidney injury and hyperkalemia..  He had initially presented with worsening shortness of breath and fatigue.  He was found to be COVID-positive.  He was initially started on BiPAP then intubated.  He was found to have a creatinine of 8.0 with a potassium of 6.6 at that time.  He required hemodialysis treatment x1 to correct his electrolytes but then demonstrated improvement of his renal function.  Nephrology had signed off the case on around 11/24/2022 with a BUN of 56 a creatinine of 2.62 and a potassium of 5.2.  It was thought that his renal failure was secondary to acute blood loss and retroperitoneal hematoma.  Subsequently however her serum creatinine has been rising to a level of 74 and 4.44 today.  Does not appear that is received any contrast exposure.  Recent urinalysis finds a red hazy specimen with specific gravity 1.015 and a pH of 7.0 there is large blood with 100+ protein which is consistent with previous evaluations.  Urology is following him for his hematuria he is received certain amount of IV fluids.  This is not well documented.  He is made 1.6 L of urine output over the course of the past 24 hours he is approximately 14 L negative since admission.  We have been asked to see him regarding his further rising creatinine   No F/C/N/V/CP/SOB.  No melena, hematochezia, hematemesis.  No HA, visual changes, or abdominal pain. UOP improved since IV lasix. CXR 12/3 pulmonary edema and/or infiltrates are identified, which appear somewhat increased since the prior exam.      DAILY NEPHROLOGY SUMMARY:  12/2 : Consult done   12/3: Overnight he was hypoxic, IVF stopped, received IV lasix 40 mg once,he is  "lethargic, sitter at bedside   12/4 More alert today. Oriented to person only No c/o, UOP 1100 cc/ past 24 hrs  12/5: Pt sitting up in bed, pleasantly confused, not aware he is in the hospital, labs reviewed, BUN/Cr 80/4.98, BP labile, resp status stable 2.5L NC O2, 1.9L UOP recorded    REVIEW OF SYSTEMS:    10 point ROS reviewed and is as per HPI/daily summary or otherwise negative    PMH/PSH/SH/FH:Reviewed and unchanged since admission note    CURRENT MEDICATIONS:Reviewed from admission to present day  Scheduled Medications   Medication Dose Frequency    sevelamer carbonate  800 mg TID WITH MEALS    lidocaine  2 Patch Q24HR    [START ON 12/6/2022] ROPINIRole  0.5 mg DAILY    [Held by provider] furosemide  40 mg Q DAY    nystatin   TID    acetaminophen  650 mg 4XDAY    senna-docusate  2 Tablet BID    And    polyethylene glycol/lytes  1 Packet BID    tamsulosin  0.4 mg AFTER BREAKFAST    metoprolol tartrate  12.5 mg TWICE DAILY    [Held by provider] apixaban  5 mg BID    omeprazole  20 mg DAILY    melatonin  5 mg Nightly    atorvastatin  40 mg QDAY    insulin regular  3-14 Units 4X/DAY ACHS        VS:  BP (!) 156/84   Pulse 78   Temp 36.1 °C (96.9 °F) (Temporal)   Resp 18   Ht 1.88 m (6' 2\")   Wt 114 kg (251 lb 5.2 oz)   SpO2 91%   BMI 32.27 kg/m²   Physical Exam  Nursing note reviewed.   Constitutional:       Appearance: Normal appearance.   Eyes:      General: No scleral icterus.  Cardiovascular:      Comments: No edema  Pulmonary:      Effort: Pulmonary effort is normal.   Abdominal:      General: There is no distension.   Genitourinary:     Comments: Ball with improving hematuria  Musculoskeletal:         General: No deformity.   Skin:     Findings: No rash.   Neurological:      Mental Status: He is alert. He is disoriented and confused.   Psychiatric:         Attention and Perception: He perceives visual hallucinations.       Fluids:    Intake/Output Summary (Last 24 hours) at 12/5/2022 1425  Last data " filed at 12/5/2022 0900  Gross per 24 hour   Intake 120 ml   Output 1300 ml   Net -1180 ml         LABS:  Labs reviewed, pertinent labs below.    Recent Labs     12/03/22  0442 12/04/22  0241 12/04/22  1318 12/04/22  2239 12/05/22  0524   RBC 2.76* 2.58*  --  2.82* 2.90*   HEMOGLOBIN 8.0* 7.5*  --  8.1* 8.4*   HEMATOCRIT 26.0* 24.3*  --  26.6* 27.1*   PLATELETCT 376 360  --  436 445   PROTHROMBTM  --   --  17.8*  --   --    APTT  --   --  39.2*  --   --    INR  --   --  1.49*  --   --    IRON 33*  --   --   --   --    FERRITIN 1216.0*  --   --   --   --    TOTIRONBC 149*  --   --   --   --          Recent Labs     12/03/22  0651 12/04/22  0241 12/05/22  0524   SODIUM 139 138 141   POTASSIUM 5.3 5.2 5.0   CHLORIDE 103 102 104   CO2 23 23 22   GLUCOSE 130* 121* 110*   BUN 77* 84* 80*   CREATININE 4.28* 4.69* 4.98*   CALCIUM 8.5 8.4* 8.9          IMAGING:  All imaging reviewed from admission to present day    DISCUSSION  Complex Case.  Appears to have significant right-sided heart failure which is leading to left-sided heart failure. Patients with this degree of right-sided heart failure typically require substantial preload to maintain their cardiac output. Treatment is to maintain preload, usually with IV fluids but this can worsen their pulmonary performance.     ASSESSMENTS:  # Acute kidney injury, nonoliguric  Likely related to altered renal perfusion with cardiorenal syndrome, R psoas hematoma and acute blood loss  Urinalysis is difficult to interpret given his gross hematuria  No emergent need for dialysis  And he would be a poor long-term dialysis cannot the date given his   chronic medical conditions               Cr continue to rise, renal US 12/3 unremarkable   Continue efforts to augment renal perfusion  # Retroperitoneal hematoma  Slight decrease in size based on CAT scan of 11/30/2022  Gross hematuria as a result  Continues with Ball catheter  # Gross hematuria, improving   Urine culture negative on  11/30/2022  Eliquis on hold   # Recent acute kidney injury with hyperkalemia  Required hemodialysis x1  # Chronic kidney disease  Baseline creatinine unknown  # Dementia  # Low serum albumin  # Hypocalcemia  # Anemia, iron stores adequate  # Diabetes mellitus, type II  # Congestive heart failure with LV dysfunction  Most recent echocardiogram finds an ejection fraction of 53%   # Right-sided heart failure  Severely dilated right ventricle with reduced right ventricular systolic function,  Normal inferior vena cava size and   inspiratory collapse.  # Hyperphosphetemia      SUGGESTIONS:  No emergent need for dialysis  Poor long-term dialysis candidate and given his chronic medical conditions  IV lasix 40 mg PRN (Decreased PO intake, appears dry on exam)   Transfuse PRN Hgb <7  Ongoing GOC discussions, palliative spoke with pt's son 12/4  Renal diet                Dose all meds for eGFR    Avoid nephrotoxins/NSAIDs/contrast dye as able    Strict I/Os      Daily labs    Thank you,

## 2022-12-05 NOTE — PALLIATIVE CARE
"Palliative Care follow-up  PC RN placed phone call to Theo, pt's son. Theo understands that the pt's kidneys, \"are worse\" and that, \"everything else is ok.\" Further, he states he is, \"not surprised that he (the pt) can not come home.\" Theo also explained that Sean is, \"putting no effort into therapies.\"     Theo understands that his dad is strong and wants to continue with all treatments offered. PC RN offered support and discussed how difficult it can be when family members have long lengths of stay in the hospital. Theo was grateful for the support and understands that the PC team will continue to be available for questions, concerns, and support if needed.     Plan: Palliative care to continue to follow, provide support, and help facilitate decision-making as clinical picture evolves.    Thank you for allowing Palliative Care to support this patient and family. Contact [phone] for additional assistance, change in patient status, or with any questions/concerns.    "

## 2022-12-06 NOTE — DISCHARGE PLANNING
Case Management Discharge Planning    Admission Date: 11/4/2022  GMLOS: 13.2  ALOS: 32    6-Clicks ADL Score: 9  6-Clicks Mobility Score: 10        Anticipated Discharge Dispo: Discharge Disposition: D/T to hospice home (50)    DME Needed: Coordination per hospice    Action(s) Taken: Voalte msg from Palliative RNStella that patient's sons chose hospice.  Referral sent to [facility].    RN CM spoke with patient's sons via telephone, Theo and Binh.  Theo lives in [place] and Binh lives in [place].  We discussed hospice at their homes and they stated that they are in no position to care for patient.  Neither of them has access to patient's money.  Patient's monthly income is approximately $1700 per month from Social Security and jail.  Theo stated he would contact his cousin who is a  to help with POA.  They were agreeable to use patient's money if they had access to help pay for group home.  Discussed group home and they were agreeable.  They prefer group home in [place].    Voalte msg to IRAIDA Cowart Supervisor and DPA.    Escalations Completed: Long Length of Stay Committee     Medically Clear: Yes    Next Steps: Follow up with  [facility] and IRAIDA.    Barriers to Discharge: Pending Placement

## 2022-12-06 NOTE — DISCHARGE PLANNING
Received Choice form at 0820  Agency/Facility Name: [facility], [facility], [facility]  Referral sent per Choice form @ 0900     1245:  Received Choice form at 1220  Agency/Facility Name: [facility]  Referral sent per Choice form @ 3363

## 2022-12-06 NOTE — CARE PLAN
The patient is Stable - Low risk of patient condition declining or worsening    Shift Goals  Clinical Goals: keep NC in place, reorient patient, safety, comfort  Patient Goals: comfort  Family Goals: continue with plan of care    Progress made toward(s) clinical / shift goals:    Problem: Psychosocial  Goal: Patient's level of anxiety will decrease  Outcome: Progressing: Patient's level of anxiety has improved throughout night shift. He is being reoriented as needed to decrease his level of anxiety     Problem: Respiratory  Goal: Patient will achieve/maintain optimum respiratory ventilation and gas exchange  Outcome: Progressing: Patient has been educated on the importance of keeping nasal canula in place to provide for optimum oxygen perfusion. Patient has been compliant throughout night shift.     Problem: Fall Risk  Goal: Patient will remain free from falls  Outcome: Progressing:  Patient has been educated on the importance of bed alarm to prevent accidental falls during his hospital stay.         Problem: Safety - Medical Restraint  Goal: Free from restraint(s) (Restraint for Interference with Medical Device)  Outcome: Met:  Patient has been free from restraints throughout night shift due to the compliance of maintaining nasal canula in place to provide adequate oxygen perfusion.

## 2022-12-06 NOTE — CARE PLAN
The patient is Watcher - Medium risk of patient condition declining or worsening    Shift Goals  Clinical Goals: keep NC in place, reorient patient, safety, comfort  Patient Goals: comfort  Family Goals: continue with plan of care    Progress made toward(s) clinical / shift goals:  Pt very confused throughout shift. Non compliant for studies and requires reorienting. Pt c/o pain, prn meds give. Pt will not keep O2 on but spO2 maintains around 90%. Pt anxious and agitated but consolable. GOC conversation today with family, see note. Pt able to nap a few time today between care. No BM this shift.

## 2022-12-06 NOTE — THERAPY
"Physical Therapy   Daily Treatment     Patient Name: Sean Kincaid  Age:  77 y.o., Sex:  male  Medical Record #: [id number]  Today's Date: 12/5/2022     Precautions  Precautions: Fall Risk;Swallow Precautions ( See Comments)    Assessment    Patient seen for follow up PT treatment session and demos improved participation. He is still delayed and requires extensive time to complete any task. He was able to sit at a supervision level and perform STSx2 people. Patient will benefit from placement for further therapy.     Plan    Continue current treatment plan.    DC Equipment Recommendations: Unable to determine at this time  Discharge Recommendations: Recommend post-acute placement for additional physical therapy services prior to discharge home      Subjective    \"Those were the olivares days\"     Objective       12/05/22 1030   Precautions   Precautions Fall Risk;Swallow Precautions ( See Comments)   Pain 0 - 10 Group   Therapist Pain Assessment 0;Post Activity Pain Same as Prior to Activity   Cognition    Cognition / Consciousness X   Orientation Level Not Oriented to Month;Not Oriented to Year;Not Oriented to Place;Not Oriented to Reason   Level of Consciousness Alert   Ability To Follow Commands 1 Step   Safety Awareness Impaired   New Learning Impaired   Attention Impaired   Sequencing Impaired   Initiation Impaired   Comments emotionally labile throughout. reports he thinks he[place] on [place] and then is tearful about missing his father. Extensive time to complete tasks and easily distracted. Requires encouragement   Sitting Lower Body Exercises   Sitting Lower Body Exercises Yes   Ankle Pumps 2 sets of 10   Long Arc Quad 2 sets of 10   Comments extensive time 2/2 patient stating \"wait, wait\" and needing cueing to complete task   Neuro-Muscular Treatments   Neuro-Muscular Treatments Postural Facilitation;Weight Shift Right;Weight Shift Left;Verbal Cuing;Tactile Cuing   Vision   Vision Comments wears " glasses   Other Treatments   Other Treatments Provided Educated about the importance of mobility   Balance   Sitting Balance (Static) Fair   Sitting Balance (Dynamic) Fair -   Standing Balance (Static) Poor +   Standing Balance (Dynamic) Poor +   Weight Shift Sitting Fair   Weight Shift Standing Poor   Skilled Intervention Postural Facilitation;Sequencing;Tactile Cuing;Verbal Cuing;Compensatory Strategies   Comments w/FWW   Gait Analysis   Gait Level Of Assist Unable to Participate   Bed Mobility    Supine to Sit Moderate Assist   Sit to Supine Maximal Assist   Scooting Contact Guard Assist   Skilled Intervention Tactile Cuing;Verbal Cuing;Sequencing   Comments extensive time   Functional Mobility   Sit to Stand Maximal Assist   Bed, Chair, Wheelchair Transfer Unable to Participate   Mobility STSx2 with 2 person assist, able to stand with flexed hips for 20 sec   Skilled Intervention Postural Facilitation;Sequencing;Tactile Cuing;Verbal Cuing   How much difficulty does the patient currently have...   Turning over in bed (including adjusting bedclothes, sheets and blankets)? 2   Sitting down on and standing up from a chair with arms (e.g., wheelchair, bedside commode, etc.) 2   Moving from lying on back to sitting on the side of the bed? 2   How much help from another person does the patient currently need...   Moving to and from a bed to a chair (including a wheelchair)? 2   Need to walk in a hospital room? 1   Climbing 3-5 steps with a railing? 1   6 clicks Mobility Score 10   Activity Tolerance   Sitting in Chair unable   Sitting Edge of Bed 20 min   Standing 20 sec   Short Term Goals    Short Term Goal # 1 B  pt will be able to complete supine<>sitting from flat bed with SPV in 6tx in order to improve independence   Goal Outcome  # 1 B Progressing slower than expected   Short Term Goal # 2 Pt will perform STS with LRAD and min A within 6 visits to progress OOB mobility   Goal Outcome # 2 Progressing slower than  expected   Short Term Goal # 3 B pt will be able to complete functional transfers with FWW and SPV in 6tx in order to decrease fall risk   Goal Outcome # 3 B Goal not met   Short Term Goal # 4 Pt will ambulate 20 ft with LRAD and mod A within 6 visits to initiate gait training   Goal Outcome # 4 Goal not met   Anticipated Discharge Equipment and Recommendations   DC Equipment Recommendations Unable to determine at this time   Discharge Recommendations Recommend post-acute placement for additional physical therapy services prior to discharge home     Pooja Mcmanus, PT, DPT, GCS

## 2022-12-06 NOTE — THERAPY
Speech Language Pathology   Fiberoptic Endoscopic Evaluation of Swallow     Patient Name: Sean Kincaid  AGE:  77 y.o., SEX:  male  Medical Record #: [id number]  Today's Date: 12/6/2022     Precautions  Precautions: Fall Risk, Swallow Precautions ( See Comments)    HPI: Per EMR-     Mr. Sean Kincaid is a 77 y.o. male with history of COPD, atrial fibrillation (was on anticoagulation), diabetes type 2, history of congestive heart failure and complete heart block, bioprosthetic replacement of the mitral valve, ANALY, EF of 45% who presented 11/4/2022 as a transfer from [facility], complaining of worsening shortness of breath and fatigue found to be COVID-19 positive and is now COVID recovered with persistent MYLA and hematuria.      Current Method of Nutrition   NPO until cleared by speech pathology. Pt was on SB6/MT2 and made NPO 12/5.       Pertinent Information:  Affect/Behavior: Inappropriate, Uncooperative  Oxygen Requirements: Room Air  Feeding Tube: None  Factor(s) Affecting Performance: Impaired mental status, Impaired command following    Discussed the risks, benefits, and alternatives of the FEES procedure. Patient/family acknowledged and agreed to proceed.     Assessment  Flexible Endoscopic Evaluation of Swallowing (FEES) completed at bedside today. The endoscope was passed transnasally via right nare to evaluate the anatomy and physiology of swallowing. Pt did not tolerate the procedure well.    Anatomic Findings:  some redness of folds.   Vocal Fold Motion: Pt unable to follow commands to complete assessment. Right fold red in color.   Secretion Management: Excess secretions-mild scattered thick white and yellow diffuse secretions present before, during, and following the FEES. Pt not following directives to cough for oral suction.   PO trials: ice chips, mildly thick liquids, liquidized, soft & bite sized      Consistency PAS Score Timing Comments   Ice Chips 1 N/A    Thin Liquid N/A  N/A    Mildly Thick Liquid 3 During swallow (inferred) and Post swallow PAS 3 using tsp and cup. MT2 to ventricular folds with deep penetration. See photos.   Liquidized 1 N/A    Puree N/A N/A Pt declined   Minced & Moist N/A N/A    Soft & Bite Sized 3 During swallow (inferred) PAS 3 following single peach and probably from pharyngeal residue post applesauce and small amount of juice with the fruit.    Regular N/A N/A    Mixed Consistency N/A N/A See soft and bite size-probable small amount of juice w/single piece of fruit.     Deep penetration MT2. Thick yellow and white secretions.    Deep penetration following single piece of fruit.    Penetration-Aspiration Scale (PAS)  1     No contrast enters airway  2     Contrast enters the airway, remains above the vocal folds, and is ejected from the airway (not seen in the airway at the end of the swallow).  3     Contrast enters the airway, remains above the vocal folds, and is not ejected from the airway (is seen in the airway after the swallow).  4     Contrast enters the airway, contacts the vocal folds, and is ejected from the airway.  5     Contrast enters the airway, contacts the vocal folds, and is not ejected from the airway  6     Contrast enters the airway, crosses the plane of the vocal folds, and is ejected from the airway.  7     Contrast enters the airway, crosses the plane of the vocal folds, and is not ejected from the airway despite effort.  8     Contrast enters the airway, crosses the plane of the vocal folds, is not ejected from the airway and there is no response to aspiration.        Oral phase:  Probable slowed mastication with cues required to not talk with oral bolus with pt not following directives and continued talking.       Pharyngeal phase:  Pt with mild to moderate vallecular, lateral channel, and pyriform sinus residue post MT2 and applesauce. This residue places pt at higher risk for build up of residue and aspiration r/t pt not following  "directives for repeat swallows. Pt with intermittent cleansing swallow with fair clearance of this residue. Deep penetration to near the airway and at the vestibular folds with MT2 and single piece of fruit. The single piece of fruit was given with mild to moderate pharyngeal residue from applesauce, and with probable small amount of juice given with the single piece of fruit.         Clinical Impressions  The pt presents with a severe pharyngeal dysphagia, likely acute related to current medical status, severe confusion and decreased ability to follow simple directives. Limited boluses during the FEES r/t poor pt behavior, severe confusion, and pt using profanity. RN helped calm the patient and held his hand during the procedure. No aspiration visualized but may have occurred during the swallow with significant coughing following the FEES. Pt not following directives for cough and oral suction following the FEES. Sats in the 90's and pt with no marked WOB during the assessment.     SLP collaborated with RN, palliative RN, and MD regarding pt verbalizing \"give me the ice\" and \"I need juice\" with possible preference for oral intake. If family/pt plan of care is to eat, consider continued SB6/MT2 per preference of care and despite risks for airway penetration and or aspiration.         Recommendations  NPO, determine POC with family conference later today. See above.   2.  Swallowing Instructions & Precautions:     Oral Care: Q4h     12/06/22 1053   Patient / Family Goals   Patient / Family Goal #1 12/6 \"Give me that ice and juice.\"   Goal #1 Outcome Goal not met   Short Term Goals   Short Term Goal # 2 B  NEW 11/23: Patient will consume SB6/MT2 diet with 1:1 feeding with no overt s/sx of aspiration.   Goal Outcome  # 2 B Goal not met   Short Term Goal # 3 12/6 Pt, family, and medical team will verbalize understanding of pt's current swallowing status and high risk for aspirating following educ/collaboration with SLP. "   Goal Outcome  # 3 Progressing as expected

## 2022-12-06 NOTE — PALLIATIVE CARE
"Palliative Care follow-up    1039: Discussed case with Marilee ANGELES, updates appreciated.   1107: Discussed case with Ulisses ALMAZAN, updates appreciated.     1130: [facility] meeting via phone with Dr. Stockton, Dr. Haynes, medical student, PC RN, and pt's two sons, Theo and Binh.  All providers introduced selves. Dr. Stockton started by assessing Theo and Binh's understanding of Sean's clinical picture and hospital course. Theo understands his kidneys are \"failing\". Binh states, \"when I last saw him, he appeared like he had dementia and I know his creatinine is high.\" Dr. Stockton reviewed the pt's hospital course and current clinical condition.  MD discussed placing NGT for artifical nutrition. Theo states, \"I worry that would just be torturing him.\"    Dr. Stockton introduced hospice as an option.  PC RN educated that hospice is a group of caregivers who provide end of life care, focused on remaining quality of life rather than quantity of life. Discussed that the focus switches from disease modifying treatment to management of symptoms such as dyspnea, anxiety, nausea, pain, etc. I counseled that they provide EOL care needs and are available 24/7, though they do not provide 24/7 direct care. Theo and Binh state that neither would be able to assist with hospice in the home. They would like more information from a hospice liaison. Received hospice choice and faxed. Updated Rosmery JACOME RN.     PC RN discussed the option for Sean to transition to comfort focused treatment here in the hospital while our team works with hospice on a discharge plan. Provided information that treatment would be focused on the pt's comfort and discussed medications commonly used. Theo and Binh are both in agreement to transitioning to comfort focused care now.     Active listening, reflection, reminiscing, validation & normalization,and empathic support utilized throughout this encounter.  All questions answered. Acknowledged family's " courage in having this discussion. They expressed appreciation for support.     Updated: Ulisses ALMAZAN, Rosmery JACOME RN.    Plan: Transition to comfort focused care, hospice referral placed. Complete POLST prior to discharge.    Thank you for allowing Palliative Care to support this patient and family. Contact [phone] for additional assistance, change in patient status, or with any questions/concerns.

## 2022-12-06 NOTE — PALLIATIVE CARE
Palliative Care follow-up  Received message form Dr. Haynes requesting GOC meeting with pt's family. PC RN placed phone calls to pt's sons Theo and Binh. Both will be present for GOC meeting tomorrow 12/6 at 11:30 via phone.     Updated: Ulisses Cowart RN    Plan: GO meeting tomorrow with pt's sons and physicians.     Thank you for allowing Palliative Care to support this patient and family. Contact [phone] for additional assistance, change in patient status, or with any questions/concerns.

## 2022-12-06 NOTE — DISCHARGE PLANNING
"Case Management Discharge Planning    Admission Date: 11/4/2022  GMLOS: 13.2  ALOS: 32    6-Clicks ADL Score: 9  6-Clicks Mobility Score: 10  PT and/or OT Eval ordered: Yes  Post-acute Referrals Ordered: Yes  Post-acute Choice Obtained: Yes  Has referral(s) been sent to post-acute provider:  Yes      Anticipated Discharge Dispo: Discharge Disposition: D/T to SNF with Medicare cert in anticipation of skilled care (03) vs D/T to facility providing skilled nursing/supportive care (04)    Action(s) Taken: Palliative RN has a scheduled GOC meeting today via conference call with patient's sons.  RN CM has requested to speak with patient's son's afterwards regarding placement.    No accepting SNFs.    RN CM contacted patient's insurance and there are no in network SNF providers.  An accepting facility can request authorization by calling [phone].    Pt is a max assist.  RN CM met with patient yesterday.  Pt pleasantly confused. Aox1.  He stated in he was [place] currently and that he had no idea where he lived.    Choice form faxed to DPA for [facility] in [place], [place].    Voalte msg to Dr. Haynes, Palliative RN IRAIDA Douglas supervisor and DPA.    Escalations Completed: Long Length of Stay Committee     Medically Clear: No    Next Steps: Follow up on referrals.  Meet with patient's son's today via telephone.    Barriers to Discharge: Pending Placement, no accepting SNFs, no in network SNFs within 100 miles.            "

## 2022-12-07 NOTE — THERAPY
"Speech Language Pathology  Daily Treatment     Patient Name: Sean Kincaid  Age:  77 y.o., Sex:  male  Medical Record #: [id number]  Today's Date: 12/7/2022     Precautions  Precautions: Fall Risk, Swallow Precautions ( See Comments)  Comments: severe confused speech and poor directive following.    Assessment  Pt now on comfort measures. Pt with increased lethargy and WOB. Pt in agreement for single ice chips with mastication and one swallow triggered in greater than 30 second delay. Pt in agreement for easier texture with LQ3/MT2 recommended with assist for intake as pt requests and as tolerated. Three oral suctions for small amount of white secretions following congested sounding cough. SLP communicated with RN and MD regarding texture downgrade. Swallow strategies posted. No active SLP treatment at this time.       Plan  LQ3/MT2 per pt/family preference and despite risks.   No active tx-pt now on comfort measures    Discharge Recommendations: Recommend post-acute placement for additional speech therapy services prior to discharge home       12/07/22 1341   Voice   Comments hoarse quality with low intensity.   Dysphagia    Positioning / Behavior Modification Modulate Rate or Bite Size;Multiple Swallows;Other (see Comments)  (feeding per comfort measures and as pt requests.)   Diet / Liquid Recommendation Liquidised (3) - (Nectar Thick Full Liquid);Mildly Thick (2) - (Nectar Thick)   Nutritional Liquid Intake Rating Scale Thickened beverages (mildly thick unless otherwise specified)   Nutritional Food Intake Rating Scale Total oral diet of a single consistency   Nursing Communication Swallow Precaution Sign Posted at Head of Bed   Skilled Intervention Other (See Comments)  (1-1 assist as pt requests intake.)   Recommended Route of Medication Administration   Medication Administration  Crush all Medications in Puree   Patient / Family Goals   Patient / Family Goal #1 12/6 \"Give me that ice and juice.\"   Goal #1 " Outcome Goal not met   Short Term Goals   Short Term Goal # 2 12/7 Pt will consume LQ3/MT2 per his requests for intake and 1-1 feeding per pt/family preference of care and despite risks for aspiration.   Goal Outcome # 2  Progressing slower than expected   Short Term Goal # 3 12/6 Pt, family, and medical team will verbalize understanding of pt's current swallowing status and high risk for aspirating following educ/collaboration with SLP.   Goal Outcome  # 3 Goal met   Session Information   Priority 0  (Resp failure, AMS, FEES x2=comfort care and PO despite risks, texture downgraded LQ3/MT2 1-1 as pt requests/tolerates. No active tx.)

## 2022-12-07 NOTE — DISCHARGE PLANNING
Case Management Discharge Planning    Admission Date: 11/4/2022  GMLOS: 13.2  ALOS: 33    6-Clicks ADL Score: 9  6-Clicks Mobility Score: 10  PT and/or OT Eval ordered: Yes  Post-acute Referrals Ordered: anticipates Group home with Hospice.   Post-acute Choice Obtained: Yes  Has referral(s) been sent to post-acute provider:  Yes      Anticipated Discharge Dispo: Discharge Disposition: D/T to hospice home (50)    Action(s) Taken: Updated Provider/Nurse on Discharge Plan    Escalations Completed: Pending Discharge Destination    Medically Clear: No    Next Steps: Awaiting return call from [facility] for Group home    Barriers to Discharge: Pending Placement    Is the patient up for discharge tomorrow: No  Writer informed by DPA that referral to [facility] made yesterday. [facility] Li[facility] Palacios,  to provide suggestions for group home for patient/family and inform this CM.   Will cont. to follow.  Noted pt/family prefers [facility] but income is noted as $1700/mo. CM to update family when more info is available.

## 2022-12-07 NOTE — PROGRESS NOTES
[facility] Internal Medicine Daily Progress Note    Date of Service  12/6/2022    [employer] Team: R IM Green Team   Attending: Mahad Kwong M.d.  Senior Resident: Dr. Stockton  Intern:  Dr. Haynes  Contact Number: [phone]    Chief Complaint  Sean Kincaid is a 77 y.o. male admitted 11/4/2022 as transfer from [facility] for respiratory failure secondary to COVID    ID:  Mr. Sean Kincaid is a 77 y.o. male with history of COPD, atrial fibrillation (was on anticoagulation), diabetes type 2, history of congestive heart failure and complete heart block, bioprosthetic replacement of the mitral valve, ANALY, EF of 45% who presented 11/4/2022 as a transfer from [facility], complaining of worsening shortness of breath and fatigue found to be COVID-19 positive and is now COVID recovered with persistent MYLA and hematuria and severe pharyngeal dysphagia.    Hospital Course  Mr. Sean Kincaid is a 77 y.o. male with history of COPD, atrial fibrillation (was on anticoagulation), diabetes type 2, history of congestive heart failure and complete heart block, bioprosthetic replacement of the mitral valve, ANALY, EF of 45% who presented 11/4/2022 as a transfer from [facility], complaining of worsening shortness of breath and fatigue. He was diagnosed with COVID-19, started initially on BiPAP and then eventually required intubation. He was also found with an MYLA with creatinine of 8.0 and potassium 6.6.  Admitted to the ICU at [facility] requiring mechanical ventilation, vasopressors including norepinephrine and vasopressin, and placed on empiric antibiotics of ceftriaxone. Nephrology was consulted for MYLA, nonoliguric, had brief initiation of RRT with improvement in urine output and no longer needed RRT as per nephrology.  He developed a right thigh/femoral hematoma related to a try Alysis catheter placement., anticoagulation was held. He required Precedex for sedation, fentanyl for pain  control, his respiratory culture turned positive for MSSA and the patient was treated with Ancef.  His COVID-19 pneumonia was treated with dexamethasone 6 mg for 10 days.  Extubated on 11/8 and found to be significantly encephalopathic. CT of the head was negative for acute changes, the patient was unable to be evaluated by MRI due pacemaker incompatibility.  Mentation improved but still had mental slowing, confusion, A&O x1, moving all 4 extremities fairly equally.  Palliative care was consulted to discuss goals of care with family and the decision was reached in light of the patient's condition and age to change his CODE STATUS to DNR/DNI.  Also noted to have had hypernatremia which resolved.  Patient was evaluated by speech therapy, he passed a swallow evaluation although his  p.o. intake remains fairly poor. With his right groin hematoma stabilizing he was restarted in form of heparin drip and resuming Coumadin with eventual transition to apixaban, hemoglobin trended down and concern for GI bleed in addition to his hematuria so anticoagulation has been held.     #MYLA  Creatinine remains elevated with nephrology following with recs that dialysis is not needed at this time.   Will continue to avoid nephrotoxins. Urine still slightly blood tinged with small clots. Fluids cannot be given due to risk of worsening his pulmonary edema.     #Encephalopathy  Patient remains Aox2 in good spiritis but agitated overnight requiring soft restraints.     #Hypoxic respiratory failure due to covid s/p intubation c/b HAP, aspiration pneumonia  He completed decadron course and now at 2-3 LNC, weaned down to 2.5 LNC. Covid isolation has been lifted.     #Constipation  Repeat KUB without colonic stool burden, will continue with bowel regimen.    #Right psoas hematoma  Has stabilized    #Severe pharyngeal dysphagia  Risk of aspiration was discussed during meeting with his sons Binh and Theo 12/06 with palliative care Stella Vidal  "present. SLP FEES evaluation was completed with difficulty due to patient not wanting to follow directions and agitation. Patient repeated stated he wanted to eat and was observed to be coughing following FEES. Sons agreed to the risk of aspiration to allow patient to eat as he vocalizes that he wants to.     Interval Problem Update  No adverse events overnight. This am patient was found with his legs to the side of the bed and stated \"I said was short of breath to get your attention.\" Patient then denied feeling short of breath, having chest pain or abdominal pain. Said he wanted to be home and misses his wife who he knows is  but misses her a lot today.     Goals of care discussion today at 11:30 with Palliative Care RN Stella Vidal, Dr. Stockton, Lemuel Tapia medical student and Mr. Kincaid's sons Theo and Binh. His son's understand that his kidneys are failing and when he was last seen he appeared to have dementia. Discussed with them his poor prognosis given is kidney failure, he is being followed by Nephrology but due to his co-morbidities he is a poor long-term dialysis candidate. Due to his kidney function, he has not been able to receive many medications for agitation and pain. Patient's severe pharyngeal dysphagia was also discussed and how he continues to verbalize that he wishes to eat. Discussed alternatives to not eating such as NG tube and PEG tube, per his son's they felt that would be torturing him to withhold food and choose alternatives routes that would only prolong his suffering. His son's therefore decided to accept the risks of aspiration and to continue with Hospice care and inpatient comfort care. They wish for their father's ongoing care to provide comfort with pain control and continue him on a diet.   -Hospice care referral placed     I have discussed this patient's plan of care and discharge plan at IDT rounds today with Case Management, Nursing, Nursing leadership, and other " members of the IDT team.    Consultants/Specialty  nephrology Adele Quintero APRSERENITY    Code Status  Comfort Care/DNR    Disposition  Patient is not medically cleared for discharge.   Anticipate discharge to to skilled nursing facility.  I have placed the appropriate orders for post-discharge needs.    Review of Systems  Review of Systems   Constitutional:  Negative for chills, fever and malaise/fatigue.   Respiratory:  Negative for cough and shortness of breath.    Cardiovascular:  Negative for chest pain.   Gastrointestinal:  Negative for abdominal pain, constipation, nausea and vomiting.   Genitourinary:  Negative for dysuria.   Musculoskeletal:  Negative for myalgias.   Neurological:  Negative for dizziness and headaches.   Psychiatric/Behavioral:  Positive for depression. The patient is nervous/anxious.         Upset about being in the hospital, misses his  wife      Physical Exam  Temp:  [36.2 °C (97.2 °F)-36.9 °C (98.4 °F)] 36.2 °C (97.2 °F)  Pulse:  [73-80] 80  Resp:  [18] 18  BP: (106-155)/(68-93) 155/93  SpO2:  [92 %-94 %] 92 %    Physical Exam  Constitutional:       General: He is not in acute distress.     Appearance: Normal appearance. He is obese. He is ill-appearing.   HENT:      Head: Normocephalic and atraumatic.      Right Ear: External ear normal.      Left Ear: External ear normal.      Nose: Nose normal.      Mouth/Throat:      Mouth: Mucous membranes are moist.   Eyes:      General: No scleral icterus.        Right eye: No discharge.         Left eye: No discharge.      Extraocular Movements: Extraocular movements intact.      Conjunctiva/sclera: Conjunctivae normal.      Pupils: Pupils are equal, round, and reactive to light.   Cardiovascular:      Rate and Rhythm: Normal rate and regular rhythm.      Pulses: Normal pulses.      Heart sounds: Normal heart sounds.   Pulmonary:      Effort: Pulmonary effort is normal. No respiratory distress.      Breath sounds: Normal breath sounds. No  wheezing or rales.   Abdominal:      General: Abdomen is flat. Bowel sounds are normal. There is no distension.      Palpations: Abdomen is soft.      Tenderness: There is no abdominal tenderness. There is no right CVA tenderness, left CVA tenderness or guarding.      Comments: Expressed discomfort with abdominal exam but not grimacing   Musculoskeletal:         General: No swelling or tenderness. Normal range of motion.      Cervical back: Normal range of motion.      Right lower leg: No edema.      Left lower leg: No edema.      Comments: Bilateral venous stasis skin changes to bilateral lower extremities   Skin:     General: Skin is warm and dry.      Coloration: Skin is not jaundiced.   Neurological:      General: No focal deficit present.      Mental Status: He is alert and oriented to person, place, and time.   Psychiatric:         Mood and Affect: Mood normal.         Behavior: Behavior normal.         Thought Content: Thought content normal.         Judgment: Judgment normal.       Fluids    Intake/Output Summary (Last 24 hours) at 12/6/2022 1938  Last data filed at 12/6/2022 1845  Gross per 24 hour   Intake --   Output 2000 ml   Net -2000 ml       Laboratory  Recent Labs     12/04/22 2239 12/05/22  0524 12/06/22  0314   WBC 9.8 9.4 9.7   RBC 2.82* 2.90* 2.79*   HEMOGLOBIN 8.1* 8.4* 8.0*   HEMATOCRIT 26.6* 27.1* 26.9*   MCV 94.3 93.4 96.4   MCH 28.7 29.0 28.7   MCHC 30.5* 31.0* 29.7*   RDW 48.5 48.3 50.3*   PLATELETCT 436 445 449*   MPV 9.9 9.9 10.2     Recent Labs     12/04/22  0241 12/05/22  0524 12/06/22  0314   SODIUM 138 141 144   POTASSIUM 5.2 5.0 4.9   CHLORIDE 102 104 104   CO2 23 22 25   GLUCOSE 121* 110* 137*   BUN 84* 80* 84*   CREATININE 4.69* 4.98* 4.89*   CALCIUM 8.4* 8.9 8.7     Recent Labs     12/04/22  1318   APTT 39.2*   INR 1.49*               Imaging  US-EXTREMITY VENOUS LOWER BILAT   Final Result      [id number] VIEW   Final Result      1.  No evidence of bowel obstruction.   2.  No  significant colonic stool demonstrated.      US-RENAL   Final Result      1.  No evidence of solid renal mass or hydronephrosis.      2.  Ball catheter within the urinary bladder.      DX-CHEST-PORTABLE (1 VIEW)   Final Result         1.  Pulmonary edema and/or infiltrates are identified, which appear somewhat increased since the prior exam.   2.  Cardiomegaly   3.  Atherosclerosis      CT-ABDOMEN-PELVIS W/O   Final Result      1.  No hydronephrosis or obstructing renal or ureteral stone.   2.  Tiny right upper pole calcification could represent a nonobstructing renal stone or cortical calcification.   3.  Decreased size of a left iliacus hematoma, measuring 3.9 x 2.7 cm, previously 4.3 x 3.4 cm.   4.  Right greater than left lower lobe consolidations favoring pneumonia.   5.  Large rectal stool burden.   6.  Gallbladder sludge versus layering stones.   7.  Cardiomegaly.   8.  Fat-containing ventral hernia with a 2.3 cm neck.   9.  Atherosclerotic changes.      DX-CHEST-PORTABLE (1 VIEW)   Final Result      No acute cardiopulmonary abnormality identified.      CT-ABDOMEN-PELVIS W/O   Final Result      1.  Left retroperitoneal hematoma along the left iliopsoas/iliac is muscle measuring approximately 4.3 x 3.4 x 5.9 cm.      2.  Bilateral lower lobe consolidation could be due to pneumonia or atelectasis.      3.  Small layering stones versus sludge in the gallbladder.      4.  Diverticulosis without diverticulitis.      5.  Moderate amount of stool throughout the colon suggesting constipation.      US-EXTREMITY VENOUS UPPER UNILAT RIGHT   Final Result      DX-WRIST-LIMITED 2- RIGHT   Final Result         1.   No radiographic evidence of acute injury.      2.  Old healed fracture right fifth metacarpal neck.      3.  Moderate osteoarthritic changes of the right first carpal metacarpal joint.      4.  Possible intra-articular loose bodies adjacent to the styloid processes of the radius and ulna.      DX-CHEST-PORTABLE  (1 VIEW)   Final Result      1.  Increased pulmonary edema   2.  Bibasilar underinflation atelectasis which could obscure an additional process. This is unchanged.   3.  Persistently enlarged cardiac silhouette      DX-CHEST-PORTABLE (1 VIEW)   Final Result      Mildly improved aeration and stable to slightly improved vascular congestion.      Mildly improved left basilar atelectasis.      DX-CHEST-PORTABLE (1 VIEW)   Final Result      1.  Interval extubation and removal of NG tube.   2.  Increased bibasilar airspace disease.   3.  Increased interstitial edema.   4.  Small left pleural effusion.      CT-HEAD W/O   Final Result      1. No CT evidence of acute infarct, hemorrhage or mass.   2. Moderate global parenchymal atrophy. Chronic small vessel ischemic changes.      DX-CHEST-PORTABLE (1 VIEW)   Final Result      Mildly improved left basilar opacity.      DX-ABDOMEN FOR TUBE PLACEMENT   Final Result      Enteric tube tip projects over the gastric antrum or proximal duodenum      DX-CHEST-PORTABLE (1 VIEW)   Final Result         1. Stable lines and tubes.   2. Worsening left basilar opacity. Small left pleural effusion. Mild right infrahilar atelectasis.         DX-CHEST-PORTABLE (1 VIEW)   Final Result      1.  Cardiomegaly with interstitial edema.      2.  Left lung base atelectasis and minimal left pleural effusion.      DX-CHEST-PORTABLE (1 VIEW)   Final Result      1.  Improved left pleural effusion with adjacent airspace disease.   2.  Improved interstitial infiltrates versus edema.      EC-ECHOCARDIOGRAM COMPLETE W/O CONT   Final Result      DX-CHEST-PORTABLE (1 VIEW)   Final Result      1.  Small left pleural effusion with adjacent airspace disease.   2.  Worsening interstitial infiltrates versus edema.   3.  Right basilar atelectasis.      DX-ABDOMEN FOR TUBE PLACEMENT   Final Result      Enteric tube tip projects over the proximal duodenum      DX-ABDOMEN FOR TUBE PLACEMENT   Final Result         1.   Nonspecific bowel gas pattern in the upper abdomen.   2.  Nasogastric tube, not visualized beyond the mid thorax, see ordered repeat abdominal x-ray for further characterization.   3.  Cardiomegaly   4.  Left lung base atelectasis or infiltrate.      OUTSIDE IMAGES-DX CHEST   Final Result      DX-CHEST-PORTABLE (1 VIEW)   Final Result         1.  Bibasilar atelectasis or early infiltrates.   2.  Trace bilateral pleural effusions   3.  Cardiomegaly           Assessment/Plan  Problem Representation:    Mr. Sean Kincaid is a 77 y.o. male with history of COPD, atrial fibrillation (was on anticoagulation), diabetes type 2, history of congestive heart failure and complete heart block, bioprosthetic replacement of the mitral valve, ANALY, EF of 45% who presented [facility]/4/2022 as a [facility] from [facility], complaining of worsening shortness of breath and fatigue found to be COVID-19 positive and is now COVID recovered with persistent MYLA and hematuria and severe pharyngeal dysphagia. Son's have elected for comfort care and Hospice care to continue his diet with acceptance of risk of aspiration.    * MYLA (acute kidney injury) (HCC)- (present on admission)  Assessment & Plan  Initially improved with dialysis and now nephrology has been reconsulted and following  Unsure of baseline,  this admission creatinine seems to have stabilized within the 2.5-3.0 range  -CTM  -nephrology following w/no need for emergent dialysis at this time, still oliguric making greater than 1500 mL of urine a day, poor long term dialysis patient given co-morbidities  -IV fluids held due to pulmonary edema when given fluids previously  -family elected for comfort care/hospice care        Anemia  Assessment & Plan  Hgb trended up from 7.5 to 8.4  CT abdomen showing: Left retroperitoneal hematoma along the left iliopsoas/iliac is muscle measuring approximately 4.3 x 3.4 x 5.9 cm.  Received 2 units pRBC so far.  -monitor H/H daily transfuse  if Hgb<7    Goals of care, counseling/discussion- (present on admission)  Assessment & Plan  Palliative care consulted with family discussion plan completed on 12/06  Due to his co-morbidities with MYLA and hematuria and now with severe pharyngeal dysphagia, his son's have elected for comfort care and hospice care.    Constipation  Assessment & Plan  s/p enema, repeat KUB no sig stool burden still have x2 bms/daily  -scheduled bowel regimen    Paroxysmal atrial fibrillation (HCC)- (present on admission)  Assessment & Plan  Hx of anticoagulation. Warfarin was held and pt received VIt K and also FFP  -switch to apixaban 5 mg BID which was held given continued at high risk for bleeding, Discussion by previous team with patient and son who are agreeable to plan of care  -metoprolol tartrate 12.5 BID    Hematuria  Assessment & Plan  Hematuria still noticable from mcdowell, which was also noted yesterday  -apixaban held  -no growth on urine culture  -per urology:     - Recommend repeat CT a/p w/o due to decreased renal function will avoid contrast    - Hand irrigate mcdowell catheter PRN for clots/catheter not draining    - Monitor for worsening hematuria, consider 3-way mcdowell catheter placement if CBI is indicated    - Continue mcdowell catheter for now, hospital team planning for TOV prior to discharge on tamsulosin    -urology signed off  -Trend CBC and transfuse as necessary for Hgb<7    Delirium- (present on admission)  Assessment & Plan  Waxing and waning, requiring soft restraints at this time  Standard precautions and out of delirium  Avoid offending medication  Avoid interruption of circadian rhythm  CT head negative for acute changes, unable to provide a MRI imaging secondary to patient's pacemaker.  -D/c tele  -No vitals overnight  -[place] if disoriented  -Patient alert and oriented x 3 at bedside  -Ok for physical restraints for increased agitation        MSSA (methicillin susceptible Staphylococcus aureus) pneumonia  ([facility])- (present on admission)  Assessment & Plan  Finished treatment     Hypernatremia- (present on admission)  Assessment & Plan  Resolved     Septic shock with acute organ dysfunction due to methicillin susceptible Staphylococcus aureus (MSSA) ([facility])  Assessment & Plan  Present on admission, treated adequately    Hematoma- (present on admission)  Assessment & Plan  CT abdomen showing Left retroperitoneal hematoma along the left iliopsoas/iliac is muscle measuring approximately 4.3 x 3.4 x 5.9 cm.  Was on warfarin which was held and was given FFP and vit K to reverse INR   Also received 2 unit of RBC, Warfarin then switched to apixaban  -apixaban held due to hematuria    Acute on chronic systolic congestive heart failure ([facility])- (present on admission)  Assessment & Plan  Metoprolol tartrate 12.5 BID, EF has been improving  Continue present medical therapy    CONCLUSIONS  The left ventricle is not well visualized due to body habitus.  Left ventricular systolic function is probably normal.  The ejection fraction is roughly estimated to be 55%.  A reliable estimation of diastolic function cannot be made due to   mitral valve disease.  Severely dilated right ventricle.  Reduced right ventricular systolic function.  Estimated right ventricular systolic pressure is 40 mmHg.  Severe biatrial dilation.  Mild mitral regurgitation.  Aortic valve sclerosis without significant stenosis.  Mild tricuspid regurgitation.  Normal inferior vena cava size and inspiratory collapse    Hyperkalemia- (present on admission)  Assessment & Plan  Improved s/p kayexalate and insulin + dextrose  RESOLVED  -Continue kayexalate  -Low potassium diet  -R/p bmp  -Monitor     Acute hypoxemic respiratory failure due to COVID-19 ([facility])- (present on admission)  Assessment & Plan  Patient previously treated for pneumonia course of Zosyn for hospital-acquired pneumonia, weaned off of oxygen, however concerned on CT chest CT for possible aspiration  pneumonia, will hold n.p.o. until speech-language pathology sees  - Speech-language pathology recommendations    Restless leg syndrome- (present on admission)  Assessment & Plan  -low dose requip 0.5mg as may be leading to agitation at night due to discomfort    Chronic anticoagulation- (present on admission)  Assessment & Plan  Holding Coumadin. Originally replaced w/ apixiban 5mg BID but now held in setting of hematuria    Pacemaker- (present on admission)  Assessment & Plan  History of complete heart block, pacemaker site no signs of infection  -telemetry    Pacemaker complications  Assessment & Plan  RESOLVED  Was informed by telemetry patient's pacemaker is not pacing properly. Patient asymptomatic at bedside w/ ecg showing intermittent pacing discussed with electrocardio physiology NP embers and plan is to interrogate device, rep notified.  -Pacer performing correctly on interrogation  - Patient asymptomatic  - DC telemetry  - Follow-up with cardiology as outpatient       VTE prophylaxis: SCDs/TEDs    I have performed a physical exam and reviewed and updated ROS and Plan today (12/6/2022). In review of yesterday's note (12/5/2022), there are no changes except as documented above.

## 2022-12-07 NOTE — CARE PLAN
The patient is Stable - Low risk of patient condition declining or worsening    Shift Goals  Clinical Goals: Maintain safety  Patient Goals: Comfort  Family Goals: None at bedside at this time    Progress made toward(s) clinical / shift goals:        Problem: Hemodynamics  Goal: Patient's hemodynamics, fluid balance and neurologic status will be stable or improve  Outcome: Progressing     Problem: Respiratory  Goal: Patient will achieve/maintain optimum respiratory ventilation and gas exchange  Outcome: Progressing     Problem: Risk for Aspiration  Goal: Patient's risk for aspiration will be absent or decrease  Outcome: Progressing     Problem: Fall Risk  Goal: Patient will remain free from falls  Outcome: Progressing       Patient is not progressing towards the following goals:

## 2022-12-07 NOTE — PROGRESS NOTES
[facility] -  PROGRESS NOTE               Author: Angie Sen M.D. Date & Time: 12/7/2022  12:32 PM     HPI:  Mr. Kincaid is a pleasant 77-year-old gentleman known to us from previous evaluations during his hospital stays.  We initially consulted him on 11/4/2022 with acute kidney injury and hyperkalemia..  He had initially presented with worsening shortness of breath and fatigue.  He was found to be COVID-positive.  He was initially started on BiPAP then intubated.  He was found to have a creatinine of 8.0 with a potassium of 6.6 at that time.  He required hemodialysis treatment x1 to correct his electrolytes but then demonstrated improvement of his renal function.  Nephrology had signed off the case on around 11/24/2022 with a BUN of 56 a creatinine of 2.62 and a potassium of 5.2.  It was thought that his renal failure was secondary to acute blood loss and retroperitoneal hematoma.  Subsequently however her serum creatinine has been rising to a level of 74 and 4.44 today.  Does not appear that is received any contrast exposure.  Recent urinalysis finds a red hazy specimen with specific gravity 1.015 and a pH of 7.0 there is large blood with 100+ protein which is consistent with previous evaluations.  Urology is following him for his hematuria he is received certain amount of IV fluids.  This is not well documented.  He is made 1.6 L of urine output over the course of the past 24 hours he is approximately 14 L negative since admission.  We have been asked to see him regarding his further rising creatinine   No F/C/N/V/CP/SOB.  No melena, hematochezia, hematemesis.  No HA, visual changes, or abdominal pain. UOP improved since IV lasix. CXR 12/3 pulmonary edema and/or infiltrates are identified, which appear somewhat increased since the prior exam.      DAILY NEPHROLOGY SUMMARY:  12/2 : Consult done   12/3: Overnight he was hypoxic, IVF stopped, received IV lasix 40 mg once,he is  "lethargic, sitter at bedside   12/4 More alert today. Oriented to person only No c/o, UOP 1100 cc/ past 24 hrs  12/5: Pt sitting up in bed, pleasantly confused, not aware he is in the hospital, labs reviewed, BUN/Cr 80/4.98, BP labile, resp status stable 2.5L NC O2, 1.9L UOP recorded  12/5: Pt laying in bed comfortable, labs reviewed , on 2.5L NC resp stable, 1.8UOP recorded in 24 hrs, BUN 84, SCr 4.89. Denies CP or SOB. Denies CP or SOB. Denies any overnight events.   12/7: NAEO, no complaints, transitioned to comfort care today    REVIEW OF SYSTEMS:    10 point ROS reviewed and is as per HPI/daily summary or otherwise negative    PMH/PSH/SH/FH:Reviewed and unchanged since admission note    CURRENT MEDICATIONS:Reviewed from admission to present day  Scheduled Medications   Medication Dose Frequency    docusate sodium  100 mg Q12HRS    scopolamine  1 Patch Q72HRS        VS:  BP (!) 155/91 Comment: Rn notified   Pulse 76   Temp 36.1 °C (97 °F) (Temporal)   Resp 18   Ht 1.88 m (6' 2\")   Wt 114 kg (251 lb 5.2 oz)   SpO2 98%   BMI 32.27 kg/m²   Physical Exam  Nursing note reviewed.   Constitutional:       General: He is not in acute distress.     Appearance: Normal appearance.   HENT:      Head: Normocephalic and atraumatic.   Eyes:      General: No scleral icterus.  Cardiovascular:      Comments: No edema  Pulmonary:      Effort: Pulmonary effort is normal.   Abdominal:      General: There is no distension.   Genitourinary:     Comments: Ball with improving hematuria  Musculoskeletal:         General: No deformity.   Skin:     General: Skin is warm and dry.      Findings: No rash.   Neurological:      Mental Status: He is alert. He is disoriented and confused.   Psychiatric:         Attention and Perception: He perceives visual hallucinations.         Mood and Affect: Mood normal.       Fluids:    Intake/Output Summary (Last 24 hours) at 12/7/2022 1232  Last data filed at 12/7/2022 0715  Gross per 24 hour "   Intake 50 ml   Output 1100 ml   Net -1050 ml         LABS:  Labs reviewed, pertinent labs below.    Recent Labs     12/04/22  1318 12/04/22  2239 12/05/22 0524 12/06/22 0314 12/07/22 0054   RBC  --    < > 2.90* 2.79* 2.69*   HEMOGLOBIN  --    < > 8.4* 8.0* 7.8*   HEMATOCRIT  --    < > 27.1* 26.9* 26.2*   PLATELETCT  --    < > 445 449* 380   PROTHROMBTM 17.8*  --   --   --   --    APTT 39.2*  --   --   --   --    INR 1.49*  --   --   --   --     < > = values in this interval not displayed.         Recent Labs     12/05/22 0524 12/06/22 0314 12/07/22 0054   SODIUM 141 144 141   POTASSIUM 5.0 4.9 5.1   CHLORIDE 104 104 107   CO2 22 25 23   GLUCOSE 110* 137* 126*   BUN 80* 84* 86*   CREATININE 4.98* 4.89* 5.08*   CALCIUM 8.9 8.7 8.5          IMAGING:  All imaging reviewed from admission to present day    DISCUSSION  Complex Case.  Appears to have significant right-sided heart failure which is leading to left-sided heart failure. Patients with this degree of right-sided heart failure typically require substantial preload to maintain their cardiac output. Treatment is to maintain preload, usually with IV fluids but this can worsen their pulmonary performance.     ASSESSMENTS:  # Acute kidney injury, nonoliguric  Likely related to altered renal perfusion with cardiorenal syndrome, R psoas hematoma and acute blood loss  Urinalysis is difficult to interpret given his gross hematuria  No emergent need for dialysis  And he would be a poor long-term dialysis cannot the date given his   chronic medical conditions               Cr trending down , renal US 12/3 unremarkable   Continue efforts to augment renal perfusion  # Retroperitoneal hematoma  Slight decrease in size based on CAT scan of 11/30/2022  Gross hematuria as a result  Continues with Ball catheter  # Gross hematuria, improving   Urine culture negative on 11/30/2022  Eliquis on hold   # Recent acute kidney injury with hyperkalemia  Required hemodialysis x1  #  Chronic kidney disease  Baseline creatinine unknown  # Dementia  # Low serum albumin  # Hypocalcemia  # Anemia, iron stores adequate  # Diabetes mellitus, type II  # Congestive heart failure with LV dysfunction  Most recent echocardiogram finds an ejection fraction of 53%   # Right-sided heart failure  Severely dilated right ventricle with reduced right ventricular systolic function,  Normal inferior vena cava size and   inspiratory collapse.  # Hyperphosphetemia      SUGGESTIONS:  No emergent need for dialysis, none to be offered with transition to comfort care  No further labs from renal standpoint              Dose all meds for eGFR              Will see as needed, call for questions

## 2022-12-07 NOTE — CARE PLAN
Problem: Respiratory  Goal: Patient will achieve/maintain optimum respiratory ventilation and gas exchange  Outcome: Progressing   The patient is Stable - Low risk of patient condition declining or worsening    Shift Goals  Clinical Goals: Comfort care  Patient Goals: comfort  Family Goals: None at bedside at this time.    Progress made toward(s) clinical / shift goals:  Oxy mask being used, mouth breather.     Assumed care of patient at 0700. Received bedside report from night shift RN. Bed is locked and lowest position, call light within reach. Treaded socks in place. Patient updated on plan of care, no complaints or pain at this time. Pt AxOx1 baseline per report. Patient breathing pattern is unlabored on 2-3L oxy mask . Pt is medical and now comfort care. All needs met at this time. Will continue care and monitoring as ordered.

## 2022-12-07 NOTE — HEART FAILURE PROGRAM
Notes indicating plan for hospice discharge. Appropriately, there are no cardiology f/u appointments scheduled.

## 2022-12-07 NOTE — PROGRESS NOTES
[facility] Internal Medicine Daily Progress Note    Date of Service  12/7/2022    [employer] Team: R IM Blue Team   Attending: Mahad Kwong M.d.  Senior Resident: Dr. Kath MORRISON  Intern:  Dr. Georgina MORRISON  Contact Number: [phone]    Chief Complaint  Sean Kincaid is a 77 y.o. male admitted 11/4/2022 as transfer from [facility] for respiratory failure secondary to COVID.    Hospital Course  76 y/o M with hx COPD, afib (was on anticoag), T2DM, hx of CHF and complete heart block, bioprosthetic replacement of mitral valve, ANALY, EF 45% who presented 11/4/2022 as transfer from [facility] with worsening SOB and fatigue. Dx w COVID-19, started initially on BiPAP and then was intubated. Found to have MYLA with Cr 8.0 and K 6.6 and admitted to [facility] ICU requiring ventilation, vasopressors (norepinephrine, vasopressin) and on empiric abx with ceftriaxone. Nephro consulted for MYLA/nonoliguria and was initiated on RRT with improvement in urine output so no longer needed. Developed R thigh/femoral hematoma related to a trialysis catheter placement so anticoagulation held. Required Precedex for sedation, fentanyl for pain control. Respiratory culture +MSSA and initiated on ancef. COVID-19 pneumonia was treated with dexamethasone 6 mg for 10 days. Extubated on 11/8 and was significantly encephalopathic. CT of head was negative for acute changes. Couldn’t do MRI due to pacemaker incompatibility. Mentation improved but still had mental slowing, confusion, A&O x1, moving all 4 extremities fairly equally. Palliative care was consulted to discuss goals of care with family and the decision was reached in light of the patient's condition and age to change his CODE STATUS to DNR/DNI. Also noted to have had hypernatremia which resolved. Per SLP, passed swallow evaluation although p.o. intake poor. R groin hematoma stabilizing so restarted on heparin gtt and resumed coumadin with eventual transition to apixaban. Hgb  trended down; concern for GI bleed in addition to his hematuria so anticoagulation held.      #MYLA- Creatinine remains elevated with nephrology following with recs that dialysis is not needed at this time. Will continue to avoid nephrotoxins. Urine still slightly blood tinged with small clots. Fluids cannot be given due to risk of worsening his pulmonary edema.       #Hypoxic respiratory failure due to covid s/p intubation c/b HAP, aspiration pneumonia  s/p decadron and now at 2-3 LNC, weaned down to 2.5 LNC. Covid isolation has been lifted.      #Constipation  Repeat KUB without colonic stool burden, will continue with bowel regimen.     #Right psoas hematoma- stabilized     #Severe pharyngeal dysphagia  Aspiration risk discussed during meeting w sonia Purcell/Theo on 12/6 with palliative care Stella Vidal. SLP FEES eval completed with difficulty due to pt not cooperating/agitation. Pt repeatedly stated he wanted to eat and was observed to be coughing following FEES. Sonia agreed to the risk of aspiration to allow patient to eat as he vocalizes that he wants to.     Interval Problem Update    No acute events overnight. Placed comfort care orders. Patient asking for water this morning which was given. Continuing to have shortness of breath and fatigue, on 3L oxymask. A&Ox1 (to self, baseline per chart review). Hgb trending down, 7.8 from 8.0 yesterday. Palliative care following. Per request, ordered sublingual morphine for pain/tachypnea as patient does not have any IV access (requires restraints to not pull it out). Pending hospice referral to [facility].     I have discussed this patient's plan of care and discharge plan at IDT rounds today with Case Management, Nursing, Nursing leadership, and other members of the IDT team.    Consultants/Specialty  nephrology Adele QUIÑONES  Palliative care    Code Status  Comfort Care/DNR    Disposition  Patient is medically cleared for discharge.   Anticipate discharge to  to hospice.  I have placed the appropriate orders for post-discharge needs.    Review of Systems  Review of Systems   Constitutional:  Negative for chills, fever and malaise/fatigue.   Respiratory:  Negative for cough and shortness of breath.    Cardiovascular:  Negative for chest pain.   Gastrointestinal:  Negative for abdominal pain, constipation, nausea and vomiting.   Genitourinary:  Negative for dysuria.   Musculoskeletal:  Negative for myalgias.   Neurological:  Negative for dizziness and headaches.   Psychiatric/Behavioral:  Positive for depression. The patient is nervous/anxious.         Upset about being in the hospital, misses his  wife      Physical Exam  Temp:  [36.1 °C (97 °F)] 36.1 °C (97 °F)  Pulse:  [76-85] 76  Resp:  [18-20] 18  BP: (138-155)/(77-91) 155/91  SpO2:  [90 %-98 %] 98 %    Physical Exam  Constitutional:       General: He is not in acute distress.     Appearance: Normal appearance. He is obese. He is ill-appearing.   HENT:      Head: Normocephalic and atraumatic.      Right Ear: External ear normal.      Left Ear: External ear normal.      Nose: Nose normal.      Mouth/Throat:      Mouth: Mucous membranes are moist.   Eyes:      General: No scleral icterus.        Right eye: No discharge.         Left eye: No discharge.      Extraocular Movements: Extraocular movements intact.      Conjunctiva/sclera: Conjunctivae normal.      Pupils: Pupils are equal, round, and reactive to light.   Cardiovascular:      Rate and Rhythm: Normal rate and regular rhythm.      Pulses: Normal pulses.      Heart sounds: Normal heart sounds.   Pulmonary:      Effort: Pulmonary effort is normal. No respiratory distress.      Breath sounds: Normal breath sounds. No wheezing or rales.      Comments: On 3L oxymask  Abdominal:      General: Abdomen is flat. Bowel sounds are normal. There is no distension.      Palpations: Abdomen is soft.      Tenderness: There is no abdominal tenderness. There is no right  CVA tenderness, left CVA tenderness or guarding.      Comments: Expressed discomfort with abdominal exam but not grimacing   Musculoskeletal:         General: No swelling or tenderness. Normal range of motion.      Cervical back: Normal range of motion.      Right lower leg: No edema.      Left lower leg: No edema.      Comments: Bilateral venous stasis skin changes to bilateral lower extremities   Skin:     General: Skin is warm and dry.      Coloration: Skin is not jaundiced.   Neurological:      General: No focal deficit present.      Mental Status: He is alert and oriented to person, place, and time.   Psychiatric:         Mood and Affect: Mood normal.         Behavior: Behavior normal.         Thought Content: Thought content normal.         Judgment: Judgment normal.       Fluids    Intake/Output Summary (Last 24 hours) at 12/7/2022 1423  Last data filed at 12/7/2022 0715  Gross per 24 hour   Intake 50 ml   Output 1100 ml   Net -1050 ml       Laboratory  Recent Labs     12/05/22 0524 12/06/22  0314 12/07/22  0054   WBC 9.4 9.7 8.3   RBC 2.90* 2.79* 2.69*   HEMOGLOBIN 8.4* 8.0* 7.8*   HEMATOCRIT 27.1* 26.9* 26.2*   MCV 93.4 96.4 97.4   MCH 29.0 28.7 29.0   MCHC 31.0* 29.7* 29.8*   RDW 48.3 50.3* 52.0*   PLATELETCT 445 449* 380   MPV 9.9 10.2 9.7     Recent Labs     12/05/22 0524 12/06/22  0314 12/07/22  0054   SODIUM 141 144 141   POTASSIUM 5.0 4.9 5.1   CHLORIDE 104 104 107   CO2 22 25 23   GLUCOSE 110* 137* 126*   BUN 80* 84* 86*   CREATININE 4.98* 4.89* 5.08*   CALCIUM 8.9 8.7 8.5                   Imaging  US-EXTREMITY VENOUS LOWER BILAT   Final Result      [id number] VIEW   Final Result      1.  No evidence of bowel obstruction.   2.  No significant colonic stool demonstrated.      US-RENAL   Final Result      1.  No evidence of solid renal mass or hydronephrosis.      2.  Ball catheter within the urinary bladder.      DX-CHEST-PORTABLE (1 VIEW)   Final Result         1.  Pulmonary edema and/or  infiltrates are identified, which appear somewhat increased since the prior exam.   2.  Cardiomegaly   3.  Atherosclerosis      CT-ABDOMEN-PELVIS W/O   Final Result      1.  No hydronephrosis or obstructing renal or ureteral stone.   2.  Tiny right upper pole calcification could represent a nonobstructing renal stone or cortical calcification.   3.  Decreased size of a left iliacus hematoma, measuring 3.9 x 2.7 cm, previously 4.3 x 3.4 cm.   4.  Right greater than left lower lobe consolidations favoring pneumonia.   5.  Large rectal stool burden.   6.  Gallbladder sludge versus layering stones.   7.  Cardiomegaly.   8.  Fat-containing ventral hernia with a 2.3 cm neck.   9.  Atherosclerotic changes.      DX-CHEST-PORTABLE (1 VIEW)   Final Result      No acute cardiopulmonary abnormality identified.      CT-ABDOMEN-PELVIS W/O   Final Result      1.  Left retroperitoneal hematoma along the left iliopsoas/iliac is muscle measuring approximately 4.3 x 3.4 x 5.9 cm.      2.  Bilateral lower lobe consolidation could be due to pneumonia or atelectasis.      3.  Small layering stones versus sludge in the gallbladder.      4.  Diverticulosis without diverticulitis.      5.  Moderate amount of stool throughout the colon suggesting constipation.      US-EXTREMITY VENOUS UPPER UNILAT RIGHT   Final Result      DX-WRIST-LIMITED 2- RIGHT   Final Result         1.   No radiographic evidence of acute injury.      2.  Old healed fracture right fifth metacarpal neck.      3.  Moderate osteoarthritic changes of the right first carpal metacarpal joint.      4.  Possible intra-articular loose bodies adjacent to the styloid processes of the radius and ulna.      DX-CHEST-PORTABLE (1 VIEW)   Final Result      1.  Increased pulmonary edema   2.  Bibasilar underinflation atelectasis which could obscure an additional process. This is unchanged.   3.  Persistently enlarged cardiac silhouette      DX-CHEST-PORTABLE (1 VIEW)   Final Result       Mildly improved aeration and stable to slightly improved vascular congestion.      Mildly improved left basilar atelectasis.      DX-CHEST-PORTABLE (1 VIEW)   Final Result      1.  Interval extubation and removal of NG tube.   2.  Increased bibasilar airspace disease.   3.  Increased interstitial edema.   4.  Small left pleural effusion.      CT-HEAD W/O   Final Result      1. No CT evidence of acute infarct, hemorrhage or mass.   2. Moderate global parenchymal atrophy. Chronic small vessel ischemic changes.      DX-CHEST-PORTABLE (1 VIEW)   Final Result      Mildly improved left basilar opacity.      DX-ABDOMEN FOR TUBE PLACEMENT   Final Result      Enteric tube tip projects over the gastric antrum or proximal duodenum      DX-CHEST-PORTABLE (1 VIEW)   Final Result         1. Stable lines and tubes.   2. Worsening left basilar opacity. Small left pleural effusion. Mild right infrahilar atelectasis.         DX-CHEST-PORTABLE (1 VIEW)   Final Result      1.  Cardiomegaly with interstitial edema.      2.  Left lung base atelectasis and minimal left pleural effusion.      DX-CHEST-PORTABLE (1 VIEW)   Final Result      1.  Improved left pleural effusion with adjacent airspace disease.   2.  Improved interstitial infiltrates versus edema.      EC-ECHOCARDIOGRAM COMPLETE W/O CONT   Final Result      DX-CHEST-PORTABLE (1 VIEW)   Final Result      1.  Small left pleural effusion with adjacent airspace disease.   2.  Worsening interstitial infiltrates versus edema.   3.  Right basilar atelectasis.      DX-ABDOMEN FOR TUBE PLACEMENT   Final Result      Enteric tube tip projects over the proximal duodenum      DX-ABDOMEN FOR TUBE PLACEMENT   Final Result         1.  Nonspecific bowel gas pattern in the upper abdomen.   2.  Nasogastric tube, not visualized beyond the mid thorax, see ordered repeat abdominal x-ray for further characterization.   3.  Cardiomegaly   4.  Left lung base atelectasis or infiltrate.      OUTSIDE  IMAGES-DX CHEST   Final Result      DX-CHEST-PORTABLE (1 VIEW)   Final Result         1.  Bibasilar atelectasis or early infiltrates.   2.  Trace bilateral pleural effusions   3.  Cardiomegaly           Assessment/Plan  Problem Representation:    Mr. Sean Kincaid is a 77 y.o. male with history of COPD, atrial fibrillation (was on anticoagulation), diabetes type 2, history of congestive heart failure and complete heart block, bioprosthetic replacement of the mitral valve, ANALY, EF of 45% who presented 11/4/2022 as a transfer from [facility], complaining of worsening shortness of breath and fatigue found to be COVID-19 positive and is now COVID recovered with persistent MYLA and hematuria and severe pharyngeal dysphagia. Sons have elected for comfort care and Hospice care to continue his diet with acceptance of risk of aspiration.    * MYLA (acute kidney injury) (HCC)- (present on admission)  Assessment & Plan  Initially improved with dialysis and now nephrology has been reconsulted and following  Unsure of baseline,  this admission creatinine seems to have stabilized within the 2.5-3.0 range  -CTM  -nephrology following w/no need for emergent dialysis at this time, still oliguric making greater than 1500 mL of urine a day, poor long term dialysis patient given co-morbidities  -IV fluids held due to pulmonary edema when given fluids previously  -family elected for comfort care/hospice care    Pacemaker complications  Assessment & Plan  RESOLVED  Was informed by telemetry patient's pacemaker is not pacing properly. Patient asymptomatic at bedside w/ ecg showing intermittent pacing discussed with electrocardio physiology NP embers and plan is to interrogate device, rep notified.  -Pacer performing correctly on interrogation  - Patient asymptomatic  - DC telemetry  - Follow-up with cardiology as outpatient    Constipation  Assessment & Plan  s/p enema, repeat KUB no sig stool burden still have x2  bms/daily  -scheduled bowel regimen    Anemia  Assessment & Plan  Hgb trended up from 7.5 to 8.4  CT abdomen showing: Left retroperitoneal hematoma along the left iliopsoas/iliac is muscle measuring approximately 4.3 x 3.4 x 5.9 cm.  Received 2 units pRBC so far.  -monitor H/H daily transfuse if Hgb<7  - comfort care as of 12/7    Delirium- (present on admission)  Assessment & Plan  Waxing and waning, requiring soft restraints at this time  Standard precautions and out of delirium  Avoid offending medication  Avoid interruption of circadian rhythm  CT head negative for acute changes, unable to provide a MRI imaging secondary to patient's pacemaker.  -D/c tele  -No vitals overnight  -[place] if disoriented  -Patient alert and oriented x 3 at bedside  -Ok for physical restraints for increased agitation  - comfort care as of 12/7    MSSA (methicillin susceptible Staphylococcus aureus) pneumonia ([facility])- (present on admission)  Assessment & Plan  Finished treatment     Goals of care, counseling/discussion- (present on admission)  Assessment & Plan  Palliative care consulted with family discussion plan completed on 12/06  Due to his co-morbidities with MYLA and hematuria and now with severe pharyngeal dysphagia, his son's have elected for comfort care and hospice care.    Hypernatremia- (present on admission)  Assessment & Plan  Resolved     Septic shock with acute organ dysfunction due to methicillin susceptible Staphylococcus aureus (MSSA) ([facility])  Assessment & Plan  Present on admission, treated adequately    Hematoma- (present on admission)  Assessment & Plan  CT abdomen showing Left retroperitoneal hematoma along the left iliopsoas/iliac is muscle measuring approximately 4.3 x 3.4 x 5.9 cm.  Was on warfarin which was held and was given FFP and vit K to reverse INR   Also received 2 unit of RBC, Warfarin then switched to apixaban  -apixaban held due to hematuria    Acute on chronic systolic congestive heart failure (HCC)-  (present on admission)  Assessment & Plan  Metoprolol tartrate 12.5 BID, EF has been improving  Continue present medical therapy  - comfort care as of 12/7    Hyperkalemia- (present on admission)  Assessment & Plan  Improved s/p kayexalate and insulin + dextrose  RESOLVED  -Continue kayexalate  -Low potassium diet  -R/p bmp  -Monitor     Acute hypoxemic respiratory failure due to COVID-19 (HCC)- (present on admission)  Assessment & Plan  Patient previously treated for pneumonia course of Zosyn for hospital-acquired pneumonia, weaned off of oxygen, however concerned on CT chest CT for possible aspiration pneumonia, will hold n.p.o. until speech-language pathology sees  - Speech-language pathology recommendations    Restless leg syndrome- (present on admission)  Assessment & Plan  -low dose requip 0.5mg as may be leading to agitation at night due to discomfort    Chronic anticoagulation- (present on admission)  Assessment & Plan  Holding Coumadin. Originally replaced w/ apixiban 5mg BID but now held in setting of hematuria    Paroxysmal atrial fibrillation (HCC)- (present on admission)  Assessment & Plan  Hx of anticoagulation. Warfarin was held and pt received VIt K and also FFP  -switch to apixaban 5 mg BID which was held given continued at high risk for bleeding, Discussion by previous team with patient and son who are agreeable to plan of care  -metoprolol tartrate 12.5 BID    Hematuria  Assessment & Plan  Hematuria still noticable from mcdowell, which was also noted yesterday  -apixaban held  -no growth on urine culture  -per urology:     - Recommend repeat CT a/p w/o due to decreased renal function will avoid contrast    - Hand irrigate mcdowell catheter PRN for clots/catheter not draining    - Monitor for worsening hematuria, consider 3-way mcdowell catheter placement if CBI is indicated    - Continue mcdowell catheter for now, hospital team planning for TOV prior to discharge on tamsulosin    -urology signed off  -Trend  CBC and transfuse as necessary for Hgb<7    Pacemaker- (present on admission)  Assessment & Plan  History of complete heart block, pacemaker site no signs of infection  -telemetry       VTE prophylaxis: SCDs/TEDs    I have performed a physical exam and reviewed and updated ROS and Plan today (12/7/2022). In review of yesterday's note (12/6/2022), there are no changes except as documented above.

## 2022-12-07 NOTE — DISCHARGE PLANNING
Agency/Facility Name: [facility]  Spoke To: Jose Enrique  Outcome: DPA was notified liaison has received referral for Pt. Jose Enrique to forward referral to hospice services. DPA inquired which  hospice typically visits. Jose Enrique to speak with intake and call this DPA back.     RN CM notified.     1055:  Agency/Facility Name: [facility]  Spoke To: Jose Enrique  Outcome: DPA was notified Hospice has not received referral. DPA to send referral via communication management.     Fax: [phone]    1057:  DPA sent hospice referral to [facility] via communication management.     1505:  Agency/Facility Name: [facility]  Spoke To: Jose Enrique  Outcome: DPA was notified Jose Enrique needs to verify with intake hospice if referral was received. Jose Enrique to call this DPA back.     1508:  Agency/Facility Name: [facility]  Spoke To: Jose Enrique  Outcome: DPA was notified Hospice still has not received referral. DPA to send referral via Epic and follow up.     1510:  DPA sent referral via Epic navigator.

## 2022-12-08 PROBLEM — E87.0 HYPERNATREMIA: Status: RESOLVED | Noted: 2022-01-01 | Resolved: 2022-01-01

## 2022-12-08 NOTE — CARE PLAN
The patient is Stable - Low risk of patient condition declining or worsening    Shift Goals  Clinical Goals: comfort  Patient Goals: comfort  Family Goals: comfort    Progress made toward(s) clinical / shift goals: Patient with comfort care orders in place. Has morphine/ativan available for pain and anxiety. On 2lnc intermittently, depending if patient keeps in in place. Fall precautions maintained. Inc of stool, has mcdowell in place. Plan for hospice.  Problem: Fall Risk  Goal: Patient will remain free from falls  Outcome: Progressing   Patient remains free from falls, all fall precautions in place.   Problem: Discharge Planning - Comfort Care  Goal: Patient's continuum of care needs are met  Outcome: Progressing   Patient with plan to be discharged to hospice facility. CM following. Comfort care orders in place.   Problem: Respiratory - Comfort Care  Goal: Patient's respiratory function will be supported  Outcome: Progressing  Patient intermittently on o2. Patient removes o2 intermittently. Comfort care orders in place for respiratory discomfort.     Patient is not progressing towards the following goals:    Problem: Psychosocial  Goal: Patient's level of anxiety will decrease  Outcome: Not Progressing   Patient intermittently anxious and agitated. Received PO ativan PRN with improvement.

## 2022-12-08 NOTE — DISCHARGE PLANNING
Agency/Facility Name: [facility]  Spoke To: Jose Enrique  Outcome: DPA was notified Hospice received referral. DPA provided liaison with updated Pt disposition. Jose Enrique stated they are trying to reach out to Pt family regarding plan for signing on Pt. DPA to update hospice as plan progresses.     RN CM notified.     1040:  Agency/Facility Name: [facility]  Spoke To: Jose Enrique  Outcome: DPA was notified Hospice has reached out to Pt son. Jose Enrique stated Pt's son has refused to sign and they cannot continue until POA for Pt has signed Pt on for hospice services. DPA to follow up as needed.     RN CM notified.   Director Jocelynn notified.     1225:  Agency/Facility Name: [facility]  Outcome: DPA left a voicemail for liaison Jose Enrique regarding Pt updated disposition. DPA requesting a call back.     1230:  Agency/Facility Name: [facility]  Spoke To: Jose Enrique  Outcome: DPA spoke with Hospice on d/c disposition. Jose Enrique states they can reach out to Pt son again regarding signing Pt on or come see Pt at bedside tomorrow once son arrives. DPA to update after speaking with care team.     1510:  Agency/Facility Name: [facility]  Spoke To: Jose Enrique  Outcome: DPA notified Hospice Pt son will be at bedside tomorrow morning but unsure of what time. DPA to touch base tomorrow morning regarding what time son will arrive.     RN CM notified.

## 2022-12-08 NOTE — PLAN OF CARE (IOPOC)
December 8, 2022  0830    Patient's son's, Theo and Binh, were both called to update them on their father's current clinical state.     When the family, Palliative care and the primary team met on Tuesday (1130), it was discussed that if we allow the patient to have pleasure feeds, he will be at an increased risk for aspiration and could shorten his life expectancy.     It was specifically said that if his oxygen requirements were to increase from aspiration, we would be looking at days left, given his other significant co-morbidities.     Patient's oxygen requirements have been increasing and he has become more encephalopathic. Comfort care orders are in to ensure that the patient is as comfortable as possible.     Palliative care team aware.       Sherry Stockton MD  Internal Medicine PGY-2

## 2022-12-08 NOTE — PROGRESS NOTES
[facility] Internal Medicine Daily Progress Note    Date of Service  12/8/2022    [facility] Team: [facility] Team   Attending: Mahad Kwong M.d.  Senior Resident: Dr. TEO Stockton  Contact Number: [phone]    Chief Complaint  Sean Kincaid is a 77 y.o. male admitted 11/4/2022 as transfer from [facility] for respiratory failure secondary to COVID.    Hospital Course  78 y/o M with hx COPD, afib (was on anticoag), T2DM, hx of CHF and complete heart block, bioprosthetic replacement of mitral valve, ANALY, EF 45% who presented 11/4/2022 as transfer from [facility] with worsening SOB and fatigue. Dx w COVID-19, started initially on BiPAP and then was intubated. Found to have MYLA with Cr 8.0 and K 6.6 and admitted to [facility] ICU requiring ventilation, vasopressors (norepinephrine, vasopressin) and on empiric abx with ceftriaxone. Nephro consulted for MYLA/nonoliguria and was initiated on RRT with improvement in urine output so no longer needed. Developed R thigh/femoral hematoma related to a trialysis catheter placement so anticoagulation held. Required Precedex for sedation, fentanyl for pain control. Respiratory culture +MSSA and initiated on ancef. COVID-19 pneumonia was treated with dexamethasone 6 mg for 10 days. Extubated on 11/8 and was significantly encephalopathic. CT of head was negative for acute changes. Couldn’t do MRI due to pacemaker incompatibility. Mentation improved but still had mental slowing, confusion, A&O x1, moving all 4 extremities fairly equally. Palliative care was consulted to discuss goals of care with family and the decision was reached in light of the patient's condition and age to change his CODE STATUS to DNR/DNI. Also noted to have had hypernatremia which resolved. Per SLP, passed swallow evaluation although p.o. intake poor. R groin hematoma stabilizing so restarted on heparin gtt and resumed coumadin with eventual transition to apixaban. Hgb trended down; concern  for GI bleed in addition to his hematuria so anticoagulation held.      #MYLA- Creatinine remains elevated with nephrology following with recs that dialysis is not needed at this time. Will continue to avoid nephrotoxins. Urine still slightly blood tinged with small clots. Fluids cannot be given due to risk of worsening his pulmonary edema.       #Hypoxic respiratory failure due to covid s/p intubation c/b HAP, aspiration pneumonia  s/p decadron and now at 2-3 LNC, weaned down to 2.5 LNC. Covid isolation has been lifted.      #Constipation  Repeat KUB without colonic stool burden, will continue with bowel regimen.     #Right psoas hematoma- stabilized     #Severe pharyngeal dysphagia  Aspiration risk discussed during meeting w sonia Purcell/Theo on 12/6 with palliative care Stella Vidal. SLP FEES eval completed with difficulty due to pt not cooperating/agitation. Pt repeatedly stated he wanted to eat and was observed to be coughing following FEES. Sons agreed to the risk of aspiration to allow patient to eat as he vocalizes that he wants to.     Interval Problem Update  Overnight, patient had increased oxygen requirements (5L NC). HR elevated to ~175 bpm. He does not have IV access, as every time one is placed, the patient pulls it out. RN staff have been trying to treat patient's agitation with comfort care medications that were ordered.   Currently, the patient is AO x1.   Patient's sons were called this AM to explain the changes in clinical status. Unclear of how much time patient has left. Palliative care was updated.   Patient's son Theo will be at bedside tomorrow AM.     I have discussed this patient's plan of care and discharge plan at IDT rounds today with Case Management, Nursing, Nursing leadership, and other members of the IDT team.    Consultants/Specialty  Nephrology  Palliative Care    Code Status  Comfort Care/DNR    Disposition  Patient is medically cleared for discharge.   Anticipate discharge to to  hospice.  I have placed the appropriate orders for post-discharge needs.    Review of Systems  Review of Systems   Reason unable to perform ROS: Unable to be performed due to encephalopathy.      Physical Exam  Temp:  [37 °C (98.6 °F)] (P) 37 °C (98.6 °F)  Pulse:  [] (P) 91  Resp:  [20-32] (P) 32  BP: (173)/(133) (P) 161/90  SpO2:  [88 %-92 %] (P) 92 %    Physical Exam  Constitutional:       Appearance: He is ill-appearing.   HENT:      Head: Normocephalic and atraumatic.      Nose: Nose normal.      Mouth/Throat:      Mouth: Mucous membranes are dry.   Eyes:      Conjunctiva/sclera: Conjunctivae normal.   Cardiovascular:      Rate and Rhythm: Regular rhythm. Tachycardia present.      Pulses: Normal pulses.   Pulmonary:      Breath sounds: Wheezing present.   Abdominal:      General: Bowel sounds are normal.      Palpations: Abdomen is soft.   Musculoskeletal:      Cervical back: Neck supple.   Skin:     General: Skin is dry.      Capillary Refill: Capillary refill takes less than 2 seconds.   Neurological:      Mental Status: He is disoriented.     Fluids    Intake/Output Summary (Last 24 hours) at 12/8/2022 1202  Last data filed at 12/8/2022 0838  Gross per 24 hour   Intake 300 ml   Output 1900 ml   Net -1600 ml     Laboratory  Recent Labs     12/06/22  0314 12/07/22  0054   WBC 9.7 8.3   RBC 2.79* 2.69*   HEMOGLOBIN 8.0* 7.8*   HEMATOCRIT 26.9* 26.2*   MCV 96.4 97.4   MCH 28.7 29.0   MCHC 29.7* 29.8*   RDW 50.3* 52.0*   PLATELETCT 449* 380   MPV 10.2 9.7     Recent Labs     12/06/22  0314 12/07/22  0054   SODIUM 144 141   POTASSIUM 4.9 5.1   CHLORIDE 104 107   CO2 25 23   GLUCOSE 137* 126*   BUN 84* 86*   CREATININE 4.89* 5.08*   CALCIUM 8.7 8.5     Imaging  US-EXTREMITY VENOUS LOWER BILAT   Final Result      [id number] VIEW   Final Result      1.  No evidence of bowel obstruction.   2.  No significant colonic stool demonstrated.      US-RENAL   Final Result      1.  No evidence of solid renal mass or  hydronephrosis.      2.  Ball catheter within the urinary bladder.      DX-CHEST-PORTABLE (1 VIEW)   Final Result         1.  Pulmonary edema and/or infiltrates are identified, which appear somewhat increased since the prior exam.   2.  Cardiomegaly   3.  Atherosclerosis      CT-ABDOMEN-PELVIS W/O   Final Result      1.  No hydronephrosis or obstructing renal or ureteral stone.   2.  Tiny right upper pole calcification could represent a nonobstructing renal stone or cortical calcification.   3.  Decreased size of a left iliacus hematoma, measuring 3.9 x 2.7 cm, previously 4.3 x 3.4 cm.   4.  Right greater than left lower lobe consolidations favoring pneumonia.   5.  Large rectal stool burden.   6.  Gallbladder sludge versus layering stones.   7.  Cardiomegaly.   8.  Fat-containing ventral hernia with a 2.3 cm neck.   9.  Atherosclerotic changes.      DX-CHEST-PORTABLE (1 VIEW)   Final Result      No acute cardiopulmonary abnormality identified.      CT-ABDOMEN-PELVIS W/O   Final Result      1.  Left retroperitoneal hematoma along the left iliopsoas/iliac is muscle measuring approximately 4.3 x 3.4 x 5.9 cm.      2.  Bilateral lower lobe consolidation could be due to pneumonia or atelectasis.      3.  Small layering stones versus sludge in the gallbladder.      4.  Diverticulosis without diverticulitis.      5.  Moderate amount of stool throughout the colon suggesting constipation.      US-EXTREMITY VENOUS UPPER UNILAT RIGHT   Final Result      DX-WRIST-LIMITED 2- RIGHT   Final Result         1.   No radiographic evidence of acute injury.      2.  Old healed fracture right fifth metacarpal neck.      3.  Moderate osteoarthritic changes of the right first carpal metacarpal joint.      4.  Possible intra-articular loose bodies adjacent to the styloid processes of the radius and ulna.      DX-CHEST-PORTABLE (1 VIEW)   Final Result      1.  Increased pulmonary edema   2.  Bibasilar underinflation atelectasis which could  obscure an additional process. This is unchanged.   3.  Persistently enlarged cardiac silhouette      DX-CHEST-PORTABLE (1 VIEW)   Final Result      Mildly improved aeration and stable to slightly improved vascular congestion.      Mildly improved left basilar atelectasis.      DX-CHEST-PORTABLE (1 VIEW)   Final Result      1.  Interval extubation and removal of NG tube.   2.  Increased bibasilar airspace disease.   3.  Increased interstitial edema.   4.  Small left pleural effusion.      CT-HEAD W/O   Final Result      1. No CT evidence of acute infarct, hemorrhage or mass.   2. Moderate global parenchymal atrophy. Chronic small vessel ischemic changes.      DX-CHEST-PORTABLE (1 VIEW)   Final Result      Mildly improved left basilar opacity.      DX-ABDOMEN FOR TUBE PLACEMENT   Final Result      Enteric tube tip projects over the gastric antrum or proximal duodenum      DX-CHEST-PORTABLE (1 VIEW)   Final Result         1. Stable lines and tubes.   2. Worsening left basilar opacity. Small left pleural effusion. Mild right infrahilar atelectasis.         DX-CHEST-PORTABLE (1 VIEW)   Final Result      1.  Cardiomegaly with interstitial edema.      2.  Left lung base atelectasis and minimal left pleural effusion.      DX-CHEST-PORTABLE (1 VIEW)   Final Result      1.  Improved left pleural effusion with adjacent airspace disease.   2.  Improved interstitial infiltrates versus edema.      EC-ECHOCARDIOGRAM COMPLETE W/O CONT   Final Result      DX-CHEST-PORTABLE (1 VIEW)   Final Result      1.  Small left pleural effusion with adjacent airspace disease.   2.  Worsening interstitial infiltrates versus edema.   3.  Right basilar atelectasis.      DX-ABDOMEN FOR TUBE PLACEMENT   Final Result      Enteric tube tip projects over the proximal duodenum      DX-ABDOMEN FOR TUBE PLACEMENT   Final Result         1.  Nonspecific bowel gas pattern in the upper abdomen.   2.  Nasogastric tube, not visualized beyond the mid thorax,  see ordered repeat abdominal x-ray for further characterization.   3.  Cardiomegaly   4.  Left lung base atelectasis or infiltrate.      OUTSIDE IMAGES-DX CHEST   Final Result      DX-CHEST-PORTABLE (1 VIEW)   Final Result         1.  Bibasilar atelectasis or early infiltrates.   2.  Trace bilateral pleural effusions   3.  Cardiomegaly         Assessment/Plan  Problem Representation:    * MYLA (acute kidney injury) (HCC)- (present on admission)  Assessment & Plan  Initially improved with dialysis and now nephrology has been reconsulted and following  Unsure of baseline,  this admission creatinine seems to have stabilized within the 2.5-3.0 range  -CTM  -nephrology following w/no need for emergent dialysis at this time, still oliguric making greater than 1500 mL of urine a day, poor long term dialysis patient given co-morbidities  -IV fluids held due to pulmonary edema when given fluids previously  -family elected for comfort care/hospice care    Pacemaker complications  Assessment & Plan  RESOLVED  Was informed by telemetry patient's pacemaker is not pacing properly. Patient asymptomatic at bedside w/ ecg showing intermittent pacing discussed with electrocardio physiology NP embers and plan is to interrogate device, rep notified.  -Pacer performing correctly on interrogation  - DC telemetry    Constipation  Assessment & Plan  s/p enema, repeat KUB no sig stool burden still have x2 bms/daily  -scheduled bowel regimen    Anemia  Assessment & Plan  Hgb trended up from 7.5 to 8.4  CT abdomen showing: Left retroperitoneal hematoma along the left iliopsoas/iliac is muscle measuring approximately 4.3 x 3.4 x 5.9 cm.  Received 2 units pRBC so far.  -monitor H/H daily transfuse if Hgb<7  - comfort care as of 12/7    Delirium- (present on admission)  Assessment & Plan  Waxing and waning, requiring soft restraints at this time  Standard precautions and out of delirium  Avoid offending medication  Avoid interruption of circadian  rhythm  CT head negative for acute changes, unable to provide a MRI imaging secondary to patient's pacemaker.  -D/c tele  -No vitals overnight  -Castaner if disoriented  -Patient alert and oriented x 3 at bedside  -Ok for physical restraints for increased agitation  - comfort care as of 12/7    MSSA (methicillin susceptible Staphylococcus aureus) pneumonia ([facility])- (present on admission)  Assessment & Plan  Finished treatment     Goals of care, counseling/discussion- (present on admission)  Assessment & Plan  Palliative care consulted with family discussion plan completed on 12/06  Due to his co-morbidities with MYLA and hematuria and now with severe pharyngeal dysphagia, his son's have elected for comfort care and hospice care.    Septic shock with acute organ dysfunction due to methicillin susceptible Staphylococcus aureus (MSSA) ([facility])  Assessment & Plan  Present on admission, treated adequately    Hematoma- (present on admission)  Assessment & Plan  CT abdomen showing Left retroperitoneal hematoma along the left iliopsoas/iliac is muscle measuring approximately 4.3 x 3.4 x 5.9 cm.  Was on warfarin which was held and was given FFP and vit K to reverse INR   Also received 2 unit of RBC, Warfarin then switched to apixaban  -apixaban held due to hematuria    Acute on chronic systolic congestive heart failure (HCC)- (present on admission)  Assessment & Plan  Metoprolol tartrate 12.5 BID, EF has been improving  Continue present medical therapy  - comfort care as of 12/7    Hyperkalemia- (present on admission)  Assessment & Plan  Improved s/p kayexalate and insulin + dextrose  -Continue kayexalate  -Low potassium diet  -Monitor     Acute hypoxemic respiratory failure due to COVID-19 ([facility])- (present on admission)  Assessment & Plan  Patient previously treated for pneumonia course of Zosyn for hospital-acquired pneumonia, weaned off of oxygen, however concerned on CT chest CT for possible aspiration pneumonia, will hold n.p.o.  until speech-language pathology sees  - Speech-language pathology recommendations    Restless leg syndrome- (present on admission)  Assessment & Plan  -low dose requip 0.5mg as may be leading to agitation at night due to discomfort    Chronic anticoagulation- (present on admission)  Assessment & Plan  Holding Coumadin. Originally replaced w/ apixiban 5mg BID but now held in setting of hematuria    Paroxysmal atrial fibrillation (HCC)- (present on admission)  Assessment & Plan  Hx of anticoagulation. Warfarin was held and pt received VIt K and also FFP  -switch to apixaban 5 mg BID which was held given continued at high risk for bleeding, Discussion by previous team with patient and son who are agreeable to plan of care  -metoprolol tartrate 12.5 BID    Hematuria  Assessment & Plan  Hematuria still noticable from mcdowell, which was also noted yesterday  -apixaban held  -no growth on urine culture  -per urology:     - Recommend repeat CT a/p w/o due to decreased renal function will avoid contrast    - Hand irrigate mcdowell catheter PRN for clots/catheter not draining    - Monitor for worsening hematuria, consider 3-way mcdowell catheter placement if CBI is indicated    - Continue mcdowell catheter for now, hospital team planning for TOV prior to discharge on tamsulosin    -urology signed off       VTE prophylaxis: pharmacologic prophylaxis contraindicated due to acute blood loss anemia, increased bleeding risk.    I have performed a physical exam and reviewed and updated ROS and Plan today (12/8/2022). In review of yesterday's note (12/7/2022), there are no changes except as documented above.

## 2022-12-08 NOTE — PALLIATIVE CARE
Palliative Care follow-up  0830: Discussed case with Dr. Stockton, updates appreciated. MD plans to adjust medications for better symptom management but is also inquiring about GIP appropriateness. Voalte to hospice liaison for evaluation and order requested from MD.     1300: Call placed to primary RNJosé Miguel. José Miguel states that patient is resting comfortably this afternoon with PRN sublingual medication.     Updated: RN, MD, hospice liaison    Plan: Will continue to monitor clinical picture and support pt/family.     Thank you for allowing Palliative Care to support this patient and family. Contact [phone] for additional assistance, change in patient status, or with any questions/concerns.

## 2022-12-08 NOTE — DISCHARGE PLANNING
Writing rec'd call from son Colin, who reports rec'd call from Palliative care requesting his presence @ bedside. Colin inquires if he should call Hospice as they are requesting his call back. Writer advised to do so and attempted to explain GIP Hospice if indicated by providers here @ [facility]. Colin did not express understanding, however reports does plan to be here @ father's bedside in the morning. Writer reports will be meeting for report on his father in a few minutes and may have further info. Son expresses frustration and ends conversation.

## 2022-12-09 NOTE — DISCHARGE PLANNING
Writer informed of IDT mtg for pt @ noon today w/ pt family. Writer informed that neither son is available @ this time. Reschedule of meeting pending floor RN/hospice liason also @ pending noon meeting reaching son, Colin.

## 2022-12-09 NOTE — CARE PLAN
The patient is Stable - Low risk of patient condition declining or worsening    Shift Goals  Clinical Goals: provide comfort, family meeting tomorrow, dc to hospice  Patient Goals: cesar  Family Goals: meeting tomorrow    Progress made toward(s) clinical / shift goals:    Problem: Psychosocial  Goal: Patient's level of anxiety will decrease  Outcome: Progressing  Patient awakes anxious at times. Patient receiving PRN ativan for anxiety.   Problem: Fall Risk  Goal: Patient will remain free from falls  Outcome: Progressing   Patient remains free from falls, all fall precautions in place.  Problem: Discharge Planning - Comfort Care  Goal: Patient's continuum of care needs are met  Outcome: Progressing   Patient with plans to be discharged to [facility]. CM following. Family meeting planned for tomorrow at noon.   Problem: Respiratory - Comfort Care  Goal: Patient's respiratory function will be supported  Outcome: Progressing   Patient RR status even and unlabored, does wake up occasionally stating he is having difficulty breathing. Has PRN meds available for pain/anxiety.

## 2022-12-09 NOTE — PROGRESS NOTES
Ongoing care no acute issues at this time. Binh Son at bedside at end of shift. Very polite and appreciative that his Dad looked comfortable. Patient resting in bed safely with no c/o anything at this time. Comfort care continued look at PRN list. Will continue care and monitoring as ordered.  Family meeting planned for 1200.

## 2022-12-09 NOTE — PLAN OF CARE (IOPOC)
December 9, 2022  1232    Primary team went by patient's bedside for 12PM meeting, as discussed. Care Management, [facility] Sweeney, RN were also present.   Patient's sons, Theo and Binh, did not arrive.     Attempts to call patient's son, Theo, were made, however patient's phone went straight to voice mail. When Binh was called, he stated he was not coming into the hospital today.     Will continue to care for patient to ensure that he is comfortable.     Pending discharge arrangements with hospice.       Sherry Stockton MD  Internal Medicine PGY-2

## 2022-12-09 NOTE — PROGRESS NOTES
[facility] Internal Medicine Daily Progress Note    Date of Service  12/9/2022    [facility] Team: R IM Green Team   Attending: Mahad Kwong M.d.  Senior Resident: Dr. Stockton  Intern:  Dr. Haynes  Contact Number: [phone]    Chief Complaint  Sean Kicnaid is a 77 y.o. male admitted 11/4/2022 as transfer from [facility] for respiratory failure secondary to COVID    ID:  Mr. Sean Kincaid is a 77 y.o. male with history of COPD, atrial fibrillation (was on anticoagulation), diabetes type 2, history of congestive heart failure and complete heart block, bioprosthetic replacement of the mitral valve, ANALY, EF of 45% who presented 11/4/2022 as a transfer from [facility], complaining of worsening shortness of breath and fatigue found to be COVID-19 positive and is now COVID recovered with persistent MYLA and hematuria and severe pharyngeal dysphagia, on comfort care.    Hospital Course  76 y/o M with hx COPD, afib (was on anticoag), T2DM, hx of CHF and complete heart block, bioprosthetic replacement of mitral valve, ANALY, EF 45% who presented 11/4/2022 as transfer from [facility] with worsening SOB and fatigue. Dx w COVID-19, started initially on BiPAP and then was intubated. Found to have MYLA with Cr 8.0 and K 6.6 and admitted to [facility] ICU requiring ventilation, vasopressors (norepinephrine, vasopressin) and on empiric abx with ceftriaxone. Nephro consulted for MYLA/nonoliguria and was initiated on RRT with improvement in urine output so no longer needed. Developed R thigh/femoral hematoma related to a trialysis catheter placement so anticoagulation held. Required Precedex for sedation, fentanyl for pain control. Respiratory culture +MSSA and initiated on ancef. COVID-19 pneumonia was treated with dexamethasone 6 mg for 10 days. Extubated on 11/8 and was significantly encephalopathic. CT of head was negative for acute changes. Couldn’t do MRI due to pacemaker incompatibility.  "Mentation improved but still had mental slowing, confusion, A&O x1, moving all 4 extremities fairly equally. Palliative care was consulted to discuss goals of care with family and the decision was reached in light of the patient's condition and age to change his CODE STATUS to DNR/DNI. Also noted to have had hypernatremia which resolved. Per SLP, passed swallow evaluation although p.o. intake poor. R groin hematoma stabilizing so restarted on heparin gtt and resumed coumadin with eventual transition to apixaban. Hgb trended down; concern for GI bleed in addition to his hematuria so anticoagulation held.      #MYLA- Creatinine remains elevated with nephrology following with recs that dialysis is not needed at this time. Will continue to avoid nephrotoxins. Urine still slightly blood tinged with small clots. Fluids cannot be given due to risk of worsening his pulmonary edema.       #Hypoxic respiratory failure due to covid s/p intubation c/b HAP, aspiration pneumonia  s/p decadron and now at 2-3 LNC, weaned down to 2.5 LNC. Covid isolation has been lifted.      #Constipation  Repeat KUB without colonic stool burden, will continue with bowel regimen.     #Right psoas hematoma- stabilized     #Severe pharyngeal dysphagia  Aspiration risk discussed during meeting w sonia Purcell/Theo on 12/6 with palliative care Stella Vidal. SLP FEES eval completed with difficulty due to pt not cooperating/agitation. Pt repeatedly stated he wanted to eat and was observed to be coughing following FEES. Sonia agreed to the risk of aspiration to allow patient to eat as he vocalizes that he wants to.     Interval Problem Update  No adverse events overnight. This am, patient was seen sleeping comfortably with nasal cannula displaced. Placed nasal cannula back on his nose, patient states \"I don't want it\" and pulled off nasal cannula. Seen patient in afternoon with nasal cannula in placed and sleeping.   -1200 Medical Team, Hospice Care Team, and " " arrived at bedside for family meeting. Called his Son Binh, who stated he cannot be at family meeting today and his brother Theo may attend but was unsure. Theo could not be reached, call goes to voice mail. Will await for callback from Theo for planning of meeting.     I have discussed this patient's plan of care and discharge plan at IDT rounds today with Case Management, Nursing, Nursing leadership, and other members of the IDT team.    Consultants/Specialty  nephrology Adele QUIÑONES    Code Status  Comfort Care/DNR    Disposition  Patient is medically cleared for discharge.   Anticipate discharge to to hospice.  I have placed the appropriate orders for post-discharge needs.    Review of Systems  Review of Systems   Reason unable to perform ROS: Comfort care-patient was sleeping, did not want to be awaken, when tried to wake up to place back nasal cannula stated \"I don't want it\"   Neurological:  Negative for headaches.   Psychiatric/Behavioral:          Upset about being in the hospital, misses his  wife      Physical Exam  Temp:  [36.7 °C (98 °F)-37.2 °C (99 °F)] 36.7 °C (98 °F)  Pulse:  [] 90  Resp:  [20-30] 20  BP: (134-160)/(76-82) 160/76  SpO2:  [87 %-90 %] 87 %    Physical Exam  Constitutional:       General: He is not in acute distress.     Appearance: He is obese. He is ill-appearing.      Comments: Sleeping comfortably in bed   HENT:      Head: Normocephalic and atraumatic.      Right Ear: External ear normal.      Left Ear: External ear normal.      Nose: Nose normal.      Mouth/Throat:      Mouth: Mucous membranes are dry.      Pharynx: Oropharynx is clear.   Cardiovascular:      Rate and Rhythm: Normal rate and regular rhythm.      Pulses: Normal pulses.   Pulmonary:      Effort: No respiratory distress.      Breath sounds: Normal breath sounds.      Comments: Increased effort with nasal cannula, not in distress  Abdominal:      General: Abdomen is flat.      " Comments: Expressed discomfort with abdominal exam but not grimacing   Musculoskeletal:      Right lower leg: No edema.      Left lower leg: No edema.      Comments: Bilateral venous stasis skin changes to bilateral lower extremities   Skin:     General: Skin is warm and dry.       Fluids    Intake/Output Summary (Last 24 hours) at 12/9/2022 1228  Last data filed at 12/8/2022 1754  Gross per 24 hour   Intake 75 ml   Output --   Net 75 ml       Laboratory  Recent Labs     12/07/22  0054   WBC 8.3   RBC 2.69*   HEMOGLOBIN 7.8*   HEMATOCRIT 26.2*   MCV 97.4   MCH 29.0   MCHC 29.8*   RDW 52.0*   PLATELETCT 380   MPV 9.7     Recent Labs     12/07/22  0054   SODIUM 141   POTASSIUM 5.1   CHLORIDE 107   CO2 23   GLUCOSE 126*   BUN 86*   CREATININE 5.08*   CALCIUM 8.5                     Imaging  US-EXTREMITY VENOUS LOWER BILAT   Final Result      [id number] VIEW   Final Result      1.  No evidence of bowel obstruction.   2.  No significant colonic stool demonstrated.      US-RENAL   Final Result      1.  No evidence of solid renal mass or hydronephrosis.      2.  Ball catheter within the urinary bladder.      DX-CHEST-PORTABLE (1 VIEW)   Final Result         1.  Pulmonary edema and/or infiltrates are identified, which appear somewhat increased since the prior exam.   2.  Cardiomegaly   3.  Atherosclerosis      CT-ABDOMEN-PELVIS W/O   Final Result      1.  No hydronephrosis or obstructing renal or ureteral stone.   2.  Tiny right upper pole calcification could represent a nonobstructing renal stone or cortical calcification.   3.  Decreased size of a left iliacus hematoma, measuring 3.9 x 2.7 cm, previously 4.3 x 3.4 cm.   4.  Right greater than left lower lobe consolidations favoring pneumonia.   5.  Large rectal stool burden.   6.  Gallbladder sludge versus layering stones.   7.  Cardiomegaly.   8.  Fat-containing ventral hernia with a 2.3 cm neck.   9.  Atherosclerotic changes.      DX-CHEST-PORTABLE (1 VIEW)   Final  Result      No acute cardiopulmonary abnormality identified.      CT-ABDOMEN-PELVIS W/O   Final Result      1.  Left retroperitoneal hematoma along the left iliopsoas/iliac is muscle measuring approximately 4.3 x 3.4 x 5.9 cm.      2.  Bilateral lower lobe consolidation could be due to pneumonia or atelectasis.      3.  Small layering stones versus sludge in the gallbladder.      4.  Diverticulosis without diverticulitis.      5.  Moderate amount of stool throughout the colon suggesting constipation.      US-EXTREMITY VENOUS UPPER UNILAT RIGHT   Final Result      DX-WRIST-LIMITED 2- RIGHT   Final Result         1.   No radiographic evidence of acute injury.      2.  Old healed fracture right fifth metacarpal neck.      3.  Moderate osteoarthritic changes of the right first carpal metacarpal joint.      4.  Possible intra-articular loose bodies adjacent to the styloid processes of the radius and ulna.      DX-CHEST-PORTABLE (1 VIEW)   Final Result      1.  Increased pulmonary edema   2.  Bibasilar underinflation atelectasis which could obscure an additional process. This is unchanged.   3.  Persistently enlarged cardiac silhouette      DX-CHEST-PORTABLE (1 VIEW)   Final Result      Mildly improved aeration and stable to slightly improved vascular congestion.      Mildly improved left basilar atelectasis.      DX-CHEST-PORTABLE (1 VIEW)   Final Result      1.  Interval extubation and removal of NG tube.   2.  Increased bibasilar airspace disease.   3.  Increased interstitial edema.   4.  Small left pleural effusion.      CT-HEAD W/O   Final Result      1. No CT evidence of acute infarct, hemorrhage or mass.   2. Moderate global parenchymal atrophy. Chronic small vessel ischemic changes.      DX-CHEST-PORTABLE (1 VIEW)   Final Result      Mildly improved left basilar opacity.      DX-ABDOMEN FOR TUBE PLACEMENT   Final Result      Enteric tube tip projects over the gastric antrum or proximal duodenum       DX-CHEST-PORTABLE (1 VIEW)   Final Result         1. Stable lines and tubes.   2. Worsening left basilar opacity. Small left pleural effusion. Mild right infrahilar atelectasis.         DX-CHEST-PORTABLE (1 VIEW)   Final Result      1.  Cardiomegaly with interstitial edema.      2.  Left lung base atelectasis and minimal left pleural effusion.      DX-CHEST-PORTABLE (1 VIEW)   Final Result      1.  Improved left pleural effusion with adjacent airspace disease.   2.  Improved interstitial infiltrates versus edema.      EC-ECHOCARDIOGRAM COMPLETE W/O CONT   Final Result      DX-CHEST-PORTABLE (1 VIEW)   Final Result      1.  Small left pleural effusion with adjacent airspace disease.   2.  Worsening interstitial infiltrates versus edema.   3.  Right basilar atelectasis.      DX-ABDOMEN FOR TUBE PLACEMENT   Final Result      Enteric tube tip projects over the proximal duodenum      DX-ABDOMEN FOR TUBE PLACEMENT   Final Result         1.  Nonspecific bowel gas pattern in the upper abdomen.   2.  Nasogastric tube, not visualized beyond the mid thorax, see ordered repeat abdominal x-ray for further characterization.   3.  Cardiomegaly   4.  Left lung base atelectasis or infiltrate.      OUTSIDE IMAGES-DX CHEST   Final Result      DX-CHEST-PORTABLE (1 VIEW)   Final Result         1.  Bibasilar atelectasis or early infiltrates.   2.  Trace bilateral pleural effusions   3.  Cardiomegaly           Assessment/Plan  Problem Representation:    Mr. Sean Kincaid is a 77 y.o. male with history of COPD, atrial fibrillation (was on anticoagulation), diabetes type 2, history of congestive heart failure and complete heart block, bioprosthetic replacement of the mitral valve, ANALY, EF of 45% who presented 11/4/2022 as a transfer from [facility], complaining of worsening shortness of breath and fatigue found to be COVID-19 positive and is now COVID recovered with persistent MYLA and hematuria and severe pharyngeal  dysphagia. Son's have elected for comfort care and Hospice care to continue his diet with acceptance of risk of aspiration, awaiting family discussion for Hospice planning.    * MYLA (acute kidney injury) (HCC)- (present on admission)  Assessment & Plan  Initially improved with dialysis and now nephrology has been reconsulted and following  Unsure of baseline,  this admission creatinine seems to have stabilized within the 2.5-3.0 range  -CTM  -nephrology following w/no need for emergent dialysis at this time, still oliguric making greater than 1500 mL of urine a day, poor long term dialysis patient given co-morbidities  -IV fluids held due to pulmonary edema when given fluids previously  -family elected for comfort care/hospice care    Anemia  Assessment & Plan  Hgb trended up from 7.5 to 8.4  CT abdomen showing: Left retroperitoneal hematoma along the left iliopsoas/iliac is muscle measuring approximately 4.3 x 3.4 x 5.9 cm.  Received 2 units pRBC so far.  - comfort care as of 12/7    Goals of care, counseling/discussion- (present on admission)  Assessment & Plan  Palliative care consulted with family discussion plan completed on 12/06  Due to his co-morbidities with MYLA and hematuria and now with severe pharyngeal dysphagia, his son's have elected for comfort care and hospice care.    Constipation  Assessment & Plan  s/p enema, repeat KUB no sig stool burden still have x2 bms/daily  -scheduled bowel regimen    Paroxysmal atrial fibrillation (HCC)- (present on admission)  Assessment & Plan  Hx of anticoagulation. Warfarin was held and pt received VIt K and also FFP  -comfort care, comfort meds only      Hematuria  Assessment & Plan  Hematuria still noticable from mcdowell, which was also noted yesterday  -apixaban held  -no growth on urine culture  -per urology:     - Recommend repeat CT a/p w/o due to decreased renal function will avoid contrast    - Hand irrigate mcdowell catheter PRN for clots/catheter not draining     - Monitor for worsening hematuria, consider 3-way mcdowell catheter placement if CBI is indicated    - Continue mcdowell catheter for now, hospital team planning for TOV prior to discharge on tamsulosin    -urology signed off    Delirium- (present on admission)  Assessment & Plan  Waxing and waning, requiring soft restraints at this time  Standard precautions and out of delirium  Avoid offending medication  Avoid interruption of circadian rhythm  CT head negative for acute changes, unable to provide a MRI imaging secondary to patient's pacemaker.  -D/c tele  -No vitals overnight  -[place] if disoriented  -Patient alert and oriented x 3 at bedside  -Ok for physical restraints for increased agitation  - comfort care as of 12/7    MSSA (methicillin susceptible Staphylococcus aureus) pneumonia ([facility])- (present on admission)  Assessment & Plan  Finished treatment     Septic shock with acute organ dysfunction due to methicillin susceptible Staphylococcus aureus (MSSA) ([facility])  Assessment & Plan  Present on admission, treated adequately    Hematoma- (present on admission)  Assessment & Plan  CT abdomen showing Left retroperitoneal hematoma along the left iliopsoas/iliac is muscle measuring approximately 4.3 x 3.4 x 5.9 cm.  Was on warfarin which was held and was given FFP and vit K to reverse INR   Also received 2 unit of RBC, Warfarin then switched to apixaban  -apixaban held due to hematuria    Acute on chronic systolic congestive heart failure (HCC)- (present on admission)  Assessment & Plan  Metoprolol tartrate 12.5 BID, EF has been improving  Continue present medical therapy  - comfort care as of 12/7    Hyperkalemia- (present on admission)  Assessment & Plan  Improved s/p kayexalate and insulin + dextrose  -comfort care    Acute hypoxemic respiratory failure due to COVID-19 ([facility])- (present on admission)  Assessment & Plan  Patient previously treated for pneumonia course of Zosyn for hospital-acquired pneumonia, weaned off of  oxygen, however concerned on CT chest CT for possible aspiration pneumonia, will hold n.p.o. until speech-language pathology sees  - Speech-language pathology recommendations    Restless leg syndrome- (present on admission)  Assessment & Plan  -comfort care medications for comfort    Chronic anticoagulation- (present on admission)  Assessment & Plan  Holding Coumadin. Originally replaced w/ apixiban 5mg BID but now held in setting of hematuria  -no anticoagulation, comfort care    Pacemaker complications  Assessment & Plan  RESOLVED  Was informed by telemetry patient's pacemaker is not pacing properly. Patient asymptomatic at bedside w/ ecg showing intermittent pacing discussed with electrocardio physiology NP embers and plan is to interrogate device, rep notified.  -Pacer performing correctly on interrogation  - DC telemetry       VTE prophylaxis: SCDs/TEDs    I have performed a physical exam and reviewed and updated ROS and Plan today (12/9/2022). In review of yesterday's note (12/8/2022), there are no changes except as documented above.

## 2022-12-09 NOTE — DISCHARGE PLANNING
Agency/Facility Name: [facility]  Spoke To: Jose Enrique  Outcome: DPA notified Hospice there is a family meeting planned at 1200 for today, 12/9. Jose Enrique to have hospice RN reach out to family.    RN CM notified.

## 2022-12-10 NOTE — PROGRESS NOTES
Patient passed peacefully at 0719 this AM, two RN verified. Yennifer Haynes MD notified and procedures followed. Family notified by MD.

## 2022-12-10 NOTE — PLAN OF CARE (IOPOC)
December 10, 2022  0725    Primary team was notified that the patient may have passed away this AM, at 0719.      Went to patient's bedside to confirm.The patient was found to be unresponsive.   I examined the patient and noted no respirations, no heart sounds, and no withdrawal to pain. Pupil were fixed and dilated.       Time of death: (0719)  Next of kin, Theo and Binh Kincaid, were both notified this AM.   Death summary will be completed by end of day.     Yennifer Haynes MD   Internal Medicine PGY-1

## 2022-12-10 NOTE — CARE PLAN
Family did not meet for meeting with hospice and case management at noon. They arrived later and said they didn't need to talk to them at this time. Patient is very somnolent and words are hard to understand. Agitated when touched but otherwise calm. Patient not alert enough to swallow at this time. Awaiting placement in hospice house.      The patient is Unstable - High likelihood or risk of patient condition declining or worsening    Shift Goals  Clinical Goals: family meeting, dc to hospice  Patient Goals: cesar  Family Goals: fam meeting    Progress made toward(s) clinical / shift goals:        Problem: Psychosocial  Goal: Patient's level of anxiety will decrease  Outcome: Progressing       Patient is not progressing towards the following goals:      Problem: Communication  Goal: The ability to communicate needs accurately and effectively will improve  Outcome: Not Progressing     Problem: Dysphagia  Goal: Dysphagia will improve  Outcome: Not Progressing     Problem: Mobility  Goal: Patient's capacity to carry out activities will improve  Outcome: Not Progressing

## 2022-12-10 NOTE — PROGRESS NOTES
Pt with tachypneic, gurgling, resist oral suctioning, morphine oral as per md order given. Con't to monitor.

## 2022-12-10 NOTE — DISCHARGE SUMMARY
[facility] Internal Medicine Death Summary      Attending: Mahad Kwong M.d.  Senior Resident: Dr. Sherry Stockton   Intern:  Dr. Yennifer Haynes  Call Back Contact Number: [phone]    Admission Date  11/4/2022    Cause of Death  Asystole 2/2 Acute Renal Failure     Comorbid Conditions at the Time of Death  Principal Problem:    MYLA (acute kidney injury) ([facility]) POA: Yes  Active Problems:    Hematuria POA: Unknown    Paroxysmal atrial fibrillation (HCC) POA: Yes    Chronic anticoagulation POA: Yes    Restless leg syndrome POA: Yes    Acute hypoxemic respiratory failure due to COVID-19 (HCC) POA: Yes    Hyperkalemia POA: Yes    Acute on chronic systolic congestive heart failure (HCC) POA: Yes    Hematoma POA: Yes    Septic shock with acute organ dysfunction due to methicillin susceptible Staphylococcus aureus (MSSA) ([facility]) POA: Unknown    Goals of care, counseling/discussion POA: Yes    MSSA (methicillin susceptible Staphylococcus aureus) pneumonia ([facility]) POA: Yes    Delirium POA: Yes    Anemia POA: Unknown    Constipation POA: Unknown    Pacemaker complications POA: Unknown  Resolved Problems:    Pacemaker POA: Yes      Overview: August 2012: [employer] Altural 60, model S606, Serial #[id number],       implanted by Dr. Dennis Gray.    Hypernatremia POA: Yes    History of Presenting Illness and Hospital Course    Mr. Kincaid is a 77 year-old male with PMH of Asthma, Atrial Fibrillation (Rx), Chronic Systolic Heart Failure (S/p PPM), S/p MV Replacement (Bioprosthetic valve), INR Goal 2.5-3.5, Iron Deficiency Anemia, Restless Leg Syndrome (Home Ropinirole), CAD, Type 2 Diabetes Mellitus, Sleep Apnea.    Admitted to ICU on 11/10 with acute hypoxemic respiratory failure 2/2 COPD exacerbation, COVID pneumonia, MSSA pneumonia.   Hospital course complicated by acute blood loss anemia 2/2 peritoneal bleed/right groin hematoma (stable) + hematuria, renal failure (has required transient HD during hospital stay) and CVA (R  lacunar infarct).     Over his hospital course, he had worsening renal failure, hypoxia and acute blood loss. Given his significant co-morbidities, he was not a candidate to receive hemodialysis. Despite aggressive attempts to improve his respiratory state, he continued to have increased oxygen requirements. Patient's blood losses suspected 2/2 GI bleed, however patient was not a candidate to have an endoscopy procedure.     Patient's clinical course was discussed with the patient's two sons, Theo and Binh. Given the patient's condition and poor qualify of life, the sons agreed with Hospice/Comfort are.     Patient passed away this AM, peacefully.     Consultants  Critical Care  Nephrology  Palliative Care  Urology    Procedures  Temporary Dialysis Catheter/Central Line  Hemodialysis x 1      Death Date: 12/10/22   Death Time: 0719    Pronounced By (RN1): Frances Austin  Pronounced By (RN2): Rosa Isela Davis  Pronounced By (MD1): Yennifer Haynes MD  Pronounced By (MD2): Sherry Stockton MD

## 2022-12-10 NOTE — CARE PLAN
The patient is Watcher - Medium risk of patient condition declining or worsening    Shift Goals  Clinical Goals: comfort measures only/pain control  Patient Goals: cesar  Family Goals: hospice placement    Progress made toward(s) clinical / shift goals:    Con't comfort measures per md order. Pain level from 0 - 4 on the flacc nonverbal scale.    Patient is not progressing towards the following goals: